# Patient Record
Sex: MALE | Race: WHITE | Employment: FULL TIME | URBAN - METROPOLITAN AREA
[De-identification: names, ages, dates, MRNs, and addresses within clinical notes are randomized per-mention and may not be internally consistent; named-entity substitution may affect disease eponyms.]

---

## 2017-11-17 ENCOUNTER — TELEPHONE (OUTPATIENT)
Dept: SLEEP MEDICINE | Age: 67
End: 2017-11-17

## 2018-02-12 ENCOUNTER — OFFICE VISIT (OUTPATIENT)
Dept: SLEEP MEDICINE | Age: 68
End: 2018-02-12

## 2018-02-12 VITALS
HEART RATE: 80 BPM | OXYGEN SATURATION: 96 % | TEMPERATURE: 96 F | BODY MASS INDEX: 34.58 KG/M2 | HEIGHT: 71 IN | SYSTOLIC BLOOD PRESSURE: 129 MMHG | DIASTOLIC BLOOD PRESSURE: 77 MMHG | WEIGHT: 247 LBS

## 2018-02-12 DIAGNOSIS — I10 ESSENTIAL HYPERTENSION: ICD-10-CM

## 2018-02-12 DIAGNOSIS — G47.33 OBSTRUCTIVE SLEEP APNEA SYNDROME: Primary | ICD-10-CM

## 2018-02-12 DIAGNOSIS — E66.9 OBESITY (BMI 30.0-34.9): ICD-10-CM

## 2018-02-12 PROBLEM — I49.9 ARRHYTHMIA: Status: ACTIVE | Noted: 2018-02-12

## 2018-02-12 RX ORDER — LANOLIN ALCOHOL/MO/W.PET/CERES
3 CREAM (GRAM) TOPICAL
COMMUNITY

## 2018-02-12 RX ORDER — GLUCOSAM/CHONDRO/HERB 149/HYAL 750-100 MG
1 TABLET ORAL DAILY
COMMUNITY

## 2018-02-12 RX ORDER — ESOMEPRAZOLE MAGNESIUM 40 MG/1
40 CAPSULE, DELAYED RELEASE ORAL DAILY
Status: ON HOLD | COMMUNITY
End: 2021-07-03

## 2018-02-12 RX ORDER — ASPIRIN 81 MG/1
81 TABLET ORAL DAILY
COMMUNITY

## 2018-02-12 RX ORDER — ATORVASTATIN CALCIUM 20 MG/1
20 TABLET, FILM COATED ORAL DAILY
COMMUNITY
End: 2021-03-04

## 2018-02-12 RX ORDER — ASCORBIC ACID 500 MG
1 CAPSULE, EXTENDED RELEASE ORAL DAILY
COMMUNITY
End: 2021-03-10

## 2018-02-12 RX ORDER — TAMSULOSIN HYDROCHLORIDE 0.4 MG/1
0.4 CAPSULE ORAL DAILY
COMMUNITY

## 2018-02-12 RX ORDER — GLUCOSAMINE SULFATE 1500 MG
1000 POWDER IN PACKET (EA) ORAL DAILY
COMMUNITY

## 2018-02-12 RX ORDER — GEMFIBROZIL 600 MG/1
600 TABLET, FILM COATED ORAL 2 TIMES DAILY
COMMUNITY

## 2018-02-12 RX ORDER — FLUTICASONE FUROATE AND VILANTEROL 100; 25 UG/1; UG/1
1 POWDER RESPIRATORY (INHALATION) DAILY
Status: ON HOLD | COMMUNITY
End: 2021-07-03

## 2018-02-12 RX ORDER — ALBUTEROL SULFATE 90 UG/1
1 AEROSOL, METERED RESPIRATORY (INHALATION)
COMMUNITY

## 2018-02-12 NOTE — PROGRESS NOTES
[address] [facility] [place]. [place], [address]  Tel.  [phone]  Fax. 100 [facility] 60  [place], [address] [facility]  Tel.  [phone]  Fax. [phone] [address] Tanya Og 33  Tel.  [phone]  Fax. [phone]         Subjective:      Nora Beaulieu is an 69 y.o. male referred for evaluation for a sleep disorder. He complains of rapid heart rate associated with lucid dreaming. Symptoms began several months ago, gradually improving since that time. He usually can fall asleep in 10-15 minutes. Family or house members note snoring. He denies falling asleep while driving. Nora Beaulieu does wake up frequently at night. He is not bothered by waking up too early and left unable to get back to sleep. He actually sleeps about 8 hours at night and wakes up about 3 times during the night. He does not work shifts:  .   Ilah Solid indicates he does not get too little sleep at night. His bedtime is 1130. He awakens at 0730. He does take naps. He takes 3 naps a week lasting 15 to 30, Minute(s). He has the following observed behaviors:  ;  .  Other remarks: vivid dreams  He is an  retired from [employer] but still doing contract work  [place] Sleepiness Score: 8      No Known Allergies      Current Outpatient Prescriptions:     tamsulosin (FLOMAX) 0.4 mg capsule, Take 0.4 mg by mouth daily. , Disp: , Rfl:     esomeprazole (NEXIUM) 40 mg capsule, Take  by mouth daily. , Disp: , Rfl:     aspirin delayed-release 81 mg tablet, Take  by mouth daily. , Disp: , Rfl:     melatonin 3 mg tablet, Take  by mouth., Disp: , Rfl:     albuterol (VENTOLIN HFA) 90 mcg/actuation inhaler, Take  by inhalation. , Disp: , Rfl:     fluticasone-vilanterol (BREO ELLIPTA) 100-25 mcg/dose inhaler, Take 1 Puff by inhalation daily. , Disp: , Rfl:     atorvastatin (LIPITOR) 20 mg tablet, Take  by mouth daily. , Disp: , Rfl:     gemfibrozil (LOPID) 600 mg tablet, Take 600 mg by mouth two (2) times a day., Disp: , Rfl:     cholecalciferol (VITAMIN D3) 1,000 unit cap, Take  by mouth daily. , Disp: , Rfl:     MULTIVIT-MIN/FA/LYCOPEN/LUTEIN (CENTRUM SILVER ULTRA MEN'S PO), Take  by mouth., Disp: , Rfl:     Omega-3-DHA-EPA-Fish Oil 1,000 mg (120 mg-180 mg) cap, Take  by mouth., Disp: , Rfl:     lecithin 1,200 mg cap, Take  by mouth., Disp: , Rfl:      He  has a past medical history of Arrhythmia and Hypertension. He  has no past surgical history on file. He family history includes Diabetes in his father and mother; Hypertension in his father and mother. He  reports that he has quit smoking. He quit after 5.00 years of use. He has never used smokeless tobacco. He reports that he drinks about 0.6 oz of alcohol per week      Review of Systems:  Constitutional: 30 pound weigh tloss  Eyes:  No blurred vision. CVS:  No significant chest pain  Pulm:  No significant shortness of breath  GI:  No significant nausea or vomiting  :  No significant nocturia  Musculoskeletal:  No significant joint pain at night  Skin:  No significant rashes  Neuro:  No significant dizziness   Psych:  No active mood issues    Sleep Review of Systems: notable for no difficulty falling asleep; infrequent awakenings at night;  regular dreaming noted; no nightmares ; no early morning headaches; no memory problems; no concentration issues; no history of any automobile or occupational accidents due to daytime drowsiness.       Objective:     Visit Vitals    /77    Pulse 80    Temp 96 °F (35.6 °C) (Oral)    Ht 5' 11\" (1.803 m)    Wt 247 lb (112 kg)    SpO2 96%    BMI 34.45 kg/m2         General:   Not in acute distress   Eyes:  Anicteric sclerae, no obvious strabismus   Nose:  No obvious nasal septum deviation    Oropharynx:   Class 3 oropharyngeal outlet, thick tongue base, enlarged and boggy uvula, low-lying soft palate, narrow tonsilo-pharyngeal pilars   Tonsils:   tonsils are present and normal   Neck: Neck circ. in \"inches\": 18; midline trachea   Chest/Lungs:  Equal lung expansion, clear on auscultation    CVS:  Normal rate, regular rhythm; no JVD   Skin:  Warm to touch; no obvious rashes   Neuro:  No focal deficits ; no obvious tremor    Psych:  Normal affect,  normal countenance;          Assessment:       ICD-10-CM ICD-9-CM    1. Obstructive sleep apnea syndrome G47.33 327.23 SLEEP STUDY UNATTENDED, 4 CHANNEL   2. Essential hypertension I10 401.9    3. Obesity (BMI 30.0-34. 9) E66.9 278.00          Plan:     * The patient currently has a Moderate Risk for having sleep apnea. STOP-BANG score 5.  * PSG was ordered for initial evaluation. I have reviewed the different types of sleep studies. Attended sleep studies and home sleep apnea tests. Home sleep testing tests only for the presence and severity of sleep apnea. he understands that if the HSAT does not provide reliable result(such as poor data/failed HSAT recording), he may have to repeat the HSAT or come in for an attended polysomnogram.   * He was provided information on sleep apnea including coresponding risk factors and the importance of proper treatment. * Counseling was provided regarding proper sleep hygiene and safe driving. *Treatment options for sleep apnea were reviewed. he is not against a trial of PAP if found to have significant sleep apnea. The treatment plan was reviewed with the patient in detail and reviewed with the patient and the lead technologist. he understands that the lead technologist will be calling him  with the results and assisting with the next step in the treatment plan as outlined today during the consultation with me. All of his questions were addressed. 2.  Hypertension - he continues on his current regimen. I have reviewed the relationship between hypertension as it relates to sleep-disordered breathing. 3.Obesity - I have counseled the patient regarding the benefits of weight loss.         Thank you for allowing us to participate in your patient's medical care. We'll keep you updated on these investigations.   Radha Guerrier MD  Diplomate in Sleep Medicine  [facility]

## 2018-02-12 NOTE — PATIENT INSTRUCTIONS
[address] [place]. [place], [address]  Tel.  [phone]  Fax. 100 [facility] 60  [place], 200 [address] [facility]  Tel.  [phone]  Fax. [phone] [address] Tanya [place]  Tel.  [phone]  Fax. [phone]     Sleep Apnea: After Your Visit  Your Care Instructions  Sleep apnea occurs when you frequently stop breathing for 10 seconds or longer during sleep. It can be mild to severe, based on the number of times per hour that you stop breathing or have slowed breathing. Blocked or narrowed airways in your nose, mouth, or throat can cause sleep apnea. Your airway can become blocked when your throat muscles and tongue relax during sleep. Sleep apnea is common, occurring in 1 out of 20 individuals. Individuals having any of the following characteristics should be evaluated and treated right away due to high risk and detrimental consequences from untreated sleep apnea:  1. Obesity  2. Congestive Heart failure  3. Atrial Fibrillation  4. Uncontrolled Hypertension  5. Type II Diabetes  6. Night-time Arrhythmias  7. Stroke  8. Pulmonary Hypertension  9. High-risk Driving Populations (pilots, truck drivers, etc.)  10. Patients Considering Weight-loss Surgery    How do you know you have sleep apnea? You probably have sleep apnea if you answer 'yes' to 3 or more of the following questions:  S - Have you been told that you Snore? T - Are you often Tired during the day? O - Has anyone Observed you stop breathing while sleeping? P- Do you have (or are being treated for) high blood Pressure? B - Are you obese (Body Mass Index > 35)? A - Is your Age 48years old or older? N - Is your Neck size greater than 16 inches? G - Are you male Gender? A sleep physician can prescribe a breathing device that prevents tissues in the throat from blocking your airway.  Or your doctor may recommend using a dental device (oral breathing device) to help keep your airway open. In some cases, surgery may be needed to remove enlarged tissues in the throat. Follow-up care is a key part of your treatment and safety. Be sure to make and go to all appointments, and call your doctor if you are having problems. It's also a good idea to know your test results and keep a list of the medicines you take. How can you care for yourself at home? · Lose weight, if needed. It may reduce the number of times you stop breathing or have slowed breathing. · Go to bed at the same time every night. · Sleep on your side. It may stop mild apnea. If you tend to roll onto your back, sew a pocket in the back of your pajama top. Put a tennis ball into the pocket, and stitch the pocket shut. This will help keep you from sleeping on your back. · Avoid alcohol and medicines such as sleeping pills and sedatives before bed. · Do not smoke. Smoking can make sleep apnea worse. If you need help quitting, talk to your doctor about stop-smoking programs and medicines. These can increase your chances of quitting for good. · Prop up the head of your bed 4 to 6 inches by putting bricks under the legs of the bed. · Treat breathing problems, such as a stuffy nose, caused by a cold or allergies. · Use a continuous positive airway pressure (CPAP) breathing machine if lifestyle changes do not help your apnea and your doctor recommends it. The machine keeps your airway from closing when you sleep. · If CPAP does not help you, ask your doctor whether you should try other breathing machines. A bilevel positive airway pressure machine has two types of air pressureâone for breathing in and one for breathing out. Another device raises or lowers air pressure as needed while you breathe. · If your nose feels dry or bleeds when using one of these machines, talk with your doctor about increasing moisture in the air. A humidifier may help.   · If your nose is runny or stuffy from using a breathing machine, talk with your doctor about using decongestants or a corticosteroid nasal spray. When should you call for help? Watch closely for changes in your health, and be sure to contact your doctor if:  · You still have sleep apnea even though you have made lifestyle changes. · You are thinking of trying a device such as CPAP. · You are having problems using a CPAP or similar machine. Where can you learn more? Go to [employer]. Enter D151 in the search box to learn more about \"Sleep Apnea: After Your Visit. \"   © 9445-8696 Healthwise, [employer]. Care instructions adapted under license by Bill Lang[employer] (which disclaims liability or warranty for this information). This care instruction is for use with your licensed healthcare professional. If you have questions about a medical condition or this instruction, always ask your healthcare professional. Isabellbernarda Sails any warranty or liability for your use of this information. PROPER SLEEP HYGIENE    What to avoid  · Do not have drinks with caffeine, such as coffee or black tea, for 8 hours before bed. · Do not smoke or use other types of tobacco near bedtime. Nicotine is a stimulant and can keep you awake. · Avoid drinking alcohol late in the evening, because it can cause you to wake in the middle of the night. · Do not eat a big meal close to bedtime. If you are hungry, eat a light snack. · Do not drink a lot of water close to bedtime, because the need to urinate may wake you up during the night. · Do not read or watch TV in bed. Use the bed only for sleeping and sexual activity. What to try  · Go to bed at the same time every night, and wake up at the same time every morning. Do not take naps during the day. · Keep your bedroom quiet, dark, and cool. · Get regular exercise, but not within 3 to 4 hours of your bedtime. .  · Sleep on a comfortable pillow and mattress.   · If watching the clock makes you anxious, turn it facing away from you so you cannot see the time. · If you worry when you lie down, start a worry book. Well before bedtime, write down your worries, and then set the book and your concerns aside. · Try meditation or other relaxation techniques before you go to bed. · If you cannot fall asleep, get up and go to another room until you feel sleepy. Do something relaxing. Repeat your bedtime routine before you go to bed again. · Make your house quiet and calm about an hour before bedtime. Turn down the lights, turn off the TV, log off the computer, and turn down the volume on music. This can help you relax after a busy day. Drowsy Driving  The [address] [place] [address] Road Traffic Safety Administration cites drowsiness as a causing factor in more than 376,975 police reported crashes annually, resulting in 76,000 injuries and 1,500 deaths. Other surveys suggest 55% of people polled have driven while drowsy in the past year, 23% had fallen asleep but not crashed, 3% crashed, and 2% had and accident due to drowsy driving. Who is at risk? Young Drivers: One study of drowsy driving accidents states that 55% of the drivers were under 25 years. Of those, 75% were male. Shift Workers and Travelers: People who work overnight or travel across time zones frequently are at higher risk of experiencing Circadian Rhythm Disorders. They are trying to work and function when their body is programed to sleep. Sleep Deprived: Lack of sleep has a serious impact on your ability to pay attention or focus on a task. Consistently getting less than the average of 8 hours your body needs creates partial or cumulative sleep deprivation. Untreated Sleep Disorders: Sleep Apnea, Narcolepsy, R.L.S., and other sleep disorders (untreated) prevent a person from getting enough restful sleep. This leads to excessive daytime sleepiness and increases the risk for drowsy driving accidents by up to 7 times.   Medications / Alcohol: Even over the counter medications can cause drowsiness. Medications that impair a drivers attention should have a warning label. Alcohol naturally makes you sleepy and on its own can cause accidents. Combined with excessive drowsiness its effects are amplified. Signs of Drowsy Driving:   * You don't remember driving the last few miles   * You may drift out of your viry   * You are unable to focus and your thoughts wander   * You may yawn more often than normal   * You have difficulty keeping your eyes open / nodding off   * Missing traffic signs, speeding, or tailgating  Prevention-   Good sleep hygiene, lifestyle and behavioral choices have the most impact on drowsy driving. There is no substitute for sleep and the average person requires 8 hours nightly. If you find yourself driving drowsy, stop and sleep. Consider the sleep hygiene tips provided during your visit as well. Medication Refill Policy: Refills for all medications require 1 week advance notice. Please have your pharmacy fax a refill request. We are unable to fax, or call in \"controled substance\" medications and you will need to pick these prescriptions up from our office. Ellie Activation    Thank you for requesting access to Ellie. Please follow the instructions below to securely access and download your online medical record. Ellie allows you to send messages to your doctor, view your test results, renew your prescriptions, schedule appointments, and more. How Do I Sign Up? 1. In your internet browser, go to [email or web address]. 2. Click on the First Time User? Click Here link in the Sign In box. You will see the New Member Sign Up page. 3. Enter your Ellie Access Code exactly as it appears below. You will not need to use this code after youve completed the sign-up process. If you do not sign up before the expiration date, you must request a new code.     Ellie Access Code: [email or web address]  Expires: 5/13/2018  2:10 PM (This is the date your [employer]logics access code will )    4. Enter the last four digits of your Social Security Number (xxxx) and Date of Birth (mm/dd/yyyy) as indicated and click Submit. You will be taken to the next sign-up page. 5. Create a [employer] ID. This will be your Edlogics login ID and cannot be changed, so think of one that is secure and easy to remember. 6. Create a Edlogics password. You can change your password at any time. 7. Enter your Password Reset Question and Answer. This can be used at a later time if you forget your password. 8. Enter your e-mail address. You will receive e-mail notification when new information is available in [address] 9. Click Sign Up. You can now view and download portions of your medical record. 10. Click the Download Summary menu link to download a portable copy of your medical information. Additional Information    If you have questions, please call [phone]. Remember, Edlogics is NOT to be used for urgent needs. For medical emergencies, dial 911.

## 2018-02-12 NOTE — Clinical Note
Thank you for the referral.  I will keep you informed of his progress.  [address], [place]

## 2018-02-20 ENCOUNTER — DOCUMENTATION ONLY (OUTPATIENT)
Dept: SLEEP MEDICINE | Age: 68
End: 2018-02-20

## 2018-02-21 ENCOUNTER — TELEPHONE (OUTPATIENT)
Dept: SLEEP MEDICINE | Age: 68
End: 2018-02-21

## 2018-02-21 NOTE — TELEPHONE ENCOUNTER
HSAT Returned    Date of Study: 2/19/2018    The following information was gathered from the patients study log:    · Lights off: 11:30 PM  · Estimated sleep onset: 12 AM    · Awakened a total of 4 times  · The patient felt they slept 7.5 hours  · Patient took nothing before starting the test  · Sleep quality was worse compared to a usual nights sleep. Further information provided: I had problems keeping the canula up my nose. Post nasal drip.

## 2018-03-01 NOTE — TELEPHONE ENCOUNTER
Patient returned technologist's call regarding sleep study results and can be reached at [phone] today and tomorrow.

## 2018-03-09 ENCOUNTER — TELEPHONE (OUTPATIENT)
Dept: SLEEP MEDICINE | Age: 68
End: 2018-03-09

## 2018-03-15 ENCOUNTER — DOCUMENTATION ONLY (OUTPATIENT)
Dept: SLEEP MEDICINE | Age: 68
End: 2018-03-15

## 2018-03-16 ENCOUNTER — TELEPHONE (OUTPATIENT)
Dept: SLEEP MEDICINE | Age: 68
End: 2018-03-16

## 2018-03-16 NOTE — TELEPHONE ENCOUNTER
HSAT Returned    Date of Study: 3/14/2018    The following information was gathered from the patients study log:    · Lights off: 11:15 PM  · Estimated sleep onset: 11:30 PM    · Awakened a total of 3 times  · The patient felt they slept 6.5 hours  · Patient took nothing before starting the test  · Sleep quality was worse compared to a usual nights sleep.     Further information provided: N/A

## 2018-04-03 ENCOUNTER — HOSPITAL ENCOUNTER (OUTPATIENT)
Dept: SLEEP MEDICINE | Age: 68
Discharge: HOME | End: 2018-04-03
Payer: MEDICARE

## 2018-04-03 ENCOUNTER — OFFICE VISIT (OUTPATIENT)
Dept: SLEEP MEDICINE | Age: 68
End: 2018-04-03

## 2018-04-03 DIAGNOSIS — G47.33 OBSTRUCTIVE SLEEP APNEA SYNDROME: Primary | ICD-10-CM

## 2018-04-03 PROCEDURE — 95806 SLEEP STUDY UNATT&RESP EFFT: CPT

## 2018-04-03 NOTE — PROGRESS NOTES
HSAT  - ED [facility]    · Instruction forms and documentation were reviewed and signed.

## 2018-04-04 ENCOUNTER — TELEPHONE (OUTPATIENT)
Dept: SLEEP MEDICINE | Age: 68
End: 2018-04-04

## 2018-04-04 ENCOUNTER — DOCUMENTATION ONLY (OUTPATIENT)
Dept: SLEEP MEDICINE | Age: 68
End: 2018-04-04

## 2018-04-04 DIAGNOSIS — G47.33 OBSTRUCTIVE SLEEP APNEA (ADULT) (PEDIATRIC): Primary | ICD-10-CM

## 2018-04-04 DIAGNOSIS — G47.34 NOCTURNAL HYPOXEMIA: ICD-10-CM

## 2018-04-04 NOTE — TELEPHONE ENCOUNTER
Results of sleep study in [employer] to convey results to patient    HSAT(#3) showed significant sleep apnea with severe oxygen desaturations. With this result, I think it best for him to come in for an attended PAP titration to ensure the pressures on the PAP adequately control his oxygen levels.  He may need a more advanced mode of therapy (bipap)    Order attached  He will be called with results

## 2018-04-04 NOTE — TELEPHONE ENCOUNTER
HSAT Returned    Date of Study: 4/3/2018    The following information was gathered from the patients study log:    · Lights off: 11 PM  · Estimated sleep onset: 12:30 PM    · Awakened a total of 5 times  · The patient felt they slept 6 hours  · Patient took nothing before starting the test  · Sleep quality was worse compared to a usual nights sleep. Further information provided: Bronchitis had a definite impact.

## 2018-04-09 NOTE — TELEPHONE ENCOUNTER
Reviewed sleep study results with patient. He expressed understanding and is willing to proceed with a PAP Titration Study.

## 2019-02-04 RX ORDER — PROPRANOLOL HYDROCHLORIDE 120 MG/1
120 CAPSULE, EXTENDED RELEASE ORAL DAILY
Status: ON HOLD | COMMUNITY
End: 2021-07-03

## 2019-02-05 ENCOUNTER — HOSPITAL ENCOUNTER (OUTPATIENT)
Age: 69
Setting detail: OUTPATIENT SURGERY
Discharge: HOME | End: 2019-02-05
Attending: INTERNAL MEDICINE | Admitting: INTERNAL MEDICINE
Payer: MEDICARE

## 2019-02-05 ENCOUNTER — ANESTHESIA (OUTPATIENT)
Dept: ENDOSCOPY | Age: 69
End: 2019-02-05
Payer: MEDICARE

## 2019-02-05 ENCOUNTER — ANESTHESIA EVENT (OUTPATIENT)
Dept: ENDOSCOPY | Age: 69
End: 2019-02-05
Payer: MEDICARE

## 2019-02-05 VITALS
DIASTOLIC BLOOD PRESSURE: 76 MMHG | HEART RATE: 69 BPM | TEMPERATURE: 97.7 F | BODY MASS INDEX: 33.74 KG/M2 | HEIGHT: 71 IN | SYSTOLIC BLOOD PRESSURE: 155 MMHG | WEIGHT: 241 LBS | OXYGEN SATURATION: 96 % | RESPIRATION RATE: 14 BRPM

## 2019-02-05 PROCEDURE — 76040000019: Performed by: INTERNAL MEDICINE

## 2019-02-05 PROCEDURE — 77030009426 HC FCPS BIOP ENDOSC BSC -B: Performed by: INTERNAL MEDICINE

## 2019-02-05 PROCEDURE — 77030013992 HC SNR POLYP ENDOSC BSC -B: Performed by: INTERNAL MEDICINE

## 2019-02-05 PROCEDURE — 74011250637 HC RX REV CODE- 250/637: Performed by: INTERNAL MEDICINE

## 2019-02-05 PROCEDURE — 76060000031 HC ANESTHESIA FIRST 0.5 HR: Performed by: INTERNAL MEDICINE

## 2019-02-05 PROCEDURE — 74011250636 HC RX REV CODE- 250/636: Performed by: INTERNAL MEDICINE

## 2019-02-05 PROCEDURE — 88305 TISSUE EXAM BY PATHOLOGIST: CPT

## 2019-02-05 PROCEDURE — 74011250636 HC RX REV CODE- 250/636

## 2019-02-05 RX ORDER — SODIUM CHLORIDE 0.9 % (FLUSH) 0.9 %
5-40 SYRINGE (ML) INJECTION EVERY 8 HOURS
Status: DISCONTINUED | OUTPATIENT
Start: 2019-02-05 | End: 2019-02-05 | Stop reason: HOSPADM

## 2019-02-05 RX ORDER — DEXTROMETHORPHAN/PSEUDOEPHED 2.5-7.5/.8
1.2 DROPS ORAL
Status: DISCONTINUED | OUTPATIENT
Start: 2019-02-05 | End: 2019-02-05 | Stop reason: HOSPADM

## 2019-02-05 RX ORDER — SODIUM CHLORIDE 0.9 % (FLUSH) 0.9 %
5-40 SYRINGE (ML) INJECTION AS NEEDED
Status: DISCONTINUED | OUTPATIENT
Start: 2019-02-05 | End: 2019-02-05 | Stop reason: HOSPADM

## 2019-02-05 RX ORDER — FLUMAZENIL 0.1 MG/ML
0.2 INJECTION INTRAVENOUS
Status: DISCONTINUED | OUTPATIENT
Start: 2019-02-05 | End: 2019-02-05 | Stop reason: HOSPADM

## 2019-02-05 RX ORDER — SODIUM CHLORIDE 9 MG/ML
100 INJECTION, SOLUTION INTRAVENOUS CONTINUOUS
Status: DISCONTINUED | OUTPATIENT
Start: 2019-02-05 | End: 2019-02-05 | Stop reason: HOSPADM

## 2019-02-05 RX ORDER — PROPOFOL 10 MG/ML
INJECTION, EMULSION INTRAVENOUS AS NEEDED
Status: DISCONTINUED | OUTPATIENT
Start: 2019-02-05 | End: 2019-02-05 | Stop reason: HOSPADM

## 2019-02-05 RX ORDER — LIDOCAINE HYDROCHLORIDE 20 MG/ML
INJECTION, SOLUTION EPIDURAL; INFILTRATION; INTRACAUDAL; PERINEURAL AS NEEDED
Status: DISCONTINUED | OUTPATIENT
Start: 2019-02-05 | End: 2019-02-05 | Stop reason: HOSPADM

## 2019-02-05 RX ORDER — FENTANYL CITRATE 50 UG/ML
25 INJECTION, SOLUTION INTRAMUSCULAR; INTRAVENOUS
Status: DISCONTINUED | OUTPATIENT
Start: 2019-02-05 | End: 2019-02-05 | Stop reason: HOSPADM

## 2019-02-05 RX ORDER — MIDAZOLAM HYDROCHLORIDE 1 MG/ML
1-2 INJECTION, SOLUTION INTRAMUSCULAR; INTRAVENOUS
Status: DISCONTINUED | OUTPATIENT
Start: 2019-02-05 | End: 2019-02-05 | Stop reason: HOSPADM

## 2019-02-05 RX ORDER — ATROPINE SULFATE 0.1 MG/ML
0.5 INJECTION INTRAVENOUS
Status: DISCONTINUED | OUTPATIENT
Start: 2019-02-05 | End: 2019-02-05 | Stop reason: HOSPADM

## 2019-02-05 RX ADMIN — SIMETHICONE 80 MG: 20 SUSPENSION/ DROPS ORAL at 15:18

## 2019-02-05 RX ADMIN — LIDOCAINE HYDROCHLORIDE 40 MG: 20 INJECTION, SOLUTION EPIDURAL; INFILTRATION; INTRACAUDAL; PERINEURAL at 15:12

## 2019-02-05 RX ADMIN — PROPOFOL 250 MG: 10 INJECTION, EMULSION INTRAVENOUS at 15:32

## 2019-02-05 RX ADMIN — SODIUM CHLORIDE 100 ML/HR: 900 INJECTION, SOLUTION INTRAVENOUS at 14:56

## 2019-02-05 NOTE — DISCHARGE INSTRUCTIONS
Nicole Montenegro MD  [facility], [address]  [place], 200 [facility]  [phone]  www. [email or web address]    Arlene Lucero  [id number]  1950    COLON DISCHARGE INSTRUCTIONS  Discomfort:  Redness at IV site- apply warm compress to area; if redness or soreness persist- contact your physician  There may be a slight amount of blood passed from the rectum  Gaseous discomfort- walking, belching will help relieve any discomfort  You may not operate a vehicle for 12 hours  You may not engage in an occupation involving machinery or appliances for rest of today  You may not drink alcoholic beverages for at least 12 hours  Avoid making any critical decisions for at least 24 hour  DIET:   High fiber diet. - however -  remember your colon is empty and a heavy meal will produce gas. Avoid these foods:  vegetables, fried / greasy foods, carbonated drinks for today      ACTIVITY:  You may resume your normal daily activities it is recommended that you spend the remainder of the day resting -  avoid any strenuous activity. CALL M.D. ANY SIGN OF:   Increasing pain, nausea, vomiting  Abdominal distension (swelling)  New increased bleeding (oral or rectal)  Fever (chills)  Pain in chest area  Bloody discharge from nose or mouth  Shortness of breath     COLONOSCOPY FINDINGS:  Your colonoscopy showed: a large rectal polyp which was removed. Also have some diverticulosis and internal hemorrhoids. Follow-up Instructions:   Call Dr. Nicole Montenegro if any questions or problems. Telephone # [phone]  Dr. Ada Horton office will notify you of the biopsy results within 7 to 10 days. Should have a repeat colonoscopy in 1 year.

## 2019-02-05 NOTE — ROUTINE PROCESS
Laury Kirt  1950  [id number]    Situation:  Verbal report received from: Leticia Salmon RN  Procedure: Procedure(s):  COLONOSCOPY  ENDOSCOPIC POLYPECTOMY  COLON BIOPSY    Background:    Preoperative diagnosis: RECTAL BLEEDING, ANEMIA  Postoperative diagnosis: diverticulosis, rectal polyp, hemorrhoids    :  Dr. Dc Blood  Assistant(s): Endoscopy Technician-1: Jose Eduardo Moore  Endoscopy RN-1: Arnoldo Monge RN    Specimens:   ID Type Source Tests Collected by Time Destination   1 : rectal polyp  Preservative Rectum  Burton Manning MD 2/5/2019 1529 Pathology   2 : rectal bx Preservative Rectum  Burton Manning MD 2/5/2019 1531 Pathology     H. Pylori  no    Assessment:  Intra-procedure medications     Anesthesia gave intra-procedure sedation and medications, see anesthesia flow sheet yes    Intravenous fluids: NS@ KVO     Vital signs stable     Abdominal assessment: round and soft     Recommendation:  Discharge patient per MD order.   Family or Friend Blake Coffee- wife  Permission to share finding with family or friend yes

## 2019-02-05 NOTE — ANESTHESIA PREPROCEDURE EVALUATION
Anesthetic History Review of Systems / Medical History Patient summary reviewed, nursing notes reviewed and pertinent labs reviewed Pulmonary COPD: mild Comments: Former smoker Neuro/Psych Within defined limits Cardiovascular Hypertension Dysrhythmias : SVT Exercise tolerance: >4 METS Comments: Denies recent problems with heart. GI/Hepatic/Renal 
  
GERD Comments: Rectal Bleeding Endo/Other Hypothyroidism Obesity and anemia Pertinent negatives: No morbid obesity Other Findings Physical Exam 
 
Airway Mallampati: III 
TM Distance: 4 - 6 cm Neck ROM: normal range of motion Mouth opening: Normal 
 
 Cardiovascular Regular rate and rhythm,  S1 and S2 normal,  no murmur, click, rub, or gallop Dental 
 
Dentition: Full upper dentures and Full lower dentures Pulmonary Breath sounds clear to auscultation Abdominal 
GI exam deferred Other Findings Anesthetic Plan ASA: 3 Anesthesia type: total IV anesthesia Induction: Intravenous Anesthetic plan and risks discussed with: Patient Took beta blker on schedule last night

## 2019-02-05 NOTE — PROCEDURES
[facility]                  Colonoscopy Operative Report    2/5/2019      Najma Roy  [id number]  1950    Procedure Type:   Colonoscopy --screening     Indications:    Screening colonoscopy     Pre-operative Diagnosis: see indication above    Post-operative Diagnosis:  See findings below    :  Celina Barriga MD    Referring Provider: Manpreet Ignacio MD      Sedation:  MAC anesthesia Propofol    Pre-Procedural Exam:      Airway: clear,  No airway problems anticipated  Heart: RRR, without gallops or rubs  Lungs: clear bilaterally without wheezes, crackles, or rhonchi  Abdomen: soft, nontender, nondistended, bowel sounds present  Mental Status: awake, alert and oriented to person, place and time     Procedure Details:  After informed consent was obtained with all risks and benefits of procedure explained and preoperative exam completed, the patient was taken to the endoscopy suite and placed in the left lateral decubitus position. Upon sequential sedation as per above, a digital rectal exam was performed . The Olympus videocolonoscope  was inserted in the rectum and carefully advanced to the cecum, which was identified by the ileocecal valve and appendiceal orifice. The cecum was identified by the ileocecal valve and appendiceal orifice. The quality of preparation was good. The colonoscope was slowly withdrawn with careful evaluation between folds. Retroflexion in the rectum was completed demonstrating internal hemorrhoids. Findings:   Rectum: Grade 1 internal hemorrhoid(s);  2.5 cm polyp on short stalk in distal rectum removed in one piece with hot snare. Edematous mucosa adjacent to polyp biopsied. Sigmoid:     - Diverticulosis  Descending Colon:     - Diverticulosis  Transverse Colon: normal  Ascending Colon: normal  Cecum: normal  Terminal Ileum: not intubated      Specimen Removed:  rectal polyp    Complications: None.      EBL: None.    Impression:    1. Two and one half cm rectal polyp removed and surrounding mucosa biopsied  2. Left sided diverticulosis  3. Internal hemorrhoids    Recommendations: --Await pathology. , -Repeat colonoscopy in 1 year. High fiber diet. Resume normal medication(s). Discharge Disposition:  Home in the company of a  when able to ambulate. Silvina Banegas MD    2/5/2019     P. Ozzie Carrel, MD  [facility], [address] [place] [address]  [phone]  [email or web address]

## 2019-02-05 NOTE — H&P
Malia Olivares MD  [facility], [address], [address]  [phone]  [email or web address]    Gastroenterology Outpatient History and Physical    Patient: Bryanna Delma    Physician: Mahesh Pollard MD    Vital Signs: Blood pressure (!) 167/98, pulse 74, temperature 98 °F (36.7 °C), resp. rate 17, height 5' 11\" (1.803 m), weight 109.3 kg (241 lb), SpO2 97 %. Allergies: No Known Allergies    Chief Complaint: Screening colonoscopy    History of Present Illness: Here for a screening colonoscopy. Never has had a colonoscopy . Currently has no GI symptoms. No FH of colon cancer or polyps. History:  Past Medical History:   Diagnosis Date    Arrhythmia     SVT    Chronic obstructive pulmonary disease (HCC)     mild    GERD (gastroesophageal reflux disease)     Hypertension     Thyroid disease     benign thyroid growth, checked annually by Dr. Ruddy Cutler      Past Surgical History:   Procedure Laterality Date    HX HEENT Bilateral     cataract extraction      Social History     Socioeconomic History    Marital status:      Spouse name: Not on file    Number of children: Not on file    Years of education: Not on file    Highest education level: Not on file   Tobacco Use    Smoking status: Former Smoker     Years: 5.00    Smokeless tobacco: Never Used   Substance and Sexual Activity    Alcohol use: Yes     Alcohol/week: 4.2 oz     Types: 7 Glasses of wine per week    Drug use: No      Family History   Problem Relation Age of Onset    Hypertension Mother     Heart Disease Mother     Hypertension Father     Diabetes Father       Patient Active Problem List   Diagnosis Code    Hypertension I10    Arrhythmia I49.9    Obesity (BMI 30.0-34. 9) E66.9       Medications:   Prior to Admission medications    Medication Sig Start Date End Date Taking?  Authorizing Provider   propranolol LA (INDERAL LA) 120 mg SR capsule Take 120 mg by mouth daily. Yes Provider, Historical   tamsulosin (FLOMAX) 0.4 mg capsule Take 0.4 mg by mouth daily. Yes Provider, Historical   esomeprazole (NEXIUM) 40 mg capsule Take 40 mg by mouth daily. Yes Provider, Historical   aspirin delayed-release 81 mg tablet Take 81 mg by mouth daily. Yes Provider, Historical   melatonin 3 mg tablet Take 3 mg by mouth nightly. Yes Provider, Historical   albuterol (VENTOLIN HFA) 90 mcg/actuation inhaler Take 1 Puff by inhalation every four (4) hours as needed. Yes Provider, Historical   fluticasone-vilanterol (BREO ELLIPTA) 100-25 mcg/dose inhaler Take 1 Puff by inhalation daily. Yes Provider, Historical   atorvastatin (LIPITOR) 20 mg tablet Take 20 mg by mouth daily. Yes Provider, Historical   gemfibrozil (LOPID) 600 mg tablet Take 600 mg by mouth two (2) times a day. Yes Provider, Historical   cholecalciferol (VITAMIN D3) 1,000 unit cap Take 1,000 Units by mouth daily. Yes Provider, Historical   MULTIVIT-MIN/FA/LYCOPEN/LUTEIN (CENTRUM SILVER ULTRA MEN'S PO) Take 1 Tab by mouth daily. Yes Provider, Historical   Omega-3-DHA-EPA-Fish Oil 1,000 mg (120 mg-180 mg) cap Take 1 Cap by mouth daily. Yes Provider, Historical   lecithin 1,200 mg cap Take 1 Cap by mouth daily.    Yes Provider, Historical       Physical Exam:     General: well developed, well nourished   HEENT: unremarkable   Heart: regular rhythm no mumur    Lungs: clear   Abdominal:  benign   Neurological: unremarkable   Extremities: no edema     Findings/Diagnosis: Screening colonoscopy    Plan of Care/Planned Procedure: Colonoscopy with monitored anesthesia care sedation    Signed:  Mallory Ewing MD 2/5/2019

## 2019-02-05 NOTE — ANESTHESIA POSTPROCEDURE EVALUATION
Procedure(s): 
COLONOSCOPY 
ENDOSCOPIC POLYPECTOMY 
COLON BIOPSY. Anesthesia Post Evaluation Patient location during evaluation: PACU Note status: Adequate. Level of consciousness: responsive to verbal stimuli and sleepy but conscious Pain management: satisfactory to patient Airway patency: patent Anesthetic complications: no 
Cardiovascular status: acceptable Respiratory status: acceptable Hydration status: acceptable Comments: +Post-Anesthesia Evaluation and Assessment Patient: Vicente Marroquin MRN: [id number]  SSN: [id number] YOB: 1950  Age: 77 y.o. Sex: male Cardiovascular Function/Vital Signs /62   Pulse 81   Temp 36.7 °C (98 °F)   Resp 16   Ht 5' 11\" (1.803 m)   Wt 109.3 kg (241 lb)   SpO2 98%   BMI 33.61 kg/m² Patient is status post Procedure(s): 
COLONOSCOPY 
ENDOSCOPIC POLYPECTOMY 
COLON BIOPSY. Nausea/Vomiting: Controlled. Postoperative hydration reviewed and adequate. Pain: 
Pain Scale 1: Numeric (0 - 10) (02/05/19 1540) Pain Intensity 1: 0 (02/05/19 1540) Managed. Neurological Status: At baseline. Mental Status and Level of Consciousness: Arousable. Pulmonary Status:  
O2 Device: Room air (02/05/19 1540) Adequate oxygenation and airway patent. Complications related to anesthesia: None Post-anesthesia assessment completed. No concerns. Signed By: Dav Garner DO  
 2/5/2019 Post anesthesia nausea and vomiting:  controlled Visit Vitals /62 Pulse 81 Temp 36.7 °C (98 °F) Resp 16 Ht 5' 11\" (1.803 m) Wt 109.3 kg (241 lb) SpO2 98% BMI 33.61 kg/m²

## 2020-01-26 ENCOUNTER — APPOINTMENT (OUTPATIENT)
Dept: CT IMAGING | Age: 70
End: 2020-01-26
Attending: EMERGENCY MEDICINE
Payer: MEDICARE

## 2020-01-26 ENCOUNTER — HOSPITAL ENCOUNTER (EMERGENCY)
Age: 70
Discharge: HOME | End: 2020-01-27
Attending: EMERGENCY MEDICINE
Payer: MEDICARE

## 2020-01-26 ENCOUNTER — APPOINTMENT (OUTPATIENT)
Dept: GENERAL RADIOLOGY | Age: 70
End: 2020-01-26
Attending: EMERGENCY MEDICINE
Payer: MEDICARE

## 2020-01-26 DIAGNOSIS — D72.829 LEUKOCYTOSIS, UNSPECIFIED TYPE: ICD-10-CM

## 2020-01-26 DIAGNOSIS — E87.1 ACUTE HYPONATREMIA: ICD-10-CM

## 2020-01-26 DIAGNOSIS — I10 ACCELERATED HYPERTENSION: ICD-10-CM

## 2020-01-26 DIAGNOSIS — R10.9 ACUTE ABDOMINAL PAIN: Primary | ICD-10-CM

## 2020-01-26 DIAGNOSIS — R68.89 FLU-LIKE SYMPTOMS: ICD-10-CM

## 2020-01-26 LAB
ALBUMIN SERPL-MCNC: 3.9 G/DL (ref 3.5–5)
ALBUMIN/GLOB SERPL: 0.9 {RATIO} (ref 1.1–2.2)
ALP SERPL-CCNC: 64 U/L (ref 45–117)
ALT SERPL-CCNC: 27 U/L (ref 12–78)
ANION GAP SERPL CALC-SCNC: 5 MMOL/L (ref 5–15)
APPEARANCE UR: CLEAR
AST SERPL-CCNC: 23 U/L (ref 15–37)
BACTERIA URNS QL MICRO: NEGATIVE /HPF
BASOPHILS # BLD: 0 K/UL (ref 0–0.1)
BASOPHILS NFR BLD: 0 % (ref 0–1)
BILIRUB SERPL-MCNC: 0.4 MG/DL (ref 0.2–1)
BILIRUB UR QL: NEGATIVE
BUN SERPL-MCNC: 18 MG/DL (ref 6–20)
BUN/CREAT SERPL: 17 (ref 12–20)
CALCIUM SERPL-MCNC: 9.4 MG/DL (ref 8.5–10.1)
CHLORIDE SERPL-SCNC: 98 MMOL/L (ref 97–108)
CO2 SERPL-SCNC: 31 MMOL/L (ref 21–32)
COLOR UR: ABNORMAL
CREAT SERPL-MCNC: 1.06 MG/DL (ref 0.7–1.3)
DIFFERENTIAL METHOD BLD: ABNORMAL
EOSINOPHIL # BLD: 0 K/UL (ref 0–0.4)
EOSINOPHIL NFR BLD: 0 % (ref 0–7)
EPITH CASTS URNS QL MICRO: ABNORMAL /LPF
ERYTHROCYTE [DISTWIDTH] IN BLOOD BY AUTOMATED COUNT: 13.2 % (ref 11.5–14.5)
FLUAV AG NPH QL IA: NEGATIVE
FLUBV AG NOSE QL IA: NEGATIVE
GLOBULIN SER CALC-MCNC: 4.2 G/DL (ref 2–4)
GLUCOSE SERPL-MCNC: 121 MG/DL (ref 65–100)
GLUCOSE UR STRIP.AUTO-MCNC: NEGATIVE MG/DL
HCT VFR BLD AUTO: 44.1 % (ref 36.6–50.3)
HGB BLD-MCNC: 14.3 G/DL (ref 12.1–17)
HGB UR QL STRIP: ABNORMAL
HYALINE CASTS URNS QL MICRO: ABNORMAL /LPF (ref 0–5)
IMM GRANULOCYTES # BLD AUTO: 0.2 K/UL (ref 0–0.04)
IMM GRANULOCYTES NFR BLD AUTO: 1 % (ref 0–0.5)
KETONES UR QL STRIP.AUTO: 40 MG/DL
LACTATE SERPL-SCNC: 1.4 MMOL/L (ref 0.4–2)
LEUKOCYTE ESTERASE UR QL STRIP.AUTO: ABNORMAL
LYMPHOCYTES # BLD: 0.8 K/UL (ref 0.8–3.5)
LYMPHOCYTES NFR BLD: 5 % (ref 12–49)
MCH RBC QN AUTO: 28.7 PG (ref 26–34)
MCHC RBC AUTO-ENTMCNC: 32.4 G/DL (ref 30–36.5)
MCV RBC AUTO: 88.6 FL (ref 80–99)
MONOCYTES # BLD: 0.9 K/UL (ref 0–1)
MONOCYTES NFR BLD: 6 % (ref 5–13)
NEUTS SEG # BLD: 13.1 K/UL (ref 1.8–8)
NEUTS SEG NFR BLD: 88 % (ref 32–75)
NITRITE UR QL STRIP.AUTO: NEGATIVE
NRBC # BLD: 0 K/UL (ref 0–0.01)
NRBC BLD-RTO: 0 PER 100 WBC
PH UR STRIP: 6.5 [PH] (ref 5–8)
PLATELET # BLD AUTO: 155 K/UL (ref 150–400)
PMV BLD AUTO: 11.1 FL (ref 8.9–12.9)
POTASSIUM SERPL-SCNC: 4.1 MMOL/L (ref 3.5–5.1)
PROT SERPL-MCNC: 8.1 G/DL (ref 6.4–8.2)
PROT UR STRIP-MCNC: 30 MG/DL
RBC # BLD AUTO: 4.98 M/UL (ref 4.1–5.7)
RBC #/AREA URNS HPF: ABNORMAL /HPF (ref 0–5)
RBC MORPH BLD: ABNORMAL
SODIUM SERPL-SCNC: 134 MMOL/L (ref 136–145)
SP GR UR REFRACTOMETRY: 1.02 (ref 1–1.03)
TROPONIN I SERPL-MCNC: <0.05 NG/ML
UA: UC IF INDICATED,UAUC: ABNORMAL
UROBILINOGEN UR QL STRIP.AUTO: 0.2 EU/DL (ref 0.2–1)
WBC # BLD AUTO: 15 K/UL (ref 4.1–11.1)
WBC URNS QL MICRO: ABNORMAL /HPF (ref 0–4)

## 2020-01-26 PROCEDURE — 87804 INFLUENZA ASSAY W/OPTIC: CPT

## 2020-01-26 PROCEDURE — 96365 THER/PROPH/DIAG IV INF INIT: CPT

## 2020-01-26 PROCEDURE — 81001 URINALYSIS AUTO W/SCOPE: CPT

## 2020-01-26 PROCEDURE — 80053 COMPREHEN METABOLIC PANEL: CPT

## 2020-01-26 PROCEDURE — 74011250636 HC RX REV CODE- 250/636: Performed by: EMERGENCY MEDICINE

## 2020-01-26 PROCEDURE — 71046 X-RAY EXAM CHEST 2 VIEWS: CPT

## 2020-01-26 PROCEDURE — 74177 CT ABD & PELVIS W/CONTRAST: CPT

## 2020-01-26 PROCEDURE — 74011250637 HC RX REV CODE- 250/637: Performed by: EMERGENCY MEDICINE

## 2020-01-26 PROCEDURE — 84484 ASSAY OF TROPONIN QUANT: CPT

## 2020-01-26 PROCEDURE — 96375 TX/PRO/DX INJ NEW DRUG ADDON: CPT

## 2020-01-26 PROCEDURE — 74011636320 HC RX REV CODE- 636/320: Performed by: EMERGENCY MEDICINE

## 2020-01-26 PROCEDURE — 93005 ELECTROCARDIOGRAM TRACING: CPT

## 2020-01-26 PROCEDURE — 83605 ASSAY OF LACTIC ACID: CPT

## 2020-01-26 PROCEDURE — 87040 BLOOD CULTURE FOR BACTERIA: CPT

## 2020-01-26 PROCEDURE — 99285 EMERGENCY DEPT VISIT HI MDM: CPT

## 2020-01-26 PROCEDURE — 85025 COMPLETE CBC W/AUTO DIFF WBC: CPT

## 2020-01-26 PROCEDURE — 36415 COLL VENOUS BLD VENIPUNCTURE: CPT

## 2020-01-26 PROCEDURE — 96366 THER/PROPH/DIAG IV INF ADDON: CPT

## 2020-01-26 RX ORDER — ACETAMINOPHEN 500 MG
1000 TABLET ORAL
Status: COMPLETED | OUTPATIENT
Start: 2020-01-26 | End: 2020-01-26

## 2020-01-26 RX ORDER — ONDANSETRON 4 MG/1
4 TABLET, ORALLY DISINTEGRATING ORAL
Qty: 20 TAB | Refills: 0 | Status: SHIPPED | OUTPATIENT
Start: 2020-01-26 | End: 2021-03-04

## 2020-01-26 RX ORDER — KETOROLAC TROMETHAMINE 30 MG/ML
30 INJECTION, SOLUTION INTRAMUSCULAR; INTRAVENOUS
Status: COMPLETED | OUTPATIENT
Start: 2020-01-26 | End: 2020-01-26

## 2020-01-26 RX ORDER — LEVOFLOXACIN 500 MG/1
500 TABLET, FILM COATED ORAL DAILY
Qty: 7 TAB | Refills: 0 | Status: SHIPPED | OUTPATIENT
Start: 2020-01-26 | End: 2020-02-02

## 2020-01-26 RX ORDER — KETOROLAC TROMETHAMINE 10 MG/1
10 TABLET, FILM COATED ORAL
Qty: 20 TAB | Refills: 0 | Status: SHIPPED | OUTPATIENT
Start: 2020-01-26 | End: 2021-03-04

## 2020-01-26 RX ORDER — LEVOFLOXACIN 5 MG/ML
750 INJECTION, SOLUTION INTRAVENOUS ONCE
Status: COMPLETED | OUTPATIENT
Start: 2020-01-26 | End: 2020-01-26

## 2020-01-26 RX ORDER — ONDANSETRON 2 MG/ML
4 INJECTION INTRAMUSCULAR; INTRAVENOUS
Status: COMPLETED | OUTPATIENT
Start: 2020-01-26 | End: 2020-01-26

## 2020-01-26 RX ORDER — SODIUM CHLORIDE 0.9 % (FLUSH) 0.9 %
10 SYRINGE (ML) INJECTION
Status: COMPLETED | OUTPATIENT
Start: 2020-01-26 | End: 2020-01-26

## 2020-01-26 RX ORDER — SODIUM CHLORIDE 0.9 % (FLUSH) 0.9 %
5-10 SYRINGE (ML) INJECTION AS NEEDED
Status: DISCONTINUED | OUTPATIENT
Start: 2020-01-26 | End: 2020-01-27 | Stop reason: HOSPADM

## 2020-01-26 RX ADMIN — ONDANSETRON 4 MG: 2 INJECTION INTRAMUSCULAR; INTRAVENOUS at 21:12

## 2020-01-26 RX ADMIN — KETOROLAC TROMETHAMINE 30 MG: 30 INJECTION, SOLUTION INTRAMUSCULAR at 23:48

## 2020-01-26 RX ADMIN — Medication 10 ML: at 22:10

## 2020-01-26 RX ADMIN — SODIUM CHLORIDE 1000 ML: 900 INJECTION, SOLUTION INTRAVENOUS at 22:56

## 2020-01-26 RX ADMIN — IOPAMIDOL 100 ML: 755 INJECTION, SOLUTION INTRAVENOUS at 22:10

## 2020-01-26 RX ADMIN — ACETAMINOPHEN 1000 MG: 500 TABLET ORAL at 21:11

## 2020-01-26 RX ADMIN — LEVOFLOXACIN 750 MG: 5 INJECTION, SOLUTION INTRAVENOUS at 21:14

## 2020-01-27 VITALS
OXYGEN SATURATION: 95 % | WEIGHT: 257.94 LBS | BODY MASS INDEX: 36.93 KG/M2 | TEMPERATURE: 100.1 F | SYSTOLIC BLOOD PRESSURE: 138 MMHG | DIASTOLIC BLOOD PRESSURE: 78 MMHG | HEIGHT: 70 IN | HEART RATE: 112 BPM | RESPIRATION RATE: 22 BRPM

## 2020-01-27 LAB
ATRIAL RATE: 106 BPM
CALCULATED P AXIS, ECG09: 69 DEGREES
CALCULATED R AXIS, ECG10: 60 DEGREES
CALCULATED T AXIS, ECG11: 73 DEGREES
DIAGNOSIS, 93000: NORMAL
P-R INTERVAL, ECG05: 200 MS
Q-T INTERVAL, ECG07: 328 MS
QRS DURATION, ECG06: 94 MS
QTC CALCULATION (BEZET), ECG08: 435 MS
VENTRICULAR RATE, ECG03: 106 BPM

## 2020-01-27 NOTE — ED PROVIDER NOTES
EMERGENCY DEPARTMENT HISTORY AND PHYSICAL EXAM      Please note that this dictation was completed with [employer], the computer voice recognition software. Quite often unanticipated grammatical, syntax, homophones, and other interpretive errors are inadvertently transcribed by the computer software. Please disregard these errors and any errors that have escaped final proofreading. Thank you. Date: 1/26/2020  Patient Name: Gary Cedeno  Patient Age and Sex: 68 y.o. male    History of Presenting Illness     Chief Complaint   Patient presents with    Fever     since last night, fever, chills. History Provided By: Patient    HPI: Gary Cedeno, 68 y.o. male with past medical history as documented below presents to the ED with c/o of acute febrile illness with associated chills, urinary frequency and dysuria. Pt noted to have a fever of 102F at home yesterday, today reporting persistent chills and urinary sx's. He states his HR is always elevated and currently takes propanolol. He reports mild dull lower abdominal discomfort as well as \"prostate pain. \" Pt denies any other alleviating or exacerbating factors. Additionally, pt specifically denies any recent headache, CP, SOB, lightheadedness, dizziness, numbness, weakness, BLE swelling, heart palpitations, diarrhea, constipation, melena, hematochezia, cough, or congestion. There are no other complaints, changes or physical findings at this time.      PCP: Abigail Menjivar MD    Past History   Past Medical History:  Past Medical History:   Diagnosis Date    Arrhythmia     SVT    Chronic obstructive pulmonary disease (HCC)     mild    GERD (gastroesophageal reflux disease)     Hypertension     Thyroid disease     benign thyroid growth, checked annually by Dr. Kaiden Nassar       Past Surgical History:  Past Surgical History:   Procedure Laterality Date    COLONOSCOPY N/A 2/5/2019    COLONOSCOPY performed by Chari Tineo MD at [facility]    COLONOSCOPY,[facility],SNARE  2/5/2019         HX HEENT Bilateral     cataract extraction    ID COLON CA SCRN NOT  [address] IND  2/5/2019            Family History:  Family History   Problem Relation Age of Onset    Hypertension Mother     Heart Disease Mother     Hypertension Father     Diabetes Father        Social History:  Social History     Tobacco Use    Smoking status: Former Smoker     Years: 5.00    Smokeless tobacco: Never Used   Substance Use Topics    Alcohol use: Yes     Alcohol/week: 7.0 standard drinks     Types: 7 Glasses of wine per week    Drug use: No       Allergies:  No Known Allergies    Current Medications:  No current facility-administered medications on file prior to encounter. Current Outpatient Medications on File Prior to Encounter   Medication Sig Dispense Refill    propranolol LA (INDERAL LA) 120 mg SR capsule Take 120 mg by mouth daily.  tamsulosin (FLOMAX) 0.4 mg capsule Take 0.4 mg by mouth daily.  esomeprazole (NEXIUM) 40 mg capsule Take 40 mg by mouth daily.  aspirin delayed-release 81 mg tablet Take 81 mg by mouth daily.  melatonin 3 mg tablet Take 3 mg by mouth nightly.  albuterol (VENTOLIN HFA) 90 mcg/actuation inhaler Take 1 Puff by inhalation every four (4) hours as needed.  fluticasone-vilanterol (BREO ELLIPTA) 100-25 mcg/dose inhaler Take 1 Puff by inhalation daily.  atorvastatin (LIPITOR) 20 mg tablet Take 20 mg by mouth daily.  gemfibrozil (LOPID) 600 mg tablet Take 600 mg by mouth two (2) times a day.  cholecalciferol (VITAMIN D3) 1,000 unit cap Take 1,000 Units by mouth daily.  MULTIVIT-MIN/FA/LYCOPEN/LUTEIN (CENTRUM SILVER ULTRA MEN'S PO) Take 1 Tab by mouth daily.  Omega-3-DHA-EPA-Fish Oil 1,000 mg (120 mg-180 mg) cap Take 1 Cap by mouth daily.  lecithin 1,200 mg cap Take 1 Cap by mouth daily.          Review of Systems   Review of Systems   Constitutional: Positive for chills and fever.   HENT: Negative. Negative for congestion, facial swelling, rhinorrhea, sore throat, trouble swallowing and voice change. Eyes: Negative. Respiratory: Negative. Negative for apnea, cough, chest tightness, shortness of breath and wheezing. Cardiovascular: Negative. Negative for chest pain, palpitations and leg swelling. Gastrointestinal: Positive for abdominal pain. Negative for abdominal distention, blood in stool, constipation, diarrhea, nausea and vomiting. Endocrine: Negative. Negative for cold intolerance, heat intolerance and polyuria. Genitourinary: Positive for dysuria and urgency. Negative for difficulty urinating, flank pain, frequency and hematuria. Musculoskeletal: Negative. Negative for arthralgias, back pain, myalgias, neck pain and neck stiffness. Skin: Negative. Negative for color change and rash. Neurological: Negative. Negative for dizziness, syncope, facial asymmetry, speech difficulty, weakness, light-headedness, numbness and headaches. Hematological: Negative. Does not bruise/bleed easily. Psychiatric/Behavioral: Negative. Negative for confusion and self-injury. The patient is not nervous/anxious. Physical Exam   Physical Exam  Vitals signs and nursing note reviewed. Constitutional:       Appearance: He is well-developed. He is not toxic-appearing. HENT:      Head: Normocephalic and atraumatic. Mouth/Throat:      Pharynx: No posterior oropharyngeal erythema. Eyes:      Conjunctiva/sclera: Conjunctivae normal.      Pupils: Pupils are equal, round, and reactive to light. Neck:      Musculoskeletal: Normal range of motion. Cardiovascular:      Rate and Rhythm: Normal rate and regular rhythm. Heart sounds: Normal heart sounds. No murmur. No friction rub. No gallop. Pulmonary:      Effort: Pulmonary effort is normal. No respiratory distress. Breath sounds: Normal breath sounds. No wheezing or rales.    Chest:      Chest wall: No tenderness. Abdominal:      General: Bowel sounds are normal. There is no distension. Palpations: Abdomen is soft. There is no mass. Tenderness: There is no abdominal tenderness. There is no guarding or rebound. Musculoskeletal: Normal range of motion. General: No tenderness or deformity. Skin:     General: Skin is warm. Findings: No rash. Neurological:      Mental Status: He is alert and oriented to person, place, and time. Cranial Nerves: No cranial nerve deficit. Motor: No abnormal muscle tone. Coordination: Coordination normal.      Deep Tendon Reflexes: Reflexes normal.   Psychiatric:         Behavior: Behavior is cooperative. Diagnostic Study Results     Labs -  Recent Results (from the past 24 hour(s))   EKG, 12 LEAD, INITIAL    Collection Time: 01/26/20  8:08 PM   Result Value Ref Range    Ventricular Rate 106 BPM    Atrial Rate 106 BPM    P-R Interval 200 ms    QRS Duration 94 ms    Q-T Interval 328 ms    QTC Calculation (Bezet) 435 ms    Calculated P Axis 69 degrees    Calculated R Axis 60 degrees    Calculated T Axis 73 degrees    Diagnosis Sinus tachycardia  No previous ECGs available      CBC WITH AUTOMATED DIFF    Collection Time: 01/26/20  8:38 PM   Result Value Ref Range    WBC 15.0 (H) 4.1 - 11.1 K/uL    RBC 4.98 4.10 - 5.70 M/uL    HGB 14.3 12.1 - 17.0 g/dL    HCT 44.1 36.6 - 50.3 %    MCV 88.6 80.0 - 99.0 FL    MCH 28.7 26.0 - 34.0 PG    MCHC 32.4 30.0 - 36.5 g/dL    RDW 13.2 11.5 - 14.5 %    PLATELET 383 261 - 596 K/uL    MPV 11.1 8.9 - 12.9 FL    NRBC 0.0 0  WBC    ABSOLUTE NRBC 0.00 0.00 - 0.01 K/uL    NEUTROPHILS 88 (H) 32 - 75 %    LYMPHOCYTES 5 (L) 12 - 49 %    MONOCYTES 6 5 - 13 %    EOSINOPHILS 0 0 - 7 %    BASOPHILS 0 0 - 1 %    IMMATURE GRANULOCYTES 1 (H) 0.0 - 0.5 %    ABS. NEUTROPHILS 13.1 (H) 1.8 - 8.0 K/UL    ABS. LYMPHOCYTES 0.8 0.8 - 3.5 K/UL    ABS. MONOCYTES 0.9 0.0 - 1.0 K/UL    ABS.  EOSINOPHILS 0.0 0.0 - 0.4 K/UL ABS. BASOPHILS 0.0 0.0 - 0.1 K/UL    ABS. IMM. GRANS. 0.2 (H) 0.00 - 0.04 K/UL    DF AUTOMATED      RBC COMMENTS NORMOCYTIC, NORMOCHROMIC     METABOLIC PANEL, COMPREHENSIVE    Collection Time: 01/26/20  8:38 PM   Result Value Ref Range    Sodium 134 (L) 136 - 145 mmol/L    Potassium 4.1 3.5 - 5.1 mmol/L    Chloride 98 97 - 108 mmol/L    CO2 31 21 - 32 mmol/L    Anion gap 5 5 - 15 mmol/L    Glucose 121 (H) 65 - 100 mg/dL    BUN 18 6 - 20 MG/DL    Creatinine 1.06 0.70 - 1.30 MG/DL    BUN/Creatinine ratio 17 12 - 20      GFR est AA >60 >60 ml/min/1.73m2    GFR est non-AA >60 >60 ml/min/1.73m2    Calcium 9.4 8.5 - 10.1 MG/DL    Bilirubin, total 0.4 0.2 - 1.0 MG/DL    ALT (SGPT) 27 12 - 78 U/L    AST (SGOT) 23 15 - 37 U/L    Alk.  phosphatase 64 45 - 117 U/L    Protein, total 8.1 6.4 - 8.2 g/dL    Albumin 3.9 3.5 - 5.0 g/dL    Globulin 4.2 (H) 2.0 - 4.0 g/dL    A-G Ratio 0.9 (L) 1.1 - 2.2     TROPONIN I    Collection Time: 01/26/20  8:38 PM   Result Value Ref Range    Troponin-I, Qt. <0.05 <0.05 ng/mL   LACTIC ACID    Collection Time: 01/26/20  8:38 PM   Result Value Ref Range    Lactic acid 1.4 0.4 - 2.0 MMOL/L   URINALYSIS W/ REFLEX CULTURE    Collection Time: 01/26/20  8:38 PM   Result Value Ref Range    Color YELLOW/STRAW      Appearance CLEAR CLEAR      Specific gravity 1.018 1.003 - 1.030      pH (UA) 6.5 5.0 - 8.0      Protein 30 (A) NEG mg/dL    Glucose NEGATIVE  NEG mg/dL    Ketone 40 (A) NEG mg/dL    Bilirubin NEGATIVE  NEG      Blood LARGE (A) NEG      Urobilinogen 0.2 0.2 - 1.0 EU/dL    Nitrites NEGATIVE  NEG      Leukocyte Esterase TRACE (A) NEG      WBC 0-4 0 - 4 /hpf    RBC  0 - 5 /hpf    Epithelial cells FEW FEW /lpf    Bacteria NEGATIVE  NEG /hpf    UA:UC IF INDICATED CULTURE NOT INDICATED BY UA RESULT CNI      Hyaline cast 0-2 0 - 5 /lpf   INFLUENZA A & B AG (RAPID TEST)    Collection Time: 01/26/20 11:02 PM   Result Value Ref Range    Influenza A Antigen NEGATIVE  NEG      Influenza B Antigen NEGATIVE  NEG         Radiologic Studies -   CT ABD PELV W CONT   Final Result   IMPRESSION:      1. Mild heterogeneous enlargement of the prostate gland, nonspecific. There are   however mildly enlarged left pelvic lymph nodes, correlation recommended. 2. No hydronephrosis. Probable diverticula along the bladder dome. 3. Several subcentimeter hypodensities in the liver, too small to characterize. 4. Appendicolith. No acute appendicitis. XR CHEST PA LAT   Final Result   IMPRESSION:   No acute process. CT Results  (Last 48 hours)               01/26/20 2210  CT ABD PELV W CONT Final result    Impression:  IMPRESSION:       1. Mild heterogeneous enlargement of the prostate gland, nonspecific. There are   however mildly enlarged left pelvic lymph nodes, correlation recommended. 2. No hydronephrosis. Probable diverticula along the bladder dome. 3. Several subcentimeter hypodensities in the liver, too small to characterize. 4. Appendicolith. No acute appendicitis. Narrative:  INDICATION: acute low abd pain, concern for prostatitis       COMPARISON: None       TECHNIQUE:   Following the uneventful intravenous administration of IV contrast, thin axial   images were obtained through the abdomen and pelvis. Coronal and sagittal   reconstructions were generated. Oral contrast was not administered. CT dose   reduction was achieved through use of a standardized protocol tailored for this   examination and automatic exposure control for dose modulation. FINDINGS:   LUNG BASES: No abnormality. LIVER: Several subcentimeter hypodensities, too small to characterize. GALLBLADDER: Unremarkable. SPLEEN: No enlargement or lesion. PANCREAS: No mass or ductal dilatation. ADRENALS: No mass. KIDNEYS: No hydronephrosis. Subcentimeter hypodensity mid left kidney, too small   to characterize. GI TRACT: Sigmoid diverticulosis. No diverticulitis. No bowel wall thickening or   obstruction. PERITONEUM: No free air or free fluid. APPENDIX: Contains calcification. RETROPERITONEUM: No aortic aneurysm. LYMPH NODES: None enlarged. ADDITIONAL COMMENTS: N/A.       URINARY BLADDER: Focal outpouching along the dome, could represent diverticula. REPRODUCTIVE ORGANS: Mild heterogeneous enlargement of the prostate. LYMPH NODES: Mildly enlarged left external iliac and inguinal lymph nodes,   measuring between 11 and 19 mm. FREE FLUID: None. BONES: No destructive bone lesion. ADDITIONAL COMMENTS: N/A. CXR Results  (Last 48 hours)               01/26/20 2024  XR CHEST PA LAT Final result    Impression:  IMPRESSION:   No acute process. Narrative:  INDICATION:   chest pain       COMPARISON: None       FINDINGS:       Frontal and lateral views of the chest demonstrate a normal cardiomediastinal   silhouette. The lungs are adequately expanded. There is no edema, effusion,   consolidation, or pneumothorax. The osseous structures are unremarkable. Medical Decision Making   I am the first provider for this patient. I reviewed the vital signs, available nursing notes, past medical history, past surgical history, family history and social history. Vital Signs-Reviewed the patient's vital signs.   Patient Vitals for the past 24 hrs:   Temp Pulse Resp BP SpO2   01/27/20 0005     95 %   01/27/20 0001  (!) 112 22 138/78 90 %   01/26/20 2357 100.1 °F (37.8 °C)       01/26/20 2345  (!) 110 16 141/64 92 %   01/26/20 2330  (!) 113 20 134/67 93 %   01/26/20 2315  (!) 127 22 149/71 (!) 85 %   01/26/20 2311     92 %   01/26/20 2300  (!) 112 16 159/80 93 %   01/26/20 2259 (!) 100.6 °F (38.1 °C) (!) 113 24 159/80 92 %   01/26/20 2245  (!) 126 24 160/81 (!) 84 %   01/26/20 2215  (!) 120 15 154/84 92 %   01/26/20 2200  (!) 113 26 169/81 94 %   01/26/20 2145  (!) 111 30 (!) 159/93 94 %   01/26/20 2130  (!) 119 25 177/88 (!) 86 %   01/26/20 2115  (!) 116 18 160/89 92 %   01/26/20 2028     92 %   01/26/20 2001 (!) 100.8 °F (38.2 °C) (!) 112 18 (!) 195/94 93 %     Pulse Oximetry Analysis - 93% on RA    Cardiac Monitor:   Rate: 112 bpm  Rhythm: Sinus Tachycardia      ED EKG interpretation:  Rhythm: sinus tachycardia; and regular . Rate (approx.): 106; Axis: normal; P wave: normal; QRS interval: normal ; ST/T wave: normal; Other findings: normal. This EKG was interpreted by Dennys Diaz M.D. Records Reviewed: Nursing Notes, Old Medical Records, Previous electrocardiograms, Previous Radiology Studies and Previous Laboratory Studies    Provider Notes (Medical Decision Making):   Patient presents with fever, tachycardia and concerns for infection. Most likely UTI vs prostatitis. DDx: sepsis 2/2 UTI, PNA, intraabdominal infection (colitis, appendicitis, cholecystitis),  infectious diarrhea, meningitis, soft tissue infection, septic arthritis, flu/viral prodrome. Will follow sepsis protocol and order set by providing IVF resuscitation, obtaining blood and urine cultures, antibiotics, labs, lactate, EKG and frequently reassessing hemodynamic status on the patient. Will hold off on aggressive fluid hydration unless patient shows signs of severe sepsis or shock. ED Course:   Initial assessment performed. The patients presenting problems have been discussed, and they are in agreement with the care plan formulated and outlined with them. I have encouraged them to ask questions as they arise throughout their visit. ALCOHOL/SUBSTANCE ABUSE COUNSELING:  Upon evaluation, pt endorsed recent alcohol/illicit drug use. For approximately 15 minutes, pt has been counseled on the dangers of alcohol and illicit drug use on their health, and they were encouraged to quit as soon as possible in order to decrease further risks to their health. Pt has conveyed their understanding of the risks involved should they continue to use these products.     HYPERTENSION COUNSELING Education was provided to the patient today regarding their hypertension. Patient is made aware of their elevated blood pressure and is instructed to follow up this week with their Primary Care for a recheck. Patient is counseled regarding consequences of chronic, uncontrolled hypertension including kidney disease, heart disease, stroke or even death. Patient states their understanding and agrees to follow up this week. Additionally, during their visit, I discussed sodium restriction, maintaining ideal body weight and regular exercise program as physiologic means to achieve blood pressure control. The patient will strive towards this. I reviewed our electronic medical record system for any past medical records that were available that may contribute to the patient's current condition, the nursing notes and vital signs from today's visit.   Ivy Castro MD    ED Orders Placed :  Orders Placed This Encounter    SEVERE SEPSIS AND SEPTIC SHOCK BUNDLE INITIATED    SEVERE SEPSIS AND SEPTIC SHOCK BUNDLE INITIATED    CULTURE, BLOOD    CULTURE, BLOOD    INFLUENZA A & B AG (RAPID TEST)    XR CHEST PA LAT    CT ABD PELV W CONT    CBC WITH AUTOMATED DIFF    METABOLIC PANEL, COMPREHENSIVE    TROPONIN I    LACTIC ACID, PLASMA    URINALYSIS W/ REFLEX CULTURE    VITAL SIGNS - PER UNIT ROUTINE    STRICT I & O    NEUROLOGIC STATUS ASSESSMENT - PER UNIT ROUTINE    NOTIFY PROVIDER: SPECIFY Notify provider within one hour to start vasopressors if patient is unable to maintain a MAP of greater than or equal to 65 mmHg despite fluid resuscitation CONTINUOUS STAT    NOTIFY PROVIDER: SPECIFY Notify provider within one hour to start vasopressors if patient is unable to maintain a MAP of greater than or equal to 65 mmHg despite fluid resuscitation CONTINUOUS STAT    VITAL SIGNS - PER UNIT ROUTINE    PO CHALLENGE    EKG 12 LEAD INITIAL    SALINE LOCK IV ONE TIME STAT    sodium chloride (NS) flush 5-10 mL    sodium chloride 0.9 % bolus infusion 1,000 mL    DISCONTD: sodium chloride 0.9 % bolus infusion 1,000 mL    DISCONTD: sodium chloride 0.9 % bolus infusion 1,000 mL    DISCONTD: sodium chloride 0.9 % bolus infusion 510 mL    acetaminophen (TYLENOL) tablet 1,000 mg    ondansetron (ZOFRAN) injection 4 mg    levoFLOXacin (LEVAQUIN) 750 mg in D5W IVPB    iopamidoL (ISOVUE-370) 76 % injection 100 mL    sodium chloride (NS) flush 10 mL    ketorolac (TORADOL) injection 30 mg    levoFLOXacin (LEVAQUIN) 500 mg tablet    ondansetron (ZOFRAN ODT) 4 mg disintegrating tablet    ketorolac (TORADOL) 10 mg tablet     ED Medications Administered:  Medications   sodium chloride (NS) flush 5-10 mL (has no administration in time range)   sodium chloride 0.9 % bolus infusion 1,000 mL (0 mL IntraVENous IV Completed 1/26/20 2357)   acetaminophen (TYLENOL) tablet 1,000 mg (1,000 mg Oral Given 1/26/20 2111)   ondansetron (ZOFRAN) injection 4 mg (4 mg IntraVENous Given 1/26/20 2112)   levoFLOXacin (LEVAQUIN) 750 mg in D5W IVPB (0 mg IntraVENous IV Completed 1/26/20 2356)   iopamidoL (ISOVUE-370) 76 % injection 100 mL (100 mL IntraVENous Given 1/26/20 2210)   sodium chloride (NS) flush 10 mL (10 mL IntraVENous Given 1/26/20 2210)   ketorolac (TORADOL) injection 30 mg (30 mg IntraVENous Given 1/26/20 2348)         Procedure Note - Rectal Exam:   Performed by: Vishal Abraham MD  Chaperoned by: primary RN  Rectal exam performed. Minimally TTP prostate, no abscess palpated, no bleeding. The procedure took 1-15 minutes, and pt tolerated well. Progress Note:  I have re-examined the patient. he feels much better and symptoms improved. Tolerating oral intake. Abdomen is soft and without guarding, rebound or other peritoneal signs. I have discussed with patient the importance of close f/u and to return to the ED if symptoms don't improve or worsen.      Progress Note:  Patient has been reassessed and reports feeling better and symptoms have improved significantly after ED treatment. Patient feels comfortable going home with close follow-up. Veronique Birch final labs and imaging have been reviewed with him and available family and/or caregiver. They have been counseled regarding his diagnosis. He verbally conveys understanding and agreement of the signs, symptoms, diagnosis, treatment and prognosis and additionally agrees to follow up as recommended with Dr. Sugar Zuleta MD and/or specialist in 24 - 48 hours. He also agrees with the care-plan we created together and conveys that all of his questions have been answered. I have also put together some discharge instructions for him that include: 1) educational information regarding their diagnosis, 2) how to care for their diagnosis at home, as well a 3) list of reasons why they would want to return to the ED prior to their follow-up appointment should the patient's condition change or symptoms worsen. I have answered all questions to the patient's satisfaction. Strict return precautions given. He both understood and agreed with plan as discussed. Vital signs stable for discharge. Pt very appreciative of care today. Disposition: Discharge  The pt is ready for discharge. The pt's signs, symptoms, diagnosis, and discharge instructions have been discussed and pt has conveyed their understanding. The pt is to follow up as recommended or return to ER should their symptoms worsen. Plan has been discussed and pt is in full agreement. Plan:  1. Return precautions as discussed. 2.   Discharge Medication List as of 1/26/2020 11:22 PM      START taking these medications    Details   levoFLOXacin (LEVAQUIN) 500 mg tablet Take 1 Tab by mouth daily for 7 days. , Print, Disp-7 Tab, R-0      ondansetron (ZOFRAN ODT) 4 mg disintegrating tablet Take 1 Tab by mouth every eight (8) hours as needed for Nausea or Vomiting., Print, Disp-20 Tab, R-0      ketorolac (TORADOL) 10 mg tablet Take 1 Tab by mouth every six (6) hours as needed for Pain., Print, Disp-20 Tab, R-0         CONTINUE these medications which have NOT CHANGED    Details   propranolol LA (INDERAL LA) 120 mg SR capsule Take 120 mg by mouth daily. , Historical Med      tamsulosin (FLOMAX) 0.4 mg capsule Take 0.4 mg by mouth daily. , Historical Med      esomeprazole (NEXIUM) 40 mg capsule Take 40 mg by mouth daily. , Historical Med      aspirin delayed-release 81 mg tablet Take 81 mg by mouth daily. , Historical Med      melatonin 3 mg tablet Take 3 mg by mouth nightly., Historical Med      albuterol (VENTOLIN HFA) 90 mcg/actuation inhaler Take 1 Puff by inhalation every four (4) hours as needed., Historical Med      fluticasone-vilanterol (BREO ELLIPTA) 100-25 mcg/dose inhaler Take 1 Puff by inhalation daily. , Historical Med      atorvastatin (LIPITOR) 20 mg tablet Take 20 mg by mouth daily. , Historical Med      gemfibrozil (LOPID) 600 mg tablet Take 600 mg by mouth two (2) times a day., Historical Med      cholecalciferol (VITAMIN D3) 1,000 unit cap Take 1,000 Units by mouth daily. , Historical Med      MULTIVIT-MIN/FA/LYCOPEN/LUTEIN (CENTRUM SILVER ULTRA MEN'S PO) Take 1 Tab by mouth daily. , Historical Med      Omega-3-DHA-EPA-Fish Oil 1,000 mg (120 mg-180 mg) cap Take 1 Cap by mouth daily. , Historical Med      lecithin 1,200 mg cap Take 1 Cap by mouth daily. , Historical Med           3. Follow-up Information     Follow up With Specialties Details Why Contact Info    Jillian Graham, [address] [facility]  [phone]      [facility] EMERGENCY DEPT Emergency Medicine  As needed, If symptoms worsen [address] [place] [id number]  [phone]    [facility]    [address] 219 52 Ashwini Sol to return to ED if worse  Diagnosis     Clinical Impression:   1. Acute abdominal pain    2. Leukocytosis, unspecified type    3.  Flu-like symptoms    4. Accelerated hypertension    5. Acute hyponatremia      Attestation:  I personally performed the services described in this documentation on this date, 1/26/2020 for patient Media Hof. I have reviewed and verified that the information is accurate and complete. Jn Condon MD      This note will not be viewable in [address]

## 2020-01-27 NOTE — ED NOTES
Patient c/o fever and chills that started last night, highest temp being 102. Patient is having urinary issues such as frequency, urgency but denies burning with urination. Patient is A&Ox4.

## 2020-01-27 NOTE — DISCHARGE INSTRUCTIONS
[facility]  Hotline    The [facility] Urology Hotline is a resource to assist you when experiencing the symptoms of a kidney stone. Call the [facility] hotline at [phone]. It is available 24/7. When you call this number, a staff member will coordinate appropriate care by either scheduling you a timely appointment at our office or directing you to immediate care, as needed. Thank you for allowing us to take care of you today! We hope we addressed all of your concerns and needs. We strive to provide excellent quality care in the Emergency Department. You will receive a survey after your visit to evaluate the care you were provided. Should you receive a survey from us, we invite you to share your experience and tell us what made it excellent. It was a pleasure serving you, we invite you to share your experience with us, in our pursuit for excellence, should you be selected to receive a survey. The exam and treatment you received in the Emergency Department were for an urgent problem and are not intended as complete care. It is important that you follow up with a doctor, nurse practitioner, or physician assistant for ongoing care. If your symptoms become worse or you do not improve as expected and you are unable to reach your usual health care provider, you should return to the Emergency Department. We are available 24 hours a day. Please take your discharge instructions with you when you go to your follow-up appointment. If you have any problem arranging a follow-up appointment, contact the Emergency Department immediately. If a prescription has been provided, please have it filled as soon as possible to prevent a delay in treatment. Read the entire medication instruction sheet provided to you by the pharmacy.  If you have any questions or reservations about taking the medication due to side effects or interactions with other medications, please call your primary care physician or contact the ER to speak with the charge nurse. Make an appointment with your family doctor or the physician you were referred to for follow-up of this visit as instructed on your discharge paperwork, as this is mandatory follow-up. Return to the ER if you are unable to be seen or if you are unable to be seen in a timely manner. If you have any problem arranging the follow-up visit, contact the Emergency Department immediately. I hope you feel better and thank you again for allow us to provide you with excellent care today at [facility]!       Warmest regards,    Juliane Hooks MD  Emergency Medicine Physician  [facility]      _____________________________________________________________________________________________________________    Vitals:    01/26/20 2001 01/26/20 2028 01/26/20 2259 01/26/20 2311   BP: (!) 195/94  159/80    BP 1 Location:   Right arm    BP Patient Position:   At rest    Pulse: (!) 112  (!) 113    Resp: 18  24    Temp: (!) 100.8 °F (38.2 °C)  (!) 100.6 °F (38.1 °C)    SpO2: 93% 92% 92% 92%   Weight: 117 kg (257 lb 15 oz)      Height: 5' 10\" (1.778 m)          Recent Results (from the past 12 hour(s))   EKG, 12 LEAD, INITIAL    Collection Time: 01/26/20  8:08 PM   Result Value Ref Range    Ventricular Rate 106 BPM    Atrial Rate 106 BPM    P-R Interval 200 ms    QRS Duration 94 ms    Q-T Interval 328 ms    QTC Calculation (Bezet) 435 ms    Calculated P Axis 69 degrees    Calculated R Axis 60 degrees    Calculated T Axis 73 degrees    Diagnosis Sinus tachycardia  No previous ECGs available      CBC WITH AUTOMATED DIFF    Collection Time: 01/26/20  8:38 PM   Result Value Ref Range    WBC 15.0 (H) 4.1 - 11.1 K/uL    RBC 4.98 4.10 - 5.70 M/uL    HGB 14.3 12.1 - 17.0 g/dL    HCT 44.1 36.6 - 50.3 %    MCV 88.6 80.0 - 99.0 FL    MCH 28.7 26.0 - 34.0 PG    MCHC 32.4 30.0 - 36.5 g/dL    RDW 13.2 11.5 - 14.5 % PLATELET 925 697 - 560 K/uL    MPV 11.1 8.9 - 12.9 FL    NRBC 0.0 0  WBC    ABSOLUTE NRBC 0.00 0.00 - 0.01 K/uL    NEUTROPHILS 88 (H) 32 - 75 %    LYMPHOCYTES 5 (L) 12 - 49 %    MONOCYTES 6 5 - 13 %    EOSINOPHILS 0 0 - 7 %    BASOPHILS 0 0 - 1 %    IMMATURE GRANULOCYTES 1 (H) 0.0 - 0.5 %    ABS. NEUTROPHILS 13.1 (H) 1.8 - 8.0 K/UL    ABS. LYMPHOCYTES 0.8 0.8 - 3.5 K/UL    ABS. MONOCYTES 0.9 0.0 - 1.0 K/UL    ABS. EOSINOPHILS 0.0 0.0 - 0.4 K/UL    ABS. BASOPHILS 0.0 0.0 - 0.1 K/UL    ABS. IMM. GRANS. 0.2 (H) 0.00 - 0.04 K/UL    DF AUTOMATED      RBC COMMENTS NORMOCYTIC, NORMOCHROMIC     METABOLIC PANEL, COMPREHENSIVE    Collection Time: 01/26/20  8:38 PM   Result Value Ref Range    Sodium 134 (L) 136 - 145 mmol/L    Potassium 4.1 3.5 - 5.1 mmol/L    Chloride 98 97 - 108 mmol/L    CO2 31 21 - 32 mmol/L    Anion gap 5 5 - 15 mmol/L    Glucose 121 (H) 65 - 100 mg/dL    BUN 18 6 - 20 MG/DL    Creatinine 1.06 0.70 - 1.30 MG/DL    BUN/Creatinine ratio 17 12 - 20      GFR est AA >60 >60 ml/min/1.73m2    GFR est non-AA >60 >60 ml/min/1.73m2    Calcium 9.4 8.5 - 10.1 MG/DL    Bilirubin, total 0.4 0.2 - 1.0 MG/DL    ALT (SGPT) 27 12 - 78 U/L    AST (SGOT) 23 15 - 37 U/L    Alk.  phosphatase 64 45 - 117 U/L    Protein, total 8.1 6.4 - 8.2 g/dL    Albumin 3.9 3.5 - 5.0 g/dL    Globulin 4.2 (H) 2.0 - 4.0 g/dL    A-G Ratio 0.9 (L) 1.1 - 2.2     TROPONIN I    Collection Time: 01/26/20  8:38 PM   Result Value Ref Range    Troponin-I, Qt. <0.05 <0.05 ng/mL   LACTIC ACID    Collection Time: 01/26/20  8:38 PM   Result Value Ref Range    Lactic acid 1.4 0.4 - 2.0 MMOL/L   URINALYSIS W/ REFLEX CULTURE    Collection Time: 01/26/20  8:38 PM   Result Value Ref Range    Color YELLOW/STRAW      Appearance CLEAR CLEAR      Specific gravity 1.018 1.003 - 1.030      pH (UA) 6.5 5.0 - 8.0      Protein 30 (A) NEG mg/dL    Glucose NEGATIVE  NEG mg/dL    Ketone 40 (A) NEG mg/dL    Bilirubin NEGATIVE  NEG      Blood LARGE (A) NEG Urobilinogen 0.2 0.2 - 1.0 EU/dL    Nitrites NEGATIVE  NEG      Leukocyte Esterase TRACE (A) NEG      WBC 0-4 0 - 4 /hpf    RBC  0 - 5 /hpf    Epithelial cells FEW FEW /lpf    Bacteria NEGATIVE  NEG /hpf    UA:UC IF INDICATED CULTURE NOT INDICATED BY UA RESULT CNI      Hyaline cast 0-2 0 - 5 /lpf       CT ABD PELV W CONT   Final Result   IMPRESSION:      1. Mild heterogeneous enlargement of the prostate gland, nonspecific. There are   however mildly enlarged left pelvic lymph nodes, correlation recommended. 2. No hydronephrosis. Probable diverticula along the bladder dome. 3. Several subcentimeter hypodensities in the liver, too small to characterize. 4. Appendicolith. No acute appendicitis. XR CHEST PA LAT   Final Result   IMPRESSION:   No acute process. CT Results  (Last 48 hours)               01/26/20 2219  CT ABD PELV W CONT Final result    Impression:  IMPRESSION:       1. Mild heterogeneous enlargement of the prostate gland, nonspecific. There are   however mildly enlarged left pelvic lymph nodes, correlation recommended. 2. No hydronephrosis. Probable diverticula along the bladder dome. 3. Several subcentimeter hypodensities in the liver, too small to characterize. 4. Appendicolith. No acute appendicitis. Narrative:  INDICATION: acute low abd pain, concern for prostatitis       COMPARISON: None       TECHNIQUE:   Following the uneventful intravenous administration of IV contrast, thin axial   images were obtained through the abdomen and pelvis. Coronal and sagittal   reconstructions were generated. Oral contrast was not administered. CT dose   reduction was achieved through use of a standardized protocol tailored for this   examination and automatic exposure control for dose modulation. FINDINGS:   LUNG BASES: No abnormality. LIVER: Several subcentimeter hypodensities, too small to characterize. GALLBLADDER: Unremarkable. SPLEEN: No enlargement or lesion. PANCREAS: No mass or ductal dilatation. ADRENALS: No mass. KIDNEYS: No hydronephrosis. Subcentimeter hypodensity mid left kidney, too small   to characterize. GI TRACT: Sigmoid diverticulosis. No diverticulitis. No bowel wall thickening or   obstruction. PERITONEUM: No free air or free fluid. APPENDIX: Contains calcification. RETROPERITONEUM: No aortic aneurysm. LYMPH NODES: None enlarged. ADDITIONAL COMMENTS: N/A.       URINARY BLADDER: Focal outpouching along the dome, could represent diverticula. REPRODUCTIVE ORGANS: Mild heterogeneous enlargement of the prostate. LYMPH NODES: Mildly enlarged left external iliac and inguinal lymph nodes,   measuring between 11 and 19 mm. FREE FLUID: None. BONES: No destructive bone lesion. ADDITIONAL COMMENTS: N/A.                  Local Primary Care Physicians   [facility] [phone]  MD Carlos A Lazar MD Clotilde Eck, MD [facility] [phone]  Sanjeev Watters, [facility]  MD Eber Dennison MD Barry Kell, MD [address] [place] [address] [phone]  Gabriela Cera[place] [address] [facility] [phone]  MD Fadi Sherman MD Farrel Memory, MD Louise Aguilar MD   [facility] [phone]  [facility] TLWBMB MD Jackson TAI MD Gracie Grey, NP [address] [phone]  Conley Furnish, MD Kristie Miss, MD Leslye Spearman, MD Caralee Osler, MD Milton Flack, MD Learta Dalton, MD Cal Peon, MD   [facility] POINT [phone]  Jose Luis Hand MD [address] [phone]  MD Clair Young, MD Maria Randhawa MD Medora Bon, MD Fleeta Mayor, MD Colvin Britain, MD   [address] [facility] [phone]  MD Marge Pacheco, P  Lorena Ni, NP  MD Jaky Herrera MD Carl Cos, MD Kaaren Barges Leahhue Reich, [address] [facility] [phone]  MD Ana Garcia MD Sarrah Ponder, MD Durrell Pulley, MD Merilee Knoll, MD   [facility] [phone]  MD Ghislaine Putnam MD [place] [phone]  Josie Watson, MD Yared Bob, MD Tasha Victoria MD   [facility] [phone]  Rita Kim, MD Elpidio Schroeder, MD Winifred Shahid, MD Paco Saldaña, MD Carmita Dorado, NP  Mahad Oro MD [facility]   [phone]  Frida Mckenzie, MD Lali Johns, MD Muna Siddiqui MD     [address] [facility] [phone]  Pepe Genao MD  [facility], FNP  Randolph Hall, FREEDOM Hall, FNP  FREEDOM Akhtar MD Sharma Perl, OSMAN Sakrar,    Miscellaneous:  Neeta Nuñez MD [facility] Departments   For adult and child immunizations, family planning, TB screening, STD testing and women's health services. [facility]: [place] [address] [phone]     [address]: [address] [phone]     [facility]        [address] [address]  For primary care services, woman and child wellness, and some clinics providing specialty care. [facility] -- [facility] [facility]. [facility]5 [facility] [phone]   [address] [facility] [address] [facility] [address] [facility] [phone]   [address] [facility] [address]  [phone]   [address]  [employer] [address] [place] [address] [address] [place] [address] Dr [phone] [address] [facility] [address]  [address] [address] [place] [phone]   [address] [phone]   Judah Coronado [facility] [address] [place] [address], [address] [place] [address]   [facility]: [facility] [address]     [place] [address] Outreach [address]  Community  [phone]   [employer]  [address] ([email or web address])         Sexual Health/Woman Wellness Clinics   For STD/HIV testing and treatment, pregnancy testing and services, men's health, birth control services, LGBT services, and hepatitis/HPV vaccine services. [place] [employer] [place] [facility] [employer] [address] [facility] 75 [facility] 157 [address] [phone]   [facility] [address] 5th floor [phone]   Pregnancy [address] Women [address]  [address]uan Antonio Bradford [phone]        [place] [address]   [phone]   Premium   [phone]   Women, Infant and Children's Services: [address] [phone]       [facility] 121 Intervention   [phone]   [address] [facility] 16.   1212 [facility]

## 2020-01-30 ENCOUNTER — HOSPITAL ENCOUNTER (EMERGENCY)
Age: 70
Discharge: HOME | End: 2020-01-30
Attending: EMERGENCY MEDICINE
Payer: MEDICARE

## 2020-01-30 ENCOUNTER — APPOINTMENT (OUTPATIENT)
Dept: ULTRASOUND IMAGING | Age: 70
End: 2020-01-30
Attending: EMERGENCY MEDICINE
Payer: MEDICARE

## 2020-01-30 VITALS
OXYGEN SATURATION: 97 % | DIASTOLIC BLOOD PRESSURE: 88 MMHG | WEIGHT: 256.84 LBS | BODY MASS INDEX: 36.77 KG/M2 | SYSTOLIC BLOOD PRESSURE: 186 MMHG | HEART RATE: 83 BPM | RESPIRATION RATE: 18 BRPM | HEIGHT: 70 IN | TEMPERATURE: 98 F

## 2020-01-30 DIAGNOSIS — I10 HYPERTENSION, UNSPECIFIED TYPE: ICD-10-CM

## 2020-01-30 DIAGNOSIS — L03.90 CELLULITIS, UNSPECIFIED CELLULITIS SITE: Primary | ICD-10-CM

## 2020-01-30 PROCEDURE — 99282 EMERGENCY DEPT VISIT SF MDM: CPT

## 2020-01-30 PROCEDURE — 93971 EXTREMITY STUDY: CPT

## 2020-01-30 RX ORDER — SULFAMETHOXAZOLE AND TRIMETHOPRIM 800; 160 MG/1; MG/1
1 TABLET ORAL 2 TIMES DAILY
Qty: 14 TAB | Refills: 0 | Status: SHIPPED | OUTPATIENT
Start: 2020-01-30 | End: 2020-02-06

## 2020-01-30 NOTE — ED PROVIDER NOTES
EMERGENCY DEPARTMENT HISTORY AND PHYSICAL EXAM      Date: 1/30/2020  Patient Name: Earlene Pineda    History of Presenting Illness     Chief Complaint   Patient presents with    Leg Swelling       History Provided By: Patient    HPI: Earlene Pineda, 68 y.o. male with PMHx as noted below presents emergency department chief complaint of left leg pain and swelling. Patient notes he was seen here several days ago and diagnosed with urinary tract infection. He was started on Levaquin at that time. Patient notes that Monday he developed redness in the left leg yesterday symptoms worsened and was associated with significant pain swelling in the left lower leg. Patient noted that there was drastic improvement in the pain today however was sent here by his primary care to rule out DVT. He otherwise is denying any chest pain or shortness of breath and is having no fevers, chills or systemic symptoms. PCP: Vanessa Oleary MD    Current Outpatient Medications   Medication Sig Dispense Refill    trimethoprim-sulfamethoxazole (BACTRIM DS) 160-800 mg per tablet Take 1 Tab by mouth two (2) times a day for 7 days. 14 Tab 0    levoFLOXacin (LEVAQUIN) 500 mg tablet Take 1 Tab by mouth daily for 7 days. 7 Tab 0    ondansetron (ZOFRAN ODT) 4 mg disintegrating tablet Take 1 Tab by mouth every eight (8) hours as needed for Nausea or Vomiting. 20 Tab 0    ketorolac (TORADOL) 10 mg tablet Take 1 Tab by mouth every six (6) hours as needed for Pain. 20 Tab 0    propranolol LA (INDERAL LA) 120 mg SR capsule Take 120 mg by mouth daily.  tamsulosin (FLOMAX) 0.4 mg capsule Take 0.4 mg by mouth daily.  esomeprazole (NEXIUM) 40 mg capsule Take 40 mg by mouth daily.  aspirin delayed-release 81 mg tablet Take 81 mg by mouth daily.  melatonin 3 mg tablet Take 3 mg by mouth nightly.  albuterol (VENTOLIN HFA) 90 mcg/actuation inhaler Take 1 Puff by inhalation every four (4) hours as needed.       fluticasone-vilanterol (BREO ELLIPTA) 100-25 mcg/dose inhaler Take 1 Puff by inhalation daily.  atorvastatin (LIPITOR) 20 mg tablet Take 20 mg by mouth daily.  gemfibrozil (LOPID) 600 mg tablet Take 600 mg by mouth two (2) times a day.  cholecalciferol (VITAMIN D3) 1,000 unit cap Take 1,000 Units by mouth daily.  MULTIVIT-MIN/FA/LYCOPEN/LUTEIN (CENTRUM SILVER ULTRA MEN'S PO) Take 1 Tab by mouth daily.  Omega-3-DHA-EPA-Fish Oil 1,000 mg (120 mg-180 mg) cap Take 1 Cap by mouth daily.  lecithin 1,200 mg cap Take 1 Cap by mouth daily. Past History     Past Medical History:  Past Medical History:   Diagnosis Date    Arrhythmia     SVT    Chronic obstructive pulmonary disease (HCC)     mild    GERD (gastroesophageal reflux disease)     Hypertension     Thyroid disease     benign thyroid growth, checked annually by Dr. Hank Zamorano       Past Surgical History:  Past Surgical History:   Procedure Laterality Date    COLONOSCOPY N/A 2/5/2019    COLONOSCOPY [facility] by Daquan Chaudhary MD at [facility] ENDOSCOPY    COLONOSCOPY,REMKOBE RODRIGUES,SNARE  2/5/2019         HX HEENT Bilateral     cataract extraction    ND COLON CA SCRN NOT  [address] IND  2/5/2019            Family History:  Family History   Problem Relation Age of Onset    Hypertension Mother     Heart Disease Mother     Hypertension Father     Diabetes Father        Social History:  Social History     Tobacco Use    Smoking status: Former Smoker     Years: 5.00    Smokeless tobacco: Never Used   Substance Use Topics    Alcohol use: Yes     Alcohol/week: 7.0 standard drinks     Types: 7 Glasses of wine per week    Drug use: No       Allergies:  No Known Allergies      Review of Systems   Review of Systems  Constitutional: Negative for fever, chills, and fatigue. HENT: Negative for congestion, sore throat, rhinorrhea, sneezing and neck stiffness   Eyes: Negative for discharge and redness.    Respiratory: Negative for  shortness of breath, wheezing   Cardiovascular: Negative for chest pain, palpitations   Gastrointestinal: Negative for nausea, vomiting, abdominal pain, constipation, diarrhea and blood in stool. Genitourinary: Negative for dysuria, hematuria, flank pain, decreased urine volume, discharge,   Musculoskeletal: Negative for myalgias or joint pain . Skin: Negative for rash or lesions . Neurological: Negative weakness, light-headedness, numbness and headaches. Physical Exam   Physical Exam    GENERAL: alert and oriented, no acute distress  EYES: PEERL, No injection, discharge or icterus. ENT: Mucous membranes pink and moist.  NECK: Supple  LUNGS: Airway patent. Non-labored respirations. Breath sounds clear with good air entry bilaterally. HEART: Regular rate and rhythm. No peripheral edema  ABDOMEN: Non-distended and non-tender, without guarding or rebound. SKIN:  warm, dry  MSK/EXTREMITIES: The left lower leg is swollen, warm and erythematous, minimal tenderness, no crepitus  NEUROLOGICAL: Alert, oriented      Diagnostic Study Results     Labs -   No results found for this or any previous visit (from the past 12 hour(s)). Radiologic Studies -   No orders to display     CT Results  (Last 48 hours)    None        CXR Results  (Last 48 hours)    None            Medical Decision Making   I am the first provider for this patient. I reviewed the vital signs, available nursing notes, past medical history, past surgical history, family history and social history. Vital Signs-Reviewed the patient's vital signs. Patient Vitals for the past 12 hrs:   Temp Pulse Resp BP SpO2   01/30/20 1236     97 %   01/30/20 1204 98 °F (36.7 °C) 83 18 186/88 96 %         Records Reviewed: Nursing Notes and Old Medical Records    Provider Notes (Medical Decision Making): On presentation, the patient is well appearing, in no acute distress with normal vital signs.   Based on my history and exam the differential diagnosis for this patient includes DVT, cellulitis, erysipelas, Baker's cyst.  The leg is only minimally tender, no crepitus or systemic symptoms concerning for necrotizing soft tissue infection. DVT ultrasound was negative. At this time, symptoms are most consistent with likely cellulitis. Patient has completed 2 days of Levaquin now so will add additional coverage with Bactrim to cover for MRSA. Otherwise patient having no systemic symptoms and feel he is stable for outpatient management. He should return if he develops high fevers or any new concerning symptoms. He should follow-up with his primary care in the next 48 hours for reevaluation. ED Course:   Initial assessment performed. The patients presenting problems have been discussed, and they are in agreement with the care plan formulated and outlined with them. I have encouraged them to ask questions as they arise throughout their visit. PROGRESS NOTE:  The patient has been re-evaluated and is ready for discharge. Reviewed available results with patient and have counseled them on diagnosis and care plan. They have expressed understanding, and all their questions have been answered. They agree with plan and agree to have pt F/U as recommended, or return to the ED if their sxs worsen. Discharge instructions have been provided and explained to them, along with reasons to have pt return to the ED. The patient is amenable to discharge so will discharge pt at this time      Disposition:  home    PLAN:  1. Current Discharge Medication List      START taking these medications    Details   trimethoprim-sulfamethoxazole (BACTRIM DS) 160-800 mg per tablet Take 1 Tab by mouth two (2) times a day for 7 days. Qty: 14 Tab, Refills: 0           2.    Follow-up Information     Follow up With Specialties Details Why Contact Info    Gabi Kerr MD [facility] Schedule an appointment as soon as possible for a visit in 2 days  [address]  [phone]      [facility] EMERGENCY DEPT Emergency Medicine  If symptoms worsen [address]  [address] [facility]  [phone]        Return to ED if worse     Diagnosis     Clinical Impression:   1. Cellulitis, unspecified cellulitis site    2. Hypertension, unspecified type        Please note that this dictation was completed with Dragon, computer voice recognition software. Quite often unanticipated grammatical, syntax, homophones, and other interpretive errors are inadvertently transcribed by the computer software. Please disregard these errors. Additionally, please excuse any errors that have escaped final proofreading.

## 2020-01-30 NOTE — ED NOTES
D/C papers given to and signed by pt, verbalizes understanding.   Pt D/C home with steady gait accompanied by wife

## 2020-01-30 NOTE — ED NOTES
Pt was evaluated in this ED of Sunday for UTI. Pt reports difficulty removing EKG electrode from LLE and pain. Pt c/o increased pain in LLE on Monday followed by redness and swelling.   Pt sent to ED from PCP to r/o LLE DVT

## 2020-01-30 NOTE — ED TRIAGE NOTES
The patient reprots that he was in here Sunday night for a UTI and reports waking up Monday with left leg swelling, reports going to PCP today and was sent here for a DVT rule out

## 2020-01-30 NOTE — DISCHARGE INSTRUCTIONS

## 2020-02-01 LAB
BACTERIA SPEC CULT: NORMAL
BACTERIA SPEC CULT: NORMAL
SERVICE CMNT-IMP: NORMAL
SERVICE CMNT-IMP: NORMAL

## 2020-05-19 ENCOUNTER — APPOINTMENT (OUTPATIENT)
Dept: ULTRASOUND IMAGING | Age: 70
End: 2020-05-19
Attending: EMERGENCY MEDICINE
Payer: MEDICARE

## 2020-05-19 ENCOUNTER — HOSPITAL ENCOUNTER (EMERGENCY)
Age: 70
Discharge: HOME | End: 2020-05-19
Attending: EMERGENCY MEDICINE
Payer: MEDICARE

## 2020-05-19 VITALS
TEMPERATURE: 97.6 F | HEART RATE: 85 BPM | SYSTOLIC BLOOD PRESSURE: 143 MMHG | OXYGEN SATURATION: 94 % | RESPIRATION RATE: 18 BRPM | DIASTOLIC BLOOD PRESSURE: 79 MMHG

## 2020-05-19 DIAGNOSIS — R60.0 EDEMA OF LEFT LOWER EXTREMITY: Primary | ICD-10-CM

## 2020-05-19 PROCEDURE — 93971 EXTREMITY STUDY: CPT

## 2020-05-19 PROCEDURE — 99282 EMERGENCY DEPT VISIT SF MDM: CPT

## 2020-05-19 RX ORDER — FINASTERIDE 5 MG/1
10 TABLET, FILM COATED ORAL DAILY
Status: ON HOLD | COMMUNITY
End: 2021-07-03

## 2020-05-19 NOTE — ED TRIAGE NOTES
TRIAGE NOTE: Pt comes in for LEFT leg swelling since Thursday. Worked hard in the yard that day. Has been using A&D ointment, no pain. History of leg swelling, no hx of DVT.

## 2020-05-19 NOTE — ED PROVIDER NOTES
The history is provided by the patient. Leg Swelling    This is a new problem. Episode onset: 5 days ago associated with 2 small \"welps\" that he thought might be bug bites from gardening. The problem occurs constantly. The problem has been gradually improving. The pain is present in the left lower leg. The quality of the pain is described as aching. The pain is mild. Pertinent negatives include no numbness, full range of motion and no stiffness. He has tried nothing for the symptoms. Past Medical History:   Diagnosis Date    Arrhythmia     SVT    Chronic obstructive pulmonary disease (HCC)     mild    GERD (gastroesophageal reflux disease)     Hypertension     Thyroid disease     benign thyroid growth, checked annually by Dr. Jocelyn Perez       Past Surgical History:   Procedure Laterality Date    COLONOSCOPY N/A 2/5/2019    COLONOSCOPY performed by Gala Baez MD at [facility] ENDOSCOPY    COLONOSCOPY,MAYELA RODRIGUES,SNARE  2/5/2019         HX HEENT Bilateral     cataract extraction    [place] COLON CA SCRN NOT HI [facility] IND  2/5/2019              Family History:   Problem Relation Age of Onset    Hypertension Mother     Heart Disease Mother     Hypertension Father     Diabetes Father        Social History     Socioeconomic History    Marital status:      Spouse name: Not on file    Number of children: Not on file    Years of education: Not on file    Highest education level: Not on file   Occupational History    Not on file   Social Needs    Financial resource strain: Not on file    Food insecurity     Worry: Not on file     Inability: Not on file    Transportation needs     Medical: Not on file     Non-medical: Not on file   Tobacco Use    Smoking status: Former Smoker     Years: 5.00    Smokeless tobacco: Never Used   Substance and Sexual Activity    Alcohol use:  Yes     Alcohol/week: 7.0 standard drinks     Types: 7 Glasses of wine per week    Drug use: No    Sexual activity: Not on file   Lifestyle    Physical activity     Days per week: Not on file     Minutes per session: Not on file    Stress: Not on file   Relationships    Social connections     Talks on phone: Not on file     Gets together: Not on file     Attends Confucianist service: Not on file     Active member of club or organization: Not on file     Attends meetings of clubs or organizations: Not on file     Relationship status: Not on file    Intimate partner violence     Fear of current or ex partner: Not on file     Emotionally abused: Not on file     Physically abused: Not on file     Forced sexual activity: Not on file   Other Topics Concern    Not on file   Social History Narrative    Not on file         ALLERGIES: Patient has no known allergies. Review of Systems   Musculoskeletal: Negative for stiffness. Neurological: Negative for numbness. All other systems reviewed and are negative. Vitals:    05/19/20 0905   BP: 143/79   Pulse: 85   Resp: 18   Temp: 97.6 °F (36.4 °C)   SpO2: 94%            Physical Exam  Vitals signs and nursing note reviewed. Constitutional:       General: He is not in acute distress. Appearance: He is well-developed. HENT:      Head: Normocephalic and atraumatic. Eyes:      Conjunctiva/sclera: Conjunctivae normal.   Neck:      Musculoskeletal: Neck supple. Trachea: No tracheal deviation. Cardiovascular:      Rate and Rhythm: Normal rate and regular rhythm. Pulmonary:      Effort: Pulmonary effort is normal. No respiratory distress. Abdominal:      General: There is no distension. Musculoskeletal: Normal range of motion. General: No deformity. Left lower leg: He exhibits no tenderness. Edema (2+ pitting from level of ankle to tibial tuberosity) present. Skin:     General: Skin is warm and dry. Neurological:      Mental Status: He is alert. Cranial Nerves: No cranial nerve deficit.    Psychiatric:         Behavior: Behavior normal.          MDM     68 y.o. male presents with LLE swelling after noticing what he thinks were 2 insect bites on his left anterior lower leg. DDx includes DVT or reactive edema after a mild cellulitis as he describes initial red streaking and tenderness which has since resolved. No DVT on venous doppler. Discussed compression and elevation as definitive management of peripheral edema. Plan to follow up with PCP as needed and return precautions discussed for worsening or new concerning symptoms.      Procedures

## 2020-05-19 NOTE — ED NOTES
Patient discharged with vital signs stable, AVS, in no acute distress. Leg wrapped with ace bandage.

## 2021-03-04 ENCOUNTER — HOSPITAL ENCOUNTER (INPATIENT)
Age: 71
LOS: 6 days | Discharge: HOME HEALTH | DRG: 189 | End: 2021-03-10
Attending: EMERGENCY MEDICINE | Admitting: HOSPITALIST
Payer: MEDICARE

## 2021-03-04 ENCOUNTER — APPOINTMENT (OUTPATIENT)
Dept: GENERAL RADIOLOGY | Age: 71
DRG: 189 | End: 2021-03-04
Attending: EMERGENCY MEDICINE
Payer: MEDICARE

## 2021-03-04 ENCOUNTER — APPOINTMENT (OUTPATIENT)
Dept: CT IMAGING | Age: 71
DRG: 189 | End: 2021-03-04
Attending: EMERGENCY MEDICINE
Payer: MEDICARE

## 2021-03-04 DIAGNOSIS — J90 CHRONIC BILATERAL PLEURAL EFFUSIONS: ICD-10-CM

## 2021-03-04 DIAGNOSIS — J44.1 ACUTE EXACERBATION OF CHRONIC OBSTRUCTIVE PULMONARY DISEASE (COPD) (HCC): ICD-10-CM

## 2021-03-04 DIAGNOSIS — J96.01 ACUTE RESPIRATORY FAILURE WITH HYPOXIA (HCC): Primary | ICD-10-CM

## 2021-03-04 LAB
ALBUMIN SERPL-MCNC: 3.5 G/DL (ref 3.5–5)
ALBUMIN/GLOB SERPL: 0.9 {RATIO} (ref 1.1–2.2)
ALP SERPL-CCNC: 80 U/L (ref 45–117)
ALT SERPL-CCNC: 43 U/L (ref 12–78)
ANION GAP SERPL CALC-SCNC: 5 MMOL/L (ref 5–15)
APPEARANCE UR: CLEAR
ARTERIAL PATENCY WRIST A: YES
AST SERPL-CCNC: 23 U/L (ref 15–37)
ATRIAL RATE: 88 BPM
BACTERIA URNS QL MICRO: NEGATIVE /HPF
BASE EXCESS BLD CALC-SCNC: 11 MMOL/L
BASOPHILS # BLD: 0 K/UL (ref 0–0.1)
BASOPHILS NFR BLD: 0 % (ref 0–1)
BDY SITE: ABNORMAL
BILIRUB SERPL-MCNC: 0.4 MG/DL (ref 0.2–1)
BILIRUB UR QL: NEGATIVE
BNP SERPL-MCNC: 1192 PG/ML
BUN SERPL-MCNC: 24 MG/DL (ref 6–20)
BUN/CREAT SERPL: 21 (ref 12–20)
CA-I BLD-SCNC: 1.38 MMOL/L (ref 1.12–1.32)
CALCIUM SERPL-MCNC: 8.8 MG/DL (ref 8.5–10.1)
CALCULATED P AXIS, ECG09: 62 DEGREES
CALCULATED R AXIS, ECG10: 42 DEGREES
CALCULATED T AXIS, ECG11: 34 DEGREES
CHLORIDE SERPL-SCNC: 98 MMOL/L (ref 97–108)
CK SERPL-CCNC: 164 U/L (ref 39–308)
CO2 SERPL-SCNC: 33 MMOL/L (ref 21–32)
COLOR UR: ABNORMAL
COVID-19 RAPID TEST, COVR: ABNORMAL
CREAT SERPL-MCNC: 1.15 MG/DL (ref 0.7–1.3)
D DIMER PPP FEU-MCNC: 1.26 MG/L FEU (ref 0–0.65)
DIAGNOSIS, 93000: NORMAL
DIFFERENTIAL METHOD BLD: ABNORMAL
EOSINOPHIL # BLD: 0.1 K/UL (ref 0–0.4)
EOSINOPHIL NFR BLD: 1 % (ref 0–7)
EPITH CASTS URNS QL MICRO: ABNORMAL /LPF
ERYTHROCYTE [DISTWIDTH] IN BLOOD BY AUTOMATED COUNT: 16.1 % (ref 11.5–14.5)
GAS FLOW.O2 O2 DELIVERY SYS: ABNORMAL L/MIN
GAS FLOW.O2 SETTING OXYMISER: 6 L/M
GLOBULIN SER CALC-MCNC: 4.1 G/DL (ref 2–4)
GLUCOSE SERPL-MCNC: 156 MG/DL (ref 65–100)
GLUCOSE UR STRIP.AUTO-MCNC: NEGATIVE MG/DL
HCO3 BLD-SCNC: 38.6 MMOL/L (ref 22–26)
HCT VFR BLD AUTO: 45.8 % (ref 36.6–50.3)
HGB BLD-MCNC: 14 G/DL (ref 12.1–17)
HGB UR QL STRIP: NEGATIVE
HYALINE CASTS URNS QL MICRO: ABNORMAL /LPF (ref 0–5)
IMM GRANULOCYTES # BLD AUTO: 0.1 K/UL (ref 0–0.04)
IMM GRANULOCYTES NFR BLD AUTO: 1 % (ref 0–0.5)
KETONES UR QL STRIP.AUTO: NEGATIVE MG/DL
LACTATE BLD-SCNC: 0.62 MMOL/L (ref 0.4–2)
LEUKOCYTE ESTERASE UR QL STRIP.AUTO: NEGATIVE
LYMPHOCYTES # BLD: 1.8 K/UL (ref 0.8–3.5)
LYMPHOCYTES NFR BLD: 18 % (ref 12–49)
MAGNESIUM SERPL-MCNC: 2 MG/DL (ref 1.6–2.4)
MCH RBC QN AUTO: 29.2 PG (ref 26–34)
MCHC RBC AUTO-ENTMCNC: 30.6 G/DL (ref 30–36.5)
MCV RBC AUTO: 95.4 FL (ref 80–99)
MONOCYTES # BLD: 0.7 K/UL (ref 0–1)
MONOCYTES NFR BLD: 7 % (ref 5–13)
NEUTS SEG # BLD: 7.2 K/UL (ref 1.8–8)
NEUTS SEG NFR BLD: 73 % (ref 32–75)
NITRITE UR QL STRIP.AUTO: NEGATIVE
NRBC # BLD: 0.05 K/UL (ref 0–0.01)
NRBC BLD-RTO: 0.5 PER 100 WBC
P-R INTERVAL, ECG05: 160 MS
PCO2 BLD: 89.5 MMHG (ref 35–45)
PH BLD: 7.24 [PH] (ref 7.35–7.45)
PH UR STRIP: 6 [PH] (ref 5–8)
PLATELET # BLD AUTO: 208 K/UL (ref 150–400)
PMV BLD AUTO: 10.3 FL (ref 8.9–12.9)
PO2 BLD: 92 MMHG (ref 80–100)
POTASSIUM SERPL-SCNC: 4.7 MMOL/L (ref 3.5–5.1)
PROCALCITONIN SERPL-MCNC: <0.05 NG/ML
PROT SERPL-MCNC: 7.6 G/DL (ref 6.4–8.2)
PROT UR STRIP-MCNC: 100 MG/DL
Q-T INTERVAL, ECG07: 372 MS
QRS DURATION, ECG06: 90 MS
QTC CALCULATION (BEZET), ECG08: 450 MS
RBC # BLD AUTO: 4.8 M/UL (ref 4.1–5.7)
RBC #/AREA URNS HPF: ABNORMAL /HPF (ref 0–5)
SAO2 % BLD: 95 % (ref 92–97)
SARS-COV-2, COV2: NORMAL
SARS-COV-2, COV2: NORMAL
SODIUM SERPL-SCNC: 136 MMOL/L (ref 136–145)
SOURCE, COVRS: ABNORMAL
SP GR UR REFRACTOMETRY: 1.02 (ref 1–1.03)
SPECIMEN TYPE: ABNORMAL
TOTAL RESP. RATE, ITRR: 18
TROPONIN I SERPL-MCNC: <0.05 NG/ML
UA: UC IF INDICATED,UAUC: ABNORMAL
UROBILINOGEN UR QL STRIP.AUTO: 0.2 EU/DL (ref 0.2–1)
VENTRICULAR RATE, ECG03: 88 BPM
WBC # BLD AUTO: 10 K/UL (ref 4.1–11.1)
WBC URNS QL MICRO: ABNORMAL /HPF (ref 0–4)

## 2021-03-04 PROCEDURE — 80053 COMPREHEN METABOLIC PANEL: CPT

## 2021-03-04 PROCEDURE — 93005 ELECTROCARDIOGRAM TRACING: CPT

## 2021-03-04 PROCEDURE — 94761 N-INVAS EAR/PLS OXIMETRY MLT: CPT

## 2021-03-04 PROCEDURE — 87635 SARS-COV-2 COVID-19 AMP PRB: CPT

## 2021-03-04 PROCEDURE — U0005 INFEC AGEN DETEC AMPLI PROBE: HCPCS

## 2021-03-04 PROCEDURE — 83735 ASSAY OF MAGNESIUM: CPT

## 2021-03-04 PROCEDURE — 82550 ASSAY OF CK (CPK): CPT

## 2021-03-04 PROCEDURE — 65660000000 HC RM CCU STEPDOWN

## 2021-03-04 PROCEDURE — 81001 URINALYSIS AUTO W/SCOPE: CPT

## 2021-03-04 PROCEDURE — 85025 COMPLETE CBC W/AUTO DIFF WBC: CPT

## 2021-03-04 PROCEDURE — 74011250637 HC RX REV CODE- 250/637: Performed by: EMERGENCY MEDICINE

## 2021-03-04 PROCEDURE — 82803 BLOOD GASES ANY COMBINATION: CPT

## 2021-03-04 PROCEDURE — 84484 ASSAY OF TROPONIN QUANT: CPT

## 2021-03-04 PROCEDURE — 36600 WITHDRAWAL OF ARTERIAL BLOOD: CPT

## 2021-03-04 PROCEDURE — 94640 AIRWAY INHALATION TREATMENT: CPT

## 2021-03-04 PROCEDURE — 85379 FIBRIN DEGRADATION QUANT: CPT

## 2021-03-04 PROCEDURE — 71275 CT ANGIOGRAPHY CHEST: CPT

## 2021-03-04 PROCEDURE — 74011250636 HC RX REV CODE- 250/636: Performed by: EMERGENCY MEDICINE

## 2021-03-04 PROCEDURE — 87040 BLOOD CULTURE FOR BACTERIA: CPT

## 2021-03-04 PROCEDURE — 74011250636 HC RX REV CODE- 250/636: Performed by: HOSPITALIST

## 2021-03-04 PROCEDURE — 74011250637 HC RX REV CODE- 250/637: Performed by: HOSPITALIST

## 2021-03-04 PROCEDURE — 83880 ASSAY OF NATRIURETIC PEPTIDE: CPT

## 2021-03-04 PROCEDURE — 36415 COLL VENOUS BLD VENIPUNCTURE: CPT

## 2021-03-04 PROCEDURE — 83605 ASSAY OF LACTIC ACID: CPT

## 2021-03-04 PROCEDURE — 96374 THER/PROPH/DIAG INJ IV PUSH: CPT

## 2021-03-04 PROCEDURE — 74011000636 HC RX REV CODE- 636: Performed by: EMERGENCY MEDICINE

## 2021-03-04 PROCEDURE — 99285 EMERGENCY DEPT VISIT HI MDM: CPT

## 2021-03-04 PROCEDURE — 71046 X-RAY EXAM CHEST 2 VIEWS: CPT

## 2021-03-04 PROCEDURE — 84145 PROCALCITONIN (PCT): CPT

## 2021-03-04 RX ORDER — PROMETHAZINE HYDROCHLORIDE 25 MG/1
12.5 TABLET ORAL
Status: DISCONTINUED | OUTPATIENT
Start: 2021-03-04 | End: 2021-03-10 | Stop reason: HOSPADM

## 2021-03-04 RX ORDER — ACETAMINOPHEN 325 MG/1
650 TABLET ORAL
Status: DISCONTINUED | OUTPATIENT
Start: 2021-03-04 | End: 2021-03-04

## 2021-03-04 RX ORDER — ENOXAPARIN SODIUM 100 MG/ML
40 INJECTION SUBCUTANEOUS DAILY
Status: DISCONTINUED | OUTPATIENT
Start: 2021-03-05 | End: 2021-03-05 | Stop reason: ALTCHOICE

## 2021-03-04 RX ORDER — SODIUM CHLORIDE 0.9 % (FLUSH) 0.9 %
5-40 SYRINGE (ML) INJECTION EVERY 8 HOURS
Status: DISCONTINUED | OUTPATIENT
Start: 2021-03-04 | End: 2021-03-10 | Stop reason: HOSPADM

## 2021-03-04 RX ORDER — MELATONIN
1000 DAILY
Status: DISCONTINUED | OUTPATIENT
Start: 2021-03-05 | End: 2021-03-10 | Stop reason: HOSPADM

## 2021-03-04 RX ORDER — FUROSEMIDE 10 MG/ML
20 INJECTION INTRAMUSCULAR; INTRAVENOUS
Status: DISCONTINUED | OUTPATIENT
Start: 2021-03-04 | End: 2021-03-04

## 2021-03-04 RX ORDER — ACETAMINOPHEN 325 MG/1
650 TABLET ORAL
Status: DISCONTINUED | OUTPATIENT
Start: 2021-03-04 | End: 2021-03-10 | Stop reason: HOSPADM

## 2021-03-04 RX ORDER — ROSUVASTATIN CALCIUM 40 MG/1
40 TABLET, COATED ORAL
COMMUNITY

## 2021-03-04 RX ORDER — TAMSULOSIN HYDROCHLORIDE 0.4 MG/1
0.4 CAPSULE ORAL EVERY EVENING
Status: DISCONTINUED | OUTPATIENT
Start: 2021-03-04 | End: 2021-03-10 | Stop reason: HOSPADM

## 2021-03-04 RX ORDER — PROPRANOLOL HYDROCHLORIDE 60 MG/1
120 CAPSULE, EXTENDED RELEASE ORAL EVERY EVENING
Status: DISCONTINUED | OUTPATIENT
Start: 2021-03-04 | End: 2021-03-10 | Stop reason: HOSPADM

## 2021-03-04 RX ORDER — FUROSEMIDE 10 MG/ML
40 INJECTION INTRAMUSCULAR; INTRAVENOUS DAILY
Status: DISCONTINUED | OUTPATIENT
Start: 2021-03-04 | End: 2021-03-05

## 2021-03-04 RX ORDER — ASPIRIN 81 MG/1
81 TABLET ORAL EVERY EVENING
Status: DISCONTINUED | OUTPATIENT
Start: 2021-03-04 | End: 2021-03-10 | Stop reason: HOSPADM

## 2021-03-04 RX ORDER — DEXAMETHASONE SODIUM PHOSPHATE 4 MG/ML
10 INJECTION, SOLUTION INTRA-ARTICULAR; INTRALESIONAL; INTRAMUSCULAR; INTRAVENOUS; SOFT TISSUE
Status: COMPLETED | OUTPATIENT
Start: 2021-03-04 | End: 2021-03-04

## 2021-03-04 RX ORDER — ONDANSETRON 2 MG/ML
4 INJECTION INTRAMUSCULAR; INTRAVENOUS
Status: DISCONTINUED | OUTPATIENT
Start: 2021-03-04 | End: 2021-03-10 | Stop reason: HOSPADM

## 2021-03-04 RX ORDER — LANOLIN ALCOHOL/MO/W.PET/CERES
3 CREAM (GRAM) TOPICAL
Status: DISCONTINUED | OUTPATIENT
Start: 2021-03-04 | End: 2021-03-10 | Stop reason: HOSPADM

## 2021-03-04 RX ORDER — ACETAMINOPHEN 650 MG/1
650 SUPPOSITORY RECTAL
Status: DISCONTINUED | OUTPATIENT
Start: 2021-03-04 | End: 2021-03-10 | Stop reason: HOSPADM

## 2021-03-04 RX ORDER — ONDANSETRON 2 MG/ML
4 INJECTION INTRAMUSCULAR; INTRAVENOUS
Status: DISCONTINUED | OUTPATIENT
Start: 2021-03-04 | End: 2021-03-04

## 2021-03-04 RX ORDER — ROSUVASTATIN CALCIUM 40 MG/1
40 TABLET, COATED ORAL
Status: DISCONTINUED | OUTPATIENT
Start: 2021-03-04 | End: 2021-03-10 | Stop reason: HOSPADM

## 2021-03-04 RX ORDER — SODIUM CHLORIDE 0.9 % (FLUSH) 0.9 %
5-10 SYRINGE (ML) INJECTION AS NEEDED
Status: DISCONTINUED | OUTPATIENT
Start: 2021-03-04 | End: 2021-03-10 | Stop reason: HOSPADM

## 2021-03-04 RX ORDER — PANTOPRAZOLE SODIUM 40 MG/1
40 TABLET, DELAYED RELEASE ORAL
Status: DISCONTINUED | OUTPATIENT
Start: 2021-03-05 | End: 2021-03-10 | Stop reason: HOSPADM

## 2021-03-04 RX ORDER — SODIUM CHLORIDE 0.9 % (FLUSH) 0.9 %
5-40 SYRINGE (ML) INJECTION AS NEEDED
Status: DISCONTINUED | OUTPATIENT
Start: 2021-03-04 | End: 2021-03-10 | Stop reason: HOSPADM

## 2021-03-04 RX ORDER — GEMFIBROZIL 600 MG/1
600 TABLET, FILM COATED ORAL 2 TIMES DAILY
Status: DISCONTINUED | OUTPATIENT
Start: 2021-03-04 | End: 2021-03-10 | Stop reason: HOSPADM

## 2021-03-04 RX ORDER — POLYETHYLENE GLYCOL 3350 17 G/17G
17 POWDER, FOR SOLUTION ORAL DAILY PRN
Status: DISCONTINUED | OUTPATIENT
Start: 2021-03-04 | End: 2021-03-10 | Stop reason: HOSPADM

## 2021-03-04 RX ORDER — FINASTERIDE 5 MG/1
5 TABLET, FILM COATED ORAL EVERY EVENING
Status: DISCONTINUED | OUTPATIENT
Start: 2021-03-04 | End: 2021-03-10 | Stop reason: HOSPADM

## 2021-03-04 RX ORDER — GLUCOSAM/CHONDRO/HERB 149/HYAL 750-100 MG
1 TABLET ORAL DAILY
Status: DISCONTINUED | OUTPATIENT
Start: 2021-03-05 | End: 2021-03-10 | Stop reason: HOSPADM

## 2021-03-04 RX ADMIN — Medication 10 ML: at 23:57

## 2021-03-04 RX ADMIN — TAMSULOSIN HYDROCHLORIDE 0.4 MG: 0.4 CAPSULE ORAL at 17:44

## 2021-03-04 RX ADMIN — IPRATROPIUM BROMIDE AND ALBUTEROL 4 PUFF: 20; 100 SPRAY, METERED RESPIRATORY (INHALATION) at 13:14

## 2021-03-04 RX ADMIN — IOPAMIDOL 100 ML: 755 INJECTION, SOLUTION INTRAVENOUS at 11:50

## 2021-03-04 RX ADMIN — FUROSEMIDE 40 MG: 10 INJECTION, SOLUTION INTRAMUSCULAR; INTRAVENOUS at 14:35

## 2021-03-04 RX ADMIN — Medication 3 MG: at 22:15

## 2021-03-04 RX ADMIN — IPRATROPIUM BROMIDE AND ALBUTEROL 1 PUFF: 20; 100 SPRAY, METERED RESPIRATORY (INHALATION) at 16:38

## 2021-03-04 RX ADMIN — IPRATROPIUM BROMIDE AND ALBUTEROL 1 PUFF: 20; 100 SPRAY, METERED RESPIRATORY (INHALATION) at 20:00

## 2021-03-04 RX ADMIN — Medication 10 ML: at 09:32

## 2021-03-04 RX ADMIN — IPRATROPIUM BROMIDE AND ALBUTEROL 4 PUFF: 20; 100 SPRAY, METERED RESPIRATORY (INHALATION) at 13:30

## 2021-03-04 RX ADMIN — DEXAMETHASONE SODIUM PHOSPHATE 10 MG: 4 INJECTION, SOLUTION INTRAMUSCULAR; INTRAVENOUS at 09:30

## 2021-03-04 RX ADMIN — Medication 10 ML: at 14:36

## 2021-03-04 RX ADMIN — ASPIRIN 81 MG: 81 TABLET, COATED ORAL at 17:44

## 2021-03-04 RX ADMIN — FINASTERIDE 5 MG: 5 TABLET, FILM COATED ORAL at 17:44

## 2021-03-04 RX ADMIN — ROSUVASTATIN CALCIUM 40 MG: 40 TABLET, FILM COATED ORAL at 23:58

## 2021-03-04 RX ADMIN — IPRATROPIUM BROMIDE AND ALBUTEROL 4 PUFF: 20; 100 SPRAY, METERED RESPIRATORY (INHALATION) at 13:45

## 2021-03-04 NOTE — ED NOTES
Pt arrives from triage via wheelchair, hypoxic in triage. Pt sats are 68% in the room. Pt placed on NRB at 15 L/min. Pt reports that he has been progressively worse since this past weekend. Pt additionally notes that he tripped and fell 2 weeks ago. Diffuse bruising noted on the left side of his body. He notes residual L should discomfort which has been exacerbated by his breathing difficulties. Pt reports mild productive cough, orthopnea which has disrupted his sleep and dependent edema in his bilateral lower extremities. Pt reports hx COPD, emphysema and a hx of PSVT. Pt denies CP, fever or known exposure to COVID.     0830: Dr Josse Gan at bedside evaluating pt. Per MD, this RN will begin to slowly titrate O2 down w /NRB. Also, per MD ice chips ok. 1027: Pt jamila w/ xray     0900: Respiratory called to administer inhaler. 0930: Oxygen titrated to 10l/min, pt remains on NRB. No distress noted, pt resting comfortably. 0935: Oxygen titrated to 8l/min, pt remains on NRB. No distress noted, pt resting comfortably. Oxygen saturation 97%     1100: CT at bedside but unable to transport pt alone at this time due to oxygen requirement. This RN will call CT later to transport pt once oxygen requirement is lower. Per MD, oxygen titrated to 6L/min on the NC. Pt tolerating change well, no distress noted. 1310: MD and respiratory at bedside. 1424: Respiratory leaving bedside. 1430: Pt resting comfortably in bed. Confirmed discontinuation of antibiotics with hospitalist and meds given as ordered. 1440: Respiratory at bedside titrating oxygen to 3 L/min     1445: Per MD, titrate oxygen saturations to maintain > 90%    1450: Pt sats in mid 80's. Titrated oxygen to 4 l/min. Pt improved to 90%    1600: Pt resting comfortably, saturations 92%. 1742: Pt resting comfortably. Trial titration to 3l/min. Pt did not toelrate, saturations dropped below 90%. Pt back on 4l/min.      1900: Sheets and gown changed, meal tray at bedside and patient repositioned into high fowlers

## 2021-03-04 NOTE — ED PROVIDER NOTES
EMERGENCY DEPARTMENT HISTORY AND PHYSICAL EXAM      Date: 3/4/2021  Patient Name: Vinay Ontiveros    History of Presenting Illness     Chief Complaint   Patient presents with    Shortness of Breath     SOB worse for few days, unable to sleep at night.  +fatigue, +hx of COPD. Denies cough or fever. Initial sats 70%; increased to 80% while sitting in triage    Rapid Heart Rate     Reports episode of SVT last night with +hx. History Provided By: Patient    HPI: Vinay Ontiveros, 65 y.o. male with a past medical history significant for COPD, high cholesterol, medical problems as stated below presents to the ED with cc of moderate severe shortness of breath with exertion, palpitations, fatigue over the last 4 days. Patient reports his shortness of breath is severe with any type of exertion at all. He has had no dry nonproductive cough. He denies any fevers or chills. No lower extremity swelling. He denies any change in taste or smell or sick contacts. No other associated symptoms. No other exacerbating ameliorating factors. He has not got this Covid vaccine as of yet. He has never had Covid before. No history of DVT or blood clots. There are no other complaints, changes, or physical findings at this time. PCP: Kate Waters MD    No current facility-administered medications on file prior to encounter. Current Outpatient Medications on File Prior to Encounter   Medication Sig Dispense Refill    rosuvastatin (Crestor) 40 mg tablet Take 40 mg by mouth nightly.  finasteride (PROSCAR) 5 mg tablet Take 5 mg by mouth daily. Pt unsure of strength      [DISCONTINUED] ondansetron (ZOFRAN ODT) 4 mg disintegrating tablet Take 1 Tab by mouth every eight (8) hours as needed for Nausea or Vomiting. 20 Tab 0    [DISCONTINUED] ketorolac (TORADOL) 10 mg tablet Take 1 Tab by mouth every six (6) hours as needed for Pain.  20 Tab 0    propranolol LA (INDERAL LA) 120 mg SR capsule Take 120 mg by mouth daily.  tamsulosin (FLOMAX) 0.4 mg capsule Take 0.4 mg by mouth daily.  esomeprazole (NEXIUM) 40 mg capsule Take 40 mg by mouth daily.  aspirin delayed-release 81 mg tablet Take 81 mg by mouth daily.  melatonin 3 mg tablet Take 3 mg by mouth nightly.  albuterol (VENTOLIN HFA) 90 mcg/actuation inhaler Take 1 Puff by inhalation every four (4) hours as needed.  fluticasone-vilanterol (BREO ELLIPTA) 100-25 mcg/dose inhaler Take 1 Puff by inhalation daily.  gemfibrozil (LOPID) 600 mg tablet Take 600 mg by mouth two (2) times a day.  cholecalciferol (VITAMIN D3) 1,000 unit cap Take 1,000 Units by mouth daily.  MULTIVIT-MIN/FA/LYCOPEN/LUTEIN (CENTRUM SILVER ULTRA MEN'S PO) Take 1 Tab by mouth daily.  Omega-3-DHA-EPA-Fish Oil 1,000 mg (120 mg-180 mg) cap Take 1 Cap by mouth daily.  lecithin 1,200 mg cap Take 1 Cap by mouth daily.  [DISCONTINUED] atorvastatin (LIPITOR) 20 mg tablet Take 20 mg by mouth daily.          Past History     Past Medical History:  Past Medical History:   Diagnosis Date    Arrhythmia     SVT    Chronic obstructive pulmonary disease (HCC)     mild    GERD (gastroesophageal reflux disease)     Hypertension     Thyroid disease     benign thyroid growth, checked annually by Dr. Arlys Duane       Past Surgical History:  Past Surgical History:   Procedure Laterality Date    COLONOSCOPY N/A 2/5/2019    COLONOSCOPY performed [facility] Mandy Sol [facility]COPY    COLONOSCOPY,MAYELA RODRIGUES,SNARE  2/5/2019         HX HEENT Bilateral     cataract extraction    [place] COLON CA SCRN NOT  [address] IND  2/5/2019            Family History:  Family History   Problem Relation Age of Onset    Hypertension Mother     Heart Disease Mother     Hypertension Father     Diabetes Father        Social History:  Social History     Tobacco Use    Smoking status: Former Smoker     Years: 5.00    Smokeless tobacco: Never Used   Substance Use Topics    Alcohol use: Yes     Alcohol/week: 7.0 standard drinks     Types: 7 Glasses of wine per week    Drug use: No       Allergies:  No Known Allergies      Review of Systems   Review of Systems   Constitutional: Negative for chills, diaphoresis, fatigue and fever. HENT: Negative for ear pain and sore throat. Eyes: Negative for pain and redness. Respiratory: Positive for cough, chest tightness and shortness of breath. Cardiovascular: Negative for chest pain and leg swelling. Gastrointestinal: Negative for abdominal pain, diarrhea, nausea and vomiting. Endocrine: Negative for cold intolerance and heat intolerance. Genitourinary: Negative for flank pain and hematuria. Musculoskeletal: Negative for back pain and neck stiffness. Skin: Negative for rash and wound. Neurological: Negative for dizziness, syncope and headaches. All other systems reviewed and are negative. Physical Exam   Physical Exam  Vitals signs and nursing note reviewed. Constitutional:       General: He is in acute distress. Appearance: He is well-developed. He is not ill-appearing. HENT:      Head: Normocephalic and atraumatic. Mouth/Throat:      Pharynx: No oropharyngeal exudate. Eyes:      Conjunctiva/sclera: Conjunctivae normal.      Pupils: Pupils are equal, round, and reactive to light. Neck:      Musculoskeletal: Normal range of motion. Cardiovascular:      Rate and Rhythm: Normal rate and regular rhythm. Heart sounds: No murmur. Pulmonary:      Effort: Pulmonary effort is normal. Tachypnea and prolonged expiration present. No accessory muscle usage or respiratory distress. Breath sounds: Examination of the right-upper field reveals decreased breath sounds. Examination of the left-upper field reveals decreased breath sounds. Examination of the right-middle field reveals decreased breath sounds. Examination of the left-middle field reveals decreased breath sounds.  Examination of the right-lower field reveals decreased breath sounds. Examination of the left-lower field reveals decreased breath sounds. Decreased breath sounds present. No wheezing. Abdominal:      General: Bowel sounds are normal. There is no distension. Palpations: Abdomen is soft. Tenderness: There is no abdominal tenderness. Musculoskeletal: Normal range of motion. General: No deformity. Skin:     General: Skin is warm and dry. Findings: No rash. Neurological:      Mental Status: He is alert and oriented to person, place, and time. Coordination: Coordination normal.   Psychiatric:         Behavior: Behavior normal.         Diagnostic Study Results     Labs -     Recent Results (from the past 24 hour(s))   EKG, 12 LEAD, INITIAL    Collection Time: 03/04/21  8:13 AM   Result Value Ref Range    Ventricular Rate 88 BPM    Atrial Rate 88 BPM    P-R Interval 160 ms    QRS Duration 90 ms    Q-T Interval 372 ms    QTC Calculation (Bezet) 450 ms    Calculated P Axis 62 degrees    Calculated R Axis 42 degrees    Calculated T Axis 34 degrees    Diagnosis       Normal sinus rhythm  Low voltage QRS  When compared with ECG of 26-JAN-2020 20:08,  Nonspecific T wave abnormality now evident in Inferior leads  Confirmed by Stefan Obregon P.VSalvador ([id number]) on 3/4/2021 11:47:45 AM     CBC WITH AUTOMATED DIFF    Collection Time: 03/04/21  8:37 AM   Result Value Ref Range    WBC 10.0 4.1 - 11.1 K/uL    RBC 4.80 4. 10 - 5.70 M/uL    HGB 14.0 12.1 - 17.0 g/dL    HCT 45.8 36.6 - 50.3 %    MCV 95.4 80.0 - 99.0 FL    MCH 29.2 26.0 - 34.0 PG    MCHC 30.6 30.0 - 36.5 g/dL    RDW 16.1 (H) 11.5 - 14.5 %    PLATELET 975 173 - 840 K/uL    MPV 10.3 8.9 - 12.9 FL    NRBC 0.5 (H) 0  WBC    ABSOLUTE NRBC 0.05 (H) 0.00 - 0.01 K/uL    NEUTROPHILS 73 32 - 75 %    LYMPHOCYTES 18 12 - 49 %    MONOCYTES 7 5 - 13 %    EOSINOPHILS 1 0 - 7 %    BASOPHILS 0 0 - 1 %    IMMATURE GRANULOCYTES 1 (H) 0.0 - 0.5 %    ABS.  NEUTROPHILS 7.2 1.8 - 8.0 K/UL    ABS. LYMPHOCYTES 1.8 0.8 - 3.5 K/UL    ABS. MONOCYTES 0.7 0.0 - 1.0 K/UL    ABS. EOSINOPHILS 0.1 0.0 - 0.4 K/UL    ABS. BASOPHILS 0.0 0.0 - 0.1 K/UL    ABS. IMM. GRANS. 0.1 (H) 0.00 - 0.04 K/UL    DF AUTOMATED     METABOLIC PANEL, COMPREHENSIVE    Collection Time: 03/04/21  8:37 AM   Result Value Ref Range    Sodium 136 136 - 145 mmol/L    Potassium 4.7 3.5 - 5.1 mmol/L    Chloride 98 97 - 108 mmol/L    CO2 33 (H) 21 - 32 mmol/L    Anion gap 5 5 - 15 mmol/L    Glucose 156 (H) 65 - 100 mg/dL    BUN 24 (H) 6 - 20 MG/DL    Creatinine 1.15 0.70 - 1.30 MG/DL    BUN/Creatinine ratio 21 (H) 12 - 20      GFR est AA >60 >60 ml/min/1.73m2    GFR est non-AA >60 >60 ml/min/1.73m2    Calcium 8.8 8.5 - 10.1 MG/DL    Bilirubin, total 0.4 0.2 - 1.0 MG/DL    ALT (SGPT) 43 12 - 78 U/L    AST (SGOT) 23 15 - 37 U/L    Alk.  phosphatase 80 45 - 117 U/L    Protein, total 7.6 6.4 - 8.2 g/dL    Albumin 3.5 3.5 - 5.0 g/dL    Globulin 4.1 (H) 2.0 - 4.0 g/dL    A-G Ratio 0.9 (L) 1.1 - 2.2     CK W/ REFLX CKMB    Collection Time: 03/04/21  8:37 AM   Result Value Ref Range     39 - 308 U/L   MAGNESIUM    Collection Time: 03/04/21  8:46 AM   Result Value Ref Range    Magnesium 2.0 1.6 - 2.4 mg/dL   PROCALCITONIN    Collection Time: 03/04/21  8:46 AM   Result Value Ref Range    Procalcitonin <0.05 ng/mL   D DIMER    Collection Time: 03/04/21  8:46 AM   Result Value Ref Range    D-dimer 1.26 (H) 0.00 - 0.65 mg/L FEU   SARS-COV-2    Collection Time: 03/04/21  8:46 AM   Result Value Ref Range    SARS-CoV-2 Please find results under separate order     TROPONIN I    Collection Time: 03/04/21  8:46 AM   Result Value Ref Range    Troponin-I, Qt. <0.05 <0.05 ng/mL   COVID-19 RAPID TEST    Collection Time: 03/04/21  8:46 AM   Result Value Ref Range    Specimen source Nasopharyngeal      COVID-19 rapid test Indeterminate (A) NOTD     NT-PRO BNP    Collection Time: 03/04/21  8:46 AM   Result Value Ref Range    NT pro-BNP 1,192 (H) <125 PG/ML   POC LACTIC ACID    Collection Time: 03/04/21  9:15 AM   Result Value Ref Range    Lactic Acid (POC) 0.62 0.40 - 2.00 mmol/L   URINALYSIS W/ REFLEX CULTURE    Collection Time: 03/04/21 10:37 AM    Specimen: Urine   Result Value Ref Range    Color YELLOW/STRAW      Appearance CLEAR CLEAR      Specific gravity 1.025 1.003 - 1.030      pH (UA) 6.0 5.0 - 8.0      Protein 100 (A) NEG mg/dL    Glucose Negative NEG mg/dL    Ketone Negative NEG mg/dL    Bilirubin Negative NEG      Blood Negative NEG      Urobilinogen 0.2 0.2 - 1.0 EU/dL    Nitrites Negative NEG      Leukocyte Esterase Negative NEG      WBC 5-10 0 - 4 /hpf    RBC 5-10 0 - 5 /hpf    Epithelial cells FEW FEW /lpf    Bacteria Negative NEG /hpf    UA:UC IF INDICATED CULTURE NOT INDICATED BY UA RESULT CNI      Hyaline cast 0-2 0 - 5 /lpf       Radiologic Studies -   CTA CHEST W OR W WO CONT   Final Result   Mild edema. Trace bilateral pleural effusions with bilateral lower   lobe atelectasis. No evidence of pulmonary embolism. XR CHEST PA LAT   Final Result   Mild pulmonary edema. XR CHEST PORT    (Results Pending)     CT Results  (Last 48 hours)               03/04/21 1150  CTA CHEST W OR W WO CONT Final result    Impression:  Mild edema. Trace bilateral pleural effusions with bilateral lower   lobe atelectasis. No evidence of pulmonary embolism. Narrative:  EXAM:  CTA CHEST W OR W WO CONT       INDICATION:   Shortness of breath for a few days, decreased oxygen saturation       COMPARISON: None. TECHNIQUE:    Precontrast  images were obtained to localize the volume for acquisition. Multislice helical CT arteriography was performed from the diaphragm to the   thoracic inlet during uneventful rapid bolus of 100 cc Isovue-370. Lung and soft   tissue windows were generated. Coronal and sagittal images were generated and   3D post processing consisting of coronal maximum intensity images was performed.      CT dose reduction was achieved through use of a standardized protocol tailored   for this examination and automatic exposure control for dose modulation. FINDINGS:   CHEST:   THYROID: Partially calcified 19 mm nodule is seen in the right gland. MEDIASTINUM: No mass or lymphadenopathy. FELIPA: No mass or lymphadenopathy. THORACIC AORTA: No dissection or aneurysm. MAIN PULMONARY ARTERY: There is no evidence of pulmonary embolism. TRACHEA/BRONCHI: Patent. ESOPHAGUS: No wall thickening or dilatation. HEART: Normal in size. PLEURA: Trace bilateral pleural effusions are noted. LUNGS: Minimal edema. Bilateral lower lobe atelectasis. INCIDENTALLY IMAGED UPPER ABDOMEN: No focal abnormality. BONES: Degenerative changes are seen in the thoracic spine. There is a 4.6 cm   left pectoralis lipoma. CXR Results  (Last 48 hours)               03/04/21 0854  XR CHEST PA LAT Final result    Impression:  Mild pulmonary edema. Narrative:  Exam:  2 view chest       Indication: Shortness of breath       Comparison to 1/26/2020. PA and lateral views demonstrate normal heart size. Mild pulmonary edema is   noted. The osseous structures are unremarkable. Medical Decision Making   I am the first provider for this patient. I reviewed the vital signs, available nursing notes, past medical history, past surgical history, family history and social history. Vital Signs-Reviewed the patient's vital signs.   Patient Vitals for the past 12 hrs:   Temp Pulse Resp BP SpO2   03/04/21 1115  81 21 (!) 143/72 92 %   03/04/21 1105  77 19 (!) 142/71 94 %   03/04/21 1103     93 %   03/04/21 1100     95 %   03/04/21 1045  75 21 (!) 163/75 96 %   03/04/21 1015  77 13 (!) 149/82 95 %   03/04/21 0935     97 %   03/04/21 0845  85 27 (!) 163/77 99 %   03/04/21 0818     96 %   03/04/21 0818     (!) 68 %   03/04/21 0817  91 23 (!) 146/70 (!) 67 %   03/04/21 0807 97.4 °F (36.3 °C) 91 20 (!) 156/83 (!) 70 %       Records Reviewed: Nursing records and medical records reviewed    MDM:  Patient presents with acute dyspnea. DDx: asthma, copd, pna, pulmonary edema, acute bronchitis, ACS, ptx, pna. Will obtain EKG, labs, CXR, provide O2 as needed for hypoxia, treat symptomatically and reassess. Will continue to monitor closely in ED. Provider Notes (Medical Decision Making):   Patient is a 59-year-old male presenting with symptoms shortness of breath, severe hypoxia, and PND. Chest x-ray and CT scan of the chest are suggestive of CHF exacerbation versus acute COPD exacerbation. No clear infiltrate. Indeterminate Covid test so will order PCR test.  Given IV Decadron for respiratory failure and possible Covid. Also given broad-spectrum antibiotics for CAP coverage. Patient is speaking in full sentences without any significant tachypnea. No indication for BiPAP or ice admission at this time. On 6 to 8 L supplemental oxygen to keep sats above 92%. ED Course:   Initial assessment performed. The patients presenting problems have been discussed, and they are in agreement with the care plan formulated and outlined with them. I have encouraged them to ask questions as they arise throughout their visit. Critical Care:  I have spent 35 minutes of critical care time in evaluating and treating this patient. This includes time spent at bedside, time with family and decision makers, documentation, review of labs and imaging, and/or consultation with specialists. It does not include time spent on separately billed procedures. This patient presents with a critical illness or injury that acutely impairs one or more vital organ systems such that there is a high probability of imminent or life threatening deterioration in the patient's condition.   This case involved decision making of high complexity to assess, manipulate, and support vital organ system failure and/or to prevent further life threatening deterioration of the patient's condition. Failure to initiate these interventions on an urgent basis would likely result in sudden, clinically significant or life threatening deterioration in the patient's condition. Abnormal findings supporting critical care: Acute respiratory failure with hypoxia  Interventions to support critical care: Supplemental oxygen  Failure to intervene may result in: Respiratory failure and/or death        Disposition:  Admit Note:  1:10 PM  Pt is being admitted by Dr. Harshil Lizarraga. The results of their tests and reason(s) for their admission have been discussed with pt and/or available family. They convey agreement and understanding for the need to be admitted and for admission diagnosis. Diagnosis     Clinical Impression:   1. Acute respiratory failure with hypoxia ([address]    2. Acute exacerbation of chronic obstructive pulmonary disease (COPD) ([facility] [address]    3. Chronic bilateral pleural effusions        Attestations:    Annmarie Andrews MD    Please note that this dictation was completed with [employer]ared[employer], the computer voice recognition software. Quite often unanticipated grammatical, syntax, homophones, and other interpretive errors are inadvertently transcribed by the computer software. Please disregard these errors. Please excuse any errors that have escaped final proofreading. Thank you.

## 2021-03-04 NOTE — PROGRESS NOTES
Transition of Care Plan:    Disposition: Home with spouse   Follow up appointments: PCP  DME needed: No DME at baseline. If patient has oxygen needs at discharge, oxygen challenge will need to be completed in order for CM to order home O2. Transportation at Discharge: Pt's wife  Keys or means to access home: Pt's wife will have keys        IM Medicare letter: Will need 2nd IMM letter prior to d/c  Caregiver Contact: Pt's Aniket verma [phone]  Discharge Caregiver contacted prior to discharge? This CM talked with pt's wife on today, unit care manager to continue communicate prior to d/c.    Reason for Admission:  Acute respiratory failure with hypoxia, acute exacerbation of chronic obstructive pulmonary disease, chronic bilateral pleural effusions. RUR Score: 12% low risk for readmission                     Plan for utilizing home health:  No home health hx, no home health needs identified at present. PCP: First and Last name: Brisa Suazo MD     Name of Practice:    Are you a current patient: Yes/No: Yes   Approximate date of last visit: Month ago    Can you participate in a virtual visit with your PCP: Yes                    Current Advanced Directive/Advance Care Plan: Advance Care Planning     General Advance Care Planning (ACP) Conversation      Date of Conversation: 3/4/2021  Conducted with: Pt's wife, Swetha Mosquera Decision Maker:       Content/Action Overview:   Pt's wife states that pt does not have ACP documents that she is aware of. Pt's wife is legal next of kin. Reviewed DNR/DNI and patient elects Full Code (Attempt Resuscitation)      Length of Voluntary ACP Conversation in minutes:  <16 minutes (Non-Billable)    [address] [place] [address]: No ACP documents on file, Pt's wife is legal next of kin. Transition of Care Plan:  Home with spouse, outpatient follow up. CM reviewed chart.  CM attempted to talk with pt, no answer to room phone. CM completed assessment with pt's wife via home phone. CM introduced self/role, verified demographics, and discussed discharge planning. Pt resides with his wife in a multi-level home with 3 ARSENIO. Pt's wife reports that their main living area (bedroom, bathroom) are on first floor. Pt's wife reports that pt was previously independent, but that his shortness of breath was making it more difficult for him to do his daily activities, leading him to present to [facility] ED. Pt uses no DME at home, has no home oxygen. Pt's wife will transport at d/c. Pt does drive at baseline. Denies SNF/IPR/HH hx. Pt's pharmacy preference is [facility] in [place]. Pt is active with PCP, sees NP at Dr. Loretta Stout office. No concerns voiced at this time with transition of care plan. Unit care management will continue to follow for transition of care planning needs. Care Management Interventions  PCP Verified by CM: Yes  Palliative Care Criteria Met (RRAT>21 & CHF Dx)?: No  Mode of Transport at Discharge:  Other (see comment)(Pt's wife)  Transition of Care Consult (CM Consult): Discharge Planning  Physical Therapy Consult: No  Occupational Therapy Consult: No  Speech Therapy Consult: No  Current Support Network: Lives with Spouse  Confirm Follow Up Transport: Self  Discharge Location  Discharge Placement: Home with outpatient services    [facility], [address]

## 2021-03-04 NOTE — H&P
Hospitalist Admission Note    NAME: Elsy Villeda   :  1950   MRN:  [id number]     Date/Time:  3/4/2021 1:36 PM    Patient PCP: Maritza Soulier, MD  Cardiology:  Dr. Dinh Winkler    Please note that this dictation was completed with [email or web address], the computer voice recognition software. Quite often unanticipated grammatical, syntax, homophones, and other interpretive errors are inadvertently transcribed by the computer software. Please disregard these errors. Please excuse any errors that have escaped final proofreading  ______________________________________________________________________   Assessment & Plan:  Acute hypoxic respiratory failure, POA due to  New onset chf with  COPD exacerbation due to chf exacerbation  COVID-19 rule out  --on 6L, CTA negative PE, + mild pulmonary edema and b/l atx and trace b/l pleural effusions, probnp elevated 1192.  --rapid covid indeterminate, pcr ordered  --diurese with IV lasix 40mg daily, echo, cardiology consult  --start combivent inhaler q4h. Continue Breo  --I/o, daily weight  --check abg to check for hypercapnia since serum bicarb elevated  --hold off steroid for now since will make chf worse  --cancel rocephin/azithromycin ordered by ER as no suspicion for bacterial bronchitis. Procalcitonin <0.05 and no change in chronic cough. KADIE, untreated  --need to f/u with his sleep doctor to get cpap. This will help keep chf under control    P. SVT  --currently sinus, continue propranolol    BPH  --continue proscar, flomax    GERD  --continue PPI    Obesity  --will be placed on reduced calorie diet in hospital  Body mass index is 38.45 kg/m².     Code: full  DVT prophylaxis: lovenox  Surrogate decision maker:  Wife Patience Patten          Subjective:   CHIEF COMPLAINT:  SOB, BOWDEN    HISTORY OF PRESENT ILLNESS:     Elsy Villeda is a 65 y.o. male with copd (not on home O2), KADIE by sleep study about 1 year ago but did not follow up to get cpap, HTN, hx SVT presents with 5 days of progressively worsening SOB. This occurred after he fell down 6-7 steps from the stairs and landed on left side 2 weeks ago with bruising of entire left trunk. He reports baseline O2 sat on his home pulse oximeter is 89-90% but over past 5-6 days, O2 sats have been low, down to 70% RA (he though the pulse oximeter was going bad). Last night unable to lay down to sleep. SOB worse with exertion, now has to rest after walking 10 feet.  +wheezing. No change in chronic cough, no sputum production, no fever, chills, chest pain. Last night also felt his heart beat much stronger than usual but HR was 85. He was diagnosed with KADIE last year but did not follow up for cpap. He has not got this Covid vaccine as of yet. He has never had Covid before. No history of DVT or blood clots. Denies any personal hx chf but it is prevalent in his family. We were asked to admit for work up and evaluation of the above problems. Past Medical History:   Diagnosis Date    Arrhythmia     SVT    Chronic obstructive pulmonary disease (HCC)     mild    GERD (gastroesophageal reflux disease)     Hypertension     Thyroid disease     benign thyroid growth, checked annually by Dr. Paolo London      Past Surgical History:   Procedure Laterality Date    COLONOSCOPY N/A 2/5/2019    COLONOSCOPY performed by Marky Velasquez MD at [facility]  2/5/2019         HX HEENT Bilateral     cataract extraction    AL COLON CA SCRN NOT  [address] [place] [address]  2/5/2019          Social History     Tobacco Use    Smoking status: Former Smoker     Years: 5.00    Smokeless tobacco: Never Used   Substance Use Topics    Alcohol use:  Yes     Alcohol/week: 7.0 standard drinks     Types: 7 Glasses of wine per week      Consultant for LEXIE Sewell 23 History   Problem Relation Age of Onset    Hypertension Mother     Heart Disease Mother     Hypertension Father     Diabetes Father No Known Allergies     Prior to Admission medications    Medication Sig Start Date End Date Taking? Authorizing Provider   rosuvastatin (Crestor) 40 mg tablet Take 40 mg by mouth nightly. Yes Other, MD Elaina   finasteride (PROSCAR) 5 mg tablet Take 5 mg by mouth daily. Pt unsure of strength    Other, MD Elaina   propranolol LA (INDERAL LA) 120 mg SR capsule Take 120 mg by mouth daily. Provider, Historical   tamsulosin (FLOMAX) 0.4 mg capsule Take 0.4 mg by mouth daily. Provider, Historical   esomeprazole (NEXIUM) 40 mg capsule Take 40 mg by mouth daily. Provider, Historical   aspirin delayed-release 81 mg tablet Take 81 mg by mouth daily. Provider, Historical   melatonin 3 mg tablet Take 3 mg by mouth nightly. Provider, Historical   albuterol (VENTOLIN HFA) 90 mcg/actuation inhaler Take 1 Puff by inhalation every four (4) hours as needed. Provider, Historical   fluticasone-vilanterol (BREO ELLIPTA) 100-25 mcg/dose inhaler Take 1 Puff by inhalation daily. Provider, Historical   gemfibrozil (LOPID) 600 mg tablet Take 600 mg by mouth two (2) times a day. Provider, Historical   cholecalciferol (VITAMIN D3) 1,000 unit cap Take 1,000 Units by mouth daily. Provider, Historical   MULTIVIT-MIN/FA/LYCOPEN/LUTEIN (CENTRUM SILVER ULTRA MEN'S PO) Take 1 Tab by mouth daily. Provider, Historical   Omega-3-DHA-EPA-Fish Oil 1,000 mg (120 mg-180 mg) cap Take 1 Cap by mouth daily. Provider, Historical   lecithin 1,200 mg cap Take 1 Cap by mouth daily. Provider, Historical     REVIEW OF SYSTEMS:  POSITIVE= Bold.   Negative = normal text  General:  fever, chills, sweats, generalized weakness, weight loss/gain, loss of appetite  Eyes:  blurred vision, eye pain, loss of vision, diplopia  Ear Nose and Throat:  rhinorrhea, pharyngitis  Respiratory:   cough, sputum production, SOB, wheezing, BOWDEN, pleuritic pain  Cardiology:  chest pain, palpitations, orthopnea, PND, edema, syncope   Gastrointestinal: abdominal pain, N/V, dysphagia, diarrhea, constipation, bleeding  Genitourinary:  frequency, urgency, dysuria, hematuria, incontinence  Muskuloskeletal :  arthralgia, myalgia  Hematology:  easy bruising, bleeding, lymphadenopathy  Dermatological:  rash, ulceration, pruritis  Endocrine:  hot flashes or polydipsia  Neurological:  headache, dizziness, confusion, focal weakness, paresthesia, memory loss, gait disturbance  Psychological: anxiety, depression, agitation      Objective:   VITALS:    Visit Vitals  BP (!) 143/72   Pulse 81   Temp 97.4 °F (36.3 °C)   Resp 21   Ht 6' (1.829 m)   Wt 128.6 kg (283 lb 8.2 oz)   SpO2 92%   BMI 38.45 kg/m²     Temp (24hrs), Av.4 °F (36.3 °C), Min:97.4 °F (36.3 °C), Max:97.4 °F (36.3 °C)    Body mass index is 38.45 kg/m². PHYSICAL EXAM:    General:    Alert, obese, cooperative, no distress, appears stated age. HEENT: Atraumatic, anicteric sclerae, pink conjunctivae     No oral ulcers, mucosa moist, throat clear. Hearing intact. Neck:  Supple, symmetrical,  thyroid: non tender  Lungs:   Decreased BS with mild wheezing. No ronchi. Bibasilar rales. Chest wall:  No tenderness  No Accessory muscle use. Heart:   Regular  rhythm,  No  murmur   No gallop. 2+ pitting edema b/l lower legs  Abdomen:   Obese, Soft, non-tender. Not distended. Bowel sounds normal. No masses  Extremities: No cyanosis. No clubbing  Skin:     Not pale Not Jaundiced  No rashes. +umbilical wall bruising, tiny bit of bruising near left shoulder  Psych:  Good insight. Not depressed. Not anxious or agitated. Neurologic: EOMs intact. No facial asymmetry. No aphasia or slurred speech. Symmetrical strength, Alert and oriented X 3.   No asterixis  Peripheral pulse: Bilateral, DP, 2+    IMAGING RESULTS:   []       I have personally reviewed the actual   []     CXR  []     CT scan  CXR:  CT :  EKG: SR 88, low voltage, normal intervals   ________________________________________________________________________  Care Plan discussed with:    Comments   Patient y    Family      RN     Care Manager                    Consultant:  y Dr. Winnfield Benavides   ________________________________________________________________________  Prophylaxis:  GI PPI   DVT lovenox   ________________________________________________________________________  Recommended Disposition:   Home with Family y   HH/PT/OT/RN    SNF/LTC    ANOOP    ________________________________________________________________________  Code Status:  Full Code y   DNR/DNI    ________________________________________________________________________  TOTAL TIME:  50 minutes      Comments     Reviewed previous records   >50% of visit spent in counseling and coordination of care  Discussion with patient and/or family and questions answered         ______________________________________________________________________  Bethanie Raymundo MD      Procedures: see electronic medical records for all procedures/Xrays and details which were not copied into this note but were reviewed prior to creation of Plan. LAB DATA REVIEWED:    Recent Results (from the past 24 hour(s))   EKG, 12 LEAD, INITIAL    Collection Time: 03/04/21  8:13 AM   Result Value Ref Range    Ventricular Rate 88 BPM    Atrial Rate 88 BPM    P-R Interval 160 ms    QRS Duration 90 ms    Q-T Interval 372 ms    QTC Calculation (Bezet) 450 ms    Calculated P Axis 62 degrees    Calculated R Axis 42 degrees    Calculated T Axis 34 degrees    Diagnosis       Normal sinus rhythm  Low voltage QRS  When compared with ECG of 26-JAN-2020 20:08,  Nonspecific T wave abnormality now evident in Inferior leads  Confirmed by Kade Delaney, P.VSalvador ([id number]) on 3/4/2021 11:47:45 AM     CBC WITH AUTOMATED DIFF    Collection Time: 03/04/21  8:37 AM   Result Value Ref Range    WBC 10.0 4.1 - 11.1 K/uL    RBC 4.80 4. 10 - 5.70 M/uL    HGB 14.0 12.1 - 17.0 g/dL    HCT 45.8 36.6 - 50.3 %    MCV 95.4 80.0 - 99.0 FL    MCH 29.2 26.0 - 34.0 PG    MCHC 30.6 30.0 - 36.5 g/dL    RDW 16.1 (H) 11.5 - 14.5 %    PLATELET 923 840 - 630 K/uL    MPV 10.3 8.9 - 12.9 FL    NRBC 0.5 (H) 0  WBC    ABSOLUTE NRBC 0.05 (H) 0.00 - 0.01 K/uL    NEUTROPHILS 73 32 - 75 %    LYMPHOCYTES 18 12 - 49 %    MONOCYTES 7 5 - 13 %    EOSINOPHILS 1 0 - 7 %    BASOPHILS 0 0 - 1 %    IMMATURE GRANULOCYTES 1 (H) 0.0 - 0.5 %    ABS. NEUTROPHILS 7.2 1.8 - 8.0 K/UL    ABS. LYMPHOCYTES 1.8 0.8 - 3.5 K/UL    ABS. MONOCYTES 0.7 0.0 - 1.0 K/UL    ABS. EOSINOPHILS 0.1 0.0 - 0.4 K/UL    ABS. BASOPHILS 0.0 0.0 - 0.1 K/UL    ABS. IMM. GRANS. 0.1 (H) 0.00 - 0.04 K/UL    DF AUTOMATED     METABOLIC PANEL, COMPREHENSIVE    Collection Time: 03/04/21  8:37 AM   Result Value Ref Range    Sodium 136 136 - 145 mmol/L    Potassium 4.7 3.5 - 5.1 mmol/L    Chloride 98 97 - 108 mmol/L    CO2 33 (H) 21 - 32 mmol/L    Anion gap 5 5 - 15 mmol/L    Glucose 156 (H) 65 - 100 mg/dL    BUN 24 (H) 6 - 20 MG/DL    Creatinine 1.15 0.70 - 1.30 MG/DL    BUN/Creatinine ratio 21 (H) 12 - 20      GFR est AA >60 >60 ml/min/1.73m2    GFR est non-AA >60 >60 ml/min/1.73m2    Calcium 8.8 8.5 - 10.1 MG/DL    Bilirubin, total 0.4 0.2 - 1.0 MG/DL    ALT (SGPT) 43 12 - 78 U/L    AST (SGOT) 23 15 - 37 U/L    Alk.  phosphatase 80 45 - 117 U/L    Protein, total 7.6 6.4 - 8.2 g/dL    Albumin 3.5 3.5 - 5.0 g/dL    Globulin 4.1 (H) 2.0 - 4.0 g/dL    A-G Ratio 0.9 (L) 1.1 - 2.2     CK W/ REFLX CKMB    Collection Time: 03/04/21  8:37 AM   Result Value Ref Range     39 - 308 U/L   MAGNESIUM    Collection Time: 03/04/21  8:46 AM   Result Value Ref Range    Magnesium 2.0 1.6 - 2.4 mg/dL   PROCALCITONIN    Collection Time: 03/04/21  8:46 AM   Result Value Ref Range    Procalcitonin <0.05 ng/mL   D DIMER    Collection Time: 03/04/21  8:46 AM   Result Value Ref Range    D-dimer 1.26 (H) 0.00 - 0.65 mg/L FEU   SARS-COV-2    Collection Time: 03/04/21  8:46 AM   Result Value Ref Range    SARS-CoV-2 Please find results under separate order     TROPONIN I    Collection Time: 03/04/21  8:46 AM   Result Value Ref Range    Troponin-I, Qt. <0.05 <0.05 ng/mL   COVID-19 RAPID TEST    Collection Time: 03/04/21  8:46 AM   Result Value Ref Range    Specimen source Nasopharyngeal      COVID-19 rapid test Indeterminate (A) NOTD     NT-PRO BNP    Collection Time: 03/04/21  8:46 AM   Result Value Ref Range    NT pro-BNP 1,192 (H) <125 PG/ML   POC LACTIC ACID    Collection Time: 03/04/21  9:15 AM   Result Value Ref Range    Lactic Acid (POC) 0.62 0.40 - 2.00 mmol/L   URINALYSIS W/ REFLEX CULTURE    Collection Time: 03/04/21 10:37 AM    Specimen: Urine   Result Value Ref Range    Color YELLOW/STRAW      Appearance CLEAR CLEAR      Specific gravity 1.025 1.003 - 1.030      pH (UA) 6.0 5.0 - 8.0      Protein 100 (A) NEG mg/dL    Glucose Negative NEG mg/dL    Ketone Negative NEG mg/dL    Bilirubin Negative NEG      Blood Negative NEG      Urobilinogen 0.2 0.2 - 1.0 EU/dL    Nitrites Negative NEG      Leukocyte Esterase Negative NEG      WBC 5-10 0 - 4 /hpf    RBC 5-10 0 - 5 /hpf    Epithelial cells FEW FEW /lpf    Bacteria Negative NEG /hpf    UA:UC IF INDICATED CULTURE NOT INDICATED BY UA RESULT CNI      Hyaline cast 0-2 0 - 5 /lpf

## 2021-03-05 ENCOUNTER — APPOINTMENT (OUTPATIENT)
Dept: NON INVASIVE DIAGNOSTICS | Age: 71
DRG: 189 | End: 2021-03-05
Attending: HOSPITALIST
Payer: MEDICARE

## 2021-03-05 LAB
ANION GAP SERPL CALC-SCNC: 2 MMOL/L (ref 5–15)
BUN SERPL-MCNC: 20 MG/DL (ref 6–20)
BUN/CREAT SERPL: 19 (ref 12–20)
CALCIUM SERPL-MCNC: 9 MG/DL (ref 8.5–10.1)
CALCULATED R AXIS, ECG10: 12 DEGREES
CALCULATED T AXIS, ECG11: 47 DEGREES
CHLORIDE SERPL-SCNC: 97 MMOL/L (ref 97–108)
CO2 SERPL-SCNC: 38 MMOL/L (ref 21–32)
CREAT SERPL-MCNC: 1.05 MG/DL (ref 0.7–1.3)
DIAGNOSIS, 93000: NORMAL
ECHO AO ROOT DIAM: 4.13 CM
ECHO AV AREA PEAK VELOCITY: 3.46 CM2
ECHO AV AREA VTI: 3.37 CM2
ECHO AV AREA/BSA PEAK VELOCITY: 1.4 CM2/M2
ECHO AV AREA/BSA VTI: 1.4 CM2/M2
ECHO AV MEAN GRADIENT: 3.12 MMHG
ECHO AV PEAK GRADIENT: 6.4 MMHG
ECHO AV PEAK VELOCITY: 126.47 CM/S
ECHO AV VTI: 24.15 CM
ECHO LA MAJOR AXIS: 3.88 CM
ECHO LA MINOR AXIS: 1.57 CM
ECHO LV E' LATERAL VELOCITY: 11.25 CM/S
ECHO LV E' SEPTAL VELOCITY: 6.13 CM/S
ECHO LV INTERNAL DIMENSION DIASTOLIC: 4.38 CM (ref 4.2–5.9)
ECHO LV INTERNAL DIMENSION SYSTOLIC: 4.02 CM
ECHO LV IVSD: 1.59 CM (ref 0.6–1)
ECHO LV MASS 2D: 269.2 G (ref 88–224)
ECHO LV MASS INDEX 2D: 109.2 G/M2 (ref 49–115)
ECHO LV POSTERIOR WALL DIASTOLIC: 1.44 CM (ref 0.6–1)
ECHO LVOT CARDIAC OUTPUT: 6.05 LITER/MINUTE
ECHO LVOT DIAM: 2.24 CM
ECHO LVOT PEAK GRADIENT: 4.99 MMHG
ECHO LVOT PEAK VELOCITY: 111.74 CM/S
ECHO LVOT SV: 81.4 ML
ECHO LVOT VTI: 20.75 CM
ECHO MV A VELOCITY: 77.52 CM/S
ECHO MV AREA PHT: 3.27 CM2
ECHO MV AREA VTI: 3.26 CM2
ECHO MV E DECELERATION TIME (DT): 279.54 MS
ECHO MV E VELOCITY: 77.52 CM/S
ECHO MV E/A RATIO: 1
ECHO MV E/E' LATERAL: 6.89
ECHO MV E/E' RATIO (AVERAGED): 9.77
ECHO MV E/E' SEPTAL: 12.65
ECHO MV MAX VELOCITY: 101.44 CM/S
ECHO MV MEAN GRADIENT: 1.65 MMHG
ECHO MV PEAK GRADIENT: 4.12 MMHG
ECHO MV PRESSURE HALF TIME (PHT): 67.31 MS
ECHO MV VTI: 24.96 CM
ECHO PV MAX VELOCITY: 57.91 CM/S
ECHO PV PEAK INSTANTANEOUS GRADIENT SYSTOLIC: 1.34 MMHG
ECHO RA AREA 4C: 27.62 CM2
GLUCOSE SERPL-MCNC: 112 MG/DL (ref 65–100)
LVOT MG: 2.5 MMHG
POTASSIUM SERPL-SCNC: 4.6 MMOL/L (ref 3.5–5.1)
Q-T INTERVAL, ECG07: 330 MS
QRS DURATION, ECG06: 90 MS
QTC CALCULATION (BEZET), ECG08: 474 MS
SARS-COV-2, XPLCVT: NOT DETECTED
SODIUM SERPL-SCNC: 137 MMOL/L (ref 136–145)
SOURCE, COVRS: NORMAL
TROPONIN I SERPL-MCNC: <0.05 NG/ML
VENTRICULAR RATE, ECG03: 124 BPM

## 2021-03-05 PROCEDURE — 74011000250 HC RX REV CODE- 250: Performed by: NURSE PRACTITIONER

## 2021-03-05 PROCEDURE — 94640 AIRWAY INHALATION TREATMENT: CPT

## 2021-03-05 PROCEDURE — 2709999900 HC NON-CHARGEABLE SUPPLY

## 2021-03-05 PROCEDURE — 74011636637 HC RX REV CODE- 636/637: Performed by: INTERNAL MEDICINE

## 2021-03-05 PROCEDURE — 74011250637 HC RX REV CODE- 250/637: Performed by: NURSE PRACTITIONER

## 2021-03-05 PROCEDURE — 84484 ASSAY OF TROPONIN QUANT: CPT

## 2021-03-05 PROCEDURE — 74011250636 HC RX REV CODE- 250/636: Performed by: HOSPITALIST

## 2021-03-05 PROCEDURE — 77030027138 HC INCENT SPIROMETER -A

## 2021-03-05 PROCEDURE — 65660000001 HC RM ICU INTERMED STEPDOWN

## 2021-03-05 PROCEDURE — 74011250637 HC RX REV CODE- 250/637: Performed by: HOSPITALIST

## 2021-03-05 PROCEDURE — 93005 ELECTROCARDIOGRAM TRACING: CPT

## 2021-03-05 PROCEDURE — 93306 TTE W/DOPPLER COMPLETE: CPT

## 2021-03-05 PROCEDURE — 80048 BASIC METABOLIC PNL TOTAL CA: CPT

## 2021-03-05 PROCEDURE — 77010033678 HC OXYGEN DAILY

## 2021-03-05 PROCEDURE — 36415 COLL VENOUS BLD VENIPUNCTURE: CPT

## 2021-03-05 RX ORDER — METOPROLOL TARTRATE 5 MG/5ML
2.5 INJECTION INTRAVENOUS ONCE
Status: COMPLETED | OUTPATIENT
Start: 2021-03-05 | End: 2021-03-05

## 2021-03-05 RX ORDER — PREDNISONE 20 MG/1
40 TABLET ORAL
Status: DISCONTINUED | OUTPATIENT
Start: 2021-03-05 | End: 2021-03-10 | Stop reason: HOSPADM

## 2021-03-05 RX ORDER — FUROSEMIDE 40 MG/1
40 TABLET ORAL DAILY
Status: DISCONTINUED | OUTPATIENT
Start: 2021-03-05 | End: 2021-03-07

## 2021-03-05 RX ORDER — METOPROLOL TARTRATE 5 MG/5ML
2.5 INJECTION INTRAVENOUS ONCE
Status: COMPLETED | OUTPATIENT
Start: 2021-03-06 | End: 2021-03-06

## 2021-03-05 RX ADMIN — GEMFIBROZIL 600 MG: 600 TABLET ORAL at 08:57

## 2021-03-05 RX ADMIN — TAMSULOSIN HYDROCHLORIDE 0.4 MG: 0.4 CAPSULE ORAL at 17:08

## 2021-03-05 RX ADMIN — Medication 10 ML: at 06:27

## 2021-03-05 RX ADMIN — Medication 10 ML: at 22:00

## 2021-03-05 RX ADMIN — FINASTERIDE 5 MG: 5 TABLET, FILM COATED ORAL at 17:08

## 2021-03-05 RX ADMIN — PROPRANOLOL HYDROCHLORIDE 120 MG: 60 CAPSULE, EXTENDED RELEASE ORAL at 00:06

## 2021-03-05 RX ADMIN — Medication 3 MG: at 21:39

## 2021-03-05 RX ADMIN — PANTOPRAZOLE SODIUM 40 MG: 40 TABLET, DELAYED RELEASE ORAL at 08:57

## 2021-03-05 RX ADMIN — ROSUVASTATIN CALCIUM 40 MG: 40 TABLET, FILM COATED ORAL at 21:39

## 2021-03-05 RX ADMIN — IPRATROPIUM BROMIDE AND ALBUTEROL 1 PUFF: 20; 100 SPRAY, METERED RESPIRATORY (INHALATION) at 08:00

## 2021-03-05 RX ADMIN — PREDNISONE 40 MG: 20 TABLET ORAL at 10:54

## 2021-03-05 RX ADMIN — IPRATROPIUM BROMIDE AND ALBUTEROL 1 PUFF: 20; 100 SPRAY, METERED RESPIRATORY (INHALATION) at 00:00

## 2021-03-05 RX ADMIN — APIXABAN 5 MG: 5 TABLET, FILM COATED ORAL at 23:14

## 2021-03-05 RX ADMIN — IPRATROPIUM BROMIDE AND ALBUTEROL 1 PUFF: 20; 100 SPRAY, METERED RESPIRATORY (INHALATION) at 11:33

## 2021-03-05 RX ADMIN — METOPROLOL TARTRATE 2.5 MG: 1 INJECTION, SOLUTION INTRAVENOUS at 23:14

## 2021-03-05 RX ADMIN — OMEGA-3 FATTY ACIDS CAP 1000 MG 1 CAPSULE: 1000 CAP at 08:57

## 2021-03-05 RX ADMIN — ENOXAPARIN SODIUM 40 MG: 40 INJECTION SUBCUTANEOUS at 08:57

## 2021-03-05 RX ADMIN — CHOLECALCIFEROL TAB 25 MCG (1000 UNIT) 1000 UNITS: 25 TAB at 08:57

## 2021-03-05 RX ADMIN — IPRATROPIUM BROMIDE AND ALBUTEROL 1 PUFF: 20; 100 SPRAY, METERED RESPIRATORY (INHALATION) at 16:03

## 2021-03-05 RX ADMIN — Medication 10 ML: at 14:00

## 2021-03-05 RX ADMIN — ASPIRIN 81 MG: 81 TABLET, COATED ORAL at 17:07

## 2021-03-05 RX ADMIN — GEMFIBROZIL 600 MG: 600 TABLET ORAL at 17:07

## 2021-03-05 RX ADMIN — MULTIPLE VITAMINS W/ MINERALS TAB 1 TABLET: TAB at 08:57

## 2021-03-05 RX ADMIN — PROPRANOLOL HYDROCHLORIDE 120 MG: 60 CAPSULE, EXTENDED RELEASE ORAL at 17:07

## 2021-03-05 RX ADMIN — FUROSEMIDE 40 MG: 10 INJECTION, SOLUTION INTRAMUSCULAR; INTRAVENOUS at 08:57

## 2021-03-05 NOTE — ED NOTES
Bedside and Verbal shift change report given to [facility] and Jade Huff (student nurse) (oncoming nurse) by Dejan Sotelo (offgoing nurse). Report included the following information SBAR, ED Summary, MAR and Recent Results.

## 2021-03-05 NOTE — PROGRESS NOTES
Problem: Falls - Risk of  Goal: *Absence of Falls  Description: Document Nell Solis Fall Risk and appropriate interventions in the flowsheet.   Outcome: Progressing Towards Goal  Note: Fall Risk Interventions:            Medication Interventions: Bed/chair exit alarm, Evaluate medications/consider consulting pharmacy, Patient to call before getting OOB         History of Falls Interventions: Bed/chair exit alarm, Room close to nurse's station         Problem: Patient Education: Go to Patient Education Activity  Goal: Patient/Family Education  Outcome: Progressing Towards Goal

## 2021-03-05 NOTE — PROGRESS NOTES
0700 Bedside shift change report given to Lucia Campo RN (oncoming nurse) by Alex Silverman RN (offgoing nurse). Report included the following information SBAR, Kardex, ED Summary, Intake/Output, MAR, Recent Results and Cardiac Rhythm NSR.     0750 Dr. Yris Ramon at the bedside. 100 [facility] with Dr. Tereza Zheng. No new order. Awaiting PCR results for Freddy Vásquez Dr. Tereza Zheng about about patient's PCR is negative for COVID. Verbal given to discontinue droplet isolation at this time. Order placed. [address] Tereza Zheng about transferring the patient off the COVID unit. Given verbal order to transfer patient to clean step down. Order placed. 1630 Tried to call report. Per Jessica Mendoza RN will call back to unit at this time. 1845 TRANSFER - OUT REPORT:    Verbal report given to Conrado Esteban RN name) on Obinna Duncan  being transferred to [facility](unit) for routine progression of care       Report consisted of patients Situation, Background, Assessment and   Recommendations(SBAR). Information from the following report(s) SBAR, Kardex, ED Summary, Intake/Output, MAR, Recent Results, Cardiac Rhythm NSR and Alarm Parameters  was reviewed with the receiving nurse. Lines:   Peripheral IV 03/04/21 Right Antecubital (Active)   Site Assessment Clean, dry, & intact 03/05/21 1526   Phlebitis Assessment 0 03/05/21 1526   Infiltration Assessment 0 03/05/21 1526   Dressing Status Clean, dry, & intact 03/05/21 1526   Dressing Type Tape;Transparent 03/05/21 1526   Hub Color/Line Status Flushed;Capped 03/05/21 1526   Action Taken Blood drawn 03/04/21 0838   Alcohol Cap Used Yes 03/05/21 1526        Opportunity for questions and clarification was provided.       Patient transported with:   O2 @ 6 liters

## 2021-03-05 NOTE — PROGRESS NOTES
TRANSFER - IN REPORT:    Verbal report received from Susana(name) on Shashi Milian  being received from ED(unit) for routine progression of care      Report consisted of patient’s Situation, Background, Assessment and   Recommendations(SBAR).     Information from the following report(s) SBAR and ED Summary was reviewed with the receiving nurse.    Opportunity for questions and clarification was provided.      Assessment completed upon patient’s arrival to unit and care assumed.     Primary Nurse Kenia Staley and BRO Núñez performed a dual skin assessment on this patient No impairment noted  Benedict score is 20    Patient arrived from ED to PCU via stretcher. Patient able to ambulate from stretcher to bed with minimal distress. Patient on 4L NC on arrival and tachypneic. Patient denies shortness of breath, palpitations or any pain at this time. Patient describes all his symptoms as going away since having a nebulizer treatment downstairs in the ED. Admission assessment and physical assessment completed, patient oriented to room and updated on plan of care. Patient denying all needs at this time, resting quietly with eyes closed- respirations even and unlabored.     0700- Dr. Garrett with cardiology at bedside speaking with patient.     End of Shift Note    Bedside shift change report given to [place] (oncoming nurse) by Kenia Staley (offgoing nurse).  Report included the following information SBAR, Kardex, ED Summary and Cardiac Rhythm NSR    Shift worked:  Night     Shift summary and any significant changes:     Patient requiring 5L NC at this time, desats with exertion. Patient denies SOB, chest pain & palpitations at this time.     Concerns for physician to address:  Propanolol given late- will need time adjustment for next dose.      Zone phone for oncoming shift:   xxxx       Activity:  Activity Level: Up with Assistance  Number times ambulated in hallways past shift: 0  Number of times OOB to chair  past shift: 0    Cardiac:   Cardiac Monitoring: Yes      Cardiac Rhythm: Normal sinus rhythm    Access:   Current line(s): PIV     Genitourinary:   Urinary status: voiding    Respiratory:   O2 Device: Nasal cannula  Chronic home O2 use?: NO  Incentive spirometer at bedside: N/A     GI:  Last Bowel Movement Date: 03/04/21  Current diet:  DIET CARDIAC Regular; 2 GM NA (House Low NA)  Passing flatus: YES  Tolerating current diet: YES       Pain Management:   Patient states pain is manageable on current regimen: YES    Skin:  Benedict Score: 20  Interventions: float heels and increase time out of bed    Patient Safety:  Fall Score:  Total Score: 2  Interventions: gripper socks, pt to call before getting OOB and stay with me (per policy)       Length of Stay:  Expected LOS: - - -  Actual LOS: [place]

## 2021-03-05 NOTE — PROGRESS NOTES
Hospitalist Progress Note    NAME: Shira Cummings   :  1950   MRN:  [id number]       Assessment / Plan:  Acute hypoxic and hypercapnic respiratory failure , POA due to  New onset chf and COPD exacerbation   COPD exacerbation due to chf exacerbation  COVID-19 rule out  --on 6L, CTA negative PE, + mild pulmonary edema and b/l atx and trace b/l pleural effusions, probnp elevated 1192.  --rapid covid indeterminate, pcr pending  --Was started on diuretics, -2 L fluid balance, currently looks euvolemic  -Change Lasix to oral 40 mg daily  -ABG from admission shows pH of 7.2 with PCO2 elevated  -On admission today patient looks euvolemic and still has wheezing  -We will add prednisone 40 mg daily  --Continue combivent inhaler q4h. Continue Breo  --I/o, daily weight  --No antibiotics as procalcitonin level is less than 0.05  -Appreciated cardiology consultation     KADIE, untreated  --need to f/u with his sleep doctor to get cpap. This will help keep chf under control     P. SVT  --currently sinus, continue propranolol     BPH  --continue proscar, flomax     GERD  --continue PPI     Obesity  --will be placed on reduced calorie diet in hospital  Body mass index is 38.45 kg/m².     Code: full  DVT prophylaxis: lovenox  Surrogate decision maker:  Wife Leticia Trujillo         Subjective:     Chief Complaint / Reason for Physician Visit  \" Continues to report shortness of breath and wheezing, denies any cough or sputum production\". Discussed with RN events overnight. Review of Systems:  Symptom Y/N Comments  Symptom Y/N Comments   Fever/Chills n   Chest Pain n    Poor Appetite n   Edema n    Cough n   Abdominal Pain n    Sputum n   Joint Pain     SOB/BOWDEN y   Pruritis/Rash     Nausea/vomit n   Tolerating PT/OT     Diarrhea n   Tolerating Diet     Constipation n   Other       Could NOT obtain due to:      Objective:     VITALS:   Last 24hrs VS reviewed since prior progress note.  Most recent are:  Patient Vitals for the past 24 hrs:   Temp Pulse Resp BP SpO2   03/05/21 1200  83      03/05/21 1055 98 °F (36.7 °C) 78 21 (!) 141/72 97 %   03/05/21 0857  83  (!) 128/56    03/05/21 0753 98.4 °F (36.9 °C) 79 24 135/69    03/05/21 0409  78 17 122/71 95 %   03/05/21 0055     94 %   03/04/21 2357 98.1 °F (36.7 °C) 83 18 (!) 166/93 95 %   03/04/21 2200 98.2 °F (36.8 °C) 89 17 (!) 142/67 92 %   03/04/21 2021     94 %   03/04/21 1845 98.6 °F (37 °C) 82 20 113/80 94 %   03/04/21 1830  81 20 134/69 93 %   03/04/21 1745  81 23 132/65 92 %   03/04/21 1730  86 25 (!) 165/92 91 %   03/04/21 1638     90 %   03/04/21 1600 98.3 °F (36.8 °C) 89 22 (!) 129/110 92 %   03/04/21 1545  82 18 139/76 91 %   03/04/21 1500  86 22 (!) 163/75 (!) 89 %   03/04/21 1452     90 %   03/04/21 1450     (!) 85 %   03/04/21 1442     90 %   03/04/21 1430 98.3 °F (36.8 °C) 86 20 (!) 157/91 95 %   03/04/21 1415  81 22 (!) 164/77 95 %   03/04/21 1400  82 19 (!) 154/133 95 %       Intake/Output Summary (Last 24 hours) at 3/5/2021 1346  Last data filed at 3/5/2021 1055  Gross per 24 hour   Intake 720 ml   Output 3225 ml   Net -2505 ml   To face encounter was provided on March 5, 2021 with the following findings    PHYSICAL EXAM:  General: WD, WN. Alert, cooperative, no acute distress    EENT:  EOMI. Anicteric sclerae. MMM  Resp:  Wheezing noticed in all the lung fields with poor air entry bilaterally. No accessory muscle use  CV:  Regular  rhythm,  No edema  GI:  Soft, Non distended, Non tender.  +Bowel sounds  Neurologic:  Alert and oriented X 3, normal speech,   Psych:   Good insight. Not anxious nor agitated  Skin:  No rashes.   No jaundice    Reviewed most current lab test results and cultures  YES  Reviewed most current radiology test results   YES  Review and summation of old records today    NO  Reviewed patient's current orders and MAR    YES  PMH/SH reviewed - no change compared to H&P  ________________________________________________________________________  Care Plan discussed with:    Comments   Patient x    Family      RN x    Care Manager     Consultant                        Multidiciplinary team rounds were held today with , nursing, pharmacist and clinical coordinator. Patient's plan of care was discussed; medications were reviewed and discharge planning was addressed. ________________________________________________________________________  Total NON critical care TIME:  30  Minutes    Total CRITICAL CARE TIME Spent:   Minutes non procedure based      Comments   >50% of visit spent in counseling and coordination of care     ________________________________________________________________________  Racquel Ascencio MD     Procedures: see electronic medical records for all procedures/Xrays and details which were not copied into this note but were reviewed prior to creation of Plan. LABS:  I reviewed today's most current labs and imaging studies.   Pertinent labs include:  Recent Labs     03/04/21  0837   WBC 10.0   HGB 14.0   HCT 45.8        Recent Labs     03/05/21  0442 03/04/21  0846 03/04/21  0837     --  136   K 4.6  --  4.7   CL 97  --  98   CO2 38*  --  33*   *  --  156*   BUN 20  --  24*   CREA 1.05  --  1.15   CA 9.0  --  8.8   MG  --  2.0  --    ALB  --   --  3.5   TBILI  --   --  0.4   ALT  --   --  43       Signed: Racquel Ascencio MD

## 2021-03-05 NOTE — PROGRESS NOTES
TRANSFER - IN REPORT:    Verbal report received from Rashida on Don Fair  being received from PCU for routine progression of care      Report consisted of patients Situation, Background, Assessment and   Recommendations(SBAR). Information from the following report(s) SBAR, ED Summary and Recent Results was reviewed with the receiving nurse. Opportunity for questions and clarification was provided. Assessment completed upon patients arrival to unit and care assumed.

## 2021-03-05 NOTE — ED NOTES
TRANSFER - OUT REPORT:    Verbal report given to Kenia(name) on Elsy Seats  being transferred to PCU(unit) for routine progression of care       Report consisted of patients Situation, Background, Assessment and   Recommendations(SBAR). Information from the following report(s) SBAR, ED Summary, [facility] and Recent Results was reviewed with the receiving nurse. Lines:   Peripheral IV 03/04/21 Right Antecubital (Active)   Site Assessment Clean, dry, & intact 03/04/21 0838   Phlebitis Assessment 0 03/04/21 0838   Infiltration Assessment 0 03/04/21 0838   Dressing Status Clean, dry, & intact 03/04/21 0838   Dressing Type Transparent;Tape 03/04/21 0838   Hub Color/Line Status Pink;Flushed;Capped 03/04/21 0838   Action Taken Blood drawn 03/04/21 0838   Alcohol Cap Used No 03/04/21 8274        Opportunity for questions and clarification was provided.       Patient transported with:   Monitor  O2 @ 4 liters  Registered Nurse

## 2021-03-05 NOTE — PROGRESS NOTES
Noted ABG result on 6L    7.24/89/92/38 95%. Reduce O2 to 3L. Titrate O2 to keep sats 90% and no higher than 92%. Does not need bipap at this time as he is awake, alert, no asterixis but would need it if he decompensates. Start diuresis. Order prn bipap for RR >28, or if cannot maintain sats >90% on NC, or if become confused or lethargic.     Alec Quezada MD

## 2021-03-06 LAB
ANION GAP SERPL CALC-SCNC: 1 MMOL/L (ref 5–15)
BUN SERPL-MCNC: 34 MG/DL (ref 6–20)
BUN/CREAT SERPL: 32 (ref 12–20)
CALCIUM SERPL-MCNC: 8.8 MG/DL (ref 8.5–10.1)
CALCULATED R AXIS, ECG10: 22 DEGREES
CALCULATED T AXIS, ECG11: 63 DEGREES
CHLORIDE SERPL-SCNC: 94 MMOL/L (ref 97–108)
CO2 SERPL-SCNC: 39 MMOL/L (ref 21–32)
CREAT SERPL-MCNC: 1.06 MG/DL (ref 0.7–1.3)
DIAGNOSIS, 93000: NORMAL
ERYTHROCYTE [DISTWIDTH] IN BLOOD BY AUTOMATED COUNT: 15.9 % (ref 11.5–14.5)
GLUCOSE SERPL-MCNC: 109 MG/DL (ref 65–100)
HCT VFR BLD AUTO: 43.6 % (ref 36.6–50.3)
HGB BLD-MCNC: 13 G/DL (ref 12.1–17)
MAGNESIUM SERPL-MCNC: 2.1 MG/DL (ref 1.6–2.4)
MCH RBC QN AUTO: 29.5 PG (ref 26–34)
MCHC RBC AUTO-ENTMCNC: 29.8 G/DL (ref 30–36.5)
MCV RBC AUTO: 99.1 FL (ref 80–99)
NRBC # BLD: 0 K/UL (ref 0–0.01)
NRBC BLD-RTO: 0 PER 100 WBC
PLATELET # BLD AUTO: 196 K/UL (ref 150–400)
PMV BLD AUTO: 10.5 FL (ref 8.9–12.9)
POTASSIUM SERPL-SCNC: 4.8 MMOL/L (ref 3.5–5.1)
Q-T INTERVAL, ECG07: 328 MS
QRS DURATION, ECG06: 88 MS
QTC CALCULATION (BEZET), ECG08: 449 MS
RBC # BLD AUTO: 4.4 M/UL (ref 4.1–5.7)
SODIUM SERPL-SCNC: 134 MMOL/L (ref 136–145)
VENTRICULAR RATE, ECG03: 113 BPM
WBC # BLD AUTO: 9.9 K/UL (ref 4.1–11.1)

## 2021-03-06 PROCEDURE — 94640 AIRWAY INHALATION TREATMENT: CPT

## 2021-03-06 PROCEDURE — 83735 ASSAY OF MAGNESIUM: CPT

## 2021-03-06 PROCEDURE — 74011636637 HC RX REV CODE- 636/637: Performed by: INTERNAL MEDICINE

## 2021-03-06 PROCEDURE — 74011000250 HC RX REV CODE- 250: Performed by: INTERNAL MEDICINE

## 2021-03-06 PROCEDURE — 36415 COLL VENOUS BLD VENIPUNCTURE: CPT

## 2021-03-06 PROCEDURE — 74011250637 HC RX REV CODE- 250/637: Performed by: INTERNAL MEDICINE

## 2021-03-06 PROCEDURE — 74011250637 HC RX REV CODE- 250/637: Performed by: NURSE PRACTITIONER

## 2021-03-06 PROCEDURE — 80048 BASIC METABOLIC PNL TOTAL CA: CPT

## 2021-03-06 PROCEDURE — 85027 COMPLETE CBC AUTOMATED: CPT

## 2021-03-06 PROCEDURE — 74011000250 HC RX REV CODE- 250: Performed by: NURSE PRACTITIONER

## 2021-03-06 PROCEDURE — 74011250636 HC RX REV CODE- 250/636: Performed by: INTERNAL MEDICINE

## 2021-03-06 PROCEDURE — 65660000001 HC RM ICU INTERMED STEPDOWN

## 2021-03-06 PROCEDURE — 77010033678 HC OXYGEN DAILY

## 2021-03-06 PROCEDURE — 74011250637 HC RX REV CODE- 250/637: Performed by: HOSPITALIST

## 2021-03-06 RX ORDER — DILTIAZEM HYDROCHLORIDE 5 MG/ML
5 INJECTION INTRAVENOUS ONCE
Status: COMPLETED | OUTPATIENT
Start: 2021-03-06 | End: 2021-03-06

## 2021-03-06 RX ORDER — DILTIAZEM HYDROCHLORIDE 5 MG/ML
15 INJECTION INTRAVENOUS ONCE
Status: COMPLETED | OUTPATIENT
Start: 2021-03-06 | End: 2021-03-06

## 2021-03-06 RX ORDER — DILTIAZEM HYDROCHLORIDE 30 MG/1
30 TABLET, FILM COATED ORAL
Status: DISCONTINUED | OUTPATIENT
Start: 2021-03-06 | End: 2021-03-08

## 2021-03-06 RX ADMIN — Medication 3 MG: at 21:15

## 2021-03-06 RX ADMIN — GEMFIBROZIL 600 MG: 600 TABLET ORAL at 08:00

## 2021-03-06 RX ADMIN — IPRATROPIUM BROMIDE AND ALBUTEROL 1 PUFF: 20; 100 SPRAY, METERED RESPIRATORY (INHALATION) at 15:12

## 2021-03-06 RX ADMIN — TAMSULOSIN HYDROCHLORIDE 0.4 MG: 0.4 CAPSULE ORAL at 17:18

## 2021-03-06 RX ADMIN — Medication 10 ML: at 07:55

## 2021-03-06 RX ADMIN — METOPROLOL TARTRATE 2.5 MG: 1 INJECTION, SOLUTION INTRAVENOUS at 00:02

## 2021-03-06 RX ADMIN — FUROSEMIDE 40 MG: 40 TABLET ORAL at 08:00

## 2021-03-06 RX ADMIN — Medication 10 ML: at 21:16

## 2021-03-06 RX ADMIN — CHOLECALCIFEROL TAB 25 MCG (1000 UNIT) 1000 UNITS: 25 TAB at 08:00

## 2021-03-06 RX ADMIN — DILTIAZEM HYDROCHLORIDE 15 MG: 5 INJECTION INTRAVENOUS at 07:50

## 2021-03-06 RX ADMIN — APIXABAN 5 MG: 5 TABLET, FILM COATED ORAL at 08:00

## 2021-03-06 RX ADMIN — PROPRANOLOL HYDROCHLORIDE 120 MG: 60 CAPSULE, EXTENDED RELEASE ORAL at 17:18

## 2021-03-06 RX ADMIN — MULTIPLE VITAMINS W/ MINERALS TAB 1 TABLET: TAB at 08:00

## 2021-03-06 RX ADMIN — PANTOPRAZOLE SODIUM 40 MG: 40 TABLET, DELAYED RELEASE ORAL at 07:55

## 2021-03-06 RX ADMIN — PREDNISONE 40 MG: 20 TABLET ORAL at 07:55

## 2021-03-06 RX ADMIN — APIXABAN 5 MG: 5 TABLET, FILM COATED ORAL at 17:18

## 2021-03-06 RX ADMIN — DILTIAZEM HYDROCHLORIDE 30 MG: 30 TABLET, FILM COATED ORAL at 11:58

## 2021-03-06 RX ADMIN — Medication 10 ML: at 17:20

## 2021-03-06 RX ADMIN — DILTIAZEM HYDROCHLORIDE 30 MG: 30 TABLET, FILM COATED ORAL at 17:18

## 2021-03-06 RX ADMIN — DILTIAZEM HYDROCHLORIDE 5 MG: 5 INJECTION INTRAVENOUS at 01:41

## 2021-03-06 RX ADMIN — IPRATROPIUM BROMIDE AND ALBUTEROL 1 PUFF: 20; 100 SPRAY, METERED RESPIRATORY (INHALATION) at 19:43

## 2021-03-06 RX ADMIN — ASPIRIN 81 MG: 81 TABLET, COATED ORAL at 17:18

## 2021-03-06 RX ADMIN — DEXTROSE MONOHYDRATE 10 MG/HR: 50 INJECTION, SOLUTION INTRAVENOUS at 07:54

## 2021-03-06 RX ADMIN — ROSUVASTATIN CALCIUM 40 MG: 40 TABLET, FILM COATED ORAL at 21:15

## 2021-03-06 RX ADMIN — OMEGA-3 FATTY ACIDS CAP 1000 MG 1 CAPSULE: 1000 CAP at 08:00

## 2021-03-06 RX ADMIN — DILTIAZEM HYDROCHLORIDE 30 MG: 30 TABLET, FILM COATED ORAL at 21:15

## 2021-03-06 RX ADMIN — DILTIAZEM HYDROCHLORIDE 30 MG: 30 TABLET, FILM COATED ORAL at 09:46

## 2021-03-06 RX ADMIN — FINASTERIDE 5 MG: 5 TABLET, FILM COATED ORAL at 17:18

## 2021-03-06 NOTE — PROGRESS NOTES
Hospitalist Progress Note    NAME: Bernardo Dominguez   :  1950   MRN:  [id number]       Assessment / Plan:  Acute hypoxic and hypercapnic respiratory failure , POA due to  New onset chf and COPD exacerbation   COPD exacerbation due to chf exacerbation  COVID-19 rule out  --on 4L, CTA negative PE, + mild pulmonary edema and b/l atx and trace b/l pleural effusions, probnp elevated 1192.  --rapid covid indeterminate, pcr is negative  --Was started on diuretics, -2 L fluid balance, currently looks euvolemic  -Continue Lasix 40 mg daily  -ABG from admission shows pH of 7.2 with PCO2 elevated  -Continue prednisone as patient is still wheezing   -continue bronchodilator treatment   Continue Breo  --I/o, daily weight  --No antibiotics as procalcitonin level is less than 0.05  -Appreciated cardiology consultation     KADIE, untreated  --need to f/u with his sleep doctor to get cpap. This will help keep chf under control     P. SVT/new onset atrial fibrillation with rapid ventricular response  --currently sinus, continue propranolol  -Patient was noted to be having new onset A. fib with RVR, he had history of paroxysmal SVT  -Was started on Cardizem drip, rate is controlled now will transition to oral Cardizem  -Is started on Eliquis  -Appreciated cardiology follow-up     BPH  --continue proscar, flomax     GERD  --continue PPI     Obesity  --will be placed on reduced calorie diet in hospital  Body mass index is 38.45 kg/m².     Code: full  DVT prophylaxis:  On Eliquis now  Surrogate decision maker:  Wife Nancyann Cranker         Subjective:     Chief Complaint / Reason for Physician Visit  \" Shortness of breath improved, noticed to be in rapid A. fib last night. Denies any orthopnea or PND. Denies any chest pain\". Discussed with RN events overnight.      Review of Systems:  Symptom Y/N Comments  Symptom Y/N Comments   Fever/Chills n   Chest Pain n    Poor Appetite n   Edema n    Cough n   Abdominal Pain n    Sputum n Joint Pain     SOB/BOWDEN y   Pruritis/Rash     Nausea/vomit n   Tolerating PT/OT     Diarrhea n   Tolerating Diet     Constipation n   Other       Could NOT obtain due to:      Objective:     VITALS:   Last 24hrs VS reviewed since prior progress note. Most recent are:  Patient Vitals for the past 24 hrs:   Temp Pulse Resp BP SpO2   03/06/21 1114 98.3 °F (36.8 °C) (!) 104 18 127/73 93 %   03/06/21 0946  (!) 117  132/85    03/06/21 0845  (!) 113  125/85 93 %   03/06/21 0801  89  (!) 88/64 92 %   03/06/21 0800  90  96/68 90 %   03/06/21 0750  (!) 139  112/61    03/06/21 0740 98.3 °F (36.8 °C) (!) 119 20 105/75 96 %   03/06/21 0431  (!) 113      03/06/21 0223 98.4 °F (36.9 °C) (!) 105 20 (!) 121/97 96 %   03/06/21 0141  (!) 119  105/60    03/06/21 0015  (!) 128      03/05/21 2334 98.3 °F (36.8 °C) 90 22 125/75 95 %   03/05/21 2332  (!) 128      03/05/21 2247 98.6 °F (37 °C) (!) 116 22 122/73 94 %   03/05/21 1913 98.5 °F (36.9 °C) 70 24 135/75 90 %   03/05/21 1707  77  115/68    03/05/21 1601    125/61    03/05/21 1600  78      03/05/21 1526 98.2 °F (36.8 °C) 90 19 125/61 90 %       Intake/Output Summary (Last 24 hours) at 3/6/2021 1244  Last data filed at 3/6/2021 1116  Gross per 24 hour   Intake 700 ml   Output 1750 ml   Net -1050 ml   To face encounter was provided on March 6, 2021 with the following findings    PHYSICAL EXAM:  General: WD, WN. Alert, cooperative, no acute distress    EENT:  EOMI. Anicteric sclerae. MMM  Resp:  Wheezing in bilateral bases. No accessory muscle use  CV:  Irregularly irregular rhythm,  No edema  GI:  Soft, Non distended, Non tender.  +Bowel sounds  Neurologic:  Alert and oriented X 3, normal speech,   Psych:   Good insight. Not anxious nor agitated  Skin:  No rashes.   No jaundice    Reviewed most current lab test results and cultures  YES  Reviewed most current radiology test results   YES  Review and summation of old records today    NO  Reviewed patient's current orders and MAR    YES  PMH/SH reviewed - no change compared to H&P  ________________________________________________________________________  Care Plan discussed with:    Comments   Patient x    Family      RN x    Care Manager     Consultant                        Multidiciplinary team rounds were held today with , nursing, pharmacist and clinical coordinator. Patient's plan of care was discussed; medications were reviewed and discharge planning was addressed. ________________________________________________________________________  Total NON critical care TIME:  30  Minutes    Total CRITICAL CARE TIME Spent:   Minutes non procedure based      Comments   >50% of visit spent in counseling and coordination of care     ________________________________________________________________________  Sam Diaz MD     Procedures: see electronic medical records for all procedures/Xrays and details which were not copied into this note but were reviewed prior to creation of Plan. LABS:  I reviewed today's most current labs and imaging studies.   Pertinent labs include:  Recent Labs     03/06/21 0152 03/04/21  0837   WBC 9.9 10.0   HGB 13.0 14.0   HCT 43.6 45.8    208     Recent Labs     03/06/21  0152 03/05/21  0442 03/04/21  0846 03/04/21  0837   * 137  --  136   K 4.8 4.6  --  4.7   CL 94* 97  --  98   CO2 39* 38*  --  33*   * 112*  --  156*   BUN 34* 20  --  24*   CREA 1.06 1.05  --  1.15   CA 8.8 9.0  --  8.8   MG 2.1  --  2.0  --    ALB  --   --   --  3.5   TBILI  --   --   --  0.4   ALT  --   --   --  43       Signed: Sam Diaz MD

## 2021-03-06 NOTE — PROGRESS NOTES
03/05/21 2247   Vital Signs   Temp 98.6 °F (37 °C)   Temp Source Oral   Pulse (Heart Rate) (!) 116   Cardiac Rhythm A Fib   Resp Rate 22   O2 Sat (%) 94 %   Level of Consciousness Alert   /73   MAP (Calculated) 89   MEWS Score 4   Pain 1   Pain Scale 1 Numeric (0 - 10)   Pain Intensity 1 0   Patient Stated Pain Goal 0   NP aware.

## 2021-03-06 NOTE — PROGRESS NOTES
End of Shift Note    Bedside shift change report given to Gibran Arrington (oncoming nurse) by Simeon Henry (offgoing nurse). Report included the following information SBAR    Shift worked:  night     Shift summary and any significant changes:     286: Pt converted into new onset afib rvr. Orders for x2 metoprolol pushes received, HR unchanged. Received order for x1 cardizem push, HR came down to 90 - 110's. HR came back to 100 - 130, fluctuating and tachying up to 150 at times. NP aware, does not want to initiate Cardizem drip. No further orders received. Attempted to page cardiologist but answering machine was turned off and transferred to no one. 4 [facility] cardiology and was told to call Dr. Dumont Pogoseat line [phone]. Called that number and was told to call [facility] cardiology back, gave the  instructions to talk to them and just make sure that a cardiologist looks at this patient today. Pt is extremely unsteady on feet, swaying and nearly tipping everytime he stands up. Gives conflicting report of when mobility issues started, says that prednisone is causing it but he also already had problems. Concerns for physician to address:        Zone phone for oncoming shift:           Activity:  Activity Level:  Up with Assistance  Number times ambulated in hallways past shift: 0  Number of times OOB to chair past shift: 2    Cardiac:   Cardiac Monitoring: Yes      Cardiac Rhythm: Atrial fibrillation    Access:   Current line(s): PIV     Genitourinary:   Urinary status: voiding    Respiratory:   O2 Device: Nasal cannula  Chronic home O2 use?: NO  Incentive spirometer at bedside: YES     GI:  Last Bowel Movement Date: 03/05/21  Current diet:  DIET CARDIAC Regular; 2 GM NA (House Low NA)  Passing flatus: YES  Tolerating current diet: YES  % Diet Eaten: 100 %    Pain Management:   Patient states pain is manageable on current regimen: YES    Skin:  Benedict Score: 20  Interventions: increase time out of bed    Patient Safety:  Fall Score:  Total Score: 3  Interventions: bed/chair alarm  High Fall Risk: Yes    Length of Stay:  Expected LOS: 3d 14h  Actual LOS: 2      Yoselin Parker

## 2021-03-06 NOTE — PROGRESS NOTES
6423: Cardizem bolus given and continuous drip started at 10mg/hr   0802: Cardizem drip stopped due to BP 88/64.  Cardiologist paged   1123: converted to NSR

## 2021-03-06 NOTE — PROGRESS NOTES
Received notification from bedside RN about patient with regards to: rhythm change, suspect afib  VS: /75, , RR 24, O2 sat 90% on NC 6 L    Intervention given: EKG ordered: Atrial fibrillation with rapid ventricular response   - Started on Eliquis 5 mg BID  - IV Metoprolol 2.5 mg x 1 dose (-140's) x 2 doses  - already being followed by cardiology    0126: persistent Afib 1in low 100's-130's noted   VS: /75, , RR 20, O sat 95 %%    - Cardizem 5 mg IV x 1 dose ordered,     0239: Notified of 13 beats VT, HR down to 90's-110's after Cardizem IV, still in Afib  VS: /97, , RR 20, O2 sat 96% on NC 6 L    - Added Magnesium to am labs

## 2021-03-06 NOTE — PROGRESS NOTES
End of Shift Note    Bedside shift change report given to [address] [place] [address] Rd (oncoming nurse) by Iraj Jaquez, BRO (offgoing nurse). Report included the following information SBAR and Kardex    Shift worked:  Day     Shift summary and any significant changes:     0754: Cardizem bolus given and continuous drip started at 10mg/hr   0802: Cardizem drip stopped due to BP 88/64. Cardiologist paged   1120: converted to NSR  On 6L NC      Concerns for physician to address:  NA     Zone phone for oncoming shift:          Activity:  Activity Level: Up with Assistance  Number times ambulated in hallways past shift: 0  Number of times OOB to chair past shift: 1    Cardiac:   Cardiac Monitoring: Yes      Cardiac Rhythm: Normal sinus rhythm    Access:   Current line(s): PIV     Genitourinary:   Urinary status: voiding    Respiratory:   O2 Device: Nasal cannula  Chronic home O2 use?: NO  Incentive spirometer at bedside: YES     GI:  Last Bowel Movement Date: 03/05/21  Current diet:  DIET CARDIAC Regular; 2 GM NA (House Low NA)  Passing flatus: YES  Tolerating current diet: YES  % Diet Eaten: 100 %    Pain Management:   Patient states pain is manageable on current regimen: YES    Skin:  Benedict Score: 20  Interventions: float heels, increase time out of bed and PT/OT consult    Patient Safety:  Fall Score:  Total Score: 3  Interventions: bed/chair alarm, assistive device (walker, cane, etc) and pt to call before getting OOB  High Fall Risk: Yes    Length of Stay:  Expected LOS: 3d 14h  Actual LOS: 1001 Bernadine Mendez, RN

## 2021-03-06 NOTE — PROGRESS NOTES
Problem: Falls - Risk of  Goal: *Absence of Falls  Description: Document Shilpa Herring Fall Risk and appropriate interventions in the flowsheet. Outcome: Progressing Towards Goal  Note: Fall Risk Interventions:  Mobility Interventions: Bed/chair exit alarm         Medication Interventions: Assess postural VS orthostatic hypotension, Bed/chair exit alarm, Patient to call before getting OOB         History of Falls Interventions: Bed/chair exit alarm         Problem: Patient Education: Go to Patient Education Activity  Goal: Patient/Family Education  Outcome: Progressing Towards Goal     Problem: Risk for Spread of Infection  Goal: Prevent transmission of infectious organism to others  Description: Prevent the transmission of infectious organisms to other patients, staff members, and visitors.   Outcome: Progressing Towards Goal     Problem: Patient Education:  Go to Education Activity  Goal: Patient/Family Education  Outcome: Progressing Towards Goal     Problem: Breathing Pattern - Ineffective  Goal: *Absence of hypoxia  Outcome: Progressing Towards Goal  Goal: *Use of effective breathing techniques  Outcome: Progressing Towards Goal  Goal: *PALLIATIVE CARE:  Alleviation of Dyspnea  Outcome: Progressing Towards Goal     Problem: Patient Education: Go to Patient Education Activity  Goal: Patient/Family Education  Outcome: Progressing Towards Goal

## 2021-03-06 NOTE — PROGRESS NOTES
Problem: Falls - Risk of  Goal: *Absence of Falls  Description: Document Bard  Fall Risk and appropriate interventions in the flowsheet.   Note: Fall Risk Interventions:  Mobility Interventions: Assess mobility with egress test, Bed/chair exit alarm, Patient to call before getting OOB         Medication Interventions: Bed/chair exit alarm, Patient to call before getting OOB         History of Falls Interventions: Bed/chair exit alarm, Investigate reason for fall

## 2021-03-06 NOTE — CONSULTS
[facility]    Name:  Matt Salazar  MR#:  [id number]  :  1950  ACCOUNT #:  [de-identified]  DATE OF SERVICE:  2021    HISTORY OF PRESENT ILLNESS:  The patient is a 68-year-old gentleman seen in reference to increasing difficulty with shortness of breath and possibility of congestive heart failure. He was last seen in 2017. At that time, an echocardiogram revealed normal left ventricular function, and a Lexiscan was negative with normal EF. He has not had chest pain. He has had increasing difficulty with shortness of breath. At time of his evaluation in the ED, he was found to have a negative CT angiogram for pulmonary embolism, but there was mild interstitial edema and bilateral pleural effusions. At the time I evaluated him, he weighed 267 pounds. He had a history of chronic obstructive pulmonary disease. MEDICATIONS:  His outpatient medications are believed to be Inderal, Nexium, Lipitor 20 and melatonin. PAST MEDICAL HISTORY:  History of SVT by history, a history of COPD with wheezing, obesity, dyslipidemia. He has been hypertensive. SOCIAL HISTORY:  He is a retired [employer]  and spent 22 years in the [employer]. He smoked up until some years ago, smoked for 40+ years. He has a daughter, 28, in [place]. FAMILY HISTORY:  Mother  at 59. Father  at 77. REVIEW OF SYSTEMS:  Essentially negative except for shortness of breath. No peripheral edema. No chest pain. He has some difficulty with constipation and some frequency. PHYSICAL EXAMINATION:  GENERAL:  He is an obese gentleman, alert and oriented, stable on supplemental oxygen. NECK:  Supple. No neck vein distention. HEENT:  No facial asymmetry. LUNGS:  Diminished breath sounds bilaterally with expiratory wheezes. HEART:  Regular rhythm. ABDOMEN:  Nontender. EXTREMITIES:  No edema. Pulses diminished.     DIAGNOSTIC DATA:  EKG:  Regular sinus rhythm, low voltage, nonspecific ST-T changes. CTA:  Negative pulmonary embolism with bilateral pleural effusions and \"interstitial edema. \"    PROBLEMS:  1. Respiratory failure. 2.  Chronic obstructive pulmonary disease. 3.  History of hypertension. 4.  History of tachycardia. DISCUSSION AND RECOMMENDATIONS:  We will obtain 2-D echo Doppler and color flow study. His ejection fraction when done previously in 2017 revealed EF of 50%-55%. An echo should be very helpful in his assessment. This would appear to be primarily chronic obstructive pulmonary disease with a mild element of left ventricular failure. Previously, he had a negative Lexiscan  three years ago. Gentle diuresis with careful attention to oxygen levels and his rising pCO2 will be appropriate. May need to add Diamox. Thank you for asking me to see this pleasant gentleman.       Fam Wadsworth MD      [id number]  D:  03/05/2021 14:10  T:  03/05/2021 19:11  JOB #:  [id number]  CC:  MD Mandi Jean MD

## 2021-03-06 NOTE — PROGRESS NOTES
Patient arrived on unit from PCU. Initially no complaints. Recent complaints of vision problems with words on the message board coming out at him and then going back onto the board. It was transient and then quit doing it. Also he has problems with jerking weakness of legs. He is sitting in chair and when holding legs up they suddenly fall. Lillian Burch He felt odd sensations on left hand also. He is attributing it to being on prednisone. Will notify the nurse who now has patient.

## 2021-03-07 LAB
ALBUMIN SERPL-MCNC: 3.3 G/DL (ref 3.5–5)
ALBUMIN/GLOB SERPL: 0.8 {RATIO} (ref 1.1–2.2)
ALP SERPL-CCNC: 67 U/L (ref 45–117)
ALT SERPL-CCNC: 31 U/L (ref 12–78)
ANION GAP SERPL CALC-SCNC: 3 MMOL/L (ref 5–15)
ARTERIAL PATENCY WRIST A: YES
AST SERPL-CCNC: 16 U/L (ref 15–37)
BASE EXCESS BLDA CALC-SCNC: 14.3 MMOL/L
BDY SITE: ABNORMAL
BILIRUB SERPL-MCNC: 0.4 MG/DL (ref 0.2–1)
BUN SERPL-MCNC: 30 MG/DL (ref 6–20)
BUN/CREAT SERPL: 29 (ref 12–20)
CA-I BLD-SCNC: 1.24 MMOL/L (ref 1.12–1.32)
CA-I BLD-SCNC: 1.25 MMOL/L (ref 1.12–1.32)
CALCIUM SERPL-MCNC: 9.2 MG/DL (ref 8.5–10.1)
CHLORIDE SERPL-SCNC: 92 MMOL/L (ref 97–108)
CO2 SERPL-SCNC: 42 MMOL/L (ref 21–32)
CREAT SERPL-MCNC: 1.04 MG/DL (ref 0.7–1.3)
ERYTHROCYTE [DISTWIDTH] IN BLOOD BY AUTOMATED COUNT: 15.6 % (ref 11.5–14.5)
GAS FLOW.O2 O2 DELIVERY SYS: 5 L/MIN
GAS FLOW.O2 O2 DELIVERY SYS: ABNORMAL L/MIN
GAS FLOW.O2 O2 DELIVERY SYS: ABNORMAL L/MIN
GAS FLOW.O2 SETTING OXYMISER: 5 L/M
GAS FLOW.O2 SETTING OXYMISER: 5 L/M
GLOBULIN SER CALC-MCNC: 4 G/DL (ref 2–4)
GLUCOSE BLD STRIP.AUTO-MCNC: 117 MG/DL (ref 65–100)
GLUCOSE SERPL-MCNC: 110 MG/DL (ref 65–100)
HCO3 BLDA-SCNC: 45 MMOL/L (ref 22–26)
HCT VFR BLD AUTO: 44.7 % (ref 36.6–50.3)
HGB BLD-MCNC: 13.1 G/DL (ref 12.1–17)
MCH RBC QN AUTO: 29.2 PG (ref 26–34)
MCHC RBC AUTO-ENTMCNC: 29.3 G/DL (ref 30–36.5)
MCV RBC AUTO: 99.6 FL (ref 80–99)
NRBC # BLD: 0 K/UL (ref 0–0.01)
NRBC BLD-RTO: 0 PER 100 WBC
PCO2 BLD: >90 MMHG (ref 35–45)
PCO2 BLD: >90 MMHG (ref 35–45)
PCO2 BLDA: 91 MMHG (ref 35–45)
PH BLD: 7.33 [PH] (ref 7.35–7.45)
PH BLD: 7.33 [PH] (ref 7.35–7.45)
PH BLDA: 7.32 [PH] (ref 7.35–7.45)
PLATELET # BLD AUTO: 193 K/UL (ref 150–400)
PMV BLD AUTO: 10.2 FL (ref 8.9–12.9)
PO2 BLD: 69 MMHG (ref 80–100)
PO2 BLD: 72 MMHG (ref 80–100)
PO2 BLDA: 72 MMHG (ref 80–100)
POTASSIUM SERPL-SCNC: 4.3 MMOL/L (ref 3.5–5.1)
PROT SERPL-MCNC: 7.3 G/DL (ref 6.4–8.2)
RBC # BLD AUTO: 4.49 M/UL (ref 4.1–5.7)
SAO2 % BLD: 92 % (ref 92–97)
SAO2% DEVICE SAO2% SENSOR NAME: ABNORMAL
SERVICE CMNT-IMP: ABNORMAL
SODIUM SERPL-SCNC: 137 MMOL/L (ref 136–145)
SPECIMEN SITE: ABNORMAL
SPECIMEN TYPE: ABNORMAL
SPECIMEN TYPE: ABNORMAL
TOTAL RESP. RATE, ITRR: 18
TOTAL RESP. RATE, ITRR: 18
TSH SERPL DL<=0.05 MIU/L-ACNC: 0.89 UIU/ML (ref 0.36–3.74)
WBC # BLD AUTO: 9.1 K/UL (ref 4.1–11.1)

## 2021-03-07 PROCEDURE — 36415 COLL VENOUS BLD VENIPUNCTURE: CPT

## 2021-03-07 PROCEDURE — 74011250637 HC RX REV CODE- 250/637: Performed by: INTERNAL MEDICINE

## 2021-03-07 PROCEDURE — 94660 CPAP INITIATION&MGMT: CPT

## 2021-03-07 PROCEDURE — 74011636637 HC RX REV CODE- 636/637: Performed by: INTERNAL MEDICINE

## 2021-03-07 PROCEDURE — 85027 COMPLETE CBC AUTOMATED: CPT

## 2021-03-07 PROCEDURE — 94640 AIRWAY INHALATION TREATMENT: CPT

## 2021-03-07 PROCEDURE — 77010033678 HC OXYGEN DAILY

## 2021-03-07 PROCEDURE — 74011250637 HC RX REV CODE- 250/637: Performed by: HOSPITALIST

## 2021-03-07 PROCEDURE — 82962 GLUCOSE BLOOD TEST: CPT

## 2021-03-07 PROCEDURE — 74011000250 HC RX REV CODE- 250: Performed by: INTERNAL MEDICINE

## 2021-03-07 PROCEDURE — 80053 COMPREHEN METABOLIC PANEL: CPT

## 2021-03-07 PROCEDURE — 5A09457 ASSISTANCE WITH RESPIRATORY VENTILATION, 24-96 CONSECUTIVE HOURS, CONTINUOUS POSITIVE AIRWAY PRESSURE: ICD-10-PCS | Performed by: INTERNAL MEDICINE

## 2021-03-07 PROCEDURE — 74011250637 HC RX REV CODE- 250/637: Performed by: NURSE PRACTITIONER

## 2021-03-07 PROCEDURE — 84443 ASSAY THYROID STIM HORMONE: CPT

## 2021-03-07 PROCEDURE — 65660000001 HC RM ICU INTERMED STEPDOWN

## 2021-03-07 PROCEDURE — 36600 WITHDRAWAL OF ARTERIAL BLOOD: CPT

## 2021-03-07 PROCEDURE — 82803 BLOOD GASES ANY COMBINATION: CPT

## 2021-03-07 RX ORDER — FUROSEMIDE 20 MG/1
20 TABLET ORAL DAILY
Status: DISCONTINUED | OUTPATIENT
Start: 2021-03-08 | End: 2021-03-10 | Stop reason: HOSPADM

## 2021-03-07 RX ORDER — IPRATROPIUM BROMIDE AND ALBUTEROL SULFATE 2.5; .5 MG/3ML; MG/3ML
3 SOLUTION RESPIRATORY (INHALATION)
Status: DISCONTINUED | OUTPATIENT
Start: 2021-03-07 | End: 2021-03-10 | Stop reason: HOSPADM

## 2021-03-07 RX ADMIN — FUROSEMIDE 40 MG: 40 TABLET ORAL at 08:06

## 2021-03-07 RX ADMIN — IPRATROPIUM BROMIDE AND ALBUTEROL 1 PUFF: 20; 100 SPRAY, METERED RESPIRATORY (INHALATION) at 03:45

## 2021-03-07 RX ADMIN — PANTOPRAZOLE SODIUM 40 MG: 40 TABLET, DELAYED RELEASE ORAL at 08:06

## 2021-03-07 RX ADMIN — IPRATROPIUM BROMIDE AND ALBUTEROL 1 PUFF: 20; 100 SPRAY, METERED RESPIRATORY (INHALATION) at 11:39

## 2021-03-07 RX ADMIN — Medication 10 ML: at 06:09

## 2021-03-07 RX ADMIN — Medication 10 ML: at 17:10

## 2021-03-07 RX ADMIN — TAMSULOSIN HYDROCHLORIDE 0.4 MG: 0.4 CAPSULE ORAL at 17:07

## 2021-03-07 RX ADMIN — DILTIAZEM HYDROCHLORIDE 30 MG: 30 TABLET, FILM COATED ORAL at 08:06

## 2021-03-07 RX ADMIN — IPRATROPIUM BROMIDE AND ALBUTEROL 1 PUFF: 20; 100 SPRAY, METERED RESPIRATORY (INHALATION) at 07:48

## 2021-03-07 RX ADMIN — Medication 10 ML: at 21:34

## 2021-03-07 RX ADMIN — GEMFIBROZIL 600 MG: 600 TABLET ORAL at 08:06

## 2021-03-07 RX ADMIN — FINASTERIDE 5 MG: 5 TABLET, FILM COATED ORAL at 17:07

## 2021-03-07 RX ADMIN — OMEGA-3 FATTY ACIDS CAP 1000 MG 1 CAPSULE: 1000 CAP at 08:06

## 2021-03-07 RX ADMIN — APIXABAN 5 MG: 5 TABLET, FILM COATED ORAL at 17:07

## 2021-03-07 RX ADMIN — Medication 3 MG: at 21:32

## 2021-03-07 RX ADMIN — PREDNISONE 40 MG: 20 TABLET ORAL at 08:06

## 2021-03-07 RX ADMIN — ASPIRIN 81 MG: 81 TABLET, COATED ORAL at 17:06

## 2021-03-07 RX ADMIN — GEMFIBROZIL 600 MG: 600 TABLET ORAL at 17:07

## 2021-03-07 RX ADMIN — PROPRANOLOL HYDROCHLORIDE 120 MG: 60 CAPSULE, EXTENDED RELEASE ORAL at 17:06

## 2021-03-07 RX ADMIN — IPRATROPIUM BROMIDE AND ALBUTEROL SULFATE 3 ML: .5; 3 SOLUTION RESPIRATORY (INHALATION) at 20:25

## 2021-03-07 RX ADMIN — APIXABAN 5 MG: 5 TABLET, FILM COATED ORAL at 08:07

## 2021-03-07 RX ADMIN — DILTIAZEM HYDROCHLORIDE 30 MG: 30 TABLET, FILM COATED ORAL at 17:07

## 2021-03-07 RX ADMIN — CHOLECALCIFEROL TAB 25 MCG (1000 UNIT) 1000 UNITS: 25 TAB at 08:07

## 2021-03-07 RX ADMIN — ROSUVASTATIN CALCIUM 40 MG: 40 TABLET, FILM COATED ORAL at 21:32

## 2021-03-07 RX ADMIN — DILTIAZEM HYDROCHLORIDE 30 MG: 30 TABLET, FILM COATED ORAL at 21:32

## 2021-03-07 RX ADMIN — MULTIPLE VITAMINS W/ MINERALS TAB 1 TABLET: TAB at 08:06

## 2021-03-07 RX ADMIN — DILTIAZEM HYDROCHLORIDE 30 MG: 30 TABLET, FILM COATED ORAL at 11:34

## 2021-03-07 NOTE — CONSULTS
Pulmonary    Reason:  Acute on chronic resp failure  Requesting:  Dr Juan Carlos Oquendo reviewed, images viewed. Prior PFTs viewed    64 yo former smoker  has a past medical history of Arrhythmia, Chronic obstructive pulmonary disease ([address] GERD (gastroesophageal reflux disease), Hypertension, and Thyroid disease. He was admitted on 3/4/21 when he presented with 5 days of increased paul, inablility to lie flat and decreased O2 sats. Found to have pulm edema on cxr and CTA with pulm edema, small effusions and no evidence of PE. Also found to have afib with RVR. Pro bnp was elevated. Seen by cardiology  He received diuretics, beta blocker and standard care for copd exacerbation. Procalcitonin was normal.  Home pulm meds include breo and albuterol. Currently on eliquis, asa, cardizem, lasix 40 mg daily, Prednisone 40 mg per day and combivent. Has a reported h/o untreated KADIE. This am an ABG was obtained and reported some confusion  On O2 at 5 L/min:  7.32 / / 72 and was ordered cpcp by hospitalist service    Prior PFT form  showed severe airflow obstruction with an FEV1 of 1.44L (41%), no BD responsiveness.   Air trapping and hyperinflation on lung volumes and mildly reduced DLCO (72/90)    Patient Vitals for the past 4 hrs:   BP Temp Pulse Resp SpO2 Weight   21 0759 121/76 97.4 °F (36.3 °C) 82  92 %    21 0749     94 %    21 0727 138/78  75 22 93 %    21 0650 128/77  76 20 95 %    21 0645     96 %    21 0637      122.7 kg (270 lb 9.6 oz)   21 0620 139/86  78 18 93 %    21 0605 (!) 141/74  80 16 93 %    21 0550 136/74  73 18 90 %    21 0530 128/78 97.6 °F (36.4 °C) 74 18 94 %    21 0515     95 %    Temp (24hrs), Av.1 °F (36.7 °C), Min:97.4 °F (36.3 °C), Max:98.7 °F (37.1 °C)      Intake/Output Summary (Last 24 hours) at 3/7/2021 0849  Last data filed at 3/7/2021 9481  Gross per 24 hour   Intake 1460 ml Output 1550 ml   Net -90 ml     Lab:  Recent Labs     03/07/21  0301 03/06/21  0152 03/05/21  0442   WBC 9.1 9.9  --    HGB 13.1 13.0  --     196  --     134* 137   K 4.3 4.8 4.6   CL 92* 94* 97   CO2 42* 39* 38*   BUN 30* 34* 20   CREA 1.04 1.06 1.05   * 109* 112*   CA 9.2 8.8 9.0   MG  --  2.1  --    TROIQ  --   --  <0.05   TBILI 0.4  --   --      ABG:  Recent Labs     03/04/21  1430   PHI 7.24*   PCO2I 89.5*   PO2I 92   HCO3I 38.6*   SO2I 95     Impression:  Acute on chronic hypoxemic and hypercarbic resp failure 2/2 COPD and CHF. Severe underlying COPD. ABG with chronic resp acidosis possibly worsened a bit by diuresis and increased HCO3 on chemistries    Paroxysmal afib - followed by cardiology - currently on cardizem, lasix, eliquis, and propranolol.   Pulm edema on presentation    Former smoker    Reported h/o untreated KADIE - has been seen by [employer] sleep    Pulm recommendations:  · bipap qhs and prn - will benefit from trilogy at discharge  · Continue copd treatment per hospitalist- follow up as outpatient with repeat PFTs  · CHF / Afib per cardiology - Preserved EF by echo 3/4/21 - no appreciable pulm htn noted  · Would hold off on diamox for now - even though his pCO2 and HCO3 are both elevated he still has a slightly acidotic pH and decreasing his serum buffer may increase his work of breathing    Service to follow on Monday    Kita Goodson MD

## 2021-03-07 NOTE — PROGRESS NOTES
End of Shift Note    Bedside shift change report given to Amita Traylor (oncoming nurse) by Ines Pate (offgoing nurse). Report included the following information SBAR    Shift worked:  night     Shift summary and any significant changes:     Pt woke up and ran into the bathroom, pulling off gown and wires, very confused and stating he thought he was being chased by a bear. Pt reoriented, has full memories prior to getting out of bed     Pt got out of bed again, this time much more confused, stating that we had traveled through time together on an alien space ship. Vitals stable, sugar 117, oriented x4. NP notified, charge at bedside, ABGs requested since they were high upon admission, requested inhalers be changed to nebulizers. ABG revealed CO2 of 91, NP ordered Bipap, placed by RT. Pt is tolerating it well, only complaining of dry mouth. Pt seems more alert, not making any more delusional statements. Concerns for physician to address:        Zone phone for oncoming shift:           Activity:  Activity Level: Up with Assistance  Number times ambulated in hallways past shift: 0  Number of times OOB to chair past shift: 3    Cardiac:   Cardiac Monitoring: Yes      Cardiac Rhythm: Normal sinus rhythm    Access:   Current line(s): PIV     Genitourinary:   Urinary status: voiding    Respiratory:   O2 Device: Nasal cannula  Chronic home O2 use?: NO  Incentive spirometer at bedside: YES     GI:  Last Bowel Movement Date: 03/06/21  Current diet:  DIET CARDIAC Regular; 2 GM NA (House Low NA)  Passing flatus: YES  Tolerating current diet: YES  % Diet Eaten: 100 %    Pain Management:   Patient states pain is manageable on current regimen: YES    Skin:  Benedict Score: 20  Interventions: turn team    Patient Safety:  Fall Score:  Total Score: 3  Interventions: bed/chair alarm  High Fall Risk: Yes    Length of Stay:  Expected LOS: 3d 14h  Actual LOS: 2      Ines Pate

## 2021-03-07 NOTE — PROGRESS NOTES
Hospitalist Progress Note    NAME: Una Zavala   :  1950   MRN:  [id number]       Assessment / Plan:  Acute hypoxic and hypercapnic respiratory failure , POA due to  New onset chf and COPD exacerbation   COPD exacerbation due to chf exacerbation  COVID-19 ruled out  --on 6 L, CTA negative PE, + mild pulmonary edema and b/l atx and trace b/l pleural effusions, probnp elevated 1192.  --rapid covid indeterminate, pcr is negative  --Was started on diuretics,, currently euvolemic. Transition Lasix to 20 mg daily  -Patient had worsening confusion last night and was noticed to have CO2 retention on ABG  -He was placed on BiPAP  -Currently on 6 L oxygen  -Continue oxygen supplementation to maintain above 88%  -Appreciated pulmonology consultation, will use BiPAP as needed and at bedtime     KADIE, untreated  --need to f/u with his sleep doctor to get cpap. This will help keep chf under control     P. SVT/new onset atrial fibrillation with rapid ventricular response  --currently sinus, continue propranolol  -Patient was noted to be having new onset A. fib with RVR, he had history of paroxysmal SVT  -Continue oral Cardizem now  -Is started on Eliquis  -Appreciated cardiology follow-up     BPH  --continue proscar, flomax     GERD  --continue PPI     Obesity  --will be placed on reduced calorie diet in hospital  Body mass index is 38.45 kg/m².     Code: full  DVT prophylaxis:  On Eliquis now  Surrogate decision maker:  Wife Nellie Leader         Subjective:     Chief Complaint / Reason for Physician Visit  \" Was noted to be confused last night with CO2 retention noticed on ABG. Is alert and awake sitting in the chair on 6 L. Denies any shortness of breath. Continues to report exertional dyspnea\". Discussed with RN events overnight.      Review of Systems:  Symptom Y/N Comments  Symptom Y/N Comments   Fever/Chills n   Chest Pain n    Poor Appetite n   Edema n    Cough n   Abdominal Pain n    Sputum n   Joint Pain SOB/BOWDEN y   Pruritis/Rash     Nausea/vomit n   Tolerating PT/OT     Diarrhea n   Tolerating Diet     Constipation n   Other       Could NOT obtain due to:      Objective:     VITALS:   Last 24hrs VS reviewed since prior progress note. Most recent are:  Patient Vitals for the past 24 hrs:   Temp Pulse Resp BP SpO2   03/07/21 0759 97.4 °F (36.3 °C) 82  121/76 92 %   03/07/21 0749     94 %   03/07/21 0727  75 22 138/78 93 %   03/07/21 0650  76 20 128/77 95 %   03/07/21 0645     96 %   03/07/21 0620  78 18 139/86 93 %   03/07/21 0605  80 16 (!) 141/74 93 %   03/07/21 0550  73 18 136/74 90 %   03/07/21 0530 97.6 °F (36.4 °C) 74 18 128/78 94 %   03/07/21 0515     95 %   03/07/21 0345     95 %   03/07/21 0250 97.8 °F (36.6 °C) 74 20 139/75 94 %   03/06/21 2300 98.7 °F (37.1 °C) 78 19 (!) 146/83 96 %   03/06/21 1944     92 %   03/06/21 1941 98.4 °F (36.9 °C) 79 18 (!) 145/76 92 %   03/06/21 1718  88  129/70    03/06/21 1513 98.6 °F (37 °C) 83 18 123/67 92 %   03/06/21 1114 98.3 °F (36.8 °C) (!) 104 18 127/73 93 %       Intake/Output Summary (Last 24 hours) at 3/7/2021 1031  Last data filed at 3/7/2021 0947  Gross per 24 hour   Intake 1460 ml   Output 1875 ml   Net -415 ml   To face encounter was provided on March 7, 2021 with the following findings    PHYSICAL EXAM:  General: WD, WN. Alert, cooperative, no acute distress    EENT:  EOMI. Anicteric sclerae. MMM  Resp:  Wheezing in bilateral bases. No accessory muscle use  CV:  regular rhythm,  No edema  GI:  Soft, Non distended, Non tender.  +Bowel sounds  Neurologic:  Alert and oriented X 3, normal speech,   Psych:   Good insight. Not anxious nor agitated  Skin:  No rashes.   No jaundice    Reviewed most current lab test results and cultures  YES  Reviewed most current radiology test results   YES  Review and summation of old records today    NO  Reviewed patient's current orders and MAR    YES  PMH/SH reviewed - no change compared to H&P  ________________________________________________________________________  Care Plan discussed with:    Comments   Patient x    Family      RN x    Care Manager     Consultant                        Multidiciplinary team rounds were held today with , nursing, pharmacist and clinical coordinator. Patient's plan of care was discussed; medications were reviewed and discharge planning was addressed. ________________________________________________________________________  Total NON critical care TIME:  30  Minutes    Total CRITICAL CARE TIME Spent:   Minutes non procedure based      Comments   >50% of visit spent in counseling and coordination of care     ________________________________________________________________________  Morena Nicholas MD     Procedures: see electronic medical records for all procedures/Xrays and details which were not copied into this note but were reviewed prior to creation of Plan. LABS:  I reviewed today's most current labs and imaging studies.   Pertinent labs include:  Recent Labs     03/07/21 0301 03/06/21  0152   WBC 9.1 9.9   HGB 13.1 13.0   HCT 44.7 43.6    196     Recent Labs     03/07/21  0301 03/06/21  0152 03/05/21  0442    134* 137   K 4.3 4.8 4.6   CL 92* 94* 97   CO2 42* 39* 38*   * 109* 112*   BUN 30* 34* 20   CREA 1.04 1.06 1.05   CA 9.2 8.8 9.0   MG  --  2.1  --    ALB 3.3*  --   --    TBILI 0.4  --   --    ALT 31  --   --        Signed: Morena Nicholas MD

## 2021-03-07 NOTE — PROGRESS NOTES
Received notification from bedside RN about patient with regards to: CO2 42 with am BMP, RN getting concerned about change in mentation, more confused.   VS: /75, HR 74, RR 20, O2 sat 95% on NC 5 L    Intervention given: ABG (pH 7.32, pCO2 91, pO2 72, HCO3 45, on NC 5 L)  - CPAP prn q hs ordered

## 2021-03-07 NOTE — PROGRESS NOTES
Progress Note      3/6/2021 7:05 PM  NAME: Carline Farmer   MRN:  [id number]   Admit Diagnosis: Acute respiratory failure with hypoxia ([facility] [J96.01]     Assessment:     PROBLEMS:  1. Respiratory failure. 2.  Chronic obstructive pulmonary disease. 3.  History of hypertension. 4. New onset of Atrial Fibrillation . Converted back to NSR>       Plan: Will adjust meds. Convert to PO meds in the AM if maintaining NSR>       [x]        High complexity decision making was performed    Subjective:     Carline Farmer denies chest pain, dyspnea. Discussed with RN events overnight. Patient Active Problem List   Diagnosis Code    Hypertension I10    Arrhythmia I49.9    Obesity (BMI 30.0-34. 9) E66.9    Acute respiratory failure with hypoxia ([facility]) J96.01       Review of Systems:    Symptom Y/N Comments  Symptom Y/N Comments   Fever/Chills N   Chest Pain N    Poor Appetite N   Edema N    Cough N   Abdominal Pain N    Sputum N   Joint Pain N    SOB/BOWDEN N   Pruritis/Rash N    Nausea/vomit N   Tolerating PT/OT Y    Diarrhea N   Tolerating Diet Y    Constipation N   Other       Could NOT obtain due to:      Objective:      Physical Exam:    Last 24hrs VS reviewed since prior progress note. Most recent are:    Visit Vitals  /70   Pulse 88   Temp 98.6 °F (37 °C)   Resp 18   Ht 6' (1.829 m)   Wt 128.3 kg (282 lb 12.8 oz)   SpO2 92%   BMI 38.35 kg/m²       Intake/Output Summary (Last 24 hours) at 3/6/2021 1905  Last data filed at 3/6/2021 1829  Gross per 24 hour   Intake 1160 ml   Output 1850 ml   Net -690 ml        General Appearance: Well developed, well nourished, alert & oriented x 3,    no acute distress. Ears/Nose/Mouth/Throat: Hearing grossly normal.  Neck: Supple. Chest: Lungs clear to auscultation bilaterally. Cardiovascular: Regular rate and rhythm, S1S2 normal, no murmur. Abdomen: Soft, non-tender, bowel sounds are active. Extremities: No edema bilaterally.   Skin: Warm and dry.    PMH/ reviewed - no change compared to H&P    Data Review    Telemetry: normal sinus rhythm     Lab Data Personally Reviewed:    Recent Labs     03/06/21  0152 03/04/21  0837   WBC 9.9 10.0   HGB 13.0 14.0   HCT 43.6 45.8    208   LABRCNT(INR:3,PTP:3,APTT:3,)  Recent Labs     03/06/21  0152 03/05/21  0442 03/04/21  0846 03/04/21  0837   * 137  --  136   K 4.8 4.6  --  4.7   CL 94* 97  --  98   CO2 39* 38*  --  33*   BUN 34* 20  --  24*   CREA 1.06 1.05  --  1.15   * 112*  --  156*   CA 8.8 9.0  --  8.8   MG 2.1  --  2.0  --    LABRCNT(CPK:3,CpKMB:3,ckndx:3,troiq:3)No results found for: CHOL, CHOLX, CHLST, CHOLV, HDL, HDLP, LDL, LDLC, DLDLP, TGLX, TRIGL, TRIGP, CHHD, CHHDXLABRCNT(sgot:3,gpt:3,ap:3,tbiL:3,TP:3,ALB:3,GLOB:3,ggt:3,aml:3,amyp:3,lpse:3,hlpse:3)No results for input(s): PH, PCO2, PO2 in the last 72 hours. No results found for: CHOL, CHOLX, CHLST, CHOLV, HDL, HDLP, LDL, LDLC, DLDLP, TGLX, TRIGL, TRIGP, CHHD, CHHDXMEDTABLEJosafat Marie MD  No results for input(s): PH, PCO2, PO2 in the last 72 hours.     Medications Personally Reviewed:    Current Facility-Administered Medications   Medication Dose Route Frequency    dilTIAZem IR (CARDIZEM) tablet 30 mg  30 mg Oral AC&HS    predniSONE (DELTASONE) tablet 40 mg  40 mg Oral DAILY WITH BREAKFAST    furosemide (LASIX) tablet 40 mg  40 mg Oral DAILY    apixaban (ELIQUIS) tablet 5 mg  5 mg Oral BID    sodium chloride (NS) flush 5-10 mL  5-10 mL IntraVENous PRN    sodium chloride (NS) flush 5-40 mL  5-40 mL IntraVENous Q8H    sodium chloride (NS) flush 5-40 mL  5-40 mL IntraVENous PRN    acetaminophen (TYLENOL) tablet 650 mg  650 mg Oral Q6H PRN    Or    acetaminophen (TYLENOL) suppository 650 mg  650 mg Rectal Q6H PRN    polyethylene glycol (MIRALAX) packet 17 g  17 g Oral DAILY PRN    promethazine (PHENERGAN) tablet 12.5 mg  12.5 mg Oral Q6H PRN    Or    ondansetron (ZOFRAN) injection 4 mg  4 mg IntraVENous Q6H PRN    aspirin delayed-release tablet 81 mg  81 mg Oral QPM    cholecalciferol (VITAMIN D3) (1000 Units /25 mcg) tablet 1,000 Units  1,000 Units Oral DAILY    pantoprazole (PROTONIX) tablet 40 mg  40 mg Oral ACB    finasteride (PROSCAR) tablet 5 mg  5 mg Oral QPM    gemfibroziL (LOPID) tablet 600 mg  600 mg Oral BID    melatonin tablet 3 mg  3 mg Oral QHS    multivitamin, tx-iron-ca-min (THERA-M w/ IRON) tablet 1 Tab  1 Tab Oral DAILY    omega 3-DHA-EPA-fish oil 1,000 mg (120 mg-180 mg) capsule 1 Cap  1 Cap Oral DAILY    propranolol LA (INDERAL LA) capsule 120 mg  120 mg Oral QPM    rosuvastatin (CRESTOR) tablet 40 mg  40 mg Oral QHS    tamsulosin (FLOMAX) capsule 0.4 mg  0.4 mg Oral QPM    ipratropium-albuterol (COMBIVENT RESPIMAT) 20 mcg-100 mcg inhalation spray  1 Puff Inhalation Q4H RT         Damion Ramos MD

## 2021-03-07 NOTE — PROGRESS NOTES
Problem: Falls - Risk of  Goal: *Absence of Falls  Description: Document Danne Lobe Fall Risk and appropriate interventions in the flowsheet. Outcome: Progressing Towards Goal  Note: Fall Risk Interventions:  Mobility Interventions: Bed/chair exit alarm         Medication Interventions: Assess postural VS orthostatic hypotension, Bed/chair exit alarm, Patient to call before getting OOB         History of Falls Interventions: Bed/chair exit alarm         Problem: Patient Education: Go to Patient Education Activity  Goal: Patient/Family Education  Outcome: Progressing Towards Goal     Problem: Risk for Spread of Infection  Goal: Prevent transmission of infectious organism to others  Description: Prevent the transmission of infectious organisms to other patients, staff members, and visitors.   Outcome: Progressing Towards Goal     Problem: Patient Education:  Go to Education Activity  Goal: Patient/Family Education  Outcome: Progressing Towards Goal     Problem: Breathing Pattern - Ineffective  Goal: *Absence of hypoxia  Outcome: Progressing Towards Goal  Goal: *Use of effective breathing techniques  Outcome: Progressing Towards Goal  Goal: *PALLIATIVE CARE:  Alleviation of Dyspnea  Outcome: Progressing Towards Goal     Problem: Patient Education: Go to Patient Education Activity  Goal: Patient/Family Education  Outcome: Progressing Towards Goal

## 2021-03-07 NOTE — PROGRESS NOTES
Progress Note      3/7/2021 7:05 PM  NAME: Sneha Caraballo   MRN:  [id number]   Admit Diagnosis: Acute respiratory failure with hypoxia ([facility] [J96.01]     Assessment:     PROBLEMS:  1. Respiratory failure. 2.  Chronic obstructive pulmonary disease. 3.  History of hypertension. 4. New onset of Atrial Fibrillation . Converted back to NSR>       Plan:     Maintaining NSR . On PO meds. Dr Tiara Cross will see in AM       [x]        High complexity decision making was performed    Subjective:     Sneha Caraballo denies chest pain, dyspnea. Discussed with RN events overnight. Patient Active Problem List   Diagnosis Code    Hypertension I10    Arrhythmia I49.9    Obesity (BMI 30.0-34. 9) E66.9    Acute respiratory failure with hypoxia ([facility]) J96.01       Review of Systems:    Symptom Y/N Comments  Symptom Y/N Comments   Fever/Chills N   Chest Pain N    Poor Appetite N   Edema N    Cough N   Abdominal Pain N    Sputum N   Joint Pain N    SOB/BOWDEN N   Pruritis/Rash N    Nausea/vomit N   Tolerating PT/OT Y    Diarrhea N   Tolerating Diet Y    Constipation N   Other       Could NOT obtain due to:      Objective:      Physical Exam:    Last 24hrs VS reviewed since prior progress note. Most recent are:    Visit Vitals  BP (!) 122/54 (BP 1 Location: Left upper arm, BP Patient Position: Sitting)   Pulse 78   Temp 98.2 °F (36.8 °C)   Resp 18   Ht 6' (1.829 m)   Wt 122.7 kg (270 lb 9.6 oz)   SpO2 90%   BMI 36.70 kg/m²       Intake/Output Summary (Last 24 hours) at 3/7/2021 1338  Last data filed at 3/7/2021 1248  Gross per 24 hour   Intake 1700 ml   Output 2865 ml   Net -1165 ml        General Appearance: Well developed, well nourished, alert & oriented x 3,    no acute distress. Ears/Nose/Mouth/Throat: Hearing grossly normal.  Neck: Supple. Chest: Lungs clear to auscultation bilaterally. Cardiovascular: Regular rate and rhythm, S1S2 normal, no murmur.   Abdomen: Soft, non-tender, bowel sounds are active. Extremities: No edema bilaterally. Skin: Warm and dry.     PMH/SH reviewed - no change compared to H&P    Data Review    Telemetry: normal sinus rhythm     Lab Data Personally Reviewed:    Recent Labs     03/07/21 0301 03/06/21 0152   WBC 9.1 9.9   HGB 13.1 13.0   HCT 44.7 43.6    196   LABRCNT(INR:3,PTP:3,APTT:3,)  Recent Labs     03/07/21 0301 03/06/21 0152 03/05/21 0442    134* 137   K 4.3 4.8 4.6   CL 92* 94* 97   CO2 42* 39* 38*   BUN 30* 34* 20   CREA 1.04 1.06 1.05   * 109* 112*   CA 9.2 8.8 9.0   MG  --  2.1  --    LABRCNT(CPK:3,CpKMB:3,ckndx:3,troiq:3)No results found for: CHOL, CHOLX, CHLST, CHOLV, HDL, HDLP, LDL, LDLC, DLDLP, TGLX, TRIGL, TRIGP, CHHD, CHHDXLABRCNT(sgot:3,gpt:3,ap:3,tbiL:3,TP:3,ALB:3,GLOB:3,ggt:3,aml:3,amyp:3,lpse:3,hlpse:3)  Recent Labs     03/07/21 0441   PH 7.32*   PCO2 91*   PO2 72*     No results found for: CHOL, CHOLX, CHLST, CHOLV, HDL, HDLP, LDL, LDLC, DLDLP, TGLX, TRIGL, TRIGP, CHHD, Mayelin Chatman MD  Recent Labs     03/07/21 0441   PH 7.32*   PCO2 91*   PO2 72*       Medications Personally Reviewed:    Current Facility-Administered Medications   Medication Dose Route Frequency    albuterol-ipratropium (DUO-NEB) 2.5 MG-0.5 MG/3 ML  3 mL Nebulization Q4H PRN    [START ON 3/8/2021] furosemide (LASIX) tablet 20 mg  20 mg Oral DAILY    dilTIAZem IR (CARDIZEM) tablet 30 mg  30 mg Oral AC&HS    predniSONE (DELTASONE) tablet 40 mg  40 mg Oral DAILY WITH BREAKFAST    apixaban (ELIQUIS) tablet 5 mg  5 mg Oral BID    sodium chloride (NS) flush 5-10 mL  5-10 mL IntraVENous PRN    sodium chloride (NS) flush 5-40 mL  5-40 mL IntraVENous Q8H    sodium chloride (NS) flush 5-40 mL  5-40 mL IntraVENous PRN    acetaminophen (TYLENOL) tablet 650 mg  650 mg Oral Q6H PRN    Or    acetaminophen (TYLENOL) suppository 650 mg  650 mg Rectal Q6H PRN    polyethylene glycol (MIRALAX) packet 17 g  17 g Oral DAILY PRN    promethazine (PHENERGAN) tablet 12.5 mg  12.5 mg Oral Q6H PRN    Or    ondansetron (ZOFRAN) injection 4 mg  4 mg IntraVENous Q6H PRN    aspirin delayed-release tablet 81 mg  81 mg Oral QPM    cholecalciferol (VITAMIN D3) (1000 Units /25 mcg) tablet 1,000 Units  1,000 Units Oral DAILY    pantoprazole (PROTONIX) tablet 40 mg  40 mg Oral ACB    finasteride (PROSCAR) tablet 5 mg  5 mg Oral QPM    gemfibroziL (LOPID) tablet 600 mg  600 mg Oral BID    melatonin tablet 3 mg  3 mg Oral QHS    multivitamin, tx-iron-ca-min (THERA-M w/ IRON) tablet 1 Tab  1 Tab Oral DAILY    omega 3-DHA-EPA-fish oil 1,000 mg (120 mg-180 mg) capsule 1 Cap  1 Cap Oral DAILY    propranolol LA (INDERAL LA) capsule 120 mg  120 mg Oral QPM    rosuvastatin (CRESTOR) tablet 40 mg  40 mg Oral QHS    tamsulosin (FLOMAX) capsule 0.4 mg  0.4 mg Oral QPM    ipratropium-albuterol (COMBIVENT RESPIMAT) 20 mcg-100 mcg inhalation spray  1 Puff Inhalation Q4H RT         Damion Ramos MD

## 2021-03-08 LAB
ERYTHROCYTE [DISTWIDTH] IN BLOOD BY AUTOMATED COUNT: 15.2 % (ref 11.5–14.5)
HCT VFR BLD AUTO: 43.8 % (ref 36.6–50.3)
HGB BLD-MCNC: 13.2 G/DL (ref 12.1–17)
MCH RBC QN AUTO: 29.5 PG (ref 26–34)
MCHC RBC AUTO-ENTMCNC: 30.1 G/DL (ref 30–36.5)
MCV RBC AUTO: 97.8 FL (ref 80–99)
NRBC # BLD: 0.03 K/UL (ref 0–0.01)
NRBC BLD-RTO: 0.4 PER 100 WBC
PLATELET # BLD AUTO: 180 K/UL (ref 150–400)
PMV BLD AUTO: 9.9 FL (ref 8.9–12.9)
RBC # BLD AUTO: 4.48 M/UL (ref 4.1–5.7)
WBC # BLD AUTO: 8.4 K/UL (ref 4.1–11.1)

## 2021-03-08 PROCEDURE — 94640 AIRWAY INHALATION TREATMENT: CPT

## 2021-03-08 PROCEDURE — 74011250637 HC RX REV CODE- 250/637: Performed by: INTERNAL MEDICINE

## 2021-03-08 PROCEDURE — 74011250637 HC RX REV CODE- 250/637: Performed by: HOSPITALIST

## 2021-03-08 PROCEDURE — 36415 COLL VENOUS BLD VENIPUNCTURE: CPT

## 2021-03-08 PROCEDURE — 65660000001 HC RM ICU INTERMED STEPDOWN

## 2021-03-08 PROCEDURE — 85027 COMPLETE CBC AUTOMATED: CPT

## 2021-03-08 PROCEDURE — 77010033678 HC OXYGEN DAILY

## 2021-03-08 PROCEDURE — 74011000250 HC RX REV CODE- 250: Performed by: INTERNAL MEDICINE

## 2021-03-08 PROCEDURE — 74011250637 HC RX REV CODE- 250/637: Performed by: NURSE PRACTITIONER

## 2021-03-08 PROCEDURE — 74011636637 HC RX REV CODE- 636/637: Performed by: INTERNAL MEDICINE

## 2021-03-08 PROCEDURE — 94660 CPAP INITIATION&MGMT: CPT

## 2021-03-08 RX ORDER — DILTIAZEM HYDROCHLORIDE 120 MG/1
120 CAPSULE, COATED, EXTENDED RELEASE ORAL DAILY
Status: DISCONTINUED | OUTPATIENT
Start: 2021-03-08 | End: 2021-03-10 | Stop reason: HOSPADM

## 2021-03-08 RX ADMIN — PROPRANOLOL HYDROCHLORIDE 120 MG: 60 CAPSULE, EXTENDED RELEASE ORAL at 17:38

## 2021-03-08 RX ADMIN — GEMFIBROZIL 600 MG: 600 TABLET ORAL at 17:38

## 2021-03-08 RX ADMIN — TAMSULOSIN HYDROCHLORIDE 0.4 MG: 0.4 CAPSULE ORAL at 17:38

## 2021-03-08 RX ADMIN — DILTIAZEM HYDROCHLORIDE 120 MG: 120 CAPSULE, COATED, EXTENDED RELEASE ORAL at 11:33

## 2021-03-08 RX ADMIN — GEMFIBROZIL 600 MG: 600 TABLET ORAL at 08:15

## 2021-03-08 RX ADMIN — ASPIRIN 81 MG: 81 TABLET, COATED ORAL at 17:38

## 2021-03-08 RX ADMIN — Medication 3 MG: at 21:54

## 2021-03-08 RX ADMIN — IPRATROPIUM BROMIDE AND ALBUTEROL SULFATE 3 ML: .5; 3 SOLUTION RESPIRATORY (INHALATION) at 13:32

## 2021-03-08 RX ADMIN — PANTOPRAZOLE SODIUM 40 MG: 40 TABLET, DELAYED RELEASE ORAL at 08:15

## 2021-03-08 RX ADMIN — CHOLECALCIFEROL TAB 25 MCG (1000 UNIT) 1000 UNITS: 25 TAB at 08:15

## 2021-03-08 RX ADMIN — Medication 10 ML: at 17:41

## 2021-03-08 RX ADMIN — Medication 10 ML: at 06:23

## 2021-03-08 RX ADMIN — MULTIPLE VITAMINS W/ MINERALS TAB 1 TABLET: TAB at 08:15

## 2021-03-08 RX ADMIN — OMEGA-3 FATTY ACIDS CAP 1000 MG 1 CAPSULE: 1000 CAP at 08:15

## 2021-03-08 RX ADMIN — Medication 10 ML: at 21:54

## 2021-03-08 RX ADMIN — FINASTERIDE 5 MG: 5 TABLET, FILM COATED ORAL at 17:38

## 2021-03-08 RX ADMIN — APIXABAN 5 MG: 5 TABLET, FILM COATED ORAL at 08:15

## 2021-03-08 RX ADMIN — IPRATROPIUM BROMIDE AND ALBUTEROL SULFATE 3 ML: .5; 3 SOLUTION RESPIRATORY (INHALATION) at 07:18

## 2021-03-08 RX ADMIN — FUROSEMIDE 20 MG: 20 TABLET ORAL at 08:15

## 2021-03-08 RX ADMIN — ROSUVASTATIN CALCIUM 40 MG: 40 TABLET, FILM COATED ORAL at 21:54

## 2021-03-08 RX ADMIN — IPRATROPIUM BROMIDE AND ALBUTEROL SULFATE 3 ML: .5; 3 SOLUTION RESPIRATORY (INHALATION) at 20:11

## 2021-03-08 RX ADMIN — PREDNISONE 40 MG: 20 TABLET ORAL at 08:15

## 2021-03-08 RX ADMIN — DILTIAZEM HYDROCHLORIDE 30 MG: 30 TABLET, FILM COATED ORAL at 08:15

## 2021-03-08 RX ADMIN — APIXABAN 5 MG: 5 TABLET, FILM COATED ORAL at 17:38

## 2021-03-08 NOTE — PROGRESS NOTES
Cardiology Progress Note  3/8/2021 4:47 PM  Admit Date: 3/4/2021  Admit Diagnosis/CC: Acute respiratory failure with hypoxia (HCC) [J96.01]  Subjective:   Patient reports:  Chest Pain:  [x] none,  consistent with [] non-cardiac  [] atypical  []  anginal chest pain             [x] none now    []  on-going  Dyspnea: [x] none  [] at rest  [] with exertion  [] improved  [] unchanged [] worsening  PND:       [x] none  [] overnight   [] current  Orthopnea: [x] none  [] improved  [] unchanged  [] worsening  Presyncope: [x] none  [] improved  [] unchanged  [] worsening  Ambulated in hallway without symptoms  [] Yes  Ambulated in room without symptoms  [x] Yes  ROS(2+other systems)   Hematuria: [] Yes  [x] No.   Dysuria: [] Yes  [x] No                                           Cough:       [] Yes  [x] No.   Sputum: [] Yes  [x] No                                            Hematochezia: [] Yes  [x] No.   Melena: [] Yes  [x] No                                            No change in family and social history from H&P/Consult note.     Current Facility-Administered Medications   Medication Dose Route Frequency    dilTIAZem ER (CARDIZEM CD) capsule 120 mg  120 mg Oral DAILY    albuterol-ipratropium (DUO-NEB) 2.5 MG-0.5 MG/3 ML  3 mL Nebulization Q4H PRN    furosemide (LASIX) tablet 20 mg  20 mg Oral DAILY    albuterol-ipratropium (DUO-NEB) 2.5 MG-0.5 MG/3 ML  3 mL Nebulization Q6H RT    predniSONE (DELTASONE) tablet 40 mg  40 mg Oral DAILY WITH BREAKFAST    apixaban (ELIQUIS) tablet 5 mg  5 mg Oral BID    sodium chloride (NS) flush 5-10 mL  5-10 mL IntraVENous PRN    sodium chloride (NS) flush 5-40 mL  5-40 mL IntraVENous Q8H    sodium chloride (NS) flush 5-40 mL  5-40 mL IntraVENous PRN    acetaminophen (TYLENOL) tablet 650 mg  650 mg Oral Q6H PRN    Or    acetaminophen (TYLENOL) suppository 650 mg  650 mg Rectal Q6H PRN    polyethylene glycol (MIRALAX) packet 17 g  17 g Oral DAILY PRN    promethazine (PHENERGAN) tablet 12.5 mg  12.5 mg Oral Q6H PRN    Or    ondansetron (ZOFRAN) injection 4 mg  4 mg IntraVENous Q6H PRN    aspirin delayed-release tablet 81 mg  81 mg Oral QPM    cholecalciferol (VITAMIN D3) (1000 Units /25 mcg) tablet 1,000 Units  1,000 Units Oral DAILY    pantoprazole (PROTONIX) tablet 40 mg  40 mg Oral ACB    finasteride (PROSCAR) tablet 5 mg  5 mg Oral QPM    gemfibroziL (LOPID) tablet 600 mg  600 mg Oral BID    melatonin tablet 3 mg  3 mg Oral QHS    multivitamin, tx-iron-ca-min (THERA-M w/ IRON) tablet 1 Tab  1 Tab Oral DAILY    omega 3-DHA-EPA-fish oil 1,000 mg (120 mg-180 mg) capsule 1 Cap  1 Cap Oral DAILY    propranolol LA (INDERAL LA) capsule 120 mg  120 mg Oral QPM    rosuvastatin (CRESTOR) tablet 40 mg  40 mg Oral QHS    tamsulosin (FLOMAX) capsule 0.4 mg  0.4 mg Oral QPM       Objective:    Physical Exam:  Last VS:   Visit Vitals  /81 (BP 1 Location: Left upper arm)   Pulse 78   Temp 98.1 °F (36.7 °C)   Resp 16   Ht 6' (1.829 m)   Wt 122.7 kg (270 lb 8 oz)   SpO2 93%   BMI 36.69 kg/m²    Temp (24hrs), Av.8 °F (36.6 °C), Min:97.5 °F (36.4 °C), Max:98.1 °F (36.7 °C)    24 hr VS reviewed. General Appearance:   [x] well developed, well nourished,  [x] NAD. [] agitated   [] lethargic but arousable  [] obtunded   ENT, Palate:    [x] WNL   [] dry palate/MM        Respiratory:    [x] CTA bilateral  [] rales  [] rhonchi  [] normal resp effort      [] similar to yesterday   [] worse    [] improved   Cardiovascular:   [x] RRR   [] Irregular rate and rhythm   [x] Normal S1, S2   [x] No gallop or rub.    [] no murmur   [x] no new murmur  [] murmur c/w:   [x] no edema  RLE: []1+ []2+ [] 3+;  LLE: []1+ []2+ []3+      [] edema similar to yesterday   [] worse    [] improved   [x] normal JVP   [] Elevated JVP    [x] JVP similar to yesterday   [] worse    [] improved   [x] carotid upstroke unchanged   [x] abd aorta not palpated   [x] no stigmata of peripheral emboli   GI:    [x] abd soft, non-distented,bowel sounds present, no                     organomegaly appreciated   Skin:  Neuro:    Cath Site:  [x] warm and dry [] cold extremities   [x] A/O x 3, grossly non-focal      [] intact w/o hematoma or bruit; distal pulse unchanged. Data Review:   Labs:    Recent Results (from the past 24 hour(s))   CBC W/O DIFF    Collection Time: 03/08/21  3:59 AM   Result Value Ref Range    WBC 8.4 4.1 - 11.1 K/uL    RBC 4.48 4.10 - 5.70 M/uL    HGB 13.2 12.1 - 17.0 g/dL    HCT 43.8 36.6 - 50.3 %    MCV 97.8 80.0 - 99.0 FL    MCH 29.5 26.0 - 34.0 PG    MCHC 30.1 30.0 - 36.5 g/dL    RDW 15.2 (H) 11.5 - 14.5 %    PLATELET 391 417 - 722 K/uL    MPV 9.9 8.9 - 12.9 FL    NRBC 0.4 (H) 0  WBC    ABSOLUTE NRBC 0.03 (H) 0.00 - 0.01 K/uL       Provided Telemetry: [x] sinus  [] chronic afib   [] paroxysmal afib  [] NSVT      Assessment:     Active Problems:    Acute respiratory failure with hypoxia (HCC) (3/4/2021)        Plan:     Atrial Fibrillation - Flutter  improved   Continue current meds    Back in RSR on meds. Copd a problem. For all other plans, see orders.    [x] High complexity decision making was performed

## 2021-03-08 NOTE — PROGRESS NOTES
Hospitalist Progress Note    NAME: Neptali Fulton   :  1950   MRN:  [id number]       Assessment / Plan:  Acute hypoxic and hypercapnic respiratory failure , POA due to  New onset chf and COPD exacerbation   COPD exacerbation due to chf exacerbation  COVID-19 ruled out  --on 6 L, CTA negative PE, + mild pulmonary edema and b/l atx and trace b/l pleural effusions, probnp elevated 1192.  --rapid covid indeterminate, pcr is negative  --Was started on diuretics,, currently euvolemic.    -Continue Lasix 20 mg daily  -Patient had worsening confusion 3/6 and was noticed to have CO2 retention on ABG  -He was placed on BiPAP  -Currently on 6 L oxygen  -Continue oxygen supplementation to maintain above 88%  -Appreciated pulmonology consultation, will use BiPAP as needed and at bedtime  -Continue prednisone  -Continue bronchodilator treatment  -Patient uses 1 to 2 L oxygen at home, he was not prescribed oxygen and brought her oxygen concentrator on  [address] by himself     KADIE, untreated  --need to f/u with his sleep doctor to get cpap. This will help keep chf under control     P. SVT/new onset atrial fibrillation with rapid ventricular response  --currently in sinus rhythm, continue propranolol  -Patient was noted to be having new onset A. fib with RVR, he had history of paroxysmal SVT  -Continue oral Cardizem now, transition to 120 mg CD  -continue Eliquis added this admission  -Appreciated cardiology follow-up     BPH  --continue proscar, flomax     GERD  --continue PPI     Obesity  --will be placed on reduced calorie diet in hospital  Body mass index is 38.45 kg/m².     Code: full  DVT prophylaxis:  On Eliquis now  Surrogate decision maker:  Wife Juan Diego Marley         Subjective:     Chief Complaint / Reason for Physician Visit  \" Reports improvement in shortness of breath, remains on 5 to 6 L oxygen. Denies any chest pain or orthopnea\". Discussed with RN events overnight.      Review of Systems:  Symptom Y/N Comments Symptom Y/N Comments   Fever/Chills n   Chest Pain n    Poor Appetite n   Edema n    Cough n   Abdominal Pain n    Sputum n   Joint Pain     SOB/BOWDEN n   Pruritis/Rash     Nausea/vomit n   Tolerating PT/OT     Diarrhea n   Tolerating Diet     Constipation n   Other       Could NOT obtain due to:      Objective:     VITALS:   Last 24hrs VS reviewed since prior progress note. Most recent are:  Patient Vitals for the past 24 hrs:   Temp Pulse Resp BP SpO2   03/08/21 1126 97.8 °F (36.6 °C) 73 18 118/62 91 %   03/08/21 0726 97.8 °F (36.6 °C) 72 16 127/73 100 %   03/08/21 0319 97.6 °F (36.4 °C) 64 22 (!) 130/55 93 %   03/08/21 0109     92 %   03/07/21 2323 97.5 °F (36.4 °C) 65 22 (!) 124/53 92 %   03/07/21 2251     94 %   03/07/21 2029     92 %   03/07/21 1950 97.7 °F (36.5 °C) (!) 103 18 (!) 155/95 90 %   03/07/21 1706  79  133/75    03/07/21 1600     92 %   03/07/21 1437 97.7 °F (36.5 °C) 88 18 117/75 92 %       Intake/Output Summary (Last 24 hours) at 3/8/2021 1310  Last data filed at 3/8/2021 0454  Gross per 24 hour   Intake 300 ml   Output 1180 ml   Net -880 ml   To face encounter was provided on March 8, 2021 with the following findings    PHYSICAL EXAM:  General: WD, WN. Alert, cooperative, no acute distress    EENT:  EOMI. Anicteric sclerae. MMM  Resp:  Wheezing in bilateral bases. No accessory muscle use  CV:  regular rhythm,  No edema  GI:  Soft, Non distended, Non tender.  +Bowel sounds  Neurologic:  Alert and oriented X 3, normal speech,   Psych:   Good insight. Not anxious nor agitated  Skin:  No rashes.   No jaundice    Reviewed most current lab test results and cultures  YES  Reviewed most current radiology test results   YES  Review and summation of old records today    NO  Reviewed patient's current orders and MAR    YES  PMH/SH reviewed - no change compared to H&P  ________________________________________________________________________  Care Plan discussed with:    Comments   Patient x Family      RN x    Care Manager     Consultant                        Multidiciplinary team rounds were held today with , nursing, pharmacist and clinical coordinator. Patient's plan of care was discussed; medications were reviewed and discharge planning was addressed. ________________________________________________________________________  Total NON critical care TIME:  30  Minutes    Total CRITICAL CARE TIME Spent:   Minutes non procedure based      Comments   >50% of visit spent in counseling and coordination of care     ________________________________________________________________________  Dalton Castillo MD     Procedures: see electronic medical records for all procedures/Xrays and details which were not copied into this note but were reviewed prior to creation of Plan. LABS:  I reviewed today's most current labs and imaging studies.   Pertinent labs include:  Recent Labs     03/08/21  0359 03/07/21 0301 03/06/21  0152   WBC 8.4 9.1 9.9   HGB 13.2 13.1 13.0   HCT 43.8 44.7 43.6    193 196     Recent Labs     03/07/21 0301 03/06/21  0152    134*   K 4.3 4.8   CL 92* 94*   CO2 42* 39*   * 109*   BUN 30* 34*   CREA 1.04 1.06   CA 9.2 8.8   MG  --  2.1   ALB 3.3*  --    TBILI 0.4  --    ALT 31  --        Signed: Dalton Castillo MD

## 2021-03-08 NOTE — PROGRESS NOTES
Problem: Falls - Risk of  Goal: *Absence of Falls  Description: Document Corey Tamika Fall Risk and appropriate interventions in the flowsheet. Outcome: Progressing Towards Goal  Note: Fall Risk Interventions:  Mobility Interventions: Bed/chair exit alarm    Mentation Interventions: Adequate sleep, hydration, pain control, Bed/chair exit alarm    Medication Interventions: Assess postural VS orthostatic hypotension, Bed/chair exit alarm         History of Falls Interventions: Bed/chair exit alarm         Problem: Patient Education: Go to Patient Education Activity  Goal: Patient/Family Education  Outcome: Progressing Towards Goal     Problem: Risk for Spread of Infection  Goal: Prevent transmission of infectious organism to others  Description: Prevent the transmission of infectious organisms to other patients, staff members, and visitors.   Outcome: Progressing Towards Goal     Problem: Patient Education:  Go to Education Activity  Goal: Patient/Family Education  Outcome: Progressing Towards Goal     Problem: Breathing Pattern - Ineffective  Goal: *Absence of hypoxia  Outcome: Progressing Towards Goal  Goal: *Use of effective breathing techniques  Outcome: Progressing Towards Goal  Goal: *PALLIATIVE CARE:  Alleviation of Dyspnea  Outcome: Progressing Towards Goal     Problem: Patient Education: Go to Patient Education Activity  Goal: Patient/Family Education  Outcome: Progressing Towards Goal

## 2021-03-08 NOTE — PROGRESS NOTES
Home Oxygen Test  Date of test: 03/08/2021  Time of test: 1620    Sa02 97 % on room air AT REST. Sa02 86 % on room air DURING AMBULATION. Sa02 86 % on 1 Liters DURING AMBULATION. Sa02  85 % on 2 Liters DURING AMBULATION. Sa02  85 % on 3 Liters DURING AMBULATION. Sa02  86 % on 4 Liters DURING AMBULATION. Sa02  90 % on 6 Liters DURING AMBULATION. Sa02 98 % on 4 Liters AT REST/AFTER AMBULATION.

## 2021-03-08 NOTE — PROGRESS NOTES
End of Shift Note Bedside shift change report given to *** (oncoming nurse) by Anurag Saha (offgoing nurse). Report included the following information SBAR Shift worked:  night Shift summary and any significant changes:  
  Pt tolerated bipap fairly well overnight. Some situational confusion persists but no psychosis overnight. Concerns for physician to address:    
  
Zone phone for oncoming shift:     
  
 
Activity: 
Activity Level: Up with Assistance Number times ambulated in hallways past shift: 0 Number of times OOB to chair past shift: 3 Cardiac:  
Cardiac Monitoring: Yes     
Cardiac Rhythm: Normal sinus rhythm Access:  
Current line(s): PIV Genitourinary:  
Urinary status: voiding Respiratory:  
O2 Device: Nasal cannula Chronic home O2 use?: NO Incentive spirometer at bedside: YES Actual Volume (ml): 1500 ml GI: 
Last Bowel Movement Date: 03/08/21 Current diet:  DIET CARDIAC Regular; 2 GM NA (House Low NA) Passing flatus: YES Tolerating current diet: YES 
% Diet Eaten: 100 % Pain Management:  
Patient states pain is manageable on current regimen: YES Skin: 
Benedict Score: 20 Interventions: increase time out of bed Patient Safety: 
Fall Score: Total Score: 3 Interventions: bed/chair alarm High Fall Risk: Yes Length of Stay: 
Expected LOS: 3d 14h Actual LOS: 4 Anurag Saha

## 2021-03-08 NOTE — CONSULTS
Pulmonary    3-8-21: Pt was seen this am.   NO distress. Was eating breakfast.   NO chest pain, no back pain, no leg pain. NO issue last pm. Has been using his bipap. Suspected KADIE. No headaches. Feels that his thinking is clear. Reason:  Acute on chronic resp failure  Requesting:  Dr Sonia Rudolph reviewed, images viewed. Prior PFTs viewed    64 yo former smoker  has a past medical history of Arrhythmia, Chronic obstructive pulmonary disease ([address] GERD (gastroesophageal reflux disease), Hypertension, and Thyroid disease. He was admitted on 3/4/21 when he presented with 5 days of increased paul, inablility to lie flat and decreased O2 sats. Found to have pulm edema on cxr and CTA with pulm edema, small effusions and no evidence of PE. Also found to have afib with RVR. Pro bnp was elevated. Seen by cardiology  He received diuretics, beta blocker and standard care for copd exacerbation. Procalcitonin was normal.  Home pulm meds include breo and albuterol. Currently on eliquis, asa, cardizem, lasix 40 mg daily, Prednisone 40 mg per day and combivent. Has a reported h/o untreated KADIE. This am an ABG was obtained and reported some confusion  On O2 at 5 L/min:  7.32 / 91/ 72 and was ordered cpcp by hospitalist service    Prior PFT form  showed severe airflow obstruction with an FEV1 of 1.44L (41%), no BD responsiveness. Air trapping and hyperinflation on lung volumes and mildly reduced DLCO (72/90)    Patient Vitals for the past 4 hrs:   BP Temp Pulse Resp SpO2 Weight   21 0726 127/73 97.8 °F (36.6 °C) 72 16 100 %    21 0633      122.7 kg (270 lb 8 oz)   Temp (24hrs), Av.8 °F (36.6 °C), Min:97.5 °F (36.4 °C), Max:98.2 °F (36.8 °C)      Gen: No distress. Normal speech. Neck: Supple, no JVP  Lungs: CTA anteriorly, some decreased BS in bases. CV: Nl s1,2. NO MRG  Abd; +BS, Soft, NT, ND  Extrem: NO C, C, E.  Neuro: nonfocal, motor seems to be intact.   Psych: no overt, anxiety or depression. Intake/Output Summary (Last 24 hours) at 3/8/2021 1002  Last data filed at 3/8/2021 0454  Gross per 24 hour   Intake 540 ml   Output 2420 ml   Net -1880 ml     Lab:  Recent Labs     03/08/21  0359 03/07/21  0301 03/06/21  0152   WBC 8.4 9.1 9.9   HGB 13.2 13.1 13.0    193 196   NA  --  137 134*   K  --  4.3 4.8   CL  --  92* 94*   CO2  --  42* 39*   BUN  --  30* 34*   CREA  --  1.04 1.06   GLU  --  110* 109*   CA  --  9.2 8.8   MG  --   --  2.1   TBILI  --  0.4  --      ABG:  Recent Labs     03/07/21  0435 03/07/21  0429   PHI 7.33* 7.33*   PCO2I >90.0* >90.0*   PO2I 72* 69*     Impression:  Acute on chronic hypoxemic and hypercarbic resp failure 2/2 COPD and CHF. Severe underlying COPD. ABG with chronic resp acidosis possibly worsened a bit by diuresis and increased HCO3 on chemistries  Will recheck the ABG in am to ensure trending in a better direction. Paroxysmal afib - followed by cardiology - currently on cardizem, lasix, eliquis, and propranolol. Pulm edema on presentation    Former smoker    Reported h/o untreated KADIE - has been seen by [facility] sleep, Can get him to follow up with us upon discharge. Pulm recommendations:  · bipap qhs and prn - Since he has KADIE needs a repeat sleep study and titration of PAP as an outpt.    · Continue copd treatment per hospitalist- follow up as outpatient with repeat PFTs  · CHF / Afib per cardiology - Preserved EF by echo 3/4/21 - no appreciable pulm htn noted  · Would hold off on diamox for now - even though his pCO2 and HCO3 are both elevated he still has a slightly acidotic pH and decreasing his serum buffer may increase his work of breathing    Service to follow on Monday    Edy Samuels MD

## 2021-03-08 NOTE — INTERDISCIPLINARY ROUNDS
[facility] Cardiopulmonary Care Interdisciplinary Rounds were held today to discuss patient's plan of care and outcomes. The following members were present: NP/Physician, Pharmacy, Nursing and Case Management. PLAN OF CARE:  
Continue current treatment plan Expected Length of Stay:  3d 14h

## 2021-03-09 ENCOUNTER — APPOINTMENT (OUTPATIENT)
Dept: GENERAL RADIOLOGY | Age: 71
DRG: 189 | End: 2021-03-09
Attending: INTERNAL MEDICINE
Payer: MEDICARE

## 2021-03-09 LAB
ALBUMIN SERPL-MCNC: 3.2 G/DL (ref 3.5–5)
ALBUMIN/GLOB SERPL: 0.8 {RATIO} (ref 1.1–2.2)
ALP SERPL-CCNC: 68 U/L (ref 45–117)
ALT SERPL-CCNC: 27 U/L (ref 12–78)
ANION GAP SERPL CALC-SCNC: 2 MMOL/L (ref 5–15)
ARTERIAL PATENCY WRIST A: YES
AST SERPL-CCNC: 17 U/L (ref 15–37)
BASE EXCESS BLD CALC-SCNC: 24 MMOL/L
BASOPHILS # BLD: 0 K/UL (ref 0–0.1)
BASOPHILS NFR BLD: 0 % (ref 0–1)
BDY SITE: ABNORMAL
BILIRUB SERPL-MCNC: 0.5 MG/DL (ref 0.2–1)
BNP SERPL-MCNC: 92 PG/ML
BUN SERPL-MCNC: 25 MG/DL (ref 6–20)
BUN/CREAT SERPL: 25 (ref 12–20)
CA-I BLD-SCNC: 1.23 MMOL/L (ref 1.12–1.32)
CALCIUM SERPL-MCNC: 8.7 MG/DL (ref 8.5–10.1)
CHLORIDE SERPL-SCNC: 92 MMOL/L (ref 97–108)
CO2 SERPL-SCNC: 41 MMOL/L (ref 21–32)
CREAT SERPL-MCNC: 1.02 MG/DL (ref 0.7–1.3)
DIFFERENTIAL METHOD BLD: ABNORMAL
EOSINOPHIL # BLD: 0 K/UL (ref 0–0.4)
EOSINOPHIL NFR BLD: 0 % (ref 0–7)
ERYTHROCYTE [DISTWIDTH] IN BLOOD BY AUTOMATED COUNT: 15 % (ref 11.5–14.5)
GAS FLOW.O2 O2 DELIVERY SYS: ABNORMAL L/MIN
GLOBULIN SER CALC-MCNC: 4 G/DL (ref 2–4)
GLUCOSE SERPL-MCNC: 102 MG/DL (ref 65–100)
HCO3 BLD-SCNC: 49.3 MMOL/L (ref 22–26)
HCT VFR BLD AUTO: 45.3 % (ref 36.6–50.3)
HGB BLD-MCNC: 13.8 G/DL (ref 12.1–17)
IMM GRANULOCYTES # BLD AUTO: 0 K/UL (ref 0–0.04)
IMM GRANULOCYTES NFR BLD AUTO: 0 % (ref 0–0.5)
LYMPHOCYTES # BLD: 1.4 K/UL (ref 0.8–3.5)
LYMPHOCYTES NFR BLD: 17 % (ref 12–49)
MAGNESIUM SERPL-MCNC: 2.4 MG/DL (ref 1.6–2.4)
MCH RBC QN AUTO: 29.4 PG (ref 26–34)
MCHC RBC AUTO-ENTMCNC: 30.5 G/DL (ref 30–36.5)
MCV RBC AUTO: 96.6 FL (ref 80–99)
MONOCYTES # BLD: 0.8 K/UL (ref 0–1)
MONOCYTES NFR BLD: 10 % (ref 5–13)
NEUTS SEG # BLD: 5.9 K/UL (ref 1.8–8)
NEUTS SEG NFR BLD: 73 % (ref 32–75)
NRBC # BLD: 0 K/UL (ref 0–0.01)
NRBC BLD-RTO: 0 PER 100 WBC
O2/TOTAL GAS SETTING VFR VENT: 50 %
PCO2 BLD: 89.9 MMHG (ref 35–45)
PEEP RESPIRATORY: 6 CMH2O
PH BLD: 7.35 [PH] (ref 7.35–7.45)
PHOSPHATE SERPL-MCNC: 3.8 MG/DL (ref 2.6–4.7)
PIP ISTAT,IPIP: 15
PLATELET # BLD AUTO: 207 K/UL (ref 150–400)
PMV BLD AUTO: 10.1 FL (ref 8.9–12.9)
PO2 BLD: 94 MMHG (ref 80–100)
POTASSIUM SERPL-SCNC: 4.4 MMOL/L (ref 3.5–5.1)
PROCALCITONIN SERPL-MCNC: <0.05 NG/ML
PROT SERPL-MCNC: 7.2 G/DL (ref 6.4–8.2)
RBC # BLD AUTO: 4.69 M/UL (ref 4.1–5.7)
SAO2 % BLD: 96 % (ref 92–97)
SODIUM SERPL-SCNC: 135 MMOL/L (ref 136–145)
SPECIMEN TYPE: ABNORMAL
TOTAL RESP. RATE, ITRR: 22
WBC # BLD AUTO: 8.1 K/UL (ref 4.1–11.1)

## 2021-03-09 PROCEDURE — 71045 X-RAY EXAM CHEST 1 VIEW: CPT

## 2021-03-09 PROCEDURE — 74011250637 HC RX REV CODE- 250/637: Performed by: NURSE PRACTITIONER

## 2021-03-09 PROCEDURE — 83735 ASSAY OF MAGNESIUM: CPT

## 2021-03-09 PROCEDURE — 36600 WITHDRAWAL OF ARTERIAL BLOOD: CPT

## 2021-03-09 PROCEDURE — 94660 CPAP INITIATION&MGMT: CPT

## 2021-03-09 PROCEDURE — 74011250637 HC RX REV CODE- 250/637: Performed by: HOSPITALIST

## 2021-03-09 PROCEDURE — 80053 COMPREHEN METABOLIC PANEL: CPT

## 2021-03-09 PROCEDURE — 94640 AIRWAY INHALATION TREATMENT: CPT

## 2021-03-09 PROCEDURE — 74011636637 HC RX REV CODE- 636/637: Performed by: INTERNAL MEDICINE

## 2021-03-09 PROCEDURE — 74011250637 HC RX REV CODE- 250/637: Performed by: INTERNAL MEDICINE

## 2021-03-09 PROCEDURE — 74011000250 HC RX REV CODE- 250: Performed by: INTERNAL MEDICINE

## 2021-03-09 PROCEDURE — 77010033678 HC OXYGEN DAILY

## 2021-03-09 PROCEDURE — 83880 ASSAY OF NATRIURETIC PEPTIDE: CPT

## 2021-03-09 PROCEDURE — 85025 COMPLETE CBC W/AUTO DIFF WBC: CPT

## 2021-03-09 PROCEDURE — 84145 PROCALCITONIN (PCT): CPT

## 2021-03-09 PROCEDURE — 65660000001 HC RM ICU INTERMED STEPDOWN

## 2021-03-09 PROCEDURE — 36415 COLL VENOUS BLD VENIPUNCTURE: CPT

## 2021-03-09 PROCEDURE — 84100 ASSAY OF PHOSPHORUS: CPT

## 2021-03-09 PROCEDURE — 82803 BLOOD GASES ANY COMBINATION: CPT

## 2021-03-09 RX ORDER — PREDNISONE 20 MG/1
40 TABLET ORAL
Qty: 40 TAB | Refills: 0 | Status: ON HOLD | OUTPATIENT
Start: 2021-03-10 | End: 2021-07-03

## 2021-03-09 RX ORDER — DILTIAZEM HYDROCHLORIDE 120 MG/1
120 CAPSULE, COATED, EXTENDED RELEASE ORAL DAILY
Qty: 30 CAP | Refills: 0 | Status: ON HOLD | OUTPATIENT
Start: 2021-03-10 | End: 2021-07-03

## 2021-03-09 RX ORDER — FUROSEMIDE 20 MG/1
TABLET ORAL
Qty: 30 TAB | Refills: 0 | Status: SHIPPED | OUTPATIENT
Start: 2021-03-10

## 2021-03-09 RX ADMIN — DILTIAZEM HYDROCHLORIDE 120 MG: 120 CAPSULE, COATED, EXTENDED RELEASE ORAL at 08:58

## 2021-03-09 RX ADMIN — APIXABAN 5 MG: 5 TABLET, FILM COATED ORAL at 18:23

## 2021-03-09 RX ADMIN — GEMFIBROZIL 600 MG: 600 TABLET ORAL at 08:59

## 2021-03-09 RX ADMIN — PROPRANOLOL HYDROCHLORIDE 120 MG: 60 CAPSULE, EXTENDED RELEASE ORAL at 18:23

## 2021-03-09 RX ADMIN — GEMFIBROZIL 600 MG: 600 TABLET ORAL at 18:23

## 2021-03-09 RX ADMIN — MULTIPLE VITAMINS W/ MINERALS TAB 1 TABLET: TAB at 08:59

## 2021-03-09 RX ADMIN — FUROSEMIDE 20 MG: 20 TABLET ORAL at 08:59

## 2021-03-09 RX ADMIN — APIXABAN 5 MG: 5 TABLET, FILM COATED ORAL at 08:59

## 2021-03-09 RX ADMIN — PREDNISONE 40 MG: 20 TABLET ORAL at 08:59

## 2021-03-09 RX ADMIN — IPRATROPIUM BROMIDE AND ALBUTEROL SULFATE 3 ML: .5; 3 SOLUTION RESPIRATORY (INHALATION) at 07:52

## 2021-03-09 RX ADMIN — IPRATROPIUM BROMIDE AND ALBUTEROL SULFATE 3 ML: .5; 3 SOLUTION RESPIRATORY (INHALATION) at 13:58

## 2021-03-09 RX ADMIN — ASPIRIN 81 MG: 81 TABLET, COATED ORAL at 18:23

## 2021-03-09 RX ADMIN — TAMSULOSIN HYDROCHLORIDE 0.4 MG: 0.4 CAPSULE ORAL at 18:23

## 2021-03-09 RX ADMIN — PANTOPRAZOLE SODIUM 40 MG: 40 TABLET, DELAYED RELEASE ORAL at 08:59

## 2021-03-09 RX ADMIN — CHOLECALCIFEROL TAB 25 MCG (1000 UNIT) 1000 UNITS: 25 TAB at 08:58

## 2021-03-09 RX ADMIN — IPRATROPIUM BROMIDE AND ALBUTEROL SULFATE 3 ML: .5; 3 SOLUTION RESPIRATORY (INHALATION) at 19:41

## 2021-03-09 RX ADMIN — Medication 10 ML: at 06:00

## 2021-03-09 RX ADMIN — OMEGA-3 FATTY ACIDS CAP 1000 MG 1 CAPSULE: 1000 CAP at 08:59

## 2021-03-09 RX ADMIN — Medication 10 ML: at 15:07

## 2021-03-09 RX ADMIN — Medication 3 MG: at 22:15

## 2021-03-09 RX ADMIN — Medication 10 ML: at 22:15

## 2021-03-09 RX ADMIN — ROSUVASTATIN CALCIUM 40 MG: 40 TABLET, FILM COATED ORAL at 22:15

## 2021-03-09 RX ADMIN — FINASTERIDE 5 MG: 5 TABLET, FILM COATED ORAL at 18:23

## 2021-03-09 NOTE — PROGRESS NOTES
Hospitalist Progress Note    NAME: Maryam Callahan   :  1950   MRN:  [id number]       Assessment / Plan:  Acute hypoxic and hypercapnic respiratory failure , POA due to  New onset chf and COPD exacerbation   COPD exacerbation due to chf exacerbation  COVID-19 ruled out  Patient was on 4 L of oxygen this morning looks comfortable and he wants to go home. Patient was tested negative for Covid. Patient is currently on Lasix 20 mg he was on a BiPAP but now he is off BiPAP. Patient is on prednisone and breathing treatments. Plan was to discharge him today. After discussion with pulmonary medicine probably is better to discharge patient tomorrow when he requires less oxygen. Patient clearly requires more than 4 L when on exertion. Baseline home oxygen level is unclear from patient. KADIE, untreated  need to f/u with his sleep doctor to get cpap. This will help keep chf under control  P. SVT/new onset atrial fibrillation with rapid ventricular response  Patient is currently in sinus rhythm on a beta-blocker. Patient is also on Cardizem 120 mg CD. He will be continued on Eliquis as well. Cardiology input was appreciated. BPH  continue proscar, flomax  GERD  continue PPI  Obesity  will be placed on reduced calorie diet in hospital  Body mass index is 38.45 kg/m². Code: full  DVT prophylaxis:  On Eliquis now  Surrogate decision maker:  Wife Chey Saldaña     Subjective:     Chief Complaint / Reason for Physician Visit  \" Patient was seen and examined this morning. He was seen sitting on the bed comfortably. He was at 4 L of oxygen. He wants to go home. After discussion with pulmonary patient probably would better discharge tomorrow done today. \". Discussed with RN events overnight.      Review of Systems:  Symptom Y/N Comments  Symptom Y/N Comments   Fever/Chills n   Chest Pain n    Poor Appetite n   Edema n    Cough n   Abdominal Pain n    Sputum n   Joint Pain     SOB/BOWDEN n   Pruritis/Rash Nausea/vomit n   Tolerating PT/OT     Diarrhea n   Tolerating Diet     Constipation n   Other       Could NOT obtain due to:      Objective:     VITALS:   Last 24hrs VS reviewed since prior progress note. Most recent are:  Patient Vitals for the past 24 hrs:   Temp Pulse Resp BP SpO2   03/09/21 0758 97.9 °F (36.6 °C) 69 20 (!) 104/52 91 %   03/09/21 0428     95 %   03/09/21 0224 98 °F (36.7 °C) 60 18 130/78 96 %   03/08/21 2345     94 %   03/08/21 2308 98.3 °F (36.8 °C) 67 16 119/69 93 %   03/08/21 2011     94 %   03/08/21 1926 98 °F (36.7 °C) 88 16 (!) 154/88 90 %   03/08/21 1738  88  (!) 143/83    03/08/21 1501 98.1 °F (36.7 °C) 78 16 130/81 93 %   03/08/21 1126 97.8 °F (36.6 °C) 73 18 118/62 91 %       Intake/Output Summary (Last 24 hours) at 3/9/2021 5690  Last data filed at 3/9/2021 0800  Gross per 24 hour   Intake 220 ml   Output 3925 ml   Net -3705 ml   To face encounter was provided on March 8, 2021 with the following findings    PHYSICAL EXAM:  General: WD, WN. Alert, cooperative, no acute distress    EENT:  EOMI. Anicteric sclerae. MMM  Resp:  Wheezing in bilateral bases. No accessory muscle use  CV:  regular rhythm,  No edema  GI:  Soft, Non distended, Non tender.  +Bowel sounds  Neurologic:  Alert and oriented X 3, normal speech,   Psych:   Good insight. Not anxious nor agitated  Skin:  No rashes. No jaundice    Reviewed most current lab test results and cultures  YES  Reviewed most current radiology test results   YES  Review and summation of old records today    NO  Reviewed patient's current orders and MAR    YES  PMH/SH reviewed - no change compared to H&P  ________________________________________________________________________  Care Plan discussed with:    Comments   Patient x    Family      RN x    Care Manager     Consultant                        Multidiciplinary team rounds were held today with , nursing, pharmacist and clinical coordinator.   Patient's plan of care was discussed; medications were reviewed and discharge planning was addressed. ________________________________________________________________________  Total NON critical care TIME:  30  Minutes    Total CRITICAL CARE TIME Spent:   Minutes non procedure based      Comments   >50% of visit spent in counseling and coordination of care     ________________________________________________________________________  Kunal Cason MD     Procedures: see electronic medical records for all procedures/Xrays and details which were not copied into this note but were reviewed prior to creation of Plan. LABS:  I reviewed today's most current labs and imaging studies. Pertinent labs include:  Recent Labs     03/09/21  0214 03/08/21  0359 03/07/21  0301   WBC 8.1 8.4 9.1   HGB 13.8 13.2 13.1   HCT 45.3 43.8 44.7    180 193     Recent Labs     03/09/21  0214 03/07/21  0301   * 137   K 4.4 4.3   CL 92* 92*   CO2 41* 42*   * 110*   BUN 25* 30*   CREA 1.02 1.04   CA 8.7 9.2   MG 2.4  --    PHOS 3.8  --    ALB 3.2* 3.3*   TBILI 0.5 0.4   ALT 27 31       Signed:  Kunal Cason MD

## 2021-03-09 NOTE — ROUTINE PROCESS
End of Shift Note Bedside shift change report given to  (oncoming nurse) by Chantel Valero RN (offgoing nurse). Report included the following information SBAR Shift worked:  7p Shift summary and any significant changes:  
  
  
Concerns for physician to address:   
  
Zone phone for oncoming shift:    
  
 
Activity: 
Activity Level: Up with Assistance Number times ambulated in hallways past shift: 0 Number of times OOB to chair past shift: 0 Cardiac:  
Cardiac Monitoring: Yes     
Cardiac Rhythm: Normal sinus rhythm Access:  
Current line(s): PIV Genitourinary:  
Urinary status: voiding Respiratory:  
O2 Device: Nasal cannula Chronic home O2 use?: NO Incentive spirometer at bedside: YES Actual Volume (ml): 1500 ml GI: 
Last Bowel Movement Date: 03/08/21 Current diet:  DIET CARDIAC Regular; 2 GM NA (House Low NA) Passing flatus: YES Tolerating current diet: YES 
% Diet Eaten: 100 % Pain Management:  
Patient states pain is manageable on current regimen: YES Skin: 
Benedict Score: 20 Interventions: float heels, increase time out of bed, PT/OT consult, limit briefs and internal/external urinary devices Patient Safety: 
Fall Score: Total Score: 2 Interventions: bed/chair alarm, assistive device (walker, cane, etc), gripper socks, pt to call before getting OOB and stay with me (per policy) High Fall Risk: Yes Length of Stay: 
Expected LOS: 3d 14h Actual LOS: 5 Chantel Valero RN

## 2021-03-09 NOTE — PROGRESS NOTES
Cardiology Progress Note  3/9/2021 8:43 AM  Admit Date: 3/4/2021  Admit Diagnosis/CC: Acute respiratory failure with hypoxia (HCC) [J96.01]  Subjective:   Patient reports:  Chest Pain:  [x] none,  consistent with [] non-cardiac  [] atypical  []  anginal chest pain             [x] none now    []  on-going  Dyspnea: [x] none  [] at rest  [] with exertion  [] improved  [] unchanged [] worsening  PND:       [x] none  [] overnight   [] current  Orthopnea: [x] none  [] improved  [] unchanged  [] worsening  Presyncope: [x] none  [] improved  [] unchanged  [] worsening  Ambulated in hallway without symptoms  [] Yes  Ambulated in room without symptoms  [x] Yes  ROS(2+other systems)   Hematuria: [] Yes  [x] No.   Dysuria: [] Yes  [x] No                                           Cough:       [] Yes  [x] No.   Sputum: [] Yes  [x] No                                            Hematochezia: [] Yes  [x] No.   Melena: [] Yes  [x] No                                            No change in family and social history from H&P/Consult note.     Current Facility-Administered Medications   Medication Dose Route Frequency    dilTIAZem ER (CARDIZEM CD) capsule 120 mg  120 mg Oral DAILY    albuterol-ipratropium (DUO-NEB) 2.5 MG-0.5 MG/3 ML  3 mL Nebulization Q4H PRN    furosemide (LASIX) tablet 20 mg  20 mg Oral DAILY    albuterol-ipratropium (DUO-NEB) 2.5 MG-0.5 MG/3 ML  3 mL Nebulization Q6H RT    predniSONE (DELTASONE) tablet 40 mg  40 mg Oral DAILY WITH BREAKFAST    apixaban (ELIQUIS) tablet 5 mg  5 mg Oral BID    sodium chloride (NS) flush 5-10 mL  5-10 mL IntraVENous PRN    sodium chloride (NS) flush 5-40 mL  5-40 mL IntraVENous Q8H    sodium chloride (NS) flush 5-40 mL  5-40 mL IntraVENous PRN    acetaminophen (TYLENOL) tablet 650 mg  650 mg Oral Q6H PRN    Or    acetaminophen (TYLENOL) suppository 650 mg  650 mg Rectal Q6H PRN    polyethylene glycol (MIRALAX) packet 17 g  17 g Oral DAILY PRN    promethazine (PHENERGAN) tablet 12.5 mg  12.5 mg Oral Q6H PRN    Or    ondansetron (ZOFRAN) injection 4 mg  4 mg IntraVENous Q6H PRN    aspirin delayed-release tablet 81 mg  81 mg Oral QPM    cholecalciferol (VITAMIN D3) (1000 Units /25 mcg) tablet 1,000 Units  1,000 Units Oral DAILY    pantoprazole (PROTONIX) tablet 40 mg  40 mg Oral ACB    finasteride (PROSCAR) tablet 5 mg  5 mg Oral QPM    gemfibroziL (LOPID) tablet 600 mg  600 mg Oral BID    melatonin tablet 3 mg  3 mg Oral QHS    multivitamin, tx-iron-ca-min (THERA-M w/ IRON) tablet 1 Tab  1 Tab Oral DAILY    omega 3-DHA-EPA-fish oil 1,000 mg (120 mg-180 mg) capsule 1 Cap  1 Cap Oral DAILY    propranolol LA (INDERAL LA) capsule 120 mg  120 mg Oral QPM    rosuvastatin (CRESTOR) tablet 40 mg  40 mg Oral QHS    tamsulosin (FLOMAX) capsule 0.4 mg  0.4 mg Oral QPM       Objective:    Physical Exam:  Last VS:   Visit Vitals  BP (!) 104/52   Pulse 69   Temp 97.9 °F (36.6 °C)   Resp 20   Ht 6' (1.829 m)   Wt 125.1 kg (275 lb 12.8 oz)   SpO2 91%   BMI 37.41 kg/m²    Temp (24hrs), Av °F (36.7 °C), Min:97.8 °F (36.6 °C), Max:98.3 °F (36.8 °C)    24 hr VS reviewed. General Appearance:   [x] well developed, well nourished,  [x] NAD. [] agitated   [] lethargic but arousable  [] obtunded   ENT, Palate:    [x] WNL   [] dry palate/MM        Respiratory:    [x] CTA bilateral  [] rales  [] rhonchi  [] normal resp effort      [] similar to yesterday   [] worse    [] improved   Cardiovascular:   [x] RRR   [] Irregular rate and rhythm   [x] Normal S1, S2   [x] No gallop or rub.    [] no murmur   [x] no new murmur  [] murmur c/w:   [x] no edema  RLE: []1+ []2+ [] 3+;  LLE: []1+ []2+ []3+      [] edema similar to yesterday   [] worse    [] improved   [x] normal JVP   [] Elevated JVP    [x] JVP similar to yesterday   [] worse    [] improved   [x] carotid upstroke unchanged   [x] abd aorta not palpated   [x] no stigmata of peripheral emboli   GI:    [x] abd soft, non-distented,bowel sounds present, no                     organomegaly appreciated   Skin:  Neuro:    Cath Site:  [x] warm and dry [] cold extremities   [x] A/O x 3, grossly non-focal      [] intact w/o hematoma or bruit; distal pulse unchanged. Data Review:   Labs:    Recent Results (from the past 24 hour(s))   CBC WITH AUTOMATED DIFF    Collection Time: 03/09/21  2:14 AM   Result Value Ref Range    WBC 8.1 4.1 - 11.1 K/uL    RBC 4.69 4.10 - 5.70 M/uL    HGB 13.8 12.1 - 17.0 g/dL    HCT 45.3 36.6 - 50.3 %    MCV 96.6 80.0 - 99.0 FL    MCH 29.4 26.0 - 34.0 PG    MCHC 30.5 30.0 - 36.5 g/dL    RDW 15.0 (H) 11.5 - 14.5 %    PLATELET 444 662 - 169 K/uL    MPV 10.1 8.9 - 12.9 FL    NRBC 0.0 0  WBC    ABSOLUTE NRBC 0.00 0.00 - 0.01 K/uL    NEUTROPHILS 73 32 - 75 %    LYMPHOCYTES 17 12 - 49 %    MONOCYTES 10 5 - 13 %    EOSINOPHILS 0 0 - 7 %    BASOPHILS 0 0 - 1 %    IMMATURE GRANULOCYTES 0 0.0 - 0.5 %    ABS. NEUTROPHILS 5.9 1.8 - 8.0 K/UL    ABS. LYMPHOCYTES 1.4 0.8 - 3.5 K/UL    ABS. MONOCYTES 0.8 0.0 - 1.0 K/UL    ABS. EOSINOPHILS 0.0 0.0 - 0.4 K/UL    ABS. BASOPHILS 0.0 0.0 - 0.1 K/UL    ABS. IMM. GRANS. 0.0 0.00 - 0.04 K/UL    DF AUTOMATED     METABOLIC PANEL, COMPREHENSIVE    Collection Time: 03/09/21  2:14 AM   Result Value Ref Range    Sodium 135 (L) 136 - 145 mmol/L    Potassium 4.4 3.5 - 5.1 mmol/L    Chloride 92 (L) 97 - 108 mmol/L    CO2 41 (HH) 21 - 32 mmol/L    Anion gap 2 (L) 5 - 15 mmol/L    Glucose 102 (H) 65 - 100 mg/dL    BUN 25 (H) 6 - 20 MG/DL    Creatinine 1.02 0.70 - 1.30 MG/DL    BUN/Creatinine ratio 25 (H) 12 - 20      GFR est AA >60 >60 ml/min/1.73m2    GFR est non-AA >60 >60 ml/min/1.73m2    Calcium 8.7 8.5 - 10.1 MG/DL    Bilirubin, total 0.5 0.2 - 1.0 MG/DL    ALT (SGPT) 27 12 - 78 U/L    AST (SGOT) 17 15 - 37 U/L    Alk.  phosphatase 68 45 - 117 U/L    Protein, total 7.2 6.4 - 8.2 g/dL    Albumin 3.2 (L) 3.5 - 5.0 g/dL    Globulin 4.0 2.0 - 4.0 g/dL    A-G Ratio 0.8 (L) 1.1 - 2.2     MAGNESIUM Collection Time: 03/09/21  2:14 AM   Result Value Ref Range    Magnesium 2.4 1.6 - 2.4 mg/dL   PHOSPHORUS    Collection Time: 03/09/21  2:14 AM   Result Value Ref Range    Phosphorus 3.8 2.6 - 4.7 MG/DL   NT-PRO BNP    Collection Time: 03/09/21  2:14 AM   Result Value Ref Range    NT pro-BNP 92 <125 PG/ML   PROCALCITONIN    Collection Time: 03/09/21  2:14 AM   Result Value Ref Range    Procalcitonin <0.05 ng/mL   POC EG7    Collection Time: 03/09/21  5:12 AM   Result Value Ref Range    Calcium, ionized (POC) 1.23 1.12 - 1.32 mmol/L    FIO2 (POC) 50 %    pH (POC) 7.35 7.35 - 7.45      pCO2 (POC) 89.9 (H) 35.0 - 45.0 MMHG    pO2 (POC) 94 80 - 100 MMHG    HCO3 (POC) 49.3 (H) 22 - 26 MMOL/L    Base excess (POC) 24 mmol/L    sO2 (POC) 96 92 - 97 %    Site RIGHT RADIAL      Device: BIPAP      PEEP/CPAP (POC) 6 cmH2O    PIP (POC) 15      Allens test (POC) YES      Specimen type (POC) ARTERIAL      Total resp. rate 22         Provided Telemetry: [x] sinus  [] chronic afib   [] paroxysmal afib  [] NSVT      Assessment:     Active Problems:    Acute respiratory failure with hypoxia (HCC) (3/4/2021)        Plan:     Atrial Fibrillation - Flutter  improved   Continue current meds    He is in RSR doing well. Cor; rsr. For all other plans, see orders.    [x] High complexity decision making was performed

## 2021-03-09 NOTE — PROGRESS NOTES
Problem: Falls - Risk of  Goal: *Absence of Falls  Description: Document Vel Pérez Fall Risk and appropriate interventions in the flowsheet. Outcome: Progressing Towards Goal  Note: Fall Risk Interventions:  Mobility Interventions: Bed/chair exit alarm, Communicate number of staff needed for ambulation/transfer, OT consult for ADLs, Patient to call before getting OOB, PT Consult for mobility concerns, PT Consult for assist device competence    Mentation Interventions: Adequate sleep, hydration, pain control, Bed/chair exit alarm, Door open when patient unattended, Evaluate medications/consider consulting pharmacy, Eyeglasses and hearing aids, Familiar objects from home, Family/sitter at bedside, Gait belt with transfers/ambulation    Medication Interventions: Bed/chair exit alarm, Evaluate medications/consider consulting pharmacy, Patient to call before getting OOB, Teach patient to arise slowly, Utilize gait belt for transfers/ambulation         History of Falls Interventions: Bed/chair exit alarm, Consult care management for discharge planning, Door open when patient unattended, Room close to nurse's station, Utilize gait belt for transfer/ambulation, Assess for delayed presentation/identification of injury for 48 hrs (comment for end date)         Problem: Patient Education: Go to Patient Education Activity  Goal: Patient/Family Education  Outcome: Progressing Towards Goal     Problem: Risk for Spread of Infection  Goal: Prevent transmission of infectious organism to others  Description: Prevent the transmission of infectious organisms to other patients, staff members, and visitors.   Outcome: Progressing Towards Goal     Problem: Patient Education:  Go to Education Activity  Goal: Patient/Family Education  Outcome: Progressing Towards Goal     Problem: Breathing Pattern - Ineffective  Goal: *Absence of hypoxia  Outcome: Progressing Towards Goal  Goal: *Use of effective breathing techniques  Outcome: Progressing Towards Goal  Goal: *PALLIATIVE CARE:  Alleviation of Dyspnea  Outcome: Progressing Towards Goal     Problem: Patient Education: Go to Patient Education Activity  Goal: Patient/Family Education  Outcome: Progressing Towards Goal

## 2021-03-09 NOTE — PROGRESS NOTES
Pulmonary    Pt was seen this am.   NO distress. Was eating breakfast.   NO chest pain, no back pain, no leg pain. NO issue last pm. Has been using his bipap. Suspected KADIE. No headaches. Feels that his thinking is clear. Patient Vitals for the past 4 hrs:   BP Temp Pulse Resp SpO2 Weight   21 0758 (!) 104/52 97.9 °F (36.6 °C) 69 20 91 %    21 0614      125.1 kg (275 lb 12.8 oz)   Temp (24hrs), Av °F (36.7 °C), Min:97.8 °F (36.6 °C), Max:98.3 °F (36.8 °C)      Gen: No distress. Normal speech. Neck: Supple, no JVP  Lungs: CTA anteriorly, some decreased BS in bases. CV: Nl s1,2. NO MRG  Abd; +BS, Soft, NT, ND  Extrem: NO C, C, E.  Neuro: nonfocal, motor seems to be intact. Psych: no overt, anxiety or depression. Intake/Output Summary (Last 24 hours) at 3/9/2021 0927  Last data filed at 3/9/2021 0800  Gross per 24 hour   Intake 220 ml   Output 3925 ml   Net -3705 ml     Lab:  Recent Labs     21  0214 21  0359 21  0301   WBC 8.1 8.4 9.1   HGB 13.8 13.2 13.1    180 193   *  --  137   K 4.4  --  4.3   CL 92*  --  92*   CO2 41*  --  42*   BUN 25*  --  30*   CREA 1.02  --  1.04   *  --  110*   CA 8.7  --  9.2   MG 2.4  --   --    PHOS 3.8  --   --    TBILI 0.5  --  0.4     ABG:  Recent Labs     21  0512 21  0435 21  0429   PHI 7.35 7.33* 7.33*   PCO2I 89.9* >90.0* >90.0*   PO2I 94 72* 69*   HCO3I 49.3*  --   --    SO2I 96  --   --    FIO2I 50  --   --      Impression:  Acute on chronic hypoxemic and hypercarbic resp failure 2/2 COPD and CHF. Severe underlying COPD. ABG with chronic resp acidosis possibly worsened a bit by diuresis and increased HCO3 on chemistries  Will recheck the ABG in am to ensure trending in a better direction. Paroxysmal afib - followed by cardiology - currently on cardizem, lasix, eliquis, and propranolol.   Pulm edema on presentation    Former smoker    Reported h/o untreated KADIE - has been seen by [facility] sleep, Can get him to follow up with us upon discharge.      Pulm recommendations:  · Pt needs non invasive ventilator in the form of AVAP/AE qhs and prn due to severe hypercapnic respiratory failure to lower PCO2, avoid readmission to hospital  · Continue copd treatment per hospitalist- follow up as outpatient with repeat PFTs  · Will needs out pt sleep eval with us  · CHF / Afib per cardiology - Preserved EF by echo 3/4/21 - no appreciable pulm htn noted  · mobilize    Jose Alberto Scott MD

## 2021-03-09 NOTE — PROGRESS NOTES
0700: Bedside shift change report given to Matt RN (oncoming nurse) by Betty Hurtado RN (offgoing nurse). Report included the following information SBAR and Kardex.

## 2021-03-09 NOTE — PROGRESS NOTES
[facility] INTERDISCIPLINARY ROUNDS    Cardiopulmonary Care Interdisciplinary Rounds were held today to discuss patient's plan of care and outcomes. The following members were present: NP/Physician, Pharmacy, Nursing and Case Management. PLAN OF CARE:   Continue current treatment plan, likely to discharge home tomorrow with home O2.      Expected Length of Stay:  3d 14h

## 2021-03-09 NOTE — PROGRESS NOTES
Transition of Care Plan:     Disposition: Home with spouse   Follow up appointments: PCP  DME needed: Home oxygen  Transportation at Discharge: Pt's wife  Jason Clear or means to access home: Pt's wife will have keys        IM Medicare letter: Will need 2nd [facility] letter prior to d/c  Caregiver Contact: Pt's Santiago Elias p) [phone]  Discharge Caregiver contacted prior to discharge? CM will contact caregiver prior to discharge    12:30pm- CM met with pt at bedside to to discuss d/c plan. CM informed pt that home oxygen and trilogy will be delivered to him before d/c. CM inquired with pt about any needs. No new needs at this time. Medicare pt has received, reviewed, and signed 2nd IM letter informing them of their right to appeal the discharge. Signed copy has been placed on pt bedside chart. 9:20am- sent referral to [address] for home oxygen via [employer]. 9:20am- sent emailed to [facility] Specialist to schedule pt's follow-up appointment. CM will continue to follow patient for discharge planning needs and arrange for services as deemed necessary.     Blayne Diana [address] [place] [address], [facility]  [phone]

## 2021-03-10 ENCOUNTER — APPOINTMENT (OUTPATIENT)
Dept: GENERAL RADIOLOGY | Age: 71
End: 2021-03-10
Attending: EMERGENCY MEDICINE
Payer: MEDICARE

## 2021-03-10 ENCOUNTER — HOSPITAL ENCOUNTER (EMERGENCY)
Age: 71
Discharge: HOME | End: 2021-03-10
Attending: EMERGENCY MEDICINE
Payer: MEDICARE

## 2021-03-10 VITALS
WEIGHT: 267 LBS | HEIGHT: 71 IN | BODY MASS INDEX: 37.38 KG/M2 | RESPIRATION RATE: 16 BRPM | OXYGEN SATURATION: 94 % | SYSTOLIC BLOOD PRESSURE: 140 MMHG | HEART RATE: 126 BPM | TEMPERATURE: 98.7 F | DIASTOLIC BLOOD PRESSURE: 64 MMHG

## 2021-03-10 VITALS
OXYGEN SATURATION: 90 % | TEMPERATURE: 98 F | HEIGHT: 72 IN | DIASTOLIC BLOOD PRESSURE: 73 MMHG | BODY MASS INDEX: 36.27 KG/M2 | RESPIRATION RATE: 20 BRPM | SYSTOLIC BLOOD PRESSURE: 117 MMHG | HEART RATE: 74 BPM | WEIGHT: 267.8 LBS

## 2021-03-10 DIAGNOSIS — R00.0 TACHYCARDIA: Primary | ICD-10-CM

## 2021-03-10 LAB
ALBUMIN SERPL-MCNC: 3.4 G/DL (ref 3.5–5)
ALBUMIN SERPL-MCNC: 3.5 G/DL (ref 3.5–5)
ALBUMIN/GLOB SERPL: 0.8 {RATIO} (ref 1.1–2.2)
ALBUMIN/GLOB SERPL: 0.9 {RATIO} (ref 1.1–2.2)
ALP SERPL-CCNC: 64 U/L (ref 45–117)
ALP SERPL-CCNC: 82 U/L (ref 45–117)
ALT SERPL-CCNC: 25 U/L (ref 12–78)
ALT SERPL-CCNC: 30 U/L (ref 12–78)
ANION GAP SERPL CALC-SCNC: 1 MMOL/L (ref 5–15)
ANION GAP SERPL CALC-SCNC: 1 MMOL/L (ref 5–15)
AST SERPL-CCNC: 16 U/L (ref 15–37)
AST SERPL-CCNC: 20 U/L (ref 15–37)
BACTERIA SPEC CULT: NORMAL
BACTERIA SPEC CULT: NORMAL
BASOPHILS # BLD: 0 K/UL (ref 0–0.1)
BASOPHILS NFR BLD: 0 % (ref 0–1)
BILIRUB SERPL-MCNC: 0.4 MG/DL (ref 0.2–1)
BILIRUB SERPL-MCNC: 0.6 MG/DL (ref 0.2–1)
BUN SERPL-MCNC: 26 MG/DL (ref 6–20)
BUN SERPL-MCNC: 31 MG/DL (ref 6–20)
BUN/CREAT SERPL: 24 (ref 12–20)
BUN/CREAT SERPL: 26 (ref 12–20)
CALCIUM SERPL-MCNC: 8.3 MG/DL (ref 8.5–10.1)
CALCIUM SERPL-MCNC: 9 MG/DL (ref 8.5–10.1)
CHLORIDE SERPL-SCNC: 93 MMOL/L (ref 97–108)
CHLORIDE SERPL-SCNC: 96 MMOL/L (ref 97–108)
CO2 SERPL-SCNC: 37 MMOL/L (ref 21–32)
CO2 SERPL-SCNC: 40 MMOL/L (ref 21–32)
CREAT SERPL-MCNC: 1.08 MG/DL (ref 0.7–1.3)
CREAT SERPL-MCNC: 1.2 MG/DL (ref 0.7–1.3)
DIFFERENTIAL METHOD BLD: ABNORMAL
EOSINOPHIL # BLD: 0 K/UL (ref 0–0.4)
EOSINOPHIL NFR BLD: 0 % (ref 0–7)
ERYTHROCYTE [DISTWIDTH] IN BLOOD BY AUTOMATED COUNT: 15.1 % (ref 11.5–14.5)
GLOBULIN SER CALC-MCNC: 4.1 G/DL (ref 2–4)
GLOBULIN SER CALC-MCNC: 4.1 G/DL (ref 2–4)
GLUCOSE SERPL-MCNC: 100 MG/DL (ref 65–100)
GLUCOSE SERPL-MCNC: 178 MG/DL (ref 65–100)
HCT VFR BLD AUTO: 48.8 % (ref 36.6–50.3)
HGB BLD-MCNC: 15.1 G/DL (ref 12.1–17)
IMM GRANULOCYTES # BLD AUTO: 0 K/UL (ref 0–0.04)
IMM GRANULOCYTES NFR BLD AUTO: 0 % (ref 0–0.5)
LYMPHOCYTES # BLD: 0.6 K/UL (ref 0.8–3.5)
LYMPHOCYTES NFR BLD: 8 % (ref 12–49)
MCH RBC QN AUTO: 29.5 PG (ref 26–34)
MCHC RBC AUTO-ENTMCNC: 30.9 G/DL (ref 30–36.5)
MCV RBC AUTO: 95.3 FL (ref 80–99)
MONOCYTES # BLD: 0.4 K/UL (ref 0–1)
MONOCYTES NFR BLD: 5 % (ref 5–13)
NEUTS SEG # BLD: 7.1 K/UL (ref 1.8–8)
NEUTS SEG NFR BLD: 87 % (ref 32–75)
NRBC # BLD: 0 K/UL (ref 0–0.01)
NRBC BLD-RTO: 0 PER 100 WBC
PLATELET # BLD AUTO: 207 K/UL (ref 150–400)
PMV BLD AUTO: 10 FL (ref 8.9–12.9)
POTASSIUM SERPL-SCNC: 4.2 MMOL/L (ref 3.5–5.1)
POTASSIUM SERPL-SCNC: 4.4 MMOL/L (ref 3.5–5.1)
PROT SERPL-MCNC: 7.5 G/DL (ref 6.4–8.2)
PROT SERPL-MCNC: 7.6 G/DL (ref 6.4–8.2)
RBC # BLD AUTO: 5.12 M/UL (ref 4.1–5.7)
RBC MORPH BLD: ABNORMAL
RBC MORPH BLD: ABNORMAL
SERVICE CMNT-IMP: NORMAL
SERVICE CMNT-IMP: NORMAL
SODIUM SERPL-SCNC: 134 MMOL/L (ref 136–145)
SODIUM SERPL-SCNC: 134 MMOL/L (ref 136–145)
TROPONIN I SERPL-MCNC: <0.05 NG/ML
WBC # BLD AUTO: 8.1 K/UL (ref 4.1–11.1)

## 2021-03-10 PROCEDURE — 94640 AIRWAY INHALATION TREATMENT: CPT

## 2021-03-10 PROCEDURE — 74011250637 HC RX REV CODE- 250/637: Performed by: INTERNAL MEDICINE

## 2021-03-10 PROCEDURE — 74011250636 HC RX REV CODE- 250/636: Performed by: EMERGENCY MEDICINE

## 2021-03-10 PROCEDURE — 71045 X-RAY EXAM CHEST 1 VIEW: CPT

## 2021-03-10 PROCEDURE — 74011250637 HC RX REV CODE- 250/637: Performed by: HOSPITALIST

## 2021-03-10 PROCEDURE — 36415 COLL VENOUS BLD VENIPUNCTURE: CPT

## 2021-03-10 PROCEDURE — 74011250637 HC RX REV CODE- 250/637: Performed by: NURSE PRACTITIONER

## 2021-03-10 PROCEDURE — 99285 EMERGENCY DEPT VISIT HI MDM: CPT

## 2021-03-10 PROCEDURE — 80053 COMPREHEN METABOLIC PANEL: CPT

## 2021-03-10 PROCEDURE — 94660 CPAP INITIATION&MGMT: CPT

## 2021-03-10 PROCEDURE — 96360 HYDRATION IV INFUSION INIT: CPT

## 2021-03-10 PROCEDURE — 74011000250 HC RX REV CODE- 250: Performed by: INTERNAL MEDICINE

## 2021-03-10 PROCEDURE — 74011636637 HC RX REV CODE- 636/637: Performed by: INTERNAL MEDICINE

## 2021-03-10 PROCEDURE — 85025 COMPLETE CBC W/AUTO DIFF WBC: CPT

## 2021-03-10 PROCEDURE — 93005 ELECTROCARDIOGRAM TRACING: CPT

## 2021-03-10 PROCEDURE — 84484 ASSAY OF TROPONIN QUANT: CPT

## 2021-03-10 PROCEDURE — 77010033678 HC OXYGEN DAILY

## 2021-03-10 RX ADMIN — MULTIPLE VITAMINS W/ MINERALS TAB 1 TABLET: TAB at 10:00

## 2021-03-10 RX ADMIN — PREDNISONE 40 MG: 20 TABLET ORAL at 09:59

## 2021-03-10 RX ADMIN — SODIUM CHLORIDE 500 ML: 900 INJECTION, SOLUTION INTRAVENOUS at 19:54

## 2021-03-10 RX ADMIN — DILTIAZEM HYDROCHLORIDE 120 MG: 120 CAPSULE, COATED, EXTENDED RELEASE ORAL at 09:59

## 2021-03-10 RX ADMIN — CHOLECALCIFEROL TAB 25 MCG (1000 UNIT) 1000 UNITS: 25 TAB at 10:00

## 2021-03-10 RX ADMIN — GEMFIBROZIL 600 MG: 600 TABLET ORAL at 10:00

## 2021-03-10 RX ADMIN — PANTOPRAZOLE SODIUM 40 MG: 40 TABLET, DELAYED RELEASE ORAL at 10:00

## 2021-03-10 RX ADMIN — Medication 10 ML: at 06:50

## 2021-03-10 RX ADMIN — IPRATROPIUM BROMIDE AND ALBUTEROL SULFATE 3 ML: .5; 3 SOLUTION RESPIRATORY (INHALATION) at 08:34

## 2021-03-10 RX ADMIN — APIXABAN 5 MG: 5 TABLET, FILM COATED ORAL at 10:00

## 2021-03-10 RX ADMIN — OMEGA-3 FATTY ACIDS CAP 1000 MG 1 CAPSULE: 1000 CAP at 10:00

## 2021-03-10 RX ADMIN — FUROSEMIDE 20 MG: 20 TABLET ORAL at 10:00

## 2021-03-10 NOTE — ED NOTES
Assumed care of pt via triage. Pt states he was discharged from [facility] today- pt states he was admitted for respiratory problems. Pt has history of COPD but was recently put on oxygen during his hospital stay. Pt uses 4L nc at rest and 6L nc when ambulating. Pt having some increased SOB with ambulation and feeling like his heart is racing. Pt denies any significant chest pain. Pt denies n/v.    Pt resting comfortably on stretcher in position of comfort. Pt in no apparent distress at this time. Call bell within reach. Side rails x2. Connected to monitor x3. Pt a/o x4. Stretcher locked in lowest position. Pt aware of plan to await for MD/PA-C/NP assessment, and pt/family verbalizes understanding. Will continue to monitor pt condition.

## 2021-03-10 NOTE — PROGRESS NOTES
End of Shift Note    Bedside shift change report given to Alvin Morgan RN (oncoming nurse) by Alex Sherman (offgoing nurse). Report included the following information SBAR and Kardex    Shift worked:  [id number]     Shift summary and any significant changes:    none   Concerns for physician to address: none   Zone phone for oncoming shift:  [phone]     Activity:  Activity Level: Up with Assistance  Number times ambulated in hallways past shift: 0  Number of times OOB to chair past shift: 0    Cardiac:   Cardiac Monitoring: Yes      Cardiac Rhythm: Normal sinus rhythm    Access:   Current line(s): PIV     Genitourinary:   Urinary status: voiding    Respiratory:   O2 Device: BIPAP  Chronic home O2 use?: NO  Incentive spirometer at bedside: YES  Actual Volume (ml): 1500 ml  GI:  Last Bowel Movement Date: 03/08/21  Current diet:  DIET CARDIAC Regular; 2 GM NA (House Low NA)  Passing flatus: YES  Tolerating current diet: YES  % Diet Eaten: 100 %    Pain Management:   Patient states pain is manageable on current regimen: YES    Skin:  Benedict Score: 20  Interventions: turn team and increase time out of bed    Patient Safety:  Fall Score:  Total Score: 2  Interventions: gripper socks  High Fall Risk: Yes    Length of Stay:  Expected LOS: 3d 14h  Actual LOS: [facility]

## 2021-03-10 NOTE — PROGRESS NOTES
End of Shift Note    Bedside shift change report given to Trudy Mccrary RN (oncoming nurse) by Joe Keller (offgoing nurse). Report included the following information SBAR and Kardex    Shift worked:  day     Shift summary and any significant changes:     awaiting discharge with pulm     Concerns for physician to address:       Zone phone for oncoming shift:          Activity:  Activity Level: Up with Assistance  Number times ambulated in hallways past shift: 1  Number of times OOB to chair past shift: 2    Cardiac:   Cardiac Monitoring: Yes      Cardiac Rhythm: Normal sinus rhythm    Access:   Current line(s): PIV     Genitourinary:   Urinary status: voiding    Respiratory:   O2 Device: Nasal cannula  Chronic home O2 use?: YES  Incentive spirometer at bedside: YES  Actual Volume (ml): 1500 ml  GI:  Last Bowel Movement Date: 03/08/21  Current diet:  DIET CARDIAC Regular; 2 GM NA (House Low NA)  Passing flatus: YES  Tolerating current diet: YES  % Diet Eaten: 100 %    Pain Management:   Patient states pain is manageable on current regimen: YES    Skin:  Benedict Score: 20  Interventions: increase time out of bed, PT/OT consult and nutritional support     Patient Safety:  Fall Score:  Total Score: 2  Interventions: bed/chair alarm, assistive device (walker, cane, etc), gripper socks and pt to call before getting OOB  High Fall Risk: Yes    Length of Stay:  Expected LOS: 3d 14h  Actual LOS: [address] [facility] [address]

## 2021-03-10 NOTE — PROGRESS NOTES
Transition of Care Plan:     Disposition: Home with spouse   Follow up appointments: PCP  DME needed: Home oxygen  Transportation at Discharge: Pt's wife  Hettie Moles or means to access home: Pt's wife will have Jasen Souza  IM Medicare letter: Given  Caregiver Contact: Pt's Roleanastasiya herman) [phone]  Discharge Caregiver contacted prior to discharge? Yes    9:20am-No further CM needs identified. CM notified pt's nurse of d/c.    9:05am- CM called pt's wife via phone to discuss d/c plan.     9:00am- Home oxygen delivered to pt's room. 8:30am- CM met with pt at bedside to discuss d/c plan. CM informed pt that home oxygen will be delivered to the hospital and trilogy will be delivered to the home. Pt stated that his wife will transport him home.      Blayne Henderson [address]  [phone]

## 2021-03-10 NOTE — PROGRESS NOTES
Spiritual Care Assessment/Progress Note  [facility]      NAME: Higinio Welsh      MRN: [id number]  AGE: 65 y.o.  SEX: male  Mu-ism Affiliation: Shinto   Language: English     3/10/2021     Total Time (in minutes): 11     Spiritual Assessment begun in [facility] CARDIOPULMONARY CARE through conversation with:         [x]Patient        [] Family    [] Friend(s)        Reason for Consult: Initial/Spiritual assessment, patient floor     Spiritual beliefs: (Please include comment if needed)     [] Identifies with a michelle tradition:         [] Supported by a michelle community:            [] Claims no spiritual orientation:           [] Seeking spiritual identity:                [] Adheres to an individual form of spirituality:           [x] Not able to assess:                           Identified resources for coping:      [] Prayer                               [] Music                  [] Guided Imagery     [x] Family/friends                 [] Pet visits     [] Devotional reading                         [] Unknown     [x] Other:  Conversation, home improvement                                             Interventions offered during this visit: (See comments for more details)    Patient Interventions: Affirmation of emotions/emotional suffering, Catharsis/review of pertinent events in supportive environment, Coping skills reviewed/reinforced, Initial/Spiritual assessment, patient floor, Life review/legacy, Normalization of emotional/spiritual concerns, Prayer (assurance of)           Plan of Care:     [] Support spiritual and/or cultural needs    [] Support AMD and/or advance care planning process      [] Support grieving process   [] Coordinate Rites and/or Rituals    [] Coordination with community clergy   [] No spiritual needs identified at this time   [] Detailed Plan of Care below (See Comments)  [] Make referral to Music Therapy  [] Make referral to Pet Therapy     [] Make referral to Addiction services  [] Make referral to [facility]  [] Make referral to Spiritual Care Partner  [] No future visits requested        [x] Follow up upon further referrals     Comments: Provided initial spiritual assessment for pt Cony Sees on [address] Processed events leading up to LOS and history of COPD. Affirmed perceptions of improvement and ability for pt to exercise his sense of agency in care. Affirmed look toward a hot shower and greater rest upon discharge. Life review revealed sources of strength found in family and outlets for creativity. Assured of prayers and advised of  services. Consult with CYN Se[facility] 1 [facility] Provider   Paging Service [phone])

## 2021-03-10 NOTE — PROGRESS NOTES
Pulmonary    3-13-21: Pt was seen and examined. He feels ok this am. He reports that he is ready to go home. Discussed that he will be sent home on oxygen and trilogy. He has no chest pain, no back pain. NO leg pain. Feels moderate dyspnea with exertion. Patient Vitals for the past 4 hrs:   BP Temp Pulse Resp SpO2 Weight   03/10/21 0959   73      03/10/21 0834     95 %    03/10/21 0816 121/77 97.4 °F (36.3 °C) 77 20 96 %    03/10/21 0637      121.5 kg (267 lb 12.8 oz)   Temp (24hrs), Av °F (36.7 °C), Min:97.4 °F (36.3 °C), Max:98.6 °F (37 °C)    Pox is 95% on 4L Nc.   Gen: No distress. Normal speech. Neck: Supple, no JVP  Lungs: CTA anteriorly, some decreased BS in bases. CV: Nl s1,2. NO MRG  Abd; +BS, Soft, NT, ND, obese. Extrem: NO C, C, E.  Neuro: nonfocal, motor seems to be intact. Psych: no overt, anxiety or depression. Intake/Output Summary (Last 24 hours) at 3/10/2021 1008  Last data filed at 3/10/2021 7328  Gross per 24 hour   Intake    Output 1150 ml   Net -1150 ml     Lab:  Recent Labs     03/10/21  0224 21  0214 21  0359   WBC  --  8.1 8.4   HGB  --  13.8 13.2   PLT  --  207 180   * 135*  --    K 4.4 4.4  --    CL 93* 92*  --    CO2 40* 41*  --    BUN 26* 25*  --    CREA 1.08 1.02  --     102*  --    CA 9.0 8.7  --    MG  --  2.4  --    PHOS  --  3.8  --    TBILI 0.6 0.5  --      ABG:  Recent Labs     21  0512   PHI 7.35   PCO2I 89.9*   PO2I 94   HCO3I 49.3*   SO2I 96   FIO2I 50     CXR:   3-9-21: CXR;    EXAM: CXR Portable.     FINDINGS: Portable chest shows no significant change since . There is no  apparent pneumothorax. Lungs show no consolidation. Heart size is normal. There  is no overt pulmonary edema.     IMPRESSION  No acute finding or significant change. Impression:  Acute on chronic hypoxemic and hypercarbic resp failure 2/2 COPD and CHF. Severe underlying COPD.   ABG with chronic resp acidosis possibly worsened a bit by diuresis and increased HCO3 on chemistries. ABG on 3/9/21: 7.35/89.9/94/49.3. ON bipap RR was 22, IPAP 15, EPAP of 6. Paroxysmal afib - followed by cardiology - currently on cardizem, lasix, eliquis, and propranolol. Pulm edema on presentation    Former smoker         Pulm recommendations:  · Pt needs non invasive ventilator in the form of AVAP/AE qhs and prn due to severe hypercapnic respiratory failure to lower PCO2, avoid readmission to hospital and due to bipap not being sufficient for his hypercarbia being very increased. (co2 of 89.9), Will have him set up with trilogy per Dana. · Continue copd treatment per hospitalist- follow up as outpatient with repeat PFTs  · CHF / Afib per cardiology - Preserved EF by echo 3/4/21 - no appreciable pulm htn noted  · Mobilize  · Agree with discharge home and follow up with me in office next week for hospital follow up.    · Discussed with Dr. Lisy Islas and his nurse this am.       Edy Samuels MD

## 2021-03-10 NOTE — DISCHARGE SUMMARY
Hospitalist Discharge Summary     Patient ID:  Georgiana Correia  [id number]  12 y.o.  1950    PCP on record: Mark Carty MD    Admit date: 3/4/2021  Discharge date and time: 3/10/2021      DISCHARGE DIAGNOSIS:  Acute hypoxic and hypercapnic respiratory failure    CONSULTATIONS:  IP CONSULT TO HOSPITALIST  IP CONSULT TO CARDIOLOGY  IP CONSULT TO PULMONOLOGY    Excerpted HPI from H&P of Fernanda Calle MD:  Georgiana Correia is a 65 y.o. male with copd (not on home O2), KADIE by sleep study about 1 year ago but did not follow up to get cpap, HTN, hx SVT presents with 5 days of progressively worsening SOB. This occurred after he fell down 6-7 steps from the stairs and landed on left side 2 weeks ago with bruising of entire left trunk.     He reports baseline O2 sat on his home pulse oximeter is 89-90% but over past 5-6 days, O2 sats have been low, down to 70% RA (he though the pulse oximeter was going bad). Last night unable to lay down to sleep. SOB worse with exertion, now has to rest after walking 10 feet.  +wheezing. No change in chronic cough, no sputum production, no fever, chills, chest pain. Last night also felt his heart beat much stronger than usual but HR was 85. He was diagnosed with KADIE last year but did not follow up for cpap.     He has not got this Covid vaccine as of yet. Hima Dash has never had Covid before.  No history of DVT or blood clots. Denies any personal hx chf but it is prevalent in his family.       ______________________________________________________________________  DISCHARGE SUMMARY/HOSPITAL COURSE:  for full details see H&P, daily progress notes, labs, consult notes. Acute hypoxic and hypercapnic respiratory failure , POA due to  New onset chf and COPD exacerbation   COPD exacerbation due to chf exacerbation  COVID-19 ruled out  Patient was on 4 L of oxygen this morning looks comfortable and he wants to go home. Patient was tested negative for Covid. Patient is currently on Lasix 20 mg he was on a BiPAP but now he is off BiPAP. Pt needs non invasive ventilator in the form of AVAP/AE qhs and prn due to severe hypercapnic respiratory failure to lower PCO2, avoid readmission to hospital and due to bipap not being sufficient for his hypercarbia being very increased. (co2 of 89.9), Will have him set up with trilogy per Dana  KADIE, untreated  need to f/u with his sleep doctor to get cpap.  This will help keep chf under control  P. SVT/new onset atrial fibrillation with rapid ventricular response  Patient is currently in sinus rhythm on a beta-blocker. Patient is also on Cardizem 120 mg CD. He will be continued on Eliquis as well. Cardiology input was appreciated. BPH  continue proscar, flomax  GERD  continue PPI  Obesity  will be placed on reduced calorie diet in hospital        _______________________________________________________________________  Patient seen and examined by me on discharge day. Pertinent Findings:  Gen:    Not in distress  Chest: Clear lungs  CVS:   Regular rhythm. No edema  Abd:  Soft, not distended, not tender  Neuro:  Alert, times 4   _______________________________________________________________________  DISCHARGE MEDICATIONS:   Current Discharge Medication List      START taking these medications    Details   predniSONE (DELTASONE) 20 mg tablet Take 40 mg by mouth daily (with breakfast). 4 tabs for 3 days   3 tabs for 3 days   2 tabs for 3 days  1 tabs for 3 days  Then stop. Qty: 40 Tab, Refills: 0      furosemide (LASIX) 20 mg tablet 1 tab po daily  Qty: 30 Tab, Refills: 0      dilTIAZem ER (CARDIZEM CD) 120 mg capsule Take 1 Cap by mouth daily. Qty: 30 Cap, Refills: 0      apixaban (ELIQUIS) 5 mg tablet Take 1 Tab by mouth two (2) times a day. Qty: 60 Tab, Refills: 0         CONTINUE these medications which have NOT CHANGED    Details   rosuvastatin (Crestor) 40 mg tablet Take 40 mg by mouth nightly.       finasteride (PROSCAR) 5 mg tablet Take 10 mg by mouth daily. Pt unsure of strength       propranolol LA (INDERAL LA) 120 mg SR capsule Take 120 mg by mouth daily. tamsulosin (FLOMAX) 0.4 mg capsule Take 0.4 mg by mouth daily. esomeprazole (NEXIUM) 40 mg capsule Take 40 mg by mouth daily. melatonin 3 mg tablet Take 3 mg by mouth nightly. albuterol (VENTOLIN HFA) 90 mcg/actuation inhaler Take 1 Puff by inhalation every four (4) hours as needed. fluticasone-vilanterol (BREO ELLIPTA) 100-25 mcg/dose inhaler Take 1 Puff by inhalation daily. gemfibrozil (LOPID) 600 mg tablet Take 600 mg by mouth two (2) times a day. cholecalciferol (VITAMIN D3) 1,000 unit cap Take 1,000 Units by mouth daily. MULTIVIT-MIN/FA/LYCOPEN/LUTEIN (CENTRUM SILVER ULTRA MEN'S PO) Take 1 Tab by mouth daily. Omega-3-DHA-EPA-Fish Oil 1,000 mg (120 mg-180 mg) cap Take 1 Cap by mouth daily. aspirin delayed-release 81 mg tablet Take 81 mg by mouth daily. STOP taking these medications       vit A/vit C/vit E/zinc/copper (ICAPS AREDS PO) Comments:   Reason for Stopping:         lecithin 1,200 mg cap Comments:   Reason for Stopping:               My Recommended Diet, Activity, Wound Care, and follow-up labs are listed in the patient's Discharge Insturctions which I have personally completed and reviewed.     ______________________________________________________________________    Risk of deterioration: Moderate    Condition at Discharge:  Stable  ______________________________________________________________________    Disposition  Home with family and home health services    ______________________________________________________________________    Care Plan discussed with:   Patient, Family, RN, Care Manager, Consultant    Comment:   ______________________________________________________________________  Total NON critical care TIME: 35 Minutes    Code Status: Full Code  ___

## 2021-03-11 ENCOUNTER — PATIENT OUTREACH (OUTPATIENT)
Dept: CASE MANAGEMENT | Age: 71
End: 2021-03-11

## 2021-03-11 LAB
ATRIAL RATE: 124 BPM
CALCULATED P AXIS, ECG09: 33 DEGREES
CALCULATED R AXIS, ECG10: 52 DEGREES
CALCULATED T AXIS, ECG11: 67 DEGREES
DIAGNOSIS, 93000: NORMAL
P-R INTERVAL, ECG05: 172 MS
Q-T INTERVAL, ECG07: 294 MS
QRS DURATION, ECG06: 86 MS
QTC CALCULATION (BEZET), ECG08: 422 MS
VENTRICULAR RATE, ECG03: 124 BPM

## 2021-03-11 NOTE — PROGRESS NOTES
Care Transitions Initial Follow Up Call    Call within 2 business days of discharge: Yes     Patient: Ute Magallanes Patient : 1950 MRN: [id number]    Last Discharge [address]       Complaint Diagnosis Description Type Department Provider    3/10/21 Palpitations Tachycardia ED (DISCHARGE) CLAUDIA Azevedo,             Challenges to be reviewed by the provider   Additional needs identified to be addressed with provider yes  medications- Will discuss need for Ellipta with pcp/pulmonary and advance care planning- no ACP on file   Trilogy machine - patient educated at hospital on use. Method of communication with provider : face to face, chart routing, staff message, phone, none    Discussed COVID-19 related testing which was available at this time. Test results were negative. Patient informed of results, if available? yes     Advance Care Planning:   Does patient have an Advance Directive: not on file; education provided     Inpatient Readmission Risk score: Unplanned Readmit Risk Score: 16    Was this a readmission? no   Patient stated reason for the admission: sob    Patients top risk factors for readmission: medical condition COPD and acute respiratory failure  Interventions to address risk factors: Scheduled appointment with PCP-NATHANAEL with pcp 3/15, Scheduled appointment with Pam Spatz will call Dr.Drew Tomás Carpenter, to schedule f/u. and Obtained and reviewed discharge summary and/or continuity of care documents    Care Transition Nurse contacted the patient by telephone to perform post hospital discharge assessment. Verified name and  with patient as identifiers. Provided introduction to self, and explanation of the Care Transition Nurse role. Reports he is doing well. Using O2 at 4 liters at rest, can increase to 6 liters with activity. Did not use Trilogy last night, plans to start tonight.  Feels comfortable in knowing how to use from education provided at hospital. Reminded to check weight q am and record, watch for gains, eat low salt diet. Care Transition Nurse reviewed discharge instructions, medical action plan and red flags with patient who verbalized understanding. Were discharge instructions available to patient? yes. Reviewed appropriate site of care based on symptoms and resources available to patient including: PCP, Specialist, After hours contact number-pcp number and Urgent Care Clinics. Patient given an opportunity to ask questions and does not have any further questions or concerns at this time. The patient agrees to contact the PCP office for questions related to their healthcare. Medication reconciliation was performed with patient, who verbalizes understanding of administration of home medications. Advised obtaining a 90-day supply of all daily and as-needed medications. Referral to Pharm D needed: no     Home Health/Outpatient orders at discharge: [address]:   Date of initial visit: na    Durable Medical Equipment ordered at discharge: home Oxygen and [place] 6: [address] received: 3/10/21    Covid Risk Education    Patient has following risk factors of: COPD and acute respiratory failure. Education provided regarding infection prevention, and signs and symptoms of COVID-19 and when to seek medical attention with patient who verbalized understanding. Discussed exposure protocols and quarantine From CDC: Are you at higher risk for severe illness?  and given an opportunity for questions and concerns. The patient agrees to contact the COVID-19 hotline [phone] or PCP office for questions related to COVID-19. For more information on steps you can take to protect yourself, see CDC's How to Protect Yourself     Was patient discharged with a pulse oximeter?  no Discussed and confirmed pulse oximeter discharge instructions and when to notify provider or seek emergency care.    Patient/family/caregiver given information for [employer] and agrees to enroll no  Patient's preferred e-mail: declines  Patient's preferred phone number: declines    Discussed follow-up appointments. If no appointment was previously scheduled, appointment scheduling offered: yes Is follow up appointment scheduled within 7 days of discharge? yes   [facility] follow up appointment(s): No future appointments. [facility] follow up appointment(s): Dr.Drew Meghann Padron, pulmonary, patient to call and schedule. Sonia Casiano- 3/15, pcp. Plan for follow-up call in 7-10 days based on severity of symptoms and risk factors. Plan for next call: symptom management-assess current symptoms, self management-managing Trilogy machine and checking pulse oximeter, follow up appointment-attended pcp and pulmonary appointments and medication management-taking meds as directed  Care Transition Nurse provided contact information for future needs. Goals Addressed                 This Visit's Progress     Prevent complications post hospitalization. 3/11/21 [facility] 3/4-3/10 Acute resp failure with hypoxia/CHF; Also [facility] ED 3/10 for Tachycardia   Reviewed discharge instructions with patient   Reviewed meds- education on new meds provided. Had not started Prednison this am, wife was on way to pick it up.  Reviewed red flags: Pulse ox less than 90%, sob despite O2, chest pain, tachycardia, fever,nausea,vomiting,diarrhea.  NATHANAEL with pcp 3/15;   Reminded to call pulmonary, Dr.Drew Meghann Padron and sched f/u.  Reminded to call cardio, Dr.Stanley Luis Mejia and sched f/u.  Reviewed need to follow low salt diet, check weigh q am and record. Notify cardio if weight increases by > 3 lb in 2 days or 5 lb in one week.  Watch for swelling in extremities.  Given info on [facility] as resource.  Given CTN contact info if questions/concerns.    CTN to check back in about a week, sooner [email or web address]

## 2021-03-11 NOTE — PROGRESS NOTES
DISCHARGE SUMMARY FROM [address] [place] [address] Duran NURSE    The patient is stable for discharge. I have reviewed the discharge instructions with the patient and spouse. The patient and spouse verbalized understanding. All questions were fully answered. The patient and spouse verbalized no complaints. Hard scripts and medication handouts were given and reviewed with the patient and spouse. Appropriate educational materials and medication side effects teaching were provided also provided. Cardiac monitor and IV line(s) were removed. Patient was discharged with O2 tank at 4L. The following personal items collected during admission were returned to the patient/family  Home medications:    Dental Appliance: Dental Appliances: None  Vision: Visual Aid: Glasses, With patient  Hearing Aid:    Jewelry: Jewelry: Ring  Clothing: Clothing: Jacket/Coat, Socks, Pants, Footwear, Undergarments  Other Valuables: Other Valuables: Cell Phone, Money (comment), With patient  Valuables sent to safe:      OR _________________________________________________________________________________________    There were no personal belongings, valuables or home medications left at patient's bedside,  or safe.

## 2021-03-11 NOTE — ED PROVIDER NOTES
EMERGENCY DEPARTMENT HISTORY AND PHYSICAL EXAM      Date: 3/10/2021  Patient Name: Jenniffer Hayes    History of Presenting Illness     Chief Complaint   Patient presents with    Palpitations     Elevated HR at home; EMs reports ST with trigeminy. Denies any CP. D/c from ED [facility] today for CHF on home oxygen       History Provided By: Patient and EMS    HPI: Jenniffer Hayes, 65 y.o. male presents to the ED with cc of Tachycardia. Pt PMHx sig for COPD, SVT. Pt had been discharged from hospital today, Pt dc home with home O2. He is also on Eliquis and has not missed any doses. Pt states at home he noted palpitations associated with elevated HR. He denies any CP or SOA. He denies any fever or chills. There has been no coughing. He denies abd pain, n/v/d. Pt states his tachycardia is not uncommon but because he was just discharged he presented to the ED for eval. He states he has been on Inderal in the past and is followed by Dr. Sruthi Ferguson. There are no other complaints, changes, or physical findings at this time.     PCP: Valerie Ayala MD    Current Facility-Administered Medications on File Prior to Encounter   Medication Dose Route Frequency Provider Last Rate Last Admin    [DISCONTINUED] dilTIAZem ER (CARDIZEM CD) capsule 120 mg  120 mg Oral DAILY Marina Cuellar MD   120 mg at 03/10/21 0959    [DISCONTINUED] albuterol-ipratropium (DUO-NEB) 2.5 MG-0.5 MG/3 ML  3 mL Nebulization Q4H PRN Bernard Butcher NP        [DISCONTINUED] furosemide (LASIX) tablet 20 mg  20 mg Oral DAILY Marina Cuellar MD   20 mg at 03/10/21 1000    [DISCONTINUED] albuterol-ipratropium (DUO-NEB) 2.5 MG-0.5 MG/3 ML  3 mL Nebulization Q6H RT Ekaterina JACOBS MD   3 mL at 03/10/21 0834    [DISCONTINUED] predniSONE (DELTASONE) tablet 40 mg  40 mg Oral DAILY WITH BREAKFAST Ekaterina JACOBS MD   40 mg at 03/10/21 0959    [DISCONTINUED] apixaban (ELIQUIS) tablet 5 mg  5 mg Oral BID Bernard Butcher NP   5 mg at 03/10/21 1000    [DISCONTINUED] sodium chloride (NS) flush 5-10 mL  5-10 mL IntraVENous PRN Satinder Izquierdo MD   10 mL at 03/04/21 0932    [DISCONTINUED] sodium chloride (NS) flush 5-40 mL  5-40 mL IntraVENous Q8H Caitlyn Michel MD   10 mL at 03/10/21 0650    [DISCONTINUED] sodium chloride (NS) flush 5-40 mL  5-40 mL IntraVENous PRN Caitlyn Michel MD        [DISCONTINUED] acetaminophen (TYLENOL) tablet 650 mg  650 mg Oral Q6H PRN Caitlyn Michel MD        [DISCONTINUED] acetaminophen (TYLENOL) suppository 650 mg  650 mg Rectal Q6H PRN Caitlyn Michel MD        [DISCONTINUED] polyethylene glycol (MIRALAX) packet 17 g  17 g Oral DAILY PRN Caitlyn Michel MD        [DISCONTINUED] promethazine (PHENERGAN) tablet 12.5 mg  12.5 mg Oral Q6H PRN Caitlyn Michel MD        [DISCONTINUED] ondansetron [facility]) injection 4 mg  4 mg IntraVENous Q6H PRN Caitlyn Michel MD        [DISCONTINUED] aspirin delayed-release tablet 81 mg  81 mg Oral QPM Caitlyn Michel MD   81 mg at 03/09/21 1823    [DISCONTINUED] cholecalciferol (VITAMIN D3) (1000 Units /25 mcg) tablet 1,000 Units  1,000 Units Oral DAILY Caitlyn Michel MD   1,000 Units at 03/10/21 1000    [DISCONTINUED] pantoprazole (PROTONIX) tablet 40 mg  40 mg Oral ACB Caitlyn Michel MD   40 mg at 03/10/21 1000    [DISCONTINUED] finasteride (PROSCAR) tablet 5 mg  5 mg Oral QPM Caitlyn Michel MD   5 mg at 03/09/21 1823    [DISCONTINUED] gemfibroziL (LOPID) tablet 600 mg  600 mg Oral BID Caitlyn Michel MD   600 mg at 03/10/21 1000    [DISCONTINUED] melatonin tablet 3 mg  3 mg Oral QHS Caitlyn Michel MD   3 mg at 03/09/21 2215    [DISCONTINUED] multivitamin, tx-iron-ca-min (THERA-M w/ IRON) tablet 1 Tab  1 Tab Oral DAILY Caitlyn Michel MD   1 Tab at 03/10/21 1000    [DISCONTINUED] omega 3-DHA-EPA-fish oil 1,000 mg (120 mg-180 mg) capsule 1 Cap  1 Cap Oral DAILY Caitlyn Michel MD   1 Cap at 03/10/21 1000    [DISCONTINUED] propranolol LA (INDERAL LA) capsule 120 mg  120 mg Oral QPM Mindi Montes MD AKIN   120 mg at 03/09/21 1823    [DISCONTINUED] rosuvastatin (CRESTOR) tablet 40 mg  40 mg Oral QHS Tyra Elliott MD   40 mg at 03/09/21 2215    [DISCONTINUED] tamsulosin (FLOMAX) capsule 0.4 mg  0.4 mg Oral QPM Tyra Elliott MD   0.4 mg at 03/09/21 2562     Current Outpatient Medications on File Prior to Encounter   Medication Sig Dispense Refill    predniSONE (DELTASONE) 20 mg tablet Take 40 mg by mouth daily (with breakfast). 4 tabs for 3 days   3 tabs for 3 days   2 tabs for 3 days  1 tabs for 3 days  Then stop. 40 Tab 0    furosemide (LASIX) 20 mg tablet 1 tab po daily 30 Tab 0    dilTIAZem ER (CARDIZEM CD) 120 mg capsule Take 1 Cap by mouth daily. 30 Cap 0    apixaban (ELIQUIS) 5 mg tablet Take 1 Tab by mouth two (2) times a day. 60 Tab 0    rosuvastatin (Crestor) 40 mg tablet Take 40 mg by mouth nightly.  finasteride (PROSCAR) 5 mg tablet Take 10 mg by mouth daily. Pt unsure of strength       propranolol LA (INDERAL LA) 120 mg SR capsule Take 120 mg by mouth daily.  tamsulosin (FLOMAX) 0.4 mg capsule Take 0.4 mg by mouth daily.  esomeprazole (NEXIUM) 40 mg capsule Take 40 mg by mouth daily.  aspirin delayed-release 81 mg tablet Take 81 mg by mouth daily.  melatonin 3 mg tablet Take 3 mg by mouth nightly.  albuterol (VENTOLIN HFA) 90 mcg/actuation inhaler Take 1 Puff by inhalation every four (4) hours as needed.  fluticasone-vilanterol (BREO ELLIPTA) 100-25 mcg/dose inhaler Take 1 Puff by inhalation daily.  gemfibrozil (LOPID) 600 mg tablet Take 600 mg by mouth two (2) times a day.  cholecalciferol (VITAMIN D3) 1,000 unit cap Take 1,000 Units by mouth daily.  MULTIVIT-MIN/FA/LYCOPEN/LUTEIN (CENTRUM SILVER ULTRA MEN'S PO) Take 1 Tab by mouth daily.  Omega-3-DHA-EPA-Fish Oil 1,000 mg (120 mg-180 mg) cap Take 1 Cap by mouth daily.          Past History     Past Medical History:  Past Medical History:   Diagnosis Date    Arrhythmia     SVT;  [facility] Fall 2018    Chronic obstructive pulmonary disease (HCC)     mild    GERD (gastroesophageal reflux disease)     Hypertension     Thyroid disease     benign thyroid growth, checked annually by Dr. Arlys Duane       Past Surgical History:  Past Surgical History:   Procedure Laterality Date    COLONOSCOPY N/A 2/5/2019    COL[facility] performed [facility] Sol [facility] at [facility] ENDOSCOPY    COLONOSCOPY,REMV RAVI,SNARE  2/5/2019         HX HEENT Bilateral     cataract extraction    AL COLON CA SCRN NOT HI RSK IND  2/5/2019            Family History:  Family History   Problem Relation Age of Onset    Hypertension Mother     Heart Disease Mother     Hypertension Father     Diabetes Father        Social History:  Social History     Tobacco Use    Smoking status: Former Smoker     Years: 5.00    Smokeless tobacco: Never Used   Substance Use Topics    Alcohol use: Yes     Alcohol/week: 7.0 standard drinks     Types: 7 Glasses of wine per week    Drug use: No       Allergies:  No Known Allergies      Review of Systems   Review of Systems   Constitutional: Negative. Negative for appetite change, chills, fatigue and fever. HENT: Negative. Negative for congestion, rhinorrhea, sinus pressure and sore throat. Eyes: Negative. Respiratory: Negative. Negative for cough, choking, chest tightness, shortness of breath and wheezing. Cardiovascular: Positive for palpitations. Negative for chest pain and leg swelling. Gastrointestinal: Negative for abdominal pain, constipation, diarrhea, nausea and vomiting. Endocrine: Negative. Genitourinary: Negative. Negative for difficulty urinating, dysuria, flank pain and urgency. Musculoskeletal: Negative. Skin: Negative. Neurological: Negative. Negative for dizziness, speech difficulty, weakness, light-headedness, numbness and headaches. Psychiatric/Behavioral: Negative. All other systems reviewed and are negative.       Physical Exam   Physical Exam  Vitals signs and nursing note reviewed. Constitutional:       General: He is not in acute distress. Appearance: He is well-developed. He is obese. He is not diaphoretic. HENT:      Head: Normocephalic and atraumatic. Mouth/Throat:      Pharynx: No oropharyngeal exudate. Eyes:      Conjunctiva/sclera: Conjunctivae normal.      Pupils: Pupils are equal, round, and reactive to light. Neck:      Musculoskeletal: Normal range of motion and neck supple. Vascular: No JVD. Trachea: No tracheal deviation. Cardiovascular:      Rate and Rhythm: Regular rhythm. Tachycardia present. Heart sounds: Normal heart sounds. No murmur. Pulmonary:      Effort: Pulmonary effort is normal. No respiratory distress. Breath sounds: Normal breath sounds. No stridor. No wheezing or rales. Chest:      Chest wall: No tenderness. Abdominal:      General: There is no distension. Palpations: Abdomen is soft. Tenderness: There is no abdominal tenderness. There is no guarding or rebound. Musculoskeletal: Normal range of motion. General: No tenderness. Skin:     General: Skin is warm and dry. Neurological:      Mental Status: He is alert and oriented to person, place, and time. Cranial Nerves: No cranial nerve deficit.       Comments: No gross motor or sensory deficits    Psychiatric:         Behavior: Behavior normal.         Diagnostic Study Results     Labs -     Recent Results (from the past 12 hour(s))   EKG, 12 LEAD, INITIAL    Collection Time: 03/10/21  5:41 PM   Result Value Ref Range    Ventricular Rate 124 BPM    Atrial Rate 124 BPM    P-R Interval 172 ms    QRS Duration 86 ms    Q-T Interval 294 ms    QTC Calculation (Bezet) 422 ms    Calculated P Axis 33 degrees    Calculated R Axis 52 degrees    Calculated T Axis 67 degrees    Diagnosis       Sinus tachycardia  Nonspecific ST abnormality  When compared with ECG of 05-MAR-2021 22:31,  Sinus rhythm has replaced Atrial fibrillation     CBC WITH AUTOMATED DIFF    Collection Time: 03/10/21  6:21 PM   Result Value Ref Range    WBC 8.1 4.1 - 11.1 K/uL    RBC 5.12 4.10 - 5.70 M/uL    HGB 15.1 12.1 - 17.0 g/dL    HCT 48.8 36.6 - 50.3 %    MCV 95.3 80.0 - 99.0 FL    MCH 29.5 26.0 - 34.0 PG    MCHC 30.9 30.0 - 36.5 g/dL    RDW 15.1 (H) 11.5 - 14.5 %    PLATELET 908 030 - 847 K/uL    MPV 10.0 8.9 - 12.9 FL    NRBC 0.0 0  WBC    ABSOLUTE NRBC 0.00 0.00 - 0.01 K/uL    NEUTROPHILS 87 (H) 32 - 75 %    LYMPHOCYTES 8 (L) 12 - 49 %    MONOCYTES 5 5 - 13 %    EOSINOPHILS 0 0 - 7 %    BASOPHILS 0 0 - 1 %    IMMATURE GRANULOCYTES 0 0.0 - 0.5 %    ABS. NEUTROPHILS 7.1 1.8 - 8.0 K/UL    ABS. LYMPHOCYTES 0.6 (L) 0.8 - 3.5 K/UL    ABS. MONOCYTES 0.4 0.0 - 1.0 K/UL    ABS. EOSINOPHILS 0.0 0.0 - 0.4 K/UL    ABS. BASOPHILS 0.0 0.0 - 0.1 K/UL    ABS. IMM. GRANS. 0.0 0.00 - 0.04 K/UL    DF SMEAR SCANNED      RBC COMMENTS ANISOCYTOSIS  1+        RBC COMMENTS BASOPHILIC STIPPLING  PRESENT       METABOLIC PANEL, COMPREHENSIVE    Collection Time: 03/10/21  6:21 PM   Result Value Ref Range    Sodium 134 (L) 136 - 145 mmol/L    Potassium 4.2 3.5 - 5.1 mmol/L    Chloride 96 (L) 97 - 108 mmol/L    CO2 37 (H) 21 - 32 mmol/L    Anion gap 1 (L) 5 - 15 mmol/L    Glucose 178 (H) 65 - 100 mg/dL    BUN 31 (H) 6 - 20 MG/DL    Creatinine 1.20 0.70 - 1.30 MG/DL    BUN/Creatinine ratio 26 (H) 12 - 20      GFR est AA >60 >60 ml/min/1.73m2    GFR est non-AA 60 (L) >60 ml/min/1.73m2    Calcium 8.3 (L) 8.5 - 10.1 MG/DL    Bilirubin, total 0.4 0.2 - 1.0 MG/DL    ALT (SGPT) 30 12 - 78 U/L    AST (SGOT) 20 15 - 37 U/L    Alk.  phosphatase 82 45 - 117 U/L    Protein, total 7.6 6.4 - 8.2 g/dL    Albumin 3.5 3.5 - 5.0 g/dL    Globulin 4.1 (H) 2.0 - 4.0 g/dL    A-G Ratio 0.9 (L) 1.1 - 2.2     TROPONIN I    Collection Time: 03/10/21  6:21 PM   Result Value Ref Range    Troponin-I, Qt. <0.05 <0.05 ng/mL       Radiologic Studies -   XR CHEST PORT   Final Result No acute findings. CT Results  (Last 48 hours)    None        CXR Results  (Last 48 hours)               03/10/21 2018  XR CHEST PORT Final result    Impression:  No acute findings. Narrative:  EXAM: XR CHEST PORT       INDICATION: Tachycardia       COMPARISON: Chest x-ray 3/9/2021 and CT thorax 3/4/2021. FINDINGS: A portable AP radiograph of the chest was obtained at 19:39 hours. The   patient is on a cardiac monitor. The lungs are clear. The cardiac and   mediastinal contours and pulmonary vascularity are normal. The bones and soft   tissues are grossly within normal limits. 03/09/21 0544  XR CHEST PORT Final result    Impression:  No acute finding or significant change. Narrative:  INDICATION: Short of breath. Emphysema. interval changes       EXAM: CXR Portable. FINDINGS: Portable chest shows no significant change since March 4. There is no   apparent pneumothorax. Lungs show no consolidation. Heart size is normal. There   is no overt pulmonary edema. Medical Decision Making   I am the first provider for this patient. I reviewed the vital signs, available nursing notes, past medical history, past surgical history, family history and social history. Vital Signs-Reviewed the patient's vital signs. Patient Vitals for the past 12 hrs:   Temp Pulse Resp BP SpO2   03/10/21 2045  (!) 126 16 (!) 140/64 94 %   03/10/21 1812  (!) 123 19  97 %   03/10/21 1809    (!) 155/78    03/10/21 1735 98.7 °F (37.1 °C) (!) 129 18 (!) 164/103 91 %       EKG interpretation: (Preliminary)  ST, rate 124, normal axis/pr/qrs, NSST, Penelope Brar DO      Records Reviewed: Nursing Notes, Old Medical Records, Previous electrocardiograms, Previous Radiology Studies and Previous Laboratory Studies, Admx 3/4-3/10 due to COPD, dc with home O2.  Pt also on diuretic    Provider Notes (Medical Decision Making):   DDx- Tachycardia, dehydration, electrolyte abnormality    ED Course:   Initial assessment performed. The patients presenting problems have been discussed, and they are in agreement with the care plan formulated and outlined with them. I have encouraged them to ask questions as they arise throughout their visit. Pt had been just discharged from the hospital. He states he has a hx of tachycardia. He has been on Inderal for quite some time due to tachycardia and is followed by Dr. Yajaira Hickey. He is currently on Eliquis and has been complaint. He states he had just been in the hospital for COPD exacerbation. If he had not been just released he would not have given his fast heart rate any thought because it happens often. He is not symptomatic and labs are all unremarkable. His CXR was clear. He states that he has been diuresed and feels thirsty and dehydrated. Pt given 500ml fluid bolus and was feeling better and did have some improvement in his hear rate. He is due for his pm dose of Inderal when he gets home. Disposition:  DC    DISCHARGE PLAN:  1. Discharge Medication List as of 3/10/2021  9:31 PM        2. Follow-up Information     Follow up With Specialties Details Why Contact Info    Booker Mason, [address]   [facility]  [address]      Joey Nicholson MD Cardiology   [address] [place] [phone]          3. Return to ED if worse     Diagnosis     Clinical Impression:   1. Tachycardia    2. COPD    Attestations:    Misa Olvera, DO    Please note that this dictation was completed with [employer], the computer voice recognition software. Quite often unanticipated grammatical, syntax, homophones, and other interpretive errors are inadvertently transcribed by the computer software. Please disregard these errors. Please excuse any errors that have escaped final proofreading. Thank you.

## 2021-03-23 ENCOUNTER — PATIENT OUTREACH (OUTPATIENT)
Dept: CASE MANAGEMENT | Age: 71
End: 2021-03-23

## 2021-03-23 NOTE — PROGRESS NOTES
Called and spoke to patient. Goals      Prevent complications post hospitalization. 3/11/21 [address] 3/4-3/10 Acute resp failure with hypoxia/CHF; [facility]   Reviewed discharge instructions with patient   Reviewed meds- education on new meds provided. Had not started Prednison this am, wife was on way to pick it up.  Reviewed red flags: Pulse ox less than 90%, sob despite O2, chest pain, tachycardia, fever,nausea,vomiting,diarrhea.  NATHANAEL with pcp 3/15;   Reminded to call pulmonary, Dr.Drew Ada Bunch and sched f/u.  Reminded to call cardio, Dr.Stanley Casandra Dobson and sched f/u.  Reviewed need to follow low salt diet, check weigh q am and record. Notify cardio if weight increases by > 3 lb in 2 days or 5 lb in one week.  Watch for swelling in extremities.  Given info on [employer] as resource.  Given CTN contact info if questions/concerns.  CTN to check back in about a week, sooner prn.mbt  3/23/21  Patient reports he is doing well. No sob. Heart beat irratic at times, normal for him for years. Sees cardio, , next week. Goes to sleep center next week re O2. Has been weaning off. Using Trilogy at night. No swelling in legs or feet and weight remains stable. No red flags noted. Call if any questions/concerns. CTN to check back in about a week. mbt

## 2021-04-08 ENCOUNTER — PATIENT OUTREACH (OUTPATIENT)
Dept: CASE MANAGEMENT | Age: 71
End: 2021-04-08

## 2021-04-08 NOTE — PROGRESS NOTES
Called and spoke to patient. Goals      Prevent complications post hospitalization. 3/11/21 [address] 3/4-3/10 Acute resp failure with hypoxia/CHF; Also [address] [facility] ED 3/10 for Tachycardia   Reviewed discharge instructions with patient   Reviewed meds- education on new meds provided. Had not started Prednison this am, wife was on way to pick it up.  Reviewed red flags: Pulse ox less than 90%, sob despite O2, chest pain, tachycardia, fever,nausea,vomiting,diarrhea.  NATHANAEL with pcp 3/15;   Reminded to call pulmonary, Dr.Drew Marvin Cunha and sched f/u.  Reminded to call cardio,  [facility] and sched f/u.  Reviewed need to follow low salt diet, check weigh q am and record. Notify cardio if weight increases by > 3 lb in 2 days or 5 lb in one week.  Watch for swelling in extremities.  Given info on [employer] as resource.  Given CTN contact info if questions/concerns.  CTN to check back in about a week, sooner prn.mbt  3/23/21  Patient reports he is doing well. No sob. Heart beat irratic at times, normal for him for years. Sees cardio, , next week. Goes to sleep center next week re O2. Has been weaning off. Using Trilogy at night. No swelling in legs or feet and weight remains stable. No red flags noted. Call if any questions/concerns. CTN to check back in about a week. mbt  4/8/21  Reports he is doing great! Saw cardio last week. Has developed Afib but cardio wants to get his lungs taken care of before addressing Afib. Next week has 6 minute walk. End of April sees respiratory therapist.  Down to 2liters of O2. Uses prn. Just walked downstairs without o2- pulse ox was 93-95%. No sob. Advised to continue current POC. Call if concerns or red flags. mbt

## 2021-04-13 ENCOUNTER — PATIENT OUTREACH (OUTPATIENT)
Dept: CASE MANAGEMENT | Age: 71
End: 2021-04-13

## 2021-04-13 NOTE — PROGRESS NOTES
Patient has graduated from the Transitions of Care Coordination  program on 4/13/21. Patient/family has the ability to self-manage at this time Care management goals have been completed. Patient was not referred to the [facility] team for further management. Goals Addressed                 This Visit's Progress     COMPLETED: [facility] post [facility]. [facility] with [facility]F; [facility]   Reviewed discharge instructions with patient   Reviewed meds- education on new meds provided. Had not started Prednison this am, wife was on way to pick it up.  Reviewed red flags: Pulse ox less than 90%, sob despite O2, chest pain, tachycardia, fever,nausea,vomiting,diarrhea.  NATHANAEL with pcp 3/15;   Reminded to call pulmonary, Dr.Drew Toni Garrett and sched f/u.  Reminded to call cardio, Dr.Stanley Chacorta Butler and sched f/u.  Reviewed need to follow low salt diet, check weigh q am and record. Notify cardio if weight increases by > 3 lb in 2 days or 5 lb in one week.  Watch for swelling in extremities.  Given info on [employer] as resource.  Given CTN contact info if questions/concerns.  CTN to check back in about a week, sooner prn.mbt  3/23/21  Patient reports he is doing well. No sob. Heart beat irratic at times, normal for him for years. Sees cardio, , next week. Goes to sleep center next week re O2. Has been weaning off. Using Trilogy at night. No swelling in legs or feet and weight remains stable. No red flags noted. Call if any questions/concerns. CTN to check back in about a week. mbt  4/8/21  Reports he is doing great! Saw cardio last week. Has developed Afib but cardio wants to get his lungs taken care of before addressing Afib. Next week has 6 minute walk. End of April sees respiratory therapist.  Down to 2liters of O2. Uses prn. Just walked downstairs without o2- pulse ox was 93-95%. No sob.   Advised to continue current POC. Call if concerns or red flags. mbt  4/13/21  Reports he is doing fine. Goes for his 6 minute walk test tomorrow. No complaints, no red flags. Wished him well, advised to continue to f/u as recommended. mbt            Patient has Care Transition Nurse's contact information for any further questions, concerns, or needs. Patients upcoming visits:  No future appointments.

## 2021-04-19 ENCOUNTER — TRANSCRIBE ORDER (OUTPATIENT)
Dept: SCHEDULING | Age: 71
End: 2021-04-19

## 2021-04-19 DIAGNOSIS — J96.12 CHRONIC HYPERCAPNIC RESPIRATORY FAILURE (HCC): Primary | ICD-10-CM

## 2021-04-27 ENCOUNTER — TRANSCRIBE ORDER (OUTPATIENT)
Dept: SCHEDULING | Age: 71
End: 2021-04-27

## 2021-04-27 DIAGNOSIS — J96.12 CHRONIC HYPERCAPNIC RESPIRATORY FAILURE (HCC): Primary | ICD-10-CM

## 2021-05-03 ENCOUNTER — HOSPITAL ENCOUNTER (OUTPATIENT)
Dept: PULMONOLOGY | Age: 71
Discharge: HOME | End: 2021-05-03
Attending: PHYSICIAN ASSISTANT
Payer: MEDICARE

## 2021-05-03 DIAGNOSIS — J96.12 CHRONIC HYPERCAPNIC RESPIRATORY FAILURE (HCC): ICD-10-CM

## 2021-05-03 LAB
ARTERIAL PATENCY WRIST A: YES
BASE EXCESS BLD CALC-SCNC: 4 MMOL/L
BDY SITE: ABNORMAL
CA-I BLD-SCNC: 1.24 MMOL/L (ref 1.12–1.32)
GAS FLOW.O2 O2 DELIVERY SYS: ABNORMAL L/MIN
GAS FLOW.O2 SETTING OXYMISER: 2 L/M
HCO3 BLD-SCNC: 28.8 MMOL/L (ref 22–26)
PCO2 BLD: 49.4 MMHG (ref 35–45)
PH BLD: 7.37 [PH] (ref 7.35–7.45)
PO2 BLD: 83 MMHG (ref 80–100)
SAO2 % BLD: 96 % (ref 92–97)
SPECIMEN TYPE: ABNORMAL

## 2021-05-03 PROCEDURE — 82803 BLOOD GASES ANY COMBINATION: CPT

## 2021-05-03 PROCEDURE — 36600 WITHDRAWAL OF ARTERIAL BLOOD: CPT

## 2021-05-21 ENCOUNTER — HOSPITAL ENCOUNTER (OUTPATIENT)
Dept: NON INVASIVE DIAGNOSTICS | Age: 71
Discharge: HOME | End: 2021-05-21
Attending: INTERNAL MEDICINE
Payer: MEDICARE

## 2021-05-21 VITALS
WEIGHT: 260 LBS | HEART RATE: 72 BPM | RESPIRATION RATE: 23 BRPM | OXYGEN SATURATION: 100 % | DIASTOLIC BLOOD PRESSURE: 75 MMHG | BODY MASS INDEX: 36.26 KG/M2 | SYSTOLIC BLOOD PRESSURE: 131 MMHG

## 2021-05-21 DIAGNOSIS — I48.91 ATRIAL FIBRILLATION, UNSPECIFIED TYPE (HCC): ICD-10-CM

## 2021-05-21 PROCEDURE — 93005 ELECTROCARDIOGRAM TRACING: CPT

## 2021-05-21 PROCEDURE — 77030018729 HC ELECTRD DEFIB PAD CARD -B

## 2021-05-21 PROCEDURE — 74011250636 HC RX REV CODE- 250/636: Performed by: INTERNAL MEDICINE

## 2021-05-21 PROCEDURE — 99152 MOD SED SAME PHYS/QHP 5/>YRS: CPT

## 2021-05-21 PROCEDURE — 92960 CARDIOVERSION ELECTRIC EXT: CPT

## 2021-05-21 RX ORDER — MIDAZOLAM HYDROCHLORIDE 1 MG/ML
.5-2 INJECTION, SOLUTION INTRAMUSCULAR; INTRAVENOUS
Status: DISCONTINUED | OUTPATIENT
Start: 2021-05-21 | End: 2021-05-21

## 2021-05-21 RX ORDER — FENTANYL CITRATE 50 UG/ML
12.5-5 INJECTION, SOLUTION INTRAMUSCULAR; INTRAVENOUS
Status: DISCONTINUED | OUTPATIENT
Start: 2021-05-21 | End: 2021-05-21

## 2021-05-21 RX ADMIN — MIDAZOLAM HYDROCHLORIDE 1 MG: 1 INJECTION, SOLUTION INTRAMUSCULAR; INTRAVENOUS at 08:03

## 2021-05-21 RX ADMIN — FENTANYL CITRATE 50 MCG: 50 INJECTION, SOLUTION INTRAMUSCULAR; INTRAVENOUS at 07:59

## 2021-05-21 RX ADMIN — MIDAZOLAM HYDROCHLORIDE 2 MG: 1 INJECTION, SOLUTION INTRAMUSCULAR; INTRAVENOUS at 08:00

## 2021-05-21 RX ADMIN — MIDAZOLAM HYDROCHLORIDE 1 MG: 1 INJECTION, SOLUTION INTRAMUSCULAR; INTRAVENOUS at 08:02

## 2021-05-21 NOTE — DISCHARGE INSTRUCTIONS
DISCHARGE SUMMARY       The following personal items collected during your admission are returned to you:   Clothing:  Shirt, mask, watch, O2 tank        PATIENT INSTRUCTIONS: Continue taking all the same medications . The cardioversion procedure can cause redness to the skin where the patches were placed. You may treat this with Aloe or Cortisone cream over the counter lotion. If the skin appears very reddened or blistered contact your physician      Call to make an appointment with Dr. Chacorta Butler in 2-3 week(s). What to do at Home:  Recommended activity: No driving today      The discharge information has been reviewed with the PATIENT . The PATIENT  verbalized understanding.

## 2021-05-21 NOTE — PROGRESS NOTES
Pt sedated with 4mg Versed and 50mcg Fentanyl for Cardioversion, given 1 synchronized shock(s) at 360 Joules, afib converted to NSR. (monitored sedation from 0758 to 0810)

## 2021-05-21 NOTE — PROGRESS NOTES
Patient arrived to Non-Invasive Cardiology Lab for Out Patient [facility] Procedure. Staff introduced to patient. Patient identifiers verified with Name and Date of Birth. Procedure verified with patient. Consent forms reviewed and signed by patient or authorized representative and verified. Allergies verified. Patient informed of procedure and plan of care. Questions answered with review. Patient on cardiac monitor, non-invasive blood pressure, SPO2 monitor. On RA. Patient is A&Ox3. Patient reports no complaints. Patient on stretcher, in low position, with side rails up. Patient instructed to call for assistance as needed. Family in waiting room.  Wife Jay Jay Ivory

## 2021-05-21 NOTE — Clinical Note
ID band present and verified. Family is in the waiting area. [facility].

## 2021-05-22 LAB
ATRIAL RATE: 94 BPM
CALCULATED P AXIS, ECG09: 13 DEGREES
CALCULATED R AXIS, ECG10: 9 DEGREES
CALCULATED T AXIS, ECG11: 63 DEGREES
DIAGNOSIS, 93000: NORMAL
P-R INTERVAL, ECG05: 214 MS
Q-T INTERVAL, ECG07: 360 MS
QRS DURATION, ECG06: 102 MS
QTC CALCULATION (BEZET), ECG08: 450 MS
VENTRICULAR RATE, ECG03: 94 BPM

## 2021-07-01 ENCOUNTER — HOSPITAL ENCOUNTER (OUTPATIENT)
Dept: NON INVASIVE DIAGNOSTICS | Age: 71
Discharge: HOME | DRG: 309 | End: 2021-07-01
Payer: MEDICARE

## 2021-07-01 VITALS
HEART RATE: 93 BPM | DIASTOLIC BLOOD PRESSURE: 80 MMHG | SYSTOLIC BLOOD PRESSURE: 125 MMHG | WEIGHT: 260 LBS | OXYGEN SATURATION: 93 % | BODY MASS INDEX: 36.26 KG/M2 | RESPIRATION RATE: 18 BRPM

## 2021-07-01 DIAGNOSIS — I48.91 ATRIAL FIBRILLATION, UNSPECIFIED TYPE (HCC): ICD-10-CM

## 2021-07-01 LAB
ATRIAL RATE: 90 BPM
CALCULATED P AXIS, ECG09: 71 DEGREES
CALCULATED R AXIS, ECG10: 5 DEGREES
CALCULATED T AXIS, ECG11: 57 DEGREES
DIAGNOSIS, 93000: NORMAL
P-R INTERVAL, ECG05: 204 MS
Q-T INTERVAL, ECG07: 378 MS
QRS DURATION, ECG06: 100 MS
QTC CALCULATION (BEZET), ECG08: 462 MS
VENTRICULAR RATE, ECG03: 90 BPM

## 2021-07-01 PROCEDURE — 77030018729 HC ELECTRD DEFIB PAD CARD -B

## 2021-07-01 PROCEDURE — 99152 MOD SED SAME PHYS/QHP 5/>YRS: CPT

## 2021-07-01 PROCEDURE — 93005 ELECTROCARDIOGRAM TRACING: CPT

## 2021-07-01 PROCEDURE — 74011250636 HC RX REV CODE- 250/636: Performed by: INTERNAL MEDICINE

## 2021-07-01 PROCEDURE — 92960 CARDIOVERSION ELECTRIC EXT: CPT

## 2021-07-01 RX ORDER — MIDAZOLAM HYDROCHLORIDE 1 MG/ML
.5-2 INJECTION, SOLUTION INTRAMUSCULAR; INTRAVENOUS
Status: DISCONTINUED | OUTPATIENT
Start: 2021-07-01 | End: 2021-07-01

## 2021-07-01 RX ORDER — FENTANYL CITRATE 50 UG/ML
12.5-5 INJECTION, SOLUTION INTRAMUSCULAR; INTRAVENOUS
Status: DISCONTINUED | OUTPATIENT
Start: 2021-07-01 | End: 2021-07-01

## 2021-07-01 RX ORDER — METOPROLOL SUCCINATE 25 MG/1
25 TABLET, EXTENDED RELEASE ORAL DAILY
Status: ON HOLD | COMMUNITY
End: 2021-12-14 | Stop reason: SDUPTHER

## 2021-07-01 RX ADMIN — MIDAZOLAM 1 MG: 1 INJECTION INTRAMUSCULAR; INTRAVENOUS at 08:10

## 2021-07-01 RX ADMIN — FENTANYL CITRATE 50 MCG: 50 INJECTION, SOLUTION INTRAMUSCULAR; INTRAVENOUS at 08:09

## 2021-07-01 RX ADMIN — MIDAZOLAM 2 MG: 1 INJECTION INTRAMUSCULAR; INTRAVENOUS at 08:09

## 2021-07-01 NOTE — PROGRESS NOTES
Pt sedated with 3mg Versed and 50mcg Fentanyl for Cardioversion, given 1 synchronized shock(s) at 360 Joules, afib converted to NSR. (monitored sedation from 0809 to 0820)

## 2021-07-01 NOTE — DISCHARGE INSTRUCTIONS
DISCHARGE SUMMARY       The following personal items collected during your admission are returned to you:   Dental Appliance:  Left at home  Clothing:  Shirt, mask  Oxygen tank. PATIENT INSTRUCTIONS: Continue taking all the same medications. The cardioversion procedure can cause redness to the skin where the patches were placed. You may treat this with Aloe or Cortisone cream over the counter lotion. If the skin appears very reddened or blistered contact your physician      Dr. Destiny Adhikari office will call you to schedule a follow up appointment. What to do at Home:  Recommended activity: No driving today      The discharge information has been reviewed with the PATIENT . The PATIENT  verbalized understanding.

## 2021-07-02 ENCOUNTER — HOSPITAL ENCOUNTER (INPATIENT)
Age: 71
LOS: 3 days | Discharge: HOME | DRG: 309 | End: 2021-07-05
Attending: EMERGENCY MEDICINE | Admitting: INTERNAL MEDICINE
Payer: MEDICARE

## 2021-07-02 ENCOUNTER — APPOINTMENT (OUTPATIENT)
Dept: GENERAL RADIOLOGY | Age: 71
DRG: 309 | End: 2021-07-02
Attending: EMERGENCY MEDICINE
Payer: MEDICARE

## 2021-07-02 DIAGNOSIS — I48.92 ATRIAL FLUTTER WITH RAPID VENTRICULAR RESPONSE (HCC): Primary | ICD-10-CM

## 2021-07-02 LAB
ALBUMIN SERPL-MCNC: 3.8 G/DL (ref 3.5–5)
ALBUMIN/GLOB SERPL: 0.9 {RATIO} (ref 1.1–2.2)
ALP SERPL-CCNC: 77 U/L (ref 45–117)
ALT SERPL-CCNC: 33 U/L (ref 12–78)
ANION GAP SERPL CALC-SCNC: 6 MMOL/L (ref 5–15)
AST SERPL-CCNC: 20 U/L (ref 15–37)
ATRIAL RATE: 258 BPM
BASOPHILS # BLD: 0 K/UL (ref 0–0.1)
BASOPHILS NFR BLD: 1 % (ref 0–1)
BILIRUB SERPL-MCNC: 0.8 MG/DL (ref 0.2–1)
BUN SERPL-MCNC: 21 MG/DL (ref 6–20)
BUN/CREAT SERPL: 18 (ref 12–20)
CALCIUM SERPL-MCNC: 8.9 MG/DL (ref 8.5–10.1)
CALCULATED P AXIS, ECG09: -104 DEGREES
CALCULATED R AXIS, ECG10: -2 DEGREES
CALCULATED T AXIS, ECG11: 64 DEGREES
CHLORIDE SERPL-SCNC: 103 MMOL/L (ref 97–108)
CO2 SERPL-SCNC: 30 MMOL/L (ref 21–32)
COMMENT, HOLDF: NORMAL
CREAT SERPL-MCNC: 1.16 MG/DL (ref 0.7–1.3)
DIAGNOSIS, 93000: NORMAL
DIFFERENTIAL METHOD BLD: ABNORMAL
EOSINOPHIL # BLD: 0.1 K/UL (ref 0–0.4)
EOSINOPHIL NFR BLD: 1 % (ref 0–7)
ERYTHROCYTE [DISTWIDTH] IN BLOOD BY AUTOMATED COUNT: 13.6 % (ref 11.5–14.5)
GLOBULIN SER CALC-MCNC: 4.1 G/DL (ref 2–4)
GLUCOSE SERPL-MCNC: 109 MG/DL (ref 65–100)
HCT VFR BLD AUTO: 45.5 % (ref 36.6–50.3)
HGB BLD-MCNC: 14.3 G/DL (ref 12.1–17)
IMM GRANULOCYTES # BLD AUTO: 0.1 K/UL (ref 0–0.04)
IMM GRANULOCYTES NFR BLD AUTO: 1 % (ref 0–0.5)
LYMPHOCYTES # BLD: 2.5 K/UL (ref 0.8–3.5)
LYMPHOCYTES NFR BLD: 29 % (ref 12–49)
MAGNESIUM SERPL-MCNC: 2.3 MG/DL (ref 1.6–2.4)
MCH RBC QN AUTO: 29.2 PG (ref 26–34)
MCHC RBC AUTO-ENTMCNC: 31.4 G/DL (ref 30–36.5)
MCV RBC AUTO: 93 FL (ref 80–99)
MONOCYTES # BLD: 0.8 K/UL (ref 0–1)
MONOCYTES NFR BLD: 9 % (ref 5–13)
NEUTS SEG # BLD: 5 K/UL (ref 1.8–8)
NEUTS SEG NFR BLD: 59 % (ref 32–75)
NRBC # BLD: 0 K/UL (ref 0–0.01)
NRBC BLD-RTO: 0 PER 100 WBC
P-R INTERVAL, ECG05: 158 MS
PLATELET # BLD AUTO: 194 K/UL (ref 150–400)
PMV BLD AUTO: 10.7 FL (ref 8.9–12.9)
POTASSIUM SERPL-SCNC: 4.5 MMOL/L (ref 3.5–5.1)
PROT SERPL-MCNC: 7.9 G/DL (ref 6.4–8.2)
Q-T INTERVAL, ECG07: 342 MS
QRS DURATION, ECG06: 88 MS
QTC CALCULATION (BEZET), ECG08: 523 MS
RBC # BLD AUTO: 4.89 M/UL (ref 4.1–5.7)
SAMPLES BEING HELD,HOLD: NORMAL
SODIUM SERPL-SCNC: 139 MMOL/L (ref 136–145)
TROPONIN I SERPL-MCNC: <0.05 NG/ML
TROPONIN I SERPL-MCNC: <0.05 NG/ML
VENTRICULAR RATE, ECG03: 141 BPM
WBC # BLD AUTO: 8.5 K/UL (ref 4.1–11.1)

## 2021-07-02 PROCEDURE — 74011250637 HC RX REV CODE- 250/637: Performed by: INTERNAL MEDICINE

## 2021-07-02 PROCEDURE — 99285 EMERGENCY DEPT VISIT HI MDM: CPT

## 2021-07-02 PROCEDURE — 74011250636 HC RX REV CODE- 250/636: Performed by: INTERNAL MEDICINE

## 2021-07-02 PROCEDURE — 74011000250 HC RX REV CODE- 250: Performed by: INTERNAL MEDICINE

## 2021-07-02 PROCEDURE — 74011250636 HC RX REV CODE- 250/636: Performed by: EMERGENCY MEDICINE

## 2021-07-02 PROCEDURE — 85025 COMPLETE CBC W/AUTO DIFF WBC: CPT

## 2021-07-02 PROCEDURE — 83735 ASSAY OF MAGNESIUM: CPT

## 2021-07-02 PROCEDURE — 84484 ASSAY OF TROPONIN QUANT: CPT

## 2021-07-02 PROCEDURE — 96376 TX/PRO/DX INJ SAME DRUG ADON: CPT

## 2021-07-02 PROCEDURE — 96374 THER/PROPH/DIAG INJ IV PUSH: CPT

## 2021-07-02 PROCEDURE — 74011000250 HC RX REV CODE- 250: Performed by: EMERGENCY MEDICINE

## 2021-07-02 PROCEDURE — 71045 X-RAY EXAM CHEST 1 VIEW: CPT

## 2021-07-02 PROCEDURE — 93005 ELECTROCARDIOGRAM TRACING: CPT

## 2021-07-02 PROCEDURE — 36415 COLL VENOUS BLD VENIPUNCTURE: CPT

## 2021-07-02 PROCEDURE — 74011000258 HC RX REV CODE- 258: Performed by: INTERNAL MEDICINE

## 2021-07-02 PROCEDURE — 80053 COMPREHEN METABOLIC PANEL: CPT

## 2021-07-02 PROCEDURE — 65660000001 HC RM ICU INTERMED STEPDOWN

## 2021-07-02 RX ORDER — SODIUM CHLORIDE 0.9 % (FLUSH) 0.9 %
5-40 SYRINGE (ML) INJECTION AS NEEDED
Status: DISCONTINUED | OUTPATIENT
Start: 2021-07-02 | End: 2021-07-05 | Stop reason: HOSPADM

## 2021-07-02 RX ORDER — POLYETHYLENE GLYCOL 3350 17 G/17G
17 POWDER, FOR SOLUTION ORAL DAILY PRN
Status: DISCONTINUED | OUTPATIENT
Start: 2021-07-02 | End: 2021-07-05 | Stop reason: HOSPADM

## 2021-07-02 RX ORDER — METOPROLOL SUCCINATE 25 MG/1
25 TABLET, EXTENDED RELEASE ORAL DAILY
Status: DISCONTINUED | OUTPATIENT
Start: 2021-07-02 | End: 2021-07-05 | Stop reason: HOSPADM

## 2021-07-02 RX ORDER — PANTOPRAZOLE SODIUM 40 MG/1
40 TABLET, DELAYED RELEASE ORAL
Status: DISCONTINUED | OUTPATIENT
Start: 2021-07-03 | End: 2021-07-05 | Stop reason: HOSPADM

## 2021-07-02 RX ORDER — ACETAMINOPHEN 325 MG/1
650 TABLET ORAL
Status: DISCONTINUED | OUTPATIENT
Start: 2021-07-02 | End: 2021-07-05 | Stop reason: HOSPADM

## 2021-07-02 RX ORDER — FINASTERIDE 5 MG/1
10 TABLET, FILM COATED ORAL DAILY
Status: DISCONTINUED | OUTPATIENT
Start: 2021-07-03 | End: 2021-07-05 | Stop reason: HOSPADM

## 2021-07-02 RX ORDER — SODIUM CHLORIDE 0.9 % (FLUSH) 0.9 %
5-40 SYRINGE (ML) INJECTION EVERY 8 HOURS
Status: DISCONTINUED | OUTPATIENT
Start: 2021-07-02 | End: 2021-07-05 | Stop reason: HOSPADM

## 2021-07-02 RX ORDER — ACETAMINOPHEN 650 MG/1
650 SUPPOSITORY RECTAL
Status: DISCONTINUED | OUTPATIENT
Start: 2021-07-02 | End: 2021-07-05 | Stop reason: HOSPADM

## 2021-07-02 RX ORDER — TAMSULOSIN HYDROCHLORIDE 0.4 MG/1
0.4 CAPSULE ORAL DAILY
Status: DISCONTINUED | OUTPATIENT
Start: 2021-07-03 | End: 2021-07-05 | Stop reason: HOSPADM

## 2021-07-02 RX ORDER — ASPIRIN 81 MG/1
81 TABLET ORAL DAILY
Status: DISCONTINUED | OUTPATIENT
Start: 2021-07-03 | End: 2021-07-05 | Stop reason: HOSPADM

## 2021-07-02 RX ORDER — ROSUVASTATIN CALCIUM 40 MG/1
40 TABLET, COATED ORAL
Status: DISCONTINUED | OUTPATIENT
Start: 2021-07-02 | End: 2021-07-05 | Stop reason: HOSPADM

## 2021-07-02 RX ORDER — FUROSEMIDE 20 MG/1
20 TABLET ORAL DAILY
Status: DISCONTINUED | OUTPATIENT
Start: 2021-07-03 | End: 2021-07-05 | Stop reason: HOSPADM

## 2021-07-02 RX ORDER — DILTIAZEM HYDROCHLORIDE 120 MG/1
120 CAPSULE, COATED, EXTENDED RELEASE ORAL DAILY
Status: DISCONTINUED | OUTPATIENT
Start: 2021-07-03 | End: 2021-07-02

## 2021-07-02 RX ORDER — GEMFIBROZIL 600 MG/1
600 TABLET, FILM COATED ORAL 2 TIMES DAILY
Status: DISCONTINUED | OUTPATIENT
Start: 2021-07-02 | End: 2021-07-05 | Stop reason: HOSPADM

## 2021-07-02 RX ORDER — DILTIAZEM HYDROCHLORIDE 5 MG/ML
10 INJECTION INTRAVENOUS
Status: COMPLETED | OUTPATIENT
Start: 2021-07-02 | End: 2021-07-02

## 2021-07-02 RX ORDER — LANOLIN ALCOHOL/MO/W.PET/CERES
3 CREAM (GRAM) TOPICAL
Status: DISCONTINUED | OUTPATIENT
Start: 2021-07-02 | End: 2021-07-05 | Stop reason: HOSPADM

## 2021-07-02 RX ORDER — ONDANSETRON 2 MG/ML
4 INJECTION INTRAMUSCULAR; INTRAVENOUS
Status: DISCONTINUED | OUTPATIENT
Start: 2021-07-02 | End: 2021-07-05 | Stop reason: HOSPADM

## 2021-07-02 RX ORDER — ACETAMINOPHEN 325 MG/1
650 TABLET ORAL
Status: DISCONTINUED | OUTPATIENT
Start: 2021-07-02 | End: 2021-07-02

## 2021-07-02 RX ORDER — MELATONIN
1000 DAILY
Status: DISCONTINUED | OUTPATIENT
Start: 2021-07-03 | End: 2021-07-05 | Stop reason: HOSPADM

## 2021-07-02 RX ADMIN — GEMFIBROZIL 600 MG: 600 TABLET ORAL at 20:20

## 2021-07-02 RX ADMIN — MELATONIN 3 MG: at 23:00

## 2021-07-02 RX ADMIN — APIXABAN 5 MG: 5 TABLET, FILM COATED ORAL at 20:20

## 2021-07-02 RX ADMIN — METOPROLOL SUCCINATE 25 MG: 25 TABLET, FILM COATED, EXTENDED RELEASE ORAL at 14:52

## 2021-07-02 RX ADMIN — DEXTROSE MONOHYDRATE 2.5 MG/HR: 50 INJECTION, SOLUTION INTRAVENOUS at 13:35

## 2021-07-02 RX ADMIN — AMIODARONE HYDROCHLORIDE 1 MG/MIN: 50 INJECTION, SOLUTION INTRAVENOUS at 16:04

## 2021-07-02 RX ADMIN — DILTIAZEM HYDROCHLORIDE 10 MG: 5 INJECTION INTRAVENOUS at 14:52

## 2021-07-02 RX ADMIN — DILTIAZEM HYDROCHLORIDE 10 MG: 5 INJECTION INTRAVENOUS at 13:32

## 2021-07-02 RX ADMIN — ROSUVASTATIN CALCIUM 40 MG: 40 TABLET, FILM COATED ORAL at 23:00

## 2021-07-02 RX ADMIN — DEXTROSE MONOHYDRATE 12.5 MG/HR: 50 INJECTION, SOLUTION INTRAVENOUS at 23:05

## 2021-07-02 RX ADMIN — Medication 10 ML: at 20:20

## 2021-07-02 RX ADMIN — Medication 10 ML: at 23:06

## 2021-07-02 NOTE — PROGRESS NOTES
Note dictated. He has At. Fib. With RVR. REC: Diltiazem iv tp slow rate. Dr. Cheryl Coe  To see in consult.

## 2021-07-02 NOTE — PROGRESS NOTES
TRANSFER - IN REPORT:    Verbal report received from Quang Walters (name) on Marylin Riley  being received from ED(unit) for routine progression of care      Report consisted of patients Situation, Background, Assessment and   Recommendations(SBAR). Information from the following report(s) SBAR, ED Summary, MAR and Cardiac Rhythm A flutter with RVR was reviewed with the receiving nurse. Opportunity for questions and clarification was provided. Assessment completed upon patients arrival to unit and care assumed.

## 2021-07-02 NOTE — CONSULTS
EP/ ARRHYTHMIA CONSULT requested by Dr. Siobhan Espino secondary to atrial flutter    Patient ID:  Patient: Leander Cabral  MRN: [id number]  Age: 65 y.o.  : 1950    Date of  Admission: 2021 12:24 PM   PCP:  Shelby Farley MD    Assessment: 1. Typical right atrial flutter with variable block (rapid HR's). 2. Persistent atrial fibrillation with a history of cardioversion in the past off Multaq and lately on Multaq .  3. Reported history of paroxysmal SVT diagnosed in the . 4. Multaq therapy recently stopped. 5. COPD with exacerbation in , hypercapnea, atrial fibrillation was diagnosed. 6. Hypertension. 7. Full code. Plan:     1. I discussed options with him including changing antiarrhythmic therapy (he was put on amio in the ER) versus Afib/flutter/SVT ablation. We are going to try both. In the shortterm, will use amiodarone and change to oral tomorrow. 2. Increase his oral diltiazem to get HR's in the 's range. He was on diltiazem  mg daily PTA, currently on an infusion. 3. OK with low dose metoprolol. He is not on propranolol. 4. No urgent need to cardiovert again. I have his information. Will arrange for an OP ablation. From my standpoint, as long as he's rate controlled and anticoagulated, OK to discharge. [x]       High complexity decision making was performed in this patient at high risk for decompensation with multiple organ involvement. Leander Cabral is a 65 y.o. male with a history of recurrent atrial arrhythmia, now in the ER with tachycardia. His ECG shows atrial flutter with variable block in the ER. This has a typical morphology. He was cardioverted yesterday for apparent atrial fibrillation, I don't have the tracings to review. In 2021, he was cardioverted for atrial fibrillation.   It was the ECG from 2021 that shows atrial fibrillation, this was when this arrhythmia was newly-diagnosed. He is unaware of his tachycardia. Currently denies syncope, dizziness, palpitations. No orthopnea, PND, or edema. He is on chronic anticoagulation and without bleeding issues. Past Medical History:   Diagnosis Date    Arrhythmia     SVT; Dr. Mathur Bio Fall 2018    Chronic obstructive pulmonary disease (HCC)     mild    GERD (gastroesophageal reflux disease)     Hypertension     Thyroid disease     benign thyroid growth, checked annually by Dr. Keesha Almaraz        Past Surgical History:   Procedure Laterality Date    COLONOSCOPY N/A 2/5/2019    COLONOS[facility] performed [facility] Cole Preciado [facility]COPY    COLONOSCOPY,MAYELA RODRIGUES,SNARE  2/5/2019         HX HEENT Bilateral     cataract extraction    SD COLON CA SCRN NOT  [address]  2/5/2019            Social History     Tobacco Use    Smoking status: Former Smoker     Years: 5.00    Smokeless tobacco: Never Used   Substance Use Topics    Alcohol use:  Yes     Alcohol/week: 7.0 standard drinks     Types: 7 Glasses of wine per week        Family History   Problem Relation Age of Onset    Hypertension Mother     Heart Disease Mother     Hypertension Father     Diabetes Father         No Known Allergies       Current Facility-Administered Medications   Medication Dose Route Frequency    dilTIAZem (CARDIZEM) 100 mg in dextrose 5% (MBP/ADV) 100 mL infusion  0-15 mg/hr IntraVENous TITRATE    metoprolol succinate (TOPROL-XL) XL tablet 25 mg  25 mg Oral DAILY    amiodarone (CORDARONE) 375 mg/250 mL D5W infusion  1 mg/min IntraVENous TITRATE    ondansetron (ZOFRAN) injection 4 mg  4 mg IntraVENous Q4H PRN    [START ON 7/3/2021] arformoterol 15 mcg/budesonide 0.5 mg neb solution   Nebulization DAILY    apixaban (ELIQUIS) tablet 5 mg  5 mg Oral BID    [START ON 7/3/2021] aspirin delayed-release tablet 81 mg  81 mg Oral DAILY    [START ON 7/3/2021] cholecalciferol (VITAMIN D3) (1000 Units /25 mcg) tablet 1,000 Units  1,000 Units Oral DAILY    [START ON 7/3/2021] pantoprazole (PROTONIX) tablet 40 mg  40 mg Oral ACB    [START ON 7/3/2021] finasteride (PROSCAR) tablet 10 mg  10 mg Oral DAILY    [START ON 7/3/2021] furosemide (LASIX) tablet 20 mg  20 mg Oral DAILY    gemfibroziL (LOPID) tablet 600 mg  600 mg Oral BID    melatonin tablet 3 mg  3 mg Oral QHS    rosuvastatin (CRESTOR) tablet 40 mg  40 mg Oral QHS    [START ON 7/3/2021] tamsulosin (FLOMAX) capsule 0.4 mg  0.4 mg Oral DAILY    sodium chloride (NS) flush 5-40 mL  5-40 mL IntraVENous Q8H    sodium chloride (NS) flush 5-40 mL  5-40 mL IntraVENous PRN    acetaminophen (TYLENOL) tablet 650 mg  650 mg Oral Q6H PRN    Or    acetaminophen (TYLENOL) suppository 650 mg  650 mg Rectal Q6H PRN    polyethylene glycol (MIRALAX) packet 17 g  17 g Oral DAILY PRN     Current Outpatient Medications   Medication Sig    dronedarone (Multaq) tab tablet Take 400 mg by mouth two (2) times daily (with meals).  metoprolol succinate (Toprol XL) 25 mg XL tablet Take  by mouth daily.  predniSONE (DELTASONE) 20 mg tablet Take 40 mg by mouth daily (with breakfast). 4 tabs for 3 days   3 tabs for 3 days   2 tabs for 3 days  1 tabs for 3 days  Then stop.  furosemide (LASIX) 20 mg tablet 1 tab po daily    dilTIAZem ER (CARDIZEM CD) 120 mg capsule Take 1 Cap by mouth daily.  apixaban (ELIQUIS) 5 mg tablet Take 1 Tab by mouth two (2) times a day.  rosuvastatin (Crestor) 40 mg tablet Take 40 mg by mouth nightly.  finasteride (PROSCAR) 5 mg tablet Take 10 mg by mouth daily. Pt unsure of strength     propranolol LA (INDERAL LA) 120 mg SR capsule Take 120 mg by mouth daily.  tamsulosin (FLOMAX) 0.4 mg capsule Take 0.4 mg by mouth daily.  esomeprazole (NEXIUM) 40 mg capsule Take 40 mg by mouth daily.  aspirin delayed-release 81 mg tablet Take 81 mg by mouth daily.  melatonin 3 mg tablet Take 3 mg by mouth nightly.     albuterol (VENTOLIN HFA) 90 mcg/actuation inhaler Take 1 Puff by inhalation every four (4) hours as needed.  fluticasone-vilanterol (BREO ELLIPTA) 100-25 mcg/dose inhaler Take 1 Puff by inhalation daily.  gemfibrozil (LOPID) 600 mg tablet Take 600 mg by mouth two (2) times a day.  cholecalciferol (VITAMIN D3) 1,000 unit cap Take 1,000 Units by mouth daily.  MULTIVIT-MIN/FA/LYCOPEN/LUTEIN (CENTRUM SILVER ULTRA MEN'S PO) Take 1 Tab by mouth daily.  Omega-3-DHA-EPA-Fish Oil 1,000 mg (120 mg-180 mg) cap Take 1 Cap by mouth daily. Review of Symptoms:  See HPI as well.   General: negative for fever, chills, sweats, weakness, weight loss   Eyes: negative for blurred vision, eye pain, loss of vision, diplopia   Ear Nose and Throat: negative for rhinorrhea, pharyngitis, otalgia, tinnitus, speech or swallowing difficulties   Respiratory: negative for SOB, cough, sputum production, wheezing, BOWDEN, pleuritic pain   Cardiology: negative for chest pain, palpitations, orthopnea, PND, edema, syncope   Gastrointestinal: negative for abdominal pain, N/V, dysphagia, change in bowel habits, bleeding   Genitourinary: negative for frequency, urgency, dysuria, hematuria, incontinence   Muskuloskeletal : negative for arthralgia, myalgia   Hematology: negative for easy bruising, bleeding, lymphadenopathy   Dermatological: negative for rash, ulceration, mole change, new lesion   Endocrine: negative for hot flashes or polydipsia   Neurological: negative for headache, dizziness, confusion, focal weakness, paresthesia, memory loss, gait disturbance   Psychological: negative for anxiety, depression, agitation       Objective:      Physical Exam:  Temp (24hrs), Av.4 °F (36.3 °C), Min:97.4 °F (36.3 °C), Max:97.4 °F (36.3 °C)    Patient Vitals for the past 8 hrs:   Pulse   21 1628 (!) 115   21 1615 (!) 110   21 1528 (!) 130   21 1513 (!) 129   21 1413 (!) 130   21 1358 (!) 130   21 1332 (!) 130   21 1228 (!) 128    Patient Vitals for the past 8 hrs:   Resp   07/02/21 1228 18    Patient Vitals for the past 8 hrs:   BP   07/02/21 1528 132/88   07/02/21 1513 125/89   07/02/21 1413 101/80   07/02/21 1358 (!) 127/98   07/02/21 1332 (!) 150/94   07/02/21 1228 (!) 167/118      No intake or output data in the 24 hours ending 07/02/21 1649    Nondiaphoretic, not in acute distress. Supple, no palpable thyromegaly. No scleral icterus, mucous membranes moist, conjuctivae pink, no xanthelasma. Unlabored, clear to auscultation bilaterally, symmetric air movement. Tachy and irregular rhythm, no murmur, pericardial rub, knock, or gallop. No JVD or peripheral edema. Palpable radial pulses bilaterally. Abdomen obese, soft, nontender, nondistended. No abdominal bruit or pulstatile masses. Extremities without cyanosis or clubbing. Muscle tone and bulk normal.  Skin warm and dry. No rashes or ulcers. Neuro grossly nonfocal.  No tremor. Awake and appropriate. CARDIOGRAPHICS and STUDIES, I reviewed:    Telemetry:  Atrial flutter with variable block. ECG: Atrial flutter with variable block. Labs:  Recent Labs     07/02/21  1243   TROIQ <0.05     No results found for: CHOL, CHOLX, CHLST, CHOLV, HDL, HDLP, LDL, LDLC, DLDLP, TGLX, TRIGL, TRIGP, CHHD, CHHDX  No results for input(s): INR, PTP, APTT, INREXT in the last 72 hours. Recent Labs     07/02/21  1243      K 4.5      CO2 30   BUN 21*   CREA 1.16   *   CA 8.9   ALB 3.8   WBC 8.5   HGB 14.3   HCT 45.5        Recent Labs     07/02/21  1243   AP 77   TP 7.9   ALB 3.8   GLOB 4.1*     No components found for: GLPOC  No results for input(s): PH, PCO2, PO2 in the last 72 hours.         Luisa Sanabria MD  7/2/2021

## 2021-07-02 NOTE — ED NOTES
TRANSFER - OUT REPORT:    Verbal report given to Maury(name) on Gregory Handley  being transferred to [facility](unit) for routine progression of care       Report consisted of patients Situation, Background, Assessment and   Recommendations(SBAR). Information from the following report(s) SBAR, ED Summary, [facility] and Recent Results was reviewed with the receiving nurse. Lines:   Peripheral IV 07/02/21 Right; Inner Forearm (Active)   Site Assessment Clean, dry, & intact 07/02/21 1246   Phlebitis Assessment 0 07/02/21 1246   Infiltration Assessment 0 07/02/21 1246   Dressing Status Clean, dry, & intact 07/02/21 1246   Dressing Type Transparent 07/02/21 1246   Hub Color/Line Status Pink 07/02/21 1246   Action Taken Blood drawn 07/02/21 1246   Alcohol Cap Used Yes 07/02/21 1246        Opportunity for questions and clarification was provided.       Patient transported with:   Monitor  Registered Nurse

## 2021-07-02 NOTE — ED PROVIDER NOTES
EMERGENCY DEPARTMENT HISTORY AND PHYSICAL EXAM      Date: 7/2/2021  Patient Name: Soledad Brown    History of Presenting Illness     Chief Complaint   Patient presents with    Rapid Heart Rate     Cardioverted yesterday at Dr. Stanford Brown office for a fib. Ths morning when he was checking his pulse oximeter, he noticed that his HR was fast.  [facility] told him to come over here. History Provided By: Patient    HPI: Soledad Brown, 65 y.o. male with history of hypertension, SVT, atrial flutter/atrial fibrillation anticoagulated on Eliquis status post cardioversion yesterday 7/1/2021, GERD, COPD, former smoker presents to the ED with cc of palpitations and heart racing sensation. Patient was sent to the ED by Dr. Yolette Lopez his cardiologist for rapid heart rate. Patient underwent cardioversion yesterday and was doing well, he restarted his Eliquis. He felt palpitations worsening today described as heart racing sensation. Denies any chest tightness, chest pain, shortness of breath, lightheadedness, near syncope, nausea, vomiting. He has otherwise been feeling well and has been compliant with his medications. There are no other complaints, changes, or physical findings at this time. PCP: Cirilo Blake MD    No current facility-administered medications on file prior to encounter. Current Outpatient Medications on File Prior to Encounter   Medication Sig Dispense Refill    dronedarone (Multaq) tab tablet Take 400 mg by mouth two (2) times daily (with meals).  metoprolol succinate (Toprol XL) 25 mg XL tablet Take  by mouth daily.  predniSONE (DELTASONE) 20 mg tablet Take 40 mg by mouth daily (with breakfast). 4 tabs for 3 days   3 tabs for 3 days   2 tabs for 3 days  1 tabs for 3 days  Then stop. 40 Tab 0    furosemide (LASIX) 20 mg tablet 1 tab po daily 30 Tab 0    dilTIAZem ER (CARDIZEM CD) 120 mg capsule Take 1 Cap by mouth daily.  30 Cap 0    apixaban (ELIQUIS) 5 mg tablet Take 1 Tab by mouth two (2) times a day. 60 Tab 0    rosuvastatin (Crestor) 40 mg tablet Take 40 mg by mouth nightly.  finasteride (PROSCAR) 5 mg tablet Take 10 mg by mouth daily. Pt unsure of strength       propranolol LA (INDERAL LA) 120 mg SR capsule Take 120 mg by mouth daily.  tamsulosin (FLOMAX) 0.4 mg capsule Take 0.4 mg by mouth daily.  esomeprazole (NEXIUM) 40 mg capsule Take 40 mg by mouth daily.  aspirin delayed-release 81 mg tablet Take 81 mg by mouth daily.  melatonin 3 mg tablet Take 3 mg by mouth nightly.  albuterol (VENTOLIN HFA) 90 mcg/actuation inhaler Take 1 Puff by inhalation every four (4) hours as needed.  fluticasone-vilanterol (BREO ELLIPTA) 100-25 mcg/dose inhaler Take 1 Puff by inhalation daily.  gemfibrozil (LOPID) 600 mg tablet Take 600 mg by mouth two (2) times a day.  cholecalciferol (VITAMIN D3) 1,000 unit cap Take 1,000 Units by mouth daily.  MULTIVIT-MIN/FA/LYCOPEN/LUTEIN (CENTRUM SILVER ULTRA MEN'S PO) Take 1 Tab by mouth daily.  Omega-3-DHA-EPA-Fish Oil 1,000 mg (120 mg-180 mg) cap Take 1 Cap by mouth daily.          Past History     Past Medical History:  Past Medical History:   Diagnosis Date    Arrhythmia     SVT; Dr. Avilez Locus Fall 2018    Chronic obstructive pulmonary disease (HCC)     mild    GERD (gastroesophageal reflux disease)     Hypertension     Thyroid disease     benign thyroid growth, checked annually by Dr. Tia Feldman       Past Surgical History:  Past Surgical History:   Procedure Laterality Date    COLONOSCOPY N/A 2/5/2019    COLONOSCOPY performed by [facility]a Fischer [facility] at [facility]    COLONOSCOPY,REMV LESN,SNARE  2/5/2019         HX HEENT Bilateral     cataract extraction    NH COLON CA SCRN NOT HI RSK IND  2/5/2019            Family History:  Family History   Problem Relation Age of Onset    Hypertension Mother     Heart Disease Mother     Hypertension Father     Diabetes Father        Social History:  Social History     Tobacco Use    Smoking status: Former Smoker     Years: 5.00    Smokeless tobacco: Never Used   Substance Use Topics    Alcohol use: Yes     Alcohol/week: 7.0 standard drinks     Types: 7 Glasses of wine per week    Drug use: No       Allergies:  No Known Allergies      Review of Systems   Review of Systems   Constitutional: Negative for chills and fever. HENT: Negative. Eyes: Negative for visual disturbance. Respiratory: Negative for cough, chest tightness and shortness of breath. Cardiovascular: Positive for palpitations. Negative for chest pain and leg swelling. Gastrointestinal: Negative for abdominal pain, nausea and vomiting. Genitourinary: Negative. Musculoskeletal: Negative for back pain and gait problem. Skin: Negative for color change and rash. Neurological: Negative for dizziness, weakness, light-headedness and headaches. Hematological: Bruises/bleeds easily. All other systems reviewed and are negative. Physical Exam   Physical Exam  Vitals and nursing note reviewed. Constitutional:       General: He is not in acute distress. Appearance: Normal appearance. He is obese. He is not ill-appearing or toxic-appearing. HENT:      Head: Normocephalic and atraumatic. Nose: Nose normal.      Mouth/Throat:      Mouth: Mucous membranes are moist.   Eyes:      Extraocular Movements: Extraocular movements intact. Pupils: Pupils are equal, round, and reactive to light. Cardiovascular:      Rate and Rhythm: Regular rhythm. Tachycardia present. Heart sounds: No murmur heard. Pulmonary:      Effort: Pulmonary effort is normal. No respiratory distress. Breath sounds: Normal breath sounds. No wheezing. Abdominal:      General: There is no distension. Palpations: Abdomen is soft. Tenderness: There is no abdominal tenderness. There is no guarding or rebound.    Musculoskeletal: General: No swelling or tenderness. Normal range of motion. Cervical back: Normal range of motion and neck supple. Right lower leg: No edema. Left lower leg: No edema. Skin:     General: Skin is warm and dry. Coloration: Skin is not pale. Findings: No erythema. Neurological:      General: No focal deficit present. Mental Status: He is alert and oriented to person, place, and time. Diagnostic Study Results     Labs -     Recent Results (from the past 12 hour(s))   EKG, 12 LEAD, INITIAL    Collection Time: 07/02/21 12:31 PM   Result Value Ref Range    Ventricular Rate 141 BPM    Atrial Rate 258 BPM    P-R Interval 158 ms    QRS Duration 88 ms    Q-T Interval 342 ms    QTC Calculation (Bezet) 523 ms    Calculated P Axis -104 degrees    Calculated R Axis -2 degrees    Calculated T Axis 64 degrees    Diagnosis       Poor data quality  Probable Atrial flutter  Nonspecific ST abnormality  When compared with ECG of 01-JUL-2021 08:11,  Probable Atrial flutter is a new finding  Confirmed by Aruna Turner ([id number]) on 7/2/2021 4:06:56 PM     CBC WITH AUTOMATED DIFF    Collection Time: 07/02/21 12:43 PM   Result Value Ref Range    WBC 8.5 4.1 - 11.1 K/uL    RBC 4.89 4. 10 - 5.70 M/uL    HGB 14.3 12.1 - 17.0 g/dL    HCT 45.5 36.6 - 50.3 %    MCV 93.0 80.0 - 99.0 FL    MCH 29.2 26.0 - 34.0 PG    MCHC 31.4 30.0 - 36.5 g/dL    RDW 13.6 11.5 - 14.5 %    PLATELET 874 434 - 030 K/uL    MPV 10.7 8.9 - 12.9 FL    NRBC 0.0 0  WBC    ABSOLUTE NRBC 0.00 0.00 - 0.01 K/uL    NEUTROPHILS 59 32 - 75 %    LYMPHOCYTES 29 12 - 49 %    MONOCYTES 9 5 - 13 %    EOSINOPHILS 1 0 - 7 %    BASOPHILS 1 0 - 1 %    IMMATURE GRANULOCYTES 1 (H) 0.0 - 0.5 %    ABS. NEUTROPHILS 5.0 1.8 - 8.0 K/UL    ABS. LYMPHOCYTES 2.5 0.8 - 3.5 K/UL    ABS. MONOCYTES 0.8 0.0 - 1.0 K/UL    ABS. EOSINOPHILS 0.1 0.0 - 0.4 K/UL    ABS. BASOPHILS 0.0 0.0 - 0.1 K/UL    ABS. IMM.  GRANS. 0.1 (H) 0.00 - 0.04 K/UL    DF AUTOMATED METABOLIC PANEL, COMPREHENSIVE    Collection Time: 07/02/21 12:43 PM   Result Value Ref Range    Sodium 139 136 - 145 mmol/L    Potassium 4.5 3.5 - 5.1 mmol/L    Chloride 103 97 - 108 mmol/L    CO2 30 21 - 32 mmol/L    Anion gap 6 5 - 15 mmol/L    Glucose 109 (H) 65 - 100 mg/dL    BUN 21 (H) 6 - 20 MG/DL    Creatinine 1.16 0.70 - 1.30 MG/DL    BUN/Creatinine ratio 18 12 - 20      GFR est AA >60 >60 ml/min/1.73m2    GFR est non-AA >60 >60 ml/min/1.73m2    Calcium 8.9 8.5 - 10.1 MG/DL    Bilirubin, total 0.8 0.2 - 1.0 MG/DL    ALT (SGPT) 33 12 - 78 U/L    AST (SGOT) 20 15 - 37 U/L    Alk. phosphatase 77 45 - 117 U/L    Protein, total 7.9 6.4 - 8.2 g/dL    Albumin 3.8 3.5 - 5.0 g/dL    Globulin 4.1 (H) 2.0 - 4.0 g/dL    A-G Ratio 0.9 (L) 1.1 - 2.2     TROPONIN I    Collection Time: 07/02/21 12:43 PM   Result Value Ref Range    Troponin-I, Qt. <0.05 <0.05 ng/mL   SAMPLES BEING HELD    Collection Time: 07/02/21 12:43 PM   Result Value Ref Range    SAMPLES BEING HELD BLUE     COMMENT        Add-on orders for these samples will be processed based on acceptable specimen integrity and analyte stability, which may vary by analyte. MAGNESIUM    Collection Time: 07/02/21 12:43 PM   Result Value Ref Range    Magnesium 2.3 1.6 - 2.4 mg/dL       Radiologic Studies -   XR CHEST PORT   Final Result   No acute findings. CT Results  (Last 48 hours)    None        CXR Results  (Last 48 hours)               07/02/21 1302  XR CHEST PORT Final result    Impression:  No acute findings. Narrative:  EXAM: XR CHEST PORT       INDICATION: rapid heart rate       COMPARISON: March 10       FINDINGS: A portable AP radiograph of the chest was obtained at 1249 hours. The   patient is on a cardiac monitor. The lungs are clear. There is atherosclerosis   of the aorta. The bones and soft tissues are grossly within normal limits. Medical Decision Making   I am the first provider for this patient.     I reviewed the vital signs, available nursing notes, past medical history, past surgical history, family history and social history. Vital Signs-Reviewed the patient's vital signs. Patient Vitals for the past 12 hrs:   Temp Pulse Resp BP SpO2   07/02/21 1628  (!) 115   94 %   07/02/21 1615  (!) 110   90 %   07/02/21 1528  (!) 130  132/88    07/02/21 1513  (!) 129  125/89 93 %   07/02/21 1413  (!) 130  101/80 91 %   07/02/21 1358  (!) 130  (!) 127/98 91 %   07/02/21 1332  (!) 130  (!) 150/94    07/02/21 1228 97.4 °F (36.3 °C) (!) 128 18 (!) 167/118 93 %     Records Reviewed: Nursing Notes, Old Medical Records, Previous Radiology Studies and Previous Laboratory Studies  I personally reviewed cardioversion records from yesterday 7/1/2021. Provider Notes (Medical Decision Making):   77-year-old male status post recent cardioversion for atrial fibrillation here with tachyarrhythmia. He is noted to have rapid ventricular response at 130 bpm here in the ED, EKG appears to be consistent with atrial flutter although SVT is also a consideration. He is hemodynamically stable and has no other complaints. Denies chest pain or shortness of breath today. Afebrile and vital signs are stable. Will treat with diltiazem and start on drip. I discussed with Dr. Sharmaine Barriga, if patient cardiovert and is stable then he can be discharged home over the weekend, otherwise we will plan for admission for further cardiology evaluation. Will evaluate with troponin, electrolytes, chest x-ray, cardiac monitoring, and reassess. ED Course:   Initial assessment performed. The patients presenting problems have been discussed, and they are in agreement with the care plan formulated and outlined with them. I have encouraged them to ask questions as they arise throughout their visit.     ED Course as of Jul 02 1752 Fri Jul 02, 2021   1322 EKG per my interpretation rapid atrial flutter versus SVT, rate 141 bpm, nonspecific ST-T wave abnormality    [AK]      ED Course User Index  [AK] Hamzah Lewis MD         Cardiac Monitoring: The cardiac monitor revealed the following rhythm as interpreted by me: Atrial Flutter rate 132 bpm  The cardiac monitor was ordered secondary to the patient's reported complaint of palpitations and to monitor the patient for dysrhythmia. Duncan Burk MD      Admission Note:  Patient is being admitted to the hospital by Dr. Melchor Jacobs, Service: Hospitalist.  The results of their tests and reasons for their admission have been discussed with them and available family. They convey agreement and understanding for the need to be admitted and for their admission diagnosis. Critical Care Documentation  I have spent 48 minutes of critical care time in evaluating and treating this patient. This includes time spent at bedside, time with family and decision makers, documentation, review of labs and imaging, and/or consultation with specialists. It does not include time spent on separately billed procedures. This patient presents with a critical illness or injury that acutely impairs one or more vital organ systems such that there is a high probability of imminent or life threatening deterioration in the patient's condition. This case involved decision making of high complexity to assess, manipulate, and support vital organ system failure and/or to prevent further life threatening deterioration of the patient's condition. Failure to initiate these interventions on an urgent basis would likely result in sudden, clinically significant or life threatening deterioration in the patient's condition. This case required my direct attention, intervention, and personal management for the time documented above. This time does not include separately billed interventions/procedures which are separately documented.      Abnormal findings supporting critical care: Atrial flutter with rapid ventricular response  Interventions to support critical care: Initiation and titration of diltiazem drip, initiation and titration of amiodarone drip, frequent reassessment of hemodynamics, consultation with specialist  Failure to intervene may result in: Worsening arrhythmia, hemodynamic instability, hypotension, endorgan damage, ischemia, death  Corinna Gallegos MD      Disposition:  Admit    Diagnosis     Clinical Impression:   1. Atrial flutter with rapid ventricular response (HCC)        Attestations:  I am the first and primary provider of record for this patient's ED encounter. I personally performed the services described above in this documentation. Corinna Gallegos MD    Please note that this dictation was completed with [employer], the computer voice recognition software. Quite often unanticipated grammatical, syntax, homophones, and other interpretive errors are inadvertently transcribed by the computer software. Please disregard these errors. Please excuse any errors that have escaped final proofreading. Thank you.

## 2021-07-03 LAB
ANION GAP SERPL CALC-SCNC: 5 MMOL/L (ref 5–15)
BUN SERPL-MCNC: 17 MG/DL (ref 6–20)
BUN/CREAT SERPL: 16 (ref 12–20)
CALCIUM SERPL-MCNC: 9 MG/DL (ref 8.5–10.1)
CHLORIDE SERPL-SCNC: 106 MMOL/L (ref 97–108)
CO2 SERPL-SCNC: 27 MMOL/L (ref 21–32)
CREAT SERPL-MCNC: 1.06 MG/DL (ref 0.7–1.3)
ERYTHROCYTE [DISTWIDTH] IN BLOOD BY AUTOMATED COUNT: 13.7 % (ref 11.5–14.5)
GLUCOSE SERPL-MCNC: 106 MG/DL (ref 65–100)
HCT VFR BLD AUTO: 42 % (ref 36.6–50.3)
HGB BLD-MCNC: 13.6 G/DL (ref 12.1–17)
MCH RBC QN AUTO: 29.6 PG (ref 26–34)
MCHC RBC AUTO-ENTMCNC: 32.4 G/DL (ref 30–36.5)
MCV RBC AUTO: 91.3 FL (ref 80–99)
NRBC # BLD: 0 K/UL (ref 0–0.01)
NRBC BLD-RTO: 0 PER 100 WBC
PLATELET # BLD AUTO: 184 K/UL (ref 150–400)
PMV BLD AUTO: 10.5 FL (ref 8.9–12.9)
POTASSIUM SERPL-SCNC: 4 MMOL/L (ref 3.5–5.1)
RBC # BLD AUTO: 4.6 M/UL (ref 4.1–5.7)
SODIUM SERPL-SCNC: 138 MMOL/L (ref 136–145)
TROPONIN I SERPL-MCNC: <0.05 NG/ML
WBC # BLD AUTO: 9.2 K/UL (ref 4.1–11.1)

## 2021-07-03 PROCEDURE — 36415 COLL VENOUS BLD VENIPUNCTURE: CPT

## 2021-07-03 PROCEDURE — 85027 COMPLETE CBC AUTOMATED: CPT

## 2021-07-03 PROCEDURE — 74011250636 HC RX REV CODE- 250/636: Performed by: INTERNAL MEDICINE

## 2021-07-03 PROCEDURE — 77010033678 HC OXYGEN DAILY

## 2021-07-03 PROCEDURE — 74011000258 HC RX REV CODE- 258: Performed by: INTERNAL MEDICINE

## 2021-07-03 PROCEDURE — 93005 ELECTROCARDIOGRAM TRACING: CPT

## 2021-07-03 PROCEDURE — 80048 BASIC METABOLIC PNL TOTAL CA: CPT

## 2021-07-03 PROCEDURE — 74011000250 HC RX REV CODE- 250: Performed by: INTERNAL MEDICINE

## 2021-07-03 PROCEDURE — 65660000001 HC RM ICU INTERMED STEPDOWN

## 2021-07-03 PROCEDURE — 74011250637 HC RX REV CODE- 250/637: Performed by: INTERNAL MEDICINE

## 2021-07-03 PROCEDURE — 94640 AIRWAY INHALATION TREATMENT: CPT

## 2021-07-03 PROCEDURE — 84484 ASSAY OF TROPONIN QUANT: CPT

## 2021-07-03 RX ORDER — LANOLIN ALCOHOL/MO/W.PET/CERES
1000 CREAM (GRAM) TOPICAL DAILY
COMMUNITY

## 2021-07-03 RX ORDER — FLUTICASONE FUROATE, UMECLIDINIUM BROMIDE AND VILANTEROL TRIFENATATE 100; 62.5; 25 UG/1; UG/1; UG/1
1 POWDER RESPIRATORY (INHALATION) DAILY
COMMUNITY

## 2021-07-03 RX ORDER — DILTIAZEM HYDROCHLORIDE 240 MG/1
240 CAPSULE, COATED, EXTENDED RELEASE ORAL DAILY
Status: DISCONTINUED | OUTPATIENT
Start: 2021-07-03 | End: 2021-07-05 | Stop reason: HOSPADM

## 2021-07-03 RX ORDER — ASCORBIC ACID 500 MG
500 TABLET ORAL DAILY
COMMUNITY

## 2021-07-03 RX ORDER — AMIODARONE HYDROCHLORIDE 200 MG/1
400 TABLET ORAL 2 TIMES DAILY
Status: DISCONTINUED | OUTPATIENT
Start: 2021-07-03 | End: 2021-07-05 | Stop reason: HOSPADM

## 2021-07-03 RX ORDER — FINASTERIDE 5 MG/1
5 TABLET, FILM COATED ORAL DAILY
COMMUNITY

## 2021-07-03 RX ORDER — IBUPROFEN 200 MG
400 TABLET ORAL
COMMUNITY
End: 2021-12-14

## 2021-07-03 RX ORDER — PANTOPRAZOLE SODIUM 40 MG/1
40 TABLET, DELAYED RELEASE ORAL DAILY
COMMUNITY

## 2021-07-03 RX ADMIN — DILTIAZEM HYDROCHLORIDE 240 MG: 240 CAPSULE, COATED, EXTENDED RELEASE ORAL at 15:48

## 2021-07-03 RX ADMIN — Medication 10 ML: at 06:12

## 2021-07-03 RX ADMIN — TAMSULOSIN HYDROCHLORIDE 0.4 MG: 0.4 CAPSULE ORAL at 09:27

## 2021-07-03 RX ADMIN — Medication 10 ML: at 17:03

## 2021-07-03 RX ADMIN — CHOLECALCIFEROL TAB 25 MCG (1000 UNIT) 1000 UNITS: 25 TAB at 09:27

## 2021-07-03 RX ADMIN — AMIODARONE HYDROCHLORIDE 400 MG: 200 TABLET ORAL at 15:48

## 2021-07-03 RX ADMIN — DEXTROSE MONOHYDRATE 10 MG/HR: 50 INJECTION, SOLUTION INTRAVENOUS at 21:38

## 2021-07-03 RX ADMIN — GEMFIBROZIL 600 MG: 600 TABLET ORAL at 17:10

## 2021-07-03 RX ADMIN — AMIODARONE HYDROCHLORIDE 1 MG/MIN: 50 INJECTION, SOLUTION INTRAVENOUS at 06:21

## 2021-07-03 RX ADMIN — ARFORMOTEROL TARTRATE: 15 SOLUTION RESPIRATORY (INHALATION) at 07:48

## 2021-07-03 RX ADMIN — GEMFIBROZIL 600 MG: 600 TABLET ORAL at 09:26

## 2021-07-03 RX ADMIN — FUROSEMIDE 20 MG: 20 TABLET ORAL at 09:26

## 2021-07-03 RX ADMIN — ROSUVASTATIN CALCIUM 40 MG: 40 TABLET, FILM COATED ORAL at 21:38

## 2021-07-03 RX ADMIN — APIXABAN 5 MG: 5 TABLET, FILM COATED ORAL at 09:26

## 2021-07-03 RX ADMIN — Medication 10 ML: at 21:38

## 2021-07-03 RX ADMIN — METOPROLOL SUCCINATE 25 MG: 25 TABLET, FILM COATED, EXTENDED RELEASE ORAL at 09:27

## 2021-07-03 RX ADMIN — FINASTERIDE 10 MG: 5 TABLET, FILM COATED ORAL at 09:27

## 2021-07-03 RX ADMIN — MELATONIN 3 MG: at 21:38

## 2021-07-03 RX ADMIN — AMIODARONE HYDROCHLORIDE 1 MG/MIN: 50 INJECTION, SOLUTION INTRAVENOUS at 00:09

## 2021-07-03 RX ADMIN — ASPIRIN 81 MG: 81 TABLET, COATED ORAL at 09:27

## 2021-07-03 RX ADMIN — DEXTROSE MONOHYDRATE 12.5 MG/HR: 50 INJECTION, SOLUTION INTRAVENOUS at 06:08

## 2021-07-03 RX ADMIN — APIXABAN 5 MG: 5 TABLET, FILM COATED ORAL at 17:02

## 2021-07-03 RX ADMIN — PANTOPRAZOLE SODIUM 40 MG: 40 TABLET, DELAYED RELEASE ORAL at 09:27

## 2021-07-03 NOTE — PROGRESS NOTES
Hospitalist Progress Note    NAME: Elda Marie   :  1950   MRN:  [id number]     Interim Hospital Summary: 65 y.o. male whom presented on 2021 with      Assessment / Plan:    Atrial flutter with variable block (rapid HR)  Persistent atrial fibrillation  Hx Paroxysmal SVT, 1980s  -transitioned to oral amiodarone and cardizem today. Continue to monitor.  -Continue Eliquis     Diastolic congestive heart failure  -Continue home Lasix and metoprolol     Hypertension  Dyslipidemia  History of COPD   BPH  GERD  -Continue metoprolol, statin, home inhalers, Flomax, PPI       Code Status: full   Surrogate Decision Maker: Wife Patrica Hi     DVT Prophylaxis: Eliquis     Baseline: From home independent     Subjective:     Chief Complaint / Reason for Physician Visit  Follow up of atrial flutter, CHF, HTN  Chart reviewed in detail. Discussed with RN events overnight. Review of Systems:  Symptom Y/N Comments  Symptom Y/N Comments   Fever/Chills    Chest Pain     Poor Appetite    Edema     Cough    Abdominal Pain     Sputum    Joint Pain     SOB/BOWDEN    Pruritis/Rash     Nausea/vomit    Tolerating PT/OT     Diarrhea    Tolerating Diet     Constipation    Other       Could NOT obtain due to:      PO intake: No data found. Objective:     VITALS:   Last 24hrs VS reviewed since prior progress note.  Most recent are:  Patient Vitals for the past 24 hrs:   Temp Pulse Resp BP SpO2   21 1142 97.3 °F (36.3 °C) 86 22 125/78 96 %   21 1139 97.7 °F (36.5 °C) 90 22  94 %   21 0813 97.6 °F (36.4 °C) 89 23 123/84 93 %   21 0750     93 %   21 0239 97.7 °F (36.5 °C) 81 18 139/70 93 %   21 2357 98 °F (36.7 °C) (!) 103 18 131/86 91 %   21 2039 98 °F (36.7 °C) (!) 101 16 118/83 90 %   21 1628  (!) 115   94 %   21 1615  (!) 110   90 %   21 1528  (!) 130  132/88    21 1513  (!) 129  125/89 93 % 07/02/21 1413  (!) 130  101/80 91 %       Intake/Output Summary (Last 24 hours) at 7/3/2021 1405  Last data filed at 7/3/2021 1351  Gross per 24 hour   Intake    Output 2055 ml   Net -2055 ml        I had a face to face encounter, and independently examined this patient on 7/3/2021, as outlined below:  PHYSICAL EXAM:  General: WD, WN. Alert, cooperative, no acute distress    EENT:  EOMI. Anicteric sclerae. MMM  Resp:  CTA bilaterally, no wheezing or rales. No accessory muscle use  CV:  Regular  rhythm,  No edema  GI:  Soft, Non distended, Non tender. +Bowel sounds  Neurologic:  Alert and oriented X 3, normal speech,   Psych:   Good insight. Not anxious nor agitated  Skin:  No rashes. No jaundice    Reviewed most current lab test results and cultures  YES  Reviewed most current radiology test results   YES  Review and summation of old records today    NO  Reviewed patient's current orders and MAR    YES  PMH/SH reviewed - no change compared to H&P  ________________________________________________________________________  Care Plan discussed with:    Comments   Patient x    Family      RN     Care Manager     Consultant                        Multidiciplinary team rounds were held today with , nursing, pharmacist and clinical coordinator. Patient's plan of care was discussed; medications were reviewed and discharge planning was addressed.      ________________________________________________________________________  Total NON critical care TIME:  25   Minutes    Total CRITICAL CARE TIME Spent:   Minutes non procedure based      Comments   >50% of visit spent in counseling and coordination of care x     This includes time during multidisciplinary rounds if indicated above   ________________________________________________________________________  Genejen Perry MD     Procedures: see electronic medical records for all procedures/Xrays and details which were not copied into this note but were reviewed prior to creation of Plan. LABS:  I reviewed today's most current labs and imaging studies.   Pertinent labs include:  Recent Labs     07/03/21  0323 07/02/21  1243   WBC 9.2 8.5   HGB 13.6 14.3   HCT 42.0 45.5    194     Recent Labs     07/03/21  0323 07/02/21  1243    139   K 4.0 4.5    103   CO2 27 30   * 109*   BUN 17 21*   CREA 1.06 1.16   CA 9.0 8.9   MG  --  2.3   ALB  --  3.8   TBILI  --  0.8   ALT  --  33

## 2021-07-03 NOTE — PROGRESS NOTES
End of Shift Note    Bedside shift change report given to  (oncoming nurse) by Carmen Griffith RN (offgoing nurse). Report included the following information SBAR    Shift worked:  night     Shift summary and any significant changes:     continue dilt drip on 12.5 hr remained 70s-80s still irregular  contnue amiodrip    BP stable, no complaints over night. Trop neg x3     Concerns for physician to address:       Zone phone for oncoming shift:          Activity:  Activity Level: Bed Rest  Number times ambulated in hallways past shift: 0  Number of times OOB to chair past shift: 0    Cardiac:   Cardiac Monitoring: Yes      Cardiac Rhythm: Atrial Fib    Access:   Current line(s): PIV     Genitourinary:   Urinary status: voiding    Respiratory:   O2 Device: Nasal cannula  Chronic home O2 use?: YES  Incentive spirometer at bedside: NO     GI:     Current diet:  ADULT DIET Regular; Low Fat/Low Chol/High Fiber/LETA  Passing flatus: YES  Tolerating current diet: YES       Pain Management:   Patient states pain is manageable on current regimen: YES    Skin:  Benedict Score: 20  Interventions: float heels, limit briefs and nutritional support     Patient Safety:  Fall Score:  Total Score: 4  Interventions: bed/chair alarm, assistive device (walker, cane, etc), gripper socks, pt to call before getting OOB and stay with me (per policy)  High Fall Risk: Yes    Length of Stay:  Expected LOS: - - -  Actual LOS: 1      Carmen Griffith RN

## 2021-07-03 NOTE — PROGRESS NOTES
ADULT PROTOCOL: JET AEROSOL ASSESSMENT    Patient  Mau Lovett     65 y.o.   male     7/3/2021  8:33 AM    Breath Sounds Pre Procedure:                                      Breath Sounds Post Procedure:                                        Breathing pattern: Pre procedure Breathing Pattern: Regular          Post procedure Breathing Pattern: Regular    Heart Rate: Pre procedure Pulse: 84           Post procedure Pulse: 79    Resp Rate: Pre procedure Respirations: 18           Post procedure Respirations: 18    POxygen: 2L/NC     Changed: NO    SpO2: Pre procedure SpO2: 93 %                Post procedure SpO2: 93 %      Nebulizer Therapy: Current medications Aerosolized Medications: Brovana, Pulmicort      Changed: NO      Problem List:   Patient Active Problem List   Diagnosis Code    Hypertension I10    Arrhythmia I49.9    Obesity (BMI 30.0-34. 9) E66.9    Acute respiratory failure with hypoxia ([facility]) J96.01    Atrial flutter with rapid ventricular response [facility]) I48.92       Respiratory Therapist: RT Beryl

## 2021-07-03 NOTE — PROGRESS NOTES
Progress Note      7/3/2021 1:44 PM  NAME: Roselyn Dumont   MRN:  [id number]   Admit Diagnosis: Atrial flutter with rapid ventricular response ([facility] [address] [I48.92]     Assessment:     Assessment: 1. Typical right atrial flutter with variable block (rapid HR's). 2. Persistent atrial fibrillation with a history of cardioversion in the past off Multaq and lately on Multaq 7/1.  3. Reported history of paroxysmal SVT diagnosed in the 1980's. 4. Multaq therapy recently stopped. 5. COPD with exacerbation in /2021, hypercapnea, atrial fibrillation was diagnosed. 6. Hypertension. 7. Full code.     Plan:      1. I discussed options with him including changing antiarrhythmic therapy (he was put on amio in the ER) versus Afib/flutter/SVT ablation. We are going to try both. In the shortterm, will use amiodarone and change to oral tomorrow. 2. Increase his oral diltiazem to get HR's in the 's range. He was on diltiazem  mg daily PTA, currently on an infusion. 3. OK with low dose metoprolol. He is not on propranolol. 4. No urgent need to cardiovert again.     I have his information. Will arrange for an OP ablation. From my standpoint, as long as he's rate controlled and anticoagulated, OK to discharge. Plan:     7/3    Heart rate is better controlled. Feels better. Will transition to PO meds. Stop IV diltiazem and IV amiodarone. [x]        High complexity decision making was performed    Subjective:     Roselyn Dumont denies chest pain, dyspnea. Discussed with RN events overnight. Patient Active Problem List   Diagnosis Code    Hypertension I10    Arrhythmia I49.9    Obesity (BMI 30.0-34. 9) E66.9    Acute respiratory failure with hypoxia (HCC) J96.01    Atrial flutter with rapid ventricular response ([facility]) I48.92       Review of Systems:    Symptom Y/N Comments  Symptom Y/N Comments   Fever/Chills N   Chest Pain N    Poor Appetite N   Edema N    Cough N   Abdominal Pain N    Sputum N   Joint Pain N    SOB/BOWDEN N   Pruritis/Rash N    Nausea/vomit N   Tolerating PT/OT Y    Diarrhea N   Tolerating Diet Y    Constipation N   Other       Could NOT obtain due to:      Objective:      Physical Exam:    Last 24hrs VS reviewed since prior progress note. Most recent are:    Visit Vitals  /78   Pulse 86   Temp 97.3 °F (36.3 °C)   Resp 22   Ht 5' 11\" (1.803 m)   Wt 119.6 kg (263 lb 10.7 oz)   SpO2 96%   BMI 36.77 kg/m²       Intake/Output Summary (Last 24 hours) at 7/3/2021 1344  Last data filed at 7/3/2021 1140  Gross per 24 hour   Intake    Output 1700 ml   Net -1700 ml        General Appearance: Well developed, well nourished, alert & oriented x 3,    no acute distress. Ears/Nose/Mouth/Throat: Hearing grossly normal.  Neck: Supple. Chest: Lungs clear to auscultation bilaterally. Cardiovascular: Regular rate and rhythm, S1S2 normal, no murmur. Abdomen: Soft, non-tender, bowel sounds are active. Extremities: No edema bilaterally. Skin: Warm and dry. PMH/SH reviewed - no change compared to H&P    Data Review    Telemetry: normal sinus rhythm     Lab Data Personally Reviewed:    Recent Labs     07/03/21  0323 07/02/21  1243   WBC 9.2 8.5   HGB 13.6 14.3   HCT 42.0 45.5    194   LABRCNT(INR:3,PTP:3,APTT:3,)  Recent Labs     07/03/21  0323 07/02/21  1243    139   K 4.0 4.5    103   CO2 27 30   BUN 17 21*   CREA 1.06 1.16   * 109*   CA 9.0 8.9   MG  --  2.3   LABRCNT(CPK:3,CpKMB:3,ckndx:3,troiq:3)No results found for: CHOL, CHOLX, CHLST, CHOLV, HDL, HDLP, LDL, LDLC, DLDLP, TGLX, TRIGL, TRIGP, CHHD, CHHDXLABRCNT(sgot:3,gpt:3,ap:3,tbiL:3,TP:3,ALB:3,GLOB:3,ggt:3,aml:3,amyp:3,lpse:3,hlpse:3)No results for input(s): PH, PCO2, PO2 in the last 72 hours.   No results found for: CHOL, CHOLX, CHLST, CHOLV, HDL, HDLP, LDL, LDLC, DLDLP, TGLX, TRIGL, TRIGP, CHHD, CHHDXMEDTABLETimothy Tj Payton MD  No results for input(s): PH, PCO2, PO2 in the last 72 hours.    Medications Personally Reviewed:    Current Facility-Administered Medications   Medication Dose Route Frequency    dilTIAZem (CARDIZEM) 100 mg in dextrose 5% (MBP/ADV) 100 mL infusion  0-15 mg/hr IntraVENous TITRATE    metoprolol succinate (TOPROL-XL) XL tablet 25 mg  25 mg Oral DAILY    amiodarone (CORDARONE) 375 mg/250 mL D5W infusion  1 mg/min IntraVENous TITRATE    ondansetron (ZOFRAN) injection 4 mg  4 mg IntraVENous Q4H PRN    arformoterol 15 mcg/budesonide 0.5 mg neb solution   Nebulization DAILY    apixaban (ELIQUIS) tablet 5 mg  5 mg Oral BID    aspirin delayed-release tablet 81 mg  81 mg Oral DAILY    cholecalciferol (VITAMIN D3) (1000 Units /25 mcg) tablet 1,000 Units  1,000 Units Oral DAILY    pantoprazole (PROTONIX) tablet 40 mg  40 mg Oral ACB    finasteride (PROSCAR) tablet 10 mg  10 mg Oral DAILY    furosemide (LASIX) tablet 20 mg  20 mg Oral DAILY    gemfibroziL (LOPID) tablet 600 mg  600 mg Oral BID    melatonin tablet 3 mg  3 mg Oral QHS    rosuvastatin (CRESTOR) tablet 40 mg  40 mg Oral QHS    tamsulosin (FLOMAX) capsule 0.4 mg  0.4 mg Oral DAILY    sodium chloride (NS) flush 5-40 mL  5-40 mL IntraVENous Q8H    sodium chloride (NS) flush 5-40 mL  5-40 mL IntraVENous PRN    acetaminophen (TYLENOL) tablet 650 mg  650 mg Oral Q6H PRN    Or    acetaminophen (TYLENOL) suppository 650 mg  650 mg Rectal Q6H PRN    polyethylene glycol (MIRALAX) packet 17 g  17 g Oral DAILY PRN         Mayela Romero MD

## 2021-07-03 NOTE — H&P
Hospitalist Admission Note    NAME: Gregory Handley   :     MRN:  [id number]     Date/Time:  2021 8:07 PM    Patient PCP: Danni Hartley MD  ________________________________________________________________________    My assessment of this patient's clinical condition and my plan of care is as follows. Assessment / Plan:  Atrial flutter with rapid rate  -Evaluated by cardiology in the emergency department and recommended to continue amiodarone and also Cardizem drip  -Continue home metoprolol  -Consider changing to p.o. Cardizem and increasing the dose in a.m.  -Check BMP and magnesium in a.m.  -He is currently hemodynamically stable  -Cardiology is following  -Continue Eliquis    Diastolic congestive heart failure  -Continue home Lasix and metoprolol    Hypertension  Dyslipidemia  History of COPD  BPH  GERD  -Continue metoprolol, statin, home inhalers, Flomax, PPI              Code Status: full   Surrogate Decision Maker: Wife Daisy Nones    DVT Prophylaxis: Eliquis      Baseline: From home independent        Subjective:   CHIEF COMPLAINT: Palpitations    HISTORY OF PRESENT ILLNESS:     Agnes Yee is a 65 y.o.   male who presents with past medical history of atrial fibrillation is coming the hospital chief complaints of palpitations. Patient reports that she had a cardioversion yesterday Dr. Renée Lopez office and this morning he was checking his pulse ox when he noticed that his heart rate was around 1 30-1 40 for which she called Dr. Teodora Jacques and he advised him to come to the emergency department for further evaluation. He does not report any chest pain or shortness of breath. He reports being compliant with his medications he does not report any abdominal pain nausea or vomiting.     On arrival to emergency department, his vital signs are within normal limits, he was noted to have atrial fibrillation with rapid ventricular rate for which she was started on Cardizem drip and cardiology was contacted and the recommended to continue Cardizem drip along with amiodarone drip. We were asked to admit for work up and evaluation of the above problems. Past Medical History:   Diagnosis Date    Arrhythmia     SVT; Dr. Domingo Lynne Fall 2018    Chronic obstructive pulmonary disease (HCC)     mild    GERD (gastroesophageal reflux disease)     Hypertension     Thyroid disease     benign thyroid growth, checked annually by Dr. Jodie Guerrero        Past Surgical History:   Procedure Laterality Date    COLONOSCOPY N/A 2/5/2019    COLONOSCOPY performed by Glenys Vail MD at [facility] ENDOSCOPY    COLONOSCOPY,MAYELA RODRIGUES,SNARE  2/5/2019         HX HEENT Bilateral     cataract extraction    NE COLON CA SCRN NOT  [address] IND  2/5/2019            Social History     Tobacco Use    Smoking status: Former Smoker     Years: 5.00    Smokeless tobacco: Never Used   Substance Use Topics    Alcohol use: Yes     Alcohol/week: 7.0 standard drinks     Types: 7 Glasses of wine per week        Family History   Problem Relation Age of Onset    Hypertension Mother     Heart Disease Mother     Hypertension Father     Diabetes Father      No Known Allergies     Prior to Admission medications    Medication Sig Start Date End Date Taking? Authorizing Provider   dronedarone (Multaq) tab tablet Take 400 mg by mouth two (2) times daily (with meals). Provider, Historical   metoprolol succinate (Toprol XL) 25 mg XL tablet Take  by mouth daily. Provider, Historical   predniSONE (DELTASONE) 20 mg tablet Take 40 mg by mouth daily (with breakfast). 4 tabs for 3 days   3 tabs for 3 days   2 tabs for 3 days  1 tabs for 3 days  Then stop. 3/10/21   Hodan Garvin MD   furosemide (LASIX) 20 mg tablet 1 tab po daily 3/10/21   Hodan Garvin MD   dilTIAZem ER (CARDIZEM CD) 120 mg capsule Take 1 Cap by mouth daily.  3/10/21   Hodan Garvin MD   apixaban (ELIQUIS) 5 mg tablet Take 1 Tab by mouth two (2) times a day. 3/9/21   Karmen Yee MD   rosuvastatin (Crestor) 40 mg tablet Take 40 mg by mouth nightly. Elaina Ruiz MD   finasteride (PROSCAR) 5 mg tablet Take 10 mg by mouth daily. Pt unsure of strength     Elaina Ruiz MD   propranolol LA (INDERAL LA) 120 mg SR capsule Take 120 mg by mouth daily. Provider, Historical   tamsulosin (FLOMAX) 0.4 mg capsule Take 0.4 mg by mouth daily. Provider, Historical   esomeprazole (NEXIUM) 40 mg capsule Take 40 mg by mouth daily. Provider, Historical   aspirin delayed-release 81 mg tablet Take 81 mg by mouth daily. Provider, Historical   melatonin 3 mg tablet Take 3 mg by mouth nightly. Provider, Historical   albuterol (VENTOLIN HFA) 90 mcg/actuation inhaler Take 1 Puff by inhalation every four (4) hours as needed. Provider, Historical   fluticasone-vilanterol (BREO ELLIPTA) 100-25 mcg/dose inhaler Take 1 Puff by inhalation daily. Provider, Historical   gemfibrozil (LOPID) 600 mg tablet Take 600 mg by mouth two (2) times a day. Provider, Historical   cholecalciferol (VITAMIN D3) 1,000 unit cap Take 1,000 Units by mouth daily. Provider, Historical   MULTIVIT-MIN/FA/LYCOPEN/LUTEIN (CENTRUM SILVER ULTRA MEN'S PO) Take 1 Tab by mouth daily. Provider, Historical   Omega-3-DHA-EPA-Fish Oil 1,000 mg (120 mg-180 mg) cap Take 1 Cap by mouth daily. Provider, Historical       REVIEW OF SYSTEMS:     I am not able to complete the review of systems because:    The patient is intubated and sedated    The patient has altered mental status due to his acute medical problems    The patient has baseline aphasia from prior stroke(s)    The patient has baseline dementia and is not reliable historian    The patient is in acute medical distress and unable to provide information           Total of 12 systems reviewed as follows:       POSITIVE= underlined text  Negative = text not underlined  General:  fever, chills, sweats, generalized weakness, weight loss/gain,      loss of appetite   Eyes:    blurred vision, eye pain, loss of vision, double vision  ENT:    rhinorrhea, pharyngitis   Respiratory:   cough, sputum production, SOB, BOWDEN, wheezing, pleuritic pain   Cardiology:   chest pain, palpitations, orthopnea, PND, edema, syncope   Gastrointestinal:  abdominal pain , N/V, diarrhea, dysphagia, constipation, bleeding   Genitourinary:  frequency, urgency, dysuria, hematuria, incontinence   Muskuloskeletal :  arthralgia, myalgia, back pain  Hematology:  easy bruising, nose or gum bleeding, lymphadenopathy   Dermatological: rash, ulceration, pruritis, color change / jaundice  Endocrine:   hot flashes or polydipsia   Neurological:  headache, dizziness, confusion, focal weakness, paresthesia,     Speech difficulties, memory loss, gait difficulty  Psychological: Feelings of anxiety, depression, agitation    Objective:   VITALS:    Visit Vitals  /88   Pulse (!) 115   Temp 97.4 °F (36.3 °C)   Resp 18   Ht 5' 11\" (1.803 m)   Wt 119.6 kg (263 lb 10.7 oz)   SpO2 94%   BMI 36.77 kg/m²       PHYSICAL EXAM:    General:    Alert, cooperative, no distress, appears stated age. HEENT: Atraumatic, anicteric sclerae, pink conjunctivae     No oral ulcers, mucosa moist, throat clear, dentition fair  Neck:  Supple, symmetrical,  thyroid: non tender  Lungs:   Clear to auscultation bilaterally. No Wheezing or Rhonchi. No rales. Chest wall:  No tenderness  No Accessory muscle use. Heart:   Irregular  rhythm,  No  murmur   No edema  Abdomen:   Soft, non-tender. Not distended. Bowel sounds normal  Extremities: No cyanosis. No clubbing,      Skin turgor normal, Capillary refill normal, Radial dial pulse 2+  Skin:     Not pale. Not Jaundiced  No rashes   Psych:  Not anxious or agitated. Neurologic: EOMs intact. No facial asymmetry. No aphasia or slurred speech. Symmetrical strength, Sensation grossly intact. Alert and oriented X 4. _______________________________________________________________________  Care Plan discussed with:    Comments   Patient y    Family      RN y    Care Manager                    Consultant:      _______________________________________________________________________  Expected  Disposition:   Home with Family y   HH/PT/OT/RN    SNF/LTC    ANOOP    ________________________________________________________________________  TOTAL TIME:  61  Minutes    Critical Care Provided     Minutes non procedure based      Comments    y Reviewed previous records   >50% of visit spent in counseling and coordination of care y Discussion with patient and/or family and questions answered       ________________________________________________________________________  Signed: Anay Duque MD    Procedures: see electronic medical records for all procedures/Xrays and details which were not copied into this note but were reviewed prior to creation of Plan. LAB DATA REVIEWED:    Recent Results (from the past 24 hour(s))   EKG, 12 LEAD, INITIAL    Collection Time: 07/02/21 12:31 PM   Result Value Ref Range    Ventricular Rate 141 BPM    Atrial Rate 258 BPM    P-R Interval 158 ms    QRS Duration 88 ms    Q-T Interval 342 ms    QTC Calculation (Bezet) 523 ms    Calculated P Axis -104 degrees    Calculated R Axis -2 degrees    Calculated T Axis 64 degrees    Diagnosis       Poor data quality  Probable Atrial flutter  Nonspecific ST abnormality  When compared with ECG of 01-JUL-2021 08:11,  Probable Atrial flutter is a new finding  Confirmed by Mirta Snyder ([id number]) on 7/2/2021 4:06:56 PM     CBC WITH AUTOMATED DIFF    Collection Time: 07/02/21 12:43 PM   Result Value Ref Range    WBC 8.5 4.1 - 11.1 K/uL    RBC 4.89 4. 10 - 5.70 M/uL    HGB 14.3 12.1 - 17.0 g/dL    HCT 45.5 36.6 - 50.3 %    MCV 93.0 80.0 - 99.0 FL    MCH 29.2 26.0 - 34.0 PG    MCHC 31.4 30.0 - 36.5 g/dL    RDW 13.6 11.5 - 14.5 %    PLATELET 237 683 - 322 K/uL    MPV 10.7 8.9 - 12.9 FL    NRBC 0.0 0  WBC    ABSOLUTE NRBC 0.00 0.00 - 0.01 K/uL    NEUTROPHILS 59 32 - 75 %    LYMPHOCYTES 29 12 - 49 %    MONOCYTES 9 5 - 13 %    EOSINOPHILS 1 0 - 7 %    BASOPHILS 1 0 - 1 %    IMMATURE GRANULOCYTES 1 (H) 0.0 - 0.5 %    ABS. NEUTROPHILS 5.0 1.8 - 8.0 K/UL    ABS. LYMPHOCYTES 2.5 0.8 - 3.5 K/UL    ABS. MONOCYTES 0.8 0.0 - 1.0 K/UL    ABS. EOSINOPHILS 0.1 0.0 - 0.4 K/UL    ABS. BASOPHILS 0.0 0.0 - 0.1 K/UL    ABS. IMM. GRANS. 0.1 (H) 0.00 - 0.04 K/UL    DF AUTOMATED     METABOLIC PANEL, COMPREHENSIVE    Collection Time: 07/02/21 12:43 PM   Result Value Ref Range    Sodium 139 136 - 145 mmol/L    Potassium 4.5 3.5 - 5.1 mmol/L    Chloride 103 97 - 108 mmol/L    CO2 30 21 - 32 mmol/L    Anion gap 6 5 - 15 mmol/L    Glucose 109 (H) 65 - 100 mg/dL    BUN 21 (H) 6 - 20 MG/DL    Creatinine 1.16 0.70 - 1.30 MG/DL    BUN/Creatinine ratio 18 12 - 20      GFR est AA >60 >60 ml/min/1.73m2    GFR est non-AA >60 >60 ml/min/1.73m2    Calcium 8.9 8.5 - 10.1 MG/DL    Bilirubin, total 0.8 0.2 - 1.0 MG/DL    ALT (SGPT) 33 12 - 78 U/L    AST (SGOT) 20 15 - 37 U/L    Alk. phosphatase 77 45 - 117 U/L    Protein, total 7.9 6.4 - 8.2 g/dL    Albumin 3.8 3.5 - 5.0 g/dL    Globulin 4.1 (H) 2.0 - 4.0 g/dL    A-G Ratio 0.9 (L) 1.1 - 2.2     TROPONIN I    Collection Time: 07/02/21 12:43 PM   Result Value Ref Range    Troponin-I, Qt. <0.05 <0.05 ng/mL   SAMPLES BEING HELD    Collection Time: 07/02/21 12:43 PM   Result Value Ref Range    SAMPLES BEING HELD BLUE     COMMENT        Add-on orders for these samples will be processed based on acceptable specimen integrity and analyte stability, which may vary by analyte.    MAGNESIUM    Collection Time: 07/02/21 12:43 PM   Result Value Ref Range    Magnesium 2.3 1.6 - 2.4 mg/dL

## 2021-07-03 NOTE — PROGRESS NOTES
Pharmacy Medication Reconciliation     The patient was interviewed regarding current PTA medication list, use and drug allergies. The patient was questioned regarding use of any other inhalers, topical products, over the counter medications, herbal medications, vitamin products or ophthalmic/nasal/otic medication use. Allergy Update: Patient has no known allergies. Recommendations/Findings: The following amendments were made to the patient's active medication list on file at [id number]:   1) Additions:        - Trelegy       - AREDs       - Vitamin C       - Vitamin B12       - Ibuprofen       - Protonix    2) Deletions:        - Breo       - Diltiazem       - Dronedarone       - Prednisone       - Propranolol       - Nexium    3) Changes: None      Pertinent Findings: None    Clarified PTA med list with Rx Query and the patient. PTA medication list was corrected to the following:     Prior to Admission Medications   Prescriptions Last Dose Informant Taking? MULTIVIT-MIN/FA/LYCOPEN/LUTEIN (CENTRUM SILVER ULTRA MEN'S PO)   No   Sig: Take 1 Tab by mouth daily. Omega-3-DHA-EPA-Fish Oil 1,000 mg (120 mg-180 mg) cap   No   Sig: Take 1 Cap by mouth daily. albuterol (VENTOLIN HFA) 90 mcg/actuation inhaler   No   Sig: Take 1 Puff by inhalation every four (4) hours as needed. apixaban (ELIQUIS) 5 mg tablet   No   Sig: Take 1 Tab by mouth two (2) times a day. ascorbic acid, vitamin C, (Vitamin C) 500 mg tablet   Yes   Sig: Take 500 mg by mouth daily. aspirin delayed-release 81 mg tablet   No   Sig: Take 81 mg by mouth daily. cholecalciferol (VITAMIN D3) 1,000 unit cap   No   Sig: Take 1,000 Units by mouth daily. cyanocobalamin (Vitamin B-12) 1,000 mcg tablet   Yes   Sig: Take 1,000 mcg by mouth daily. finasteride (PROSCAR) 5 mg tablet   Yes   Sig: Take 5 mg by mouth daily. fluticasone-umeclidinium-vilanterol (Trelegy Ellipta) 100-62.5-25 mcg inhaler   Yes   Sig: Take 1 Puff by inhalation daily. furosemide (LASIX) 20 mg tablet   No   Si tab po daily   gemfibrozil (LOPID) 600 mg tablet   No   Sig: Take 600 mg by mouth two (2) times a day. ibuprofen (MOTRIN) 200 mg tablet   Yes   Sig: Take 400 mg by mouth every eight (8) hours as needed for Pain.   melatonin 3 mg tablet   No   Sig: Take 3 mg by mouth nightly. metoprolol succinate (Toprol XL) 25 mg XL tablet   No   Sig: Take 25 mg by mouth daily. pantoprazole (Protonix) 40 mg tablet   Yes   Sig: Take 40 mg by mouth daily. rosuvastatin (Crestor) 40 mg tablet   No   Sig: Take 40 mg by mouth nightly. tamsulosin (FLOMAX) 0.4 mg capsule   No   Sig: Take 0.4 mg by mouth daily. vit A/vit C/vit E/zinc/copper (PRESERVISION AREDS PO)   Yes   Sig: Take 1 Caplet by mouth two (2) times a day.       Facility-Administered Medications: None        Thank you,  Aramis Red, PHARMD

## 2021-07-03 NOTE — CONSULTS
[address]    Name:  Stacy Sandoval  MR#:  [id number]  :  1950  ACCOUNT #:  [de-identified]  DATE OF SERVICE:  2021    HISTORY OF PRESENT ILLNESS:  The patient is a 68-year-old gentleman who just underwent cardioversion on 2021. At that time, he was taking Multaq 400 mg twice daily, Eliquis 5 twice daily, furosemide 20, and diltiazem had been discontinued as well as Inderal LA. He was on Toprol 25 as well as rosuvastatin 40. Cardioversion was carried out uneventfully. The patient had sinus rhythm and sinus tachycardia at the time of discharge. His initial bout of atrial fibrillation occurred when he had a bout of pneumonia and respiratory failure approximately 6 weeks ago. The patient denies chest pain or shortness of breath. Today, he noted the onset of rapid pulse and was advised to come to emergency room where he was found to be in atrial fibrillation with rapid ventricular response. He had no chest pain. He utilizes oxygen at home and is not short of breath with house oxygen. MEDICATIONS:  His current medications are Eliquis 5 twice daily, Multaq 400 mg twice daily, furosemide 20. Inderal has been discontinued. He is on Toprol XL 25 one daily. Diltiazem has been discontinued. He is also on rosuvastatin 40 . SOCIAL HISTORY:  He is . He is retired from [employer]. His wife is very attentive. REVIEW OF SYSTEMS:  GENERAL:  His appetite is good. He has not lost weight. He has not noted chest pain or syncope. He has no difficulty with his appetite. HEENT:  No headaches, blurred vision, double vision, glaucoma, or cataracts. RESPIRATORY:  He has shortness of breath chronically, but O2 sats at home were usually in the 90s. He is followed by Dr. Mei Camejo. CARDIOVASCULAR:  No peripheral edema. GI:  No abdominal discomfort. GENITOURINARY:  No genitourinary complaints.     PHYSICAL EXAMINATION:  VITAL SIGNS:  His weight is 266, BMI is 37. Blood pressure 120/80 per [facility], pulse is 140-150. GENERAL:  He is alert and oriented. NECK:  Supple. Carotids are full. LUNGS:  Revealed diminished breath sounds, but no rales, rhonchi or wheezes. HEART:  Irregular rhythm, rapid rate. ABDOMEN:  Nontender. EXTREMITIES:  No edema. Pulses present. SKIN:  Warm and dry. EKG:  Atrial fibrillation with rapid ventricular response. Nonspecific ST changes. PROBLEMS:  1. Atrial fibrillation with rapid ventricular response, status post cardioversion - 07/01/2021.  2.  Chronic obstructive pulmonary disease. 3.  Status post initial cardioversion approximately 1 month ago with recurrence. 4.  Dyslipidemia. 5.  Obesity. The patient will be admitted for rate control with diltiazem. Consultation with Dr. Karina Ny will be obtained. He may need ablation for atrial fibrillation.       Mica Eli MD      [id number]  D:  07/02/2021 14:52  T:  07/02/2021 20:22  JOB #:  [id number]  CC:  MD Shabnam Johnson MD

## 2021-07-03 NOTE — ACP (ADVANCE CARE PLANNING)
Advance Care Planning Note      NAME: Livia Hashimoto   :  1950   MRN:  [id number]     Date/Time:  2021 8:16 PM    Active Diagnoses:  Hospital Problems  Date Reviewed: 2019        Codes Class Noted POA    Atrial flutter with rapid ventricular response [facility]) ICD-10-CM: H30.41  ICD-9-CM: 427.32  2021 Unknown          Diastolic CHF  Hypertension  Dyslipidemia    These active diagnoses are of sufficient risk that focused discussion on advance care planning is indicated in order to allow the patient to thoughtfully consider personal goals of care, and if situations arise that prevent the ability to personally give input, to ensure appropriate representation of their personal desires for different levels and aggressiveness of care. Discussion:   Code status addressed and wants to be a Full code. Patient wants central line and vasopressors if needed. Patient  would like to assign wife Yasmeen Mcdaniels  as the surrogate decision maker. Persons present and participating in discussion: Livia Hashimoto, Randye Love, MD .       Time Spent:   Total time spent face-to-face in education and discussion:  16   minutes.          Laura Campbell MD   Hospitalist

## 2021-07-04 LAB
ATRIAL RATE: 125 BPM
CALCULATED P AXIS, ECG09: 88 DEGREES
CALCULATED R AXIS, ECG10: 12 DEGREES
CALCULATED T AXIS, ECG11: 60 DEGREES
DIAGNOSIS, 93000: NORMAL
P-R INTERVAL, ECG05: 134 MS
Q-T INTERVAL, ECG07: 296 MS
QRS DURATION, ECG06: 122 MS
QTC CALCULATION (BEZET), ECG08: 427 MS
VENTRICULAR RATE, ECG03: 125 BPM

## 2021-07-04 PROCEDURE — 74011250637 HC RX REV CODE- 250/637: Performed by: INTERNAL MEDICINE

## 2021-07-04 PROCEDURE — 77010033678 HC OXYGEN DAILY

## 2021-07-04 PROCEDURE — 65660000001 HC RM ICU INTERMED STEPDOWN

## 2021-07-04 PROCEDURE — 94640 AIRWAY INHALATION TREATMENT: CPT

## 2021-07-04 PROCEDURE — 74011000250 HC RX REV CODE- 250: Performed by: INTERNAL MEDICINE

## 2021-07-04 RX ADMIN — ASPIRIN 81 MG: 81 TABLET, COATED ORAL at 08:06

## 2021-07-04 RX ADMIN — GEMFIBROZIL 600 MG: 600 TABLET ORAL at 08:06

## 2021-07-04 RX ADMIN — AMIODARONE HYDROCHLORIDE 400 MG: 200 TABLET ORAL at 17:34

## 2021-07-04 RX ADMIN — APIXABAN 5 MG: 5 TABLET, FILM COATED ORAL at 08:06

## 2021-07-04 RX ADMIN — AMIODARONE HYDROCHLORIDE 400 MG: 200 TABLET ORAL at 08:06

## 2021-07-04 RX ADMIN — Medication 10 ML: at 06:59

## 2021-07-04 RX ADMIN — DILTIAZEM HYDROCHLORIDE 240 MG: 240 CAPSULE, COATED, EXTENDED RELEASE ORAL at 08:06

## 2021-07-04 RX ADMIN — ROSUVASTATIN CALCIUM 40 MG: 40 TABLET, FILM COATED ORAL at 21:42

## 2021-07-04 RX ADMIN — TAMSULOSIN HYDROCHLORIDE 0.4 MG: 0.4 CAPSULE ORAL at 08:06

## 2021-07-04 RX ADMIN — FINASTERIDE 10 MG: 5 TABLET, FILM COATED ORAL at 08:06

## 2021-07-04 RX ADMIN — CHOLECALCIFEROL TAB 25 MCG (1000 UNIT) 1000 UNITS: 25 TAB at 08:06

## 2021-07-04 RX ADMIN — FUROSEMIDE 20 MG: 20 TABLET ORAL at 08:06

## 2021-07-04 RX ADMIN — PANTOPRAZOLE SODIUM 40 MG: 40 TABLET, DELAYED RELEASE ORAL at 07:18

## 2021-07-04 RX ADMIN — Medication 10 ML: at 21:43

## 2021-07-04 RX ADMIN — ARFORMOTEROL TARTRATE: 15 SOLUTION RESPIRATORY (INHALATION) at 10:10

## 2021-07-04 RX ADMIN — APIXABAN 5 MG: 5 TABLET, FILM COATED ORAL at 17:34

## 2021-07-04 RX ADMIN — Medication 10 ML: at 17:35

## 2021-07-04 RX ADMIN — MELATONIN 3 MG: at 21:42

## 2021-07-04 RX ADMIN — GEMFIBROZIL 600 MG: 600 TABLET ORAL at 17:34

## 2021-07-04 RX ADMIN — METOPROLOL SUCCINATE 25 MG: 25 TABLET, FILM COATED, EXTENDED RELEASE ORAL at 08:06

## 2021-07-04 NOTE — PROGRESS NOTES
Progress Note      7/4/2021 1:44 PM  NAME: Valentín Gill   MRN:  [id number]   Admit Diagnosis: Atrial flutter with rapid ventricular response ([address] [I48.92]     Assessment:     Assessment: 1. Typical right atrial flutter with variable block (rapid HR's). 2. Persistent atrial fibrillation with a history of cardioversion in the past off Multaq and lately on Multaq 7/1.  3. Reported history of paroxysmal SVT diagnosed in the 1980's. 4. Multaq therapy recently stopped. 5. COPD with exacerbation in /2021, hypercapnea, atrial fibrillation was diagnosed. 6. Hypertension. 7. Full code.     Plan:      1. I discussed options with him including changing antiarrhythmic therapy (he was put on amio in the ER) versus Afib/flutter/SVT ablation. We are going to try both. In the shortterm, will use amiodarone and change to oral tomorrow. 2. Increase his oral diltiazem to get HR's in the 's range. He was on diltiazem  mg daily PTA, currently on an infusion. 3. OK with low dose metoprolol. He is not on propranolol. 4. No urgent need to cardiovert again.     I have his information. Will arrange for an OP ablation. From my standpoint, as long as he's rate controlled and anticoagulated, OK to discharge. Plan:     7/3    Heart rate is better controlled. Feels better. Will transition to PO meds. Stop IV diltiazem and IV amiodarone. 7/4 had recurrence of tahcycardia last night and put back on IV  Cardizem . Rate came under control. IV stopped earlier today . Rate is OK     If remains stable plan discharge tomorrow . [x]        High complexity decision making was performed    Subjective:     Valentín Gill denies chest pain, dyspnea. Discussed with RN events overnight. Patient Active Problem List   Diagnosis Code    Hypertension I10    Arrhythmia I49.9    Obesity (BMI 30.0-34. 9) E66.9    Acute respiratory failure with hypoxia (HCC) J96.01    Atrial flutter with rapid ventricular response (HCC) I48.92       Review of Systems:    Symptom Y/N Comments  Symptom Y/N Comments   Fever/Chills N   Chest Pain N    Poor Appetite N   Edema N    Cough N   Abdominal Pain N    Sputum N   Joint Pain N    SOB/BOWDEN N   Pruritis/Rash N    Nausea/vomit N   Tolerating PT/OT Y    Diarrhea N   Tolerating Diet Y    Constipation N   Other       Could NOT obtain due to:      Objective:      Physical Exam:    Last 24hrs VS reviewed since prior progress note. Most recent are:    Visit Vitals  /69 (BP 1 Location: Left upper arm, BP Patient Position: Sitting)   Pulse 94   Temp 97.7 °F (36.5 °C)   Resp 16   Ht 5' 11\" (1.803 m)   Wt 119.6 kg (263 lb 10.7 oz)   SpO2 92%   BMI 36.77 kg/m²       Intake/Output Summary (Last 24 hours) at 7/4/2021 1541  Last data filed at 7/4/2021 1211  Gross per 24 hour   Intake    Output 1376 ml   Net -1376 ml        General Appearance: Well developed, well nourished, alert & oriented x 3,    no acute distress. Ears/Nose/Mouth/Throat: Hearing grossly normal.  Neck: Supple. Chest: Lungs clear to auscultation bilaterally. Cardiovascular: Regular rate and rhythm, S1S2 normal, no murmur. Abdomen: Soft, non-tender, bowel sounds are active. Extremities: No edema bilaterally. Skin: Warm and dry.     PMH/SH reviewed - no change compared to H&P    Data Review    Telemetry: normal sinus rhythm     Lab Data Personally Reviewed:    Recent Labs     07/03/21  0323 07/02/21  1243   WBC 9.2 8.5   HGB 13.6 14.3   HCT 42.0 45.5    194   LABRCNT(INR:3,PTP:3,APTT:3,)  Recent Labs     07/03/21  0323 07/02/21  1243    139   K 4.0 4.5    103   CO2 27 30   BUN 17 21*   CREA 1.06 1.16   * 109*   CA 9.0 8.9   MG  --  2.3   LABRCNT(CPK:3,CpKMB:3,ckndx:3,troiq:3)No results found for: CHOL, CHOLX, CHLST, CHOLV, HDL, HDLP, LDL, LDLC, DLDLP, TGLX, TRIGL, TRIGP, CHHD, CHHDXLABRCNT(sgot:3,gpt:3,ap:3,tbiL:3,TP:3,ALB:3,GLOB:3,ggt:3,aml:3,amyp:3,lpse:3,hlpse:3)No results for input(s): PH, PCO2, PO2 in the last 72 hours. No results found for: CHOL, CHOLX, CHLST, CHOLV, HDL, HDLP, LDL, LDLC, DLDLP, TGLX, TRIGL, TRIGP, CHHD, CHHDXMEDTABLETimelvira Cheema MD  No results for input(s): PH, PCO2, PO2 in the last 72 hours.     Medications Personally Reviewed:    Current Facility-Administered Medications   Medication Dose Route Frequency    amiodarone (CORDARONE) tablet 400 mg  400 mg Oral BID    dilTIAZem ER (CARDIZEM CD) capsule 240 mg  240 mg Oral DAILY    dilTIAZem (CARDIZEM) 100 mg in dextrose 5% (MBP/ADV) 100 mL infusion  0-15 mg/hr IntraVENous TITRATE    metoprolol succinate (TOPROL-XL) XL tablet 25 mg  25 mg Oral DAILY    ondansetron (ZOFRAN) injection 4 mg  4 mg IntraVENous Q4H PRN    arformoterol 15 mcg/budesonide 0.5 mg neb solution   Nebulization DAILY    apixaban (ELIQUIS) tablet 5 mg  5 mg Oral BID    aspirin delayed-release tablet 81 mg  81 mg Oral DAILY    cholecalciferol (VITAMIN D3) (1000 Units /25 mcg) tablet 1,000 Units  1,000 Units Oral DAILY    pantoprazole (PROTONIX) tablet 40 mg  40 mg Oral ACB    finasteride (PROSCAR) tablet 10 mg  10 mg Oral DAILY    furosemide (LASIX) tablet 20 mg  20 mg Oral DAILY    gemfibroziL (LOPID) tablet 600 mg  600 mg Oral BID    melatonin tablet 3 mg  3 mg Oral QHS    rosuvastatin (CRESTOR) tablet 40 mg  40 mg Oral QHS    tamsulosin (FLOMAX) capsule 0.4 mg  0.4 mg Oral DAILY    sodium chloride (NS) flush 5-40 mL  5-40 mL IntraVENous Q8H    sodium chloride (NS) flush 5-40 mL  5-40 mL IntraVENous PRN    acetaminophen (TYLENOL) tablet 650 mg  650 mg Oral Q6H PRN    Or    acetaminophen (TYLENOL) suppository 650 mg  650 mg Rectal Q6H PRN    polyethylene glycol (MIRALAX) packet 17 g  17 g Oral DAILY PRN         April Neri MD

## 2021-07-04 NOTE — PROGRESS NOTES
End of Shift Note    Bedside shift change report given to (oncoming nurse) by Mandi Dumont RN (offgoing nurse). Report included the following information SBAR    Shift worked:  night     Shift summary and any significant changes:    1900: received shift report and patient HR is 126 regular rhythm patient states he has history of tachycardia  Cardiology paged waiting to hear back  2100: EKG preformed; sinus tach? aflutter? 2115: Spoke with Dr. Carlitos Benavides, ordered to restart dilt drip at 10ml/hr  2150: restarted on dilt 10ml/hr  2340: patient rhythm strip rate controlled aflutter, BP stable  0034: HR <60 titrate to 5ml/hr    Patient has remained Aflutter rate controlled over night on dilt drip   Concerns for physician to address:       Zone phone for oncoming shift:          Activity:  Activity Level: Up with Assistance  Number times ambulated in hallways past shift: 0  Number of times OOB to chair past shift: 1    Cardiac:   Cardiac Monitoring: Yes      A. Eloy    Access:   Current line(s): PIV     Genitourinary:   Urinary status: voiding    Respiratory:   O2 Device: Nasal cannula  Chronic home O2 use?: YES  Incentive spirometer at bedside: NO     GI:     Current diet:  ADULT DIET Regular; Low Fat/Low Chol/High Fiber/LETA  Passing flatus: YES  Tolerating current diet: YES       Pain Management:   Patient states pain is manageable on current regimen: YES    Skin:  Benedict Score: 20  Interventions: increase time out of bed    Patient Safety:  Fall Score:  Total Score: 4  Interventions: bed/chair alarm, assistive device (walker, cane, etc), gripper socks, pt to call before getting OOB and stay with me (per policy)  High Fall Risk: Yes    Length of Stay:  Expected LOS: - - -  Actual LOS: 1      Mandi Dumont RN

## 2021-07-04 NOTE — PROGRESS NOTES
Hospitalist Progress Note    NAME: Soledad Brown   :  1950   MRN:  [id number]     Interim Hospital Summary: 65 y.o. male whom presented on 2021 with      Assessment / Plan:    Atrial flutter with variable block (rapid HR)  Persistent atrial fibrillation  Hx Paroxysmal SVT, 1980s  -continue oral amiodarone and cardizem today. -IV cardizem infusion restarted last night.    -Continue Eliquis     Diastolic congestive heart failure  -Continue home Lasix and metoprolol     Hypertension  Dyslipidemia  History of COPD   BPH  GERD  -Continue metoprolol, statin, home inhalers, Flomax, PPI       Code Status: full   Surrogate Decision Maker: Wife Stew Galeano     DVT Prophylaxis: Eliquis     Baseline: From home independent     Subjective:     Chief Complaint / Reason for Physician Visit  Follow up of atrial flutter, CHF, HTN  Chart reviewed in detail. Discussed with RN events overnight. Review of Systems:  Symptom Y/N Comments  Symptom Y/N Comments   Fever/Chills    Chest Pain     Poor Appetite    Edema     Cough    Abdominal Pain     Sputum    Joint Pain     SOB/BOWDEN    Pruritis/Rash     Nausea/vomit    Tolerating PT/OT     Diarrhea    Tolerating Diet     Constipation    Other       Could NOT obtain due to:      PO intake: No data found. Objective:     VITALS:   Last 24hrs VS reviewed since prior progress note.  Most recent are:  Patient Vitals for the past 24 hrs:   Temp Pulse Resp BP SpO2   21 1049 97.7 °F (36.5 °C) 94 16 125/69 92 %   21 1015     92 %   21 0700 97.9 °F (36.6 °C) (!) 106 20 130/85 92 %   21 0630  84 15 117/89 93 %   21 0230 98 °F (36.7 °C) 60 15 109/63 94 %   21 0104  76 17  90 %   21 0100    105/66    21 2330  79  (!) 116/56    21 2315 98.3 °F (36.8 °C) 82 18 128/63 93 %   21 2301  81      21 2247  84      21 2244  100      21 2241  (!) 119      07/03/21 2039 98.1 °F (36.7 °C) (!) 125 19 (!) 137/98 93 %   07/03/21 2032 98 °F (36.7 °C) (!) 126 21 (!) 157/94 91 %   07/03/21 1603 97.6 °F (36.4 °C) 88 22 (!) 148/86 96 %       Intake/Output Summary (Last 24 hours) at 7/4/2021 1148  Last data filed at 7/4/2021 1048  Gross per 24 hour   Intake    Output 1506 ml   Net -1506 ml        I had a face to face encounter, and independently examined this patient on 7/4/2021, as outlined below:  PHYSICAL EXAM:  General: WD, WN. Alert, cooperative, no acute distress    EENT:  EOMI. Anicteric sclerae. MMM  Resp:  CTA bilaterally, no wheezing or rales. No accessory muscle use  CV:  Regular  rhythm,  No edema  GI:  Soft, Non distended, Non tender. +Bowel sounds  Neurologic:  Alert and oriented X 3, normal speech,   Psych:   Good insight. Not anxious nor agitated  Skin:  No rashes. No jaundice    Reviewed most current lab test results and cultures  YES  Reviewed most current radiology test results   YES  Review and summation of old records today    NO  Reviewed patient's current orders and MAR    YES  PMH/ reviewed - no change compared to H&P  ________________________________________________________________________  Care Plan discussed with:    Comments   Patient x    Family      RN     Care Manager     Consultant                        Multidiciplinary team rounds were held today with , nursing, pharmacist and clinical coordinator. Patient's plan of care was discussed; medications were reviewed and discharge planning was addressed.      ________________________________________________________________________  Total NON critical care TIME:  25   Minutes    Total CRITICAL CARE TIME Spent:   Minutes non procedure based      Comments   >50% of visit spent in counseling and coordination of care x     This includes time during multidisciplinary rounds if indicated above ________________________________________________________________________  Mehnaz Crespo MD     Procedures: see electronic medical records for all procedures/Xrays and details which were not copied into this note but were reviewed prior to creation of Plan. LABS:  I reviewed today's most current labs and imaging studies.   Pertinent labs include:  Recent Labs     07/03/21  0323 07/02/21  1243   WBC 9.2 8.5   HGB 13.6 14.3   HCT 42.0 45.5    194     Recent Labs     07/03/21  0323 07/02/21  1243    139   K 4.0 4.5    103   CO2 27 30   * 109*   BUN 17 21*   CREA 1.06 1.16   CA 9.0 8.9   MG  --  2.3   ALB  --  3.8   TBILI  --  0.8   ALT  --  33

## 2021-07-05 VITALS
WEIGHT: 263.67 LBS | TEMPERATURE: 97.9 F | BODY MASS INDEX: 36.91 KG/M2 | HEIGHT: 71 IN | OXYGEN SATURATION: 93 % | RESPIRATION RATE: 18 BRPM | HEART RATE: 81 BPM | SYSTOLIC BLOOD PRESSURE: 114 MMHG | DIASTOLIC BLOOD PRESSURE: 74 MMHG

## 2021-07-05 PROCEDURE — 77010033678 HC OXYGEN DAILY

## 2021-07-05 PROCEDURE — 74011250637 HC RX REV CODE- 250/637: Performed by: INTERNAL MEDICINE

## 2021-07-05 PROCEDURE — 94640 AIRWAY INHALATION TREATMENT: CPT

## 2021-07-05 PROCEDURE — 74011000250 HC RX REV CODE- 250: Performed by: INTERNAL MEDICINE

## 2021-07-05 RX ORDER — METOPROLOL TARTRATE 25 MG/1
25 TABLET, FILM COATED ORAL ONCE
Status: COMPLETED | OUTPATIENT
Start: 2021-07-05 | End: 2021-07-05

## 2021-07-05 RX ORDER — AMIODARONE HYDROCHLORIDE 400 MG/1
TABLET ORAL
Qty: 60 TABLET | Refills: 1 | Status: SHIPPED | OUTPATIENT
Start: 2021-07-05 | End: 2021-09-08

## 2021-07-05 RX ORDER — DILTIAZEM HYDROCHLORIDE 240 MG/1
240 CAPSULE, COATED, EXTENDED RELEASE ORAL DAILY
Qty: 30 CAPSULE | Refills: 1 | Status: SHIPPED | OUTPATIENT
Start: 2021-07-06 | End: 2021-09-04

## 2021-07-05 RX ADMIN — Medication 10 ML: at 05:38

## 2021-07-05 RX ADMIN — ASPIRIN 81 MG: 81 TABLET, COATED ORAL at 08:24

## 2021-07-05 RX ADMIN — METOPROLOL SUCCINATE 25 MG: 25 TABLET, FILM COATED, EXTENDED RELEASE ORAL at 08:25

## 2021-07-05 RX ADMIN — METOPROLOL TARTRATE 25 MG: 25 TABLET, FILM COATED ORAL at 09:32

## 2021-07-05 RX ADMIN — GEMFIBROZIL 600 MG: 600 TABLET ORAL at 08:25

## 2021-07-05 RX ADMIN — AMIODARONE HYDROCHLORIDE 400 MG: 200 TABLET ORAL at 08:24

## 2021-07-05 RX ADMIN — PANTOPRAZOLE SODIUM 40 MG: 40 TABLET, DELAYED RELEASE ORAL at 08:25

## 2021-07-05 RX ADMIN — ARFORMOTEROL TARTRATE: 15 SOLUTION RESPIRATORY (INHALATION) at 08:51

## 2021-07-05 RX ADMIN — CHOLECALCIFEROL TAB 25 MCG (1000 UNIT) 1000 UNITS: 25 TAB at 08:25

## 2021-07-05 RX ADMIN — TAMSULOSIN HYDROCHLORIDE 0.4 MG: 0.4 CAPSULE ORAL at 08:25

## 2021-07-05 RX ADMIN — FUROSEMIDE 20 MG: 20 TABLET ORAL at 08:25

## 2021-07-05 RX ADMIN — FINASTERIDE 10 MG: 5 TABLET, FILM COATED ORAL at 08:25

## 2021-07-05 RX ADMIN — DILTIAZEM HYDROCHLORIDE 240 MG: 240 CAPSULE, COATED, EXTENDED RELEASE ORAL at 08:25

## 2021-07-05 RX ADMIN — APIXABAN 5 MG: 5 TABLET, FILM COATED ORAL at 08:25

## 2021-07-05 NOTE — PROGRESS NOTES
Transition of Care Plan:    RUR: 11%  Disposition: home with spouse   Follow up appointments: PCP, Cardiology   DME needed: N/A  Transportation at Discharge: wife will transport home, ETA 2PM  [place] or means to access home: yes      IM Medicare letter: 2nd IMM reviewed on 7/5/21  Caregiver Contact: Eden Christensen (spouse) [phone]  Discharge Caregiver contacted prior to discharge? No, pt contacted spouse    Reason for Admission: atrial flutter with rapid ventricular response                      RUR Score: 11%                 Plan for utilizing home health: No    PCP: First and Last name:  Mathieu Bah MD     Name of Practice:    Are you a current patient: Yes/No: yes   Approximate date of last visit: May 2021   Can you participate in a virtual visit with your PCP: yes                    Current Advanced Directive/Advance Care Plan: Full Code     [address] (ACP) Conversation    Date of Conversation: 7/5/2021  Conducted with: Patient with [address] Decision Maker:     Click here to complete [address] including selection of the [address] Relationship (ie \"Primary\")  Today we documented Decision Maker(s) consistent with Legal Next of Kin hierarchy. Content/Action Overview:   DECLINED ACP conversation - will revisit periodically   Reviewed DNR/DNI and patient elects Full Code (Attempt Resuscitation)     Length of Voluntary ACP Conversation in minutes:  <16 minutes (Non-Billable)    Erin Both                     Transition of Care Plan:   Home w/ spouse and outpatient follow up appointments     Chart reviewed. CM aware of discharge order. CM contacted pt by phone to complete initial assessment and to finalize discharge plan. Pt resides with spouse in a 2 level home with 3 steps to enter. There are 13 steps within to get to 2nd floor.  Pt reports being completely independent at home with ambulation, IADLs/ADLs. Pt owns home oxygen provided by [employer] which he says he only uses as needed. Preferred pharmacy is [facility] in [place]. PCP is Dr. Josee Todd who pt last saw in May 2021. Pt is a [place], though he reports not being service connected with the [facility]. No prior hx of SNF/Rehab or HHC reported at this time. No ACP on file. Pt states his PCP office gave him the documentation and that he just needs to fill it out. Spouse is legal NOK. Pt is FULL code. No CM needs identified at this time. Pt is eager to discharge home. His spouse is on the way to hospital to transport him home. Medicare pt has received, reviewed, and signed 2nd IM letter informing them of their right to appeal the discharge. Signed copy has been placed on pt bedside chart. Pt was provided with copy of 2nd IMM letter to keep. CM unable to schedule follow up appointments at this time due to observed holiday/offices are closed. Pt aware. Pt is ready for d/c from a CM standpoint. Assigned RN informed. Care Management Interventions  PCP Verified by CM: Yes (Dr. Josee Todd )  Last Visit to PCP: 05/05/20  Palliative Care Criteria Met (RRAT>21 & CHF Dx)?: No  Mode of Transport at Discharge:  Other (see comment) (spouse)  Transition of Care Consult (CM Consult): Discharge Planning  Discharge Durable Medical Equipment: No  Physical Therapy Consult: No  Occupational Therapy Consult: No  Speech Therapy Consult: No  Current Support Network: Lives with Spouse, Own Home  Confirm Follow Up Transport: Family  The Patient and/or Patient Representative was Provided with a Choice of Provider and Agrees with the Discharge Plan?: Yes  Freedom of Choice List was Provided with Basic Dialogue that Supports the Patient's Individualized Plan of Care/Goals, Treatment Preferences and Shares the Quality Data Associated with the Providers?: No  [place] Resource Information Provided?: No  Discharge Location  Discharge Placement: Home with family assistance    JOSELO Inman   Care Manager, [facility]  [phone]

## 2021-07-05 NOTE — PROGRESS NOTES
DISCHARGE SUMMARY FROM [facility] NURSE    The patient is stable for discharge. I have reviewed the discharge instructions with the patient. The patient verbalized understanding. All questions were fully answered. The patient verbalized no complaints. Scripts sent to pharmacy and medication handouts were given and reviewed with the patient. Appropriate educational materials and medication side effects teaching were provided also provided. Cardiac monitor and IV line(s) were removed. The following personal items collected during admission were returned to the patient/family  Home medications:   Dental Appliance: Dental Appliances: With patient  Vision: Visual Aid: At bedside  Hearing Aid:    Jewelry: Jewelry: Watch  Clothing: Clothing: Socks, Slippers, Pants  Other Valuables: Other Valuables: Cell Phone  Valuables sent to safe:      OR _________________________________________________________________________________________    There were no personal belongings, valuables or home medications left at patient's bedside,  or safe.

## 2021-07-05 NOTE — PROGRESS NOTES
Progress Note      7/5/2021 1:44 PM  NAME: Elda Marie   MRN:  [id number]   Admit Diagnosis: Atrial flutter with rapid ventricular response ([facility] [address] [I48.92]     Assessment:     Assessment: 1. Typical right atrial flutter with variable block (rapid HR's). 2. Persistent atrial fibrillation with a history of cardioversion in the past off Multaq and lately on Multaq 7/1.  3. Reported history of paroxysmal SVT diagnosed in the 1980's. 4. Multaq therapy recently stopped. 5. COPD with exacerbation in /2021, hypercapnea, atrial fibrillation was diagnosed. 6. Hypertension. 7. Full code.     Plan:      1. I discussed options with him including changing antiarrhythmic therapy (he was put on amio in the ER) versus Afib/flutter/SVT ablation. We are going to try both. In the shortterm, will use amiodarone and change to oral tomorrow. 2. Increase his oral diltiazem to get HR's in the 's range. He was on diltiazem  mg daily PTA, currently on an infusion. 3. OK with low dose metoprolol. He is not on propranolol. 4. No urgent need to cardiovert again.     I have his information. Will arrange for an OP ablation. From my standpoint, as long as he's rate controlled and anticoagulated, OK to discharge. Plan:     7/3    Heart rate is better controlled. Feels better. Will transition to PO meds. Stop IV diltiazem and IV amiodarone. 7/4 had recurrence of tahcycardia last night and put back on IV  Cardizem . Rate came under control. IV stopped earlier today . Rate is OK     If remains stable plan discharge tomorrow . 7/5  meds adjusted . Rate is better . OK to discharge. [x]        High complexity decision making was performed    Subjective:     Elda Marie denies chest pain, dyspnea. Discussed with RN events overnight. Patient Active Problem List   Diagnosis Code    Hypertension I10    Arrhythmia I49.9    Obesity (BMI 30.0-34. 9) E66.9  Acute respiratory failure with hypoxia ([facility]) J96.01    Atrial flutter with rapid ventricular response ([facility]) I48.92       Review of Systems:    Symptom Y/N Comments  Symptom Y/N Comments   Fever/Chills N   Chest Pain N    Poor Appetite N   Edema N    Cough N   Abdominal Pain N    Sputum N   Joint Pain N    SOB/BOWDEN N   Pruritis/Rash N    Nausea/vomit N   Tolerating PT/OT Y    Diarrhea N   Tolerating Diet Y    Constipation N   Other       Could NOT obtain due to:      Objective:      Physical Exam:    Last 24hrs VS reviewed since prior progress note. Most recent are:    Visit Vitals  /74   Pulse 81   Temp 97.9 °F (36.6 °C)   Resp 18   Ht 5' 11\" (1.803 m)   Wt 119.6 kg (263 lb 10.7 oz)   SpO2 93%   BMI 36.77 kg/m²       Intake/Output Summary (Last 24 hours) at 7/5/2021 1144  Last data filed at 7/5/2021 1100  Gross per 24 hour   Intake 240 ml   Output 3180 ml   Net -2940 ml        General Appearance: Well developed, well nourished, alert & oriented x 3,    no acute distress. Ears/Nose/Mouth/Throat: Hearing grossly normal.  Neck: Supple. Chest: Lungs clear to auscultation bilaterally. Cardiovascular: Regular rate and rhythm, S1S2 normal, no murmur. Abdomen: Soft, non-tender, bowel sounds are active. Extremities: No edema bilaterally. Skin: Warm and dry.     PMH/SH reviewed - no change compared to H&P    Data Review    Telemetry: normal sinus rhythm     Lab Data Personally Reviewed:    Recent Labs     07/03/21  0323 07/02/21  1243   WBC 9.2 8.5   HGB 13.6 14.3   HCT 42.0 45.5    194   LABRCNT(INR:3,PTP:3,APTT:3,)  Recent Labs     07/03/21  0323 07/02/21  1243    139   K 4.0 4.5    103   CO2 27 30   BUN 17 21*   CREA 1.06 1.16   * 109*   CA 9.0 8.9   MG  --  2.3   LABRCNT(CPK:3,CpKMB:3,ckndx:3,troiq:3)No results found for: CHOL, CHOLX, CHLST, CHOLV, HDL, HDLP, LDL, LDLC, DLDLP, TGLX, TRIGL, TRIGP, CHHD, CHHDXLABRCNT(sgot:3,gpt:3,ap:3,tbiL:3,TP:3,ALB:3,GLOB:3,ggt:3,aml:3,amyp:3,lpse:3,hlpse:3)No results for input(s): PH, PCO2, PO2 in the last 72 hours. No results found for: CHOL, CHOLX, CHLST, CHOLV, HDL, HDLP, LDL, LDLC, DLDLP, TGLX, TRIGL, TRIGP, CHHD, CHHDXMEDTABLEJosafat Arita MD  No results for input(s): PH, PCO2, PO2 in the last 72 hours.     Medications Personally Reviewed:    Current Facility-Administered Medications   Medication Dose Route Frequency    amiodarone (CORDARONE) tablet 400 mg  400 mg Oral BID    dilTIAZem ER (CARDIZEM CD) capsule 240 mg  240 mg Oral DAILY    dilTIAZem (CARDIZEM) 100 mg in dextrose 5% (MBP/ADV) 100 mL infusion  0-15 mg/hr IntraVENous TITRATE    metoprolol succinate (TOPROL-XL) XL tablet 25 mg  25 mg Oral DAILY    ondansetron (ZOFRAN) injection 4 mg  4 mg IntraVENous Q4H PRN    apixaban (ELIQUIS) tablet 5 mg  5 mg Oral BID    aspirin delayed-release tablet 81 mg  81 mg Oral DAILY    cholecalciferol (VITAMIN D3) (1000 Units /25 mcg) tablet 1,000 Units  1,000 Units Oral DAILY    pantoprazole (PROTONIX) tablet 40 mg  40 mg Oral ACB    finasteride (PROSCAR) tablet 10 mg  10 mg Oral DAILY    furosemide (LASIX) tablet 20 mg  20 mg Oral DAILY    gemfibroziL (LOPID) tablet 600 mg  600 mg Oral BID    melatonin tablet 3 mg  3 mg Oral QHS    rosuvastatin (CRESTOR) tablet 40 mg  40 mg Oral QHS    tamsulosin (FLOMAX) capsule 0.4 mg  0.4 mg Oral DAILY    sodium chloride (NS) flush 5-40 mL  5-40 mL IntraVENous Q8H    sodium chloride (NS) flush 5-40 mL  5-40 mL IntraVENous PRN    acetaminophen (TYLENOL) tablet 650 mg  650 mg Oral Q6H PRN    Or    acetaminophen (TYLENOL) suppository 650 mg  650 mg Rectal Q6H PRN    polyethylene glycol (MIRALAX) packet 17 g  17 g Oral DAILY PRN         Roz Dorado MD

## 2021-07-05 NOTE — PROGRESS NOTES
End of Shift Note    Bedside shift change report given to (oncoming nurse) by Skyla Restrepo RN (offgoing nurse). Report included the following information SBAR    Shift worked:  night     Shift summary and any significant changes:     no significant changes    Remained rate controlled a flutter     Concerns for physician to address:      Zone phone for oncoming shift:          Activity:  Activity Level: Up with Assistance  Number times ambulated in hallways past shift: 0  Number of times OOB to chair past shift: 0    Cardiac:   Cardiac Monitoring: Yes      Cardiac Rhythm: A flutter    Access:   Current line(s): PIV     Genitourinary:   Urinary status: voiding    Respiratory:   O2 Device: Nasal cannula  Chronic home O2 use?: yes  Incentive spirometer at bedside: NO     GI:     Current diet:  ADULT DIET Regular; Low Fat/Low Chol/High Fiber/LETA  Passing flatus: YES  Tolerating current diet: YES       Pain Management:   Patient states pain is manageable on current regimen: YES    Skin:  Benedict Score: 20  Interventions: increase time out of bed    Patient Safety:  Fall Score:  Total Score: 4  Interventions: bed/chair alarm, assistive device (walker, cane, etc), gripper socks, pt to call before getting OOB and stay with me (per policy)  High Fall Risk: Yes    Length of Stay:  Expected LOS: - - -  Actual LOS: 3      Skyla Restrepo RN

## 2021-07-05 NOTE — DISCHARGE INSTRUCTIONS
HOSPITALIST DISCHARGE INSTRUCTIONS    NAME: Valentín Gill   :     MRN:  [id number]     Date/Time:  2021 12:00 PM    ADMIT DATE: 2021   DISCHARGE DATE: 2021         · It is important that you take the medication exactly as they are prescribed. · Keep your medication in the bottles provided by the pharmacist and keep a list of the medication names, dosages, and times to be taken in your wallet. · Do not take other medications without consulting your doctor. What to do at [address]:  Cardiac Diet    Recommended activity: Activity as tolerated      If you have questions regarding the hospital related prescriptions or hospital related issues please call [facility] at [phone]. You can always direct your questions to your primary care doctor if you are unable to reach your hospital physician; your PCP works as an extension of your hospital doctor just like your hospital doctor is an extension of your PCP for your time at the hospital [facility], [facility])    If you experience any of the following symptoms then please call your primary care physician or return to the emergency room if you cannot get hold of your doctor:    Fever, chills, nausea, vomiting, or persistent diarrhea  Worsening weakness or new problems with your speech or balance  Dark stools or visible blood in your stools  New Leg swelling or shortness of breath as these could be signs of a clot    Additional Instructions:      Bring these papers with you to your follow up appointments. The papers will help your doctors be sure to continue the care plan from the hospital.              Information obtained by :  I understand that if any problems occur once I am at home I am to contact my physician. I understand and acknowledge receipt of the instructions indicated above. Physician's or R.N.'s Signature                                                                  Date/Time                                                                                                                                              Patient or Representative Signature

## 2021-07-05 NOTE — PROGRESS NOTES
Patients heart rate is ranging from 100's to 130's. Cardiology notified and one time dose of metoprolol 25mg ordered for now.

## 2021-07-05 NOTE — PROGRESS NOTES
Problem: Falls - Risk of  Goal: *Absence of Falls  Description: Document Alesha Coronado Fall Risk and appropriate interventions in the flowsheet.   Outcome: Progressing Towards Goal  Note: Fall Risk Interventions:  Mobility Interventions: Bed/chair exit alarm         Medication Interventions: Bed/chair exit alarm    Elimination Interventions: Bed/chair exit alarm, Call light in reach    History of Falls Interventions: Bed/chair exit alarm

## 2021-07-05 NOTE — DISCHARGE SUMMARY
Hospitalist Discharge Summary     Patient ID:  Shila Hamilton  [id number]  1 y.o.  1950    PCP on record: Jonathan Nuñez MD    Admit date: 7/2/2021  Discharge date and time: 7/5/2021      DISCHARGE DIAGNOSIS:    Atrial flutter with variable block (rapid HR)  Persistent atrial fibrillation  Hx Paroxysmal SVT, 4886Z  Diastolic congestive heart failure  Hypertension  Dyslipidemia  History of COPD   BPH  GERD      CONSULTATIONS:  IP CONSULT TO CARDIOLOGY  IP CONSULT TO HOSPITALIST    Excerpted HPI from H&P of Abran Alcocer MD:  CHIEF COMPLAINT: Palpitations     HISTORY OF PRESENT ILLNESS:     Yolande Zelaya is a 65 y.o.   male who presents with past medical history of atrial fibrillation is coming the hospital chief complaints of palpitations. Patient reports that she had a cardioversion yesterday Dr. Angie Rodrigues office and this morning he was checking his pulse ox when he noticed that his heart rate was around 1 30-1 40 for which she called Dr. Eliecer Street and he advised him to come to the emergency department for further evaluation. He does not report any chest pain or shortness of breath. He reports being compliant with his medications he does not report any abdominal pain nausea or vomiting.     On arrival to emergency department, his vital signs are within normal limits, he was noted to have atrial fibrillation with rapid ventricular rate for which she was started on Cardizem drip and cardiology was contacted and the recommended to continue Cardizem drip along with amiodarone drip.  ______________________________________________________________________  DISCHARGE SUMMARY/HOSPITAL COURSE:  for full details see H&P, daily progress notes, labs, consult notes. Atrial flutter with variable block (rapid HR)  Persistent atrial fibrillation  Hx Paroxysmal SVT, 1980s  -rate controlled with IV amiodarone and IV diltiazem.   Eventually transitioned to PO, monitored over 48 hours and he did well.       -Continue Eliquis  -appreciate Dr Cornelius Chairez and Dr Gokul Dodson help and input.    -follow up with Dr Pat Youssef after discharge.      Diastolic congestive heart failure  -Continue home Lasix and metoprolol     Hypertension  Dyslipidemia  History of COPD   BPH  GERD  -Continue metoprolol, statin, home inhalers, Flomax, PPI    _______________________________________________________________________  Patient seen and examined by me on discharge day. Pertinent Findings:  Gen:    Not in distress  Chest: Clear lungs  CVS:   Regular rhythm. No edema  Abd:  Soft, not distended, not tender  Neuro:  Alert, Oriented x 4, grossly non focal exam  _______________________________________________________________________  DISCHARGE MEDICATIONS:   Current Discharge Medication List      START taking these medications    Details   dilTIAZem ER (CARDIZEM CD) 240 mg capsule Take 1 Capsule by mouth daily for 60 days. Qty: 30 Capsule, Refills: 1  Start date: 7/6/2021, End date: 9/4/2021      amiodarone (PACERONE) 400 mg tablet Take 400mg BID for 5 days then 400mg daily thereafter. Qty: 60 Tablet, Refills: 1  Start date: 7/5/2021    Comments: Next refill can be just 30 tablets         CONTINUE these medications which have NOT CHANGED    Details   finasteride (PROSCAR) 5 mg tablet Take 5 mg by mouth daily. vit A/vit C/vit E/zinc/copper (PRESERVISION AREDS PO) Take 1 Caplet by mouth two (2) times a day. ascorbic acid, vitamin C, (Vitamin C) 500 mg tablet Take 500 mg by mouth daily. cyanocobalamin (Vitamin B-12) 1,000 mcg tablet Take 1,000 mcg by mouth daily. ibuprofen (MOTRIN) 200 mg tablet Take 400 mg by mouth every eight (8) hours as needed for Pain. fluticasone-umeclidinium-vilanterol (Trelegy Ellipta) 100-62.5-25 mcg inhaler Take 1 Puff by inhalation daily. pantoprazole (Protonix) 40 mg tablet Take 40 mg by mouth daily.       metoprolol succinate (Toprol XL) 25 mg XL tablet Take 25 mg by mouth daily.      furosemide (LASIX) 20 mg tablet 1 tab po daily  Qty: 30 Tab, Refills: 0      apixaban (ELIQUIS) 5 mg tablet Take 1 Tab by mouth two (2) times a day. Qty: 60 Tab, Refills: 0      rosuvastatin (Crestor) 40 mg tablet Take 40 mg by mouth nightly. tamsulosin (FLOMAX) 0.4 mg capsule Take 0.4 mg by mouth daily. aspirin delayed-release 81 mg tablet Take 81 mg by mouth daily. melatonin 3 mg tablet Take 3 mg by mouth nightly. albuterol (VENTOLIN HFA) 90 mcg/actuation inhaler Take 1 Puff by inhalation every four (4) hours as needed. gemfibrozil (LOPID) 600 mg tablet Take 600 mg by mouth two (2) times a day. cholecalciferol (VITAMIN D3) 1,000 unit cap Take 1,000 Units by mouth daily. MULTIVIT-MIN/FA/LYCOPEN/LUTEIN (CENTRUM SILVER ULTRA MEN'S PO) Take 1 Tab by mouth daily. Omega-3-DHA-EPA-Fish Oil 1,000 mg (120 mg-180 mg) cap Take 1 Cap by mouth daily. STOP taking these medications       esomeprazole (NEXIUM) 40 mg capsule Comments:   Reason for Stopping:               My Recommended Diet, Activity, Wound Care, and follow-up labs are listed in the patient's Discharge Insturctions which I have personally completed and reviewed.   Risk of deterioration: Low    Condition at Discharge:  Stable  _____________________________________________________________________    Disposition  Home with family, no needs  ____________________________________________________________________    Care Plan discussed with:   Patient, Family, RN, Care Manager, Consultant    ____________________________________________________________________    Code Status: Full Code  ____________________________________________________________________      Condition at Discharge:  Stable  _____________________________________________________________________  Follow up with:   PCP : Alejandra Monae MD  Follow-up Information     Follow up With Specialties Details Why Contact Info    Alejandra Monae MD [facility]  [phone]      Armando Persaud MD Cardiology In 1 week  [phone] [phone]                Total time in minutes spent coordinating this discharge (includes going over instructions, follow-up, prescriptions, and preparing report for sign off to her PCP) :  35 minutes    Signed:  Linda Richmond MD

## 2021-07-05 NOTE — PROGRESS NOTES
Problem: Falls - Risk of  Goal: *Absence of Falls  Description: Document Latha Dillon Fall Risk and appropriate interventions in the flowsheet.   Outcome: Progressing Towards Goal  Note: Fall Risk Interventions:  Mobility Interventions: Bed/chair exit alarm, Patient to call before getting OOB         Medication Interventions: Bed/chair exit alarm, Patient to call before getting OOB, Teach patient to arise slowly    Elimination Interventions: Bed/chair exit alarm, Call light in reach, Patient to call for help with toileting needs, Urinal in reach, Stay With Me (per policy)    History of Falls Interventions: Bed/chair exit alarm

## 2021-07-05 NOTE — PROGRESS NOTES
Problem: Falls - Risk of  Goal: *Absence of Falls  Description: Document Ilda Moise Fall Risk and appropriate interventions in the flowsheet.   7/5/2021 1529 by Baldo Luevano  Outcome: Resolved/Met  7/5/2021 0943 by Baldo Luevano  Outcome: Progressing Towards Goal  Note: Fall Risk Interventions:  Mobility Interventions: Bed/chair exit alarm         Medication Interventions: Bed/chair exit alarm    Elimination Interventions: Bed/chair exit alarm, Call light in reach    History of Falls Interventions: Bed/chair exit alarm         Problem: Patient Education: Go to Patient Education Activity  Goal: Patient/Family Education  Outcome: Resolved/Met     Problem: Arrhythmia Pathway (Adult)  Goal: *Absence of arrhythmia  Outcome: Resolved/Met     Problem: Patient Education: Go to Patient Education Activity  Goal: Patient/Family Education  Outcome: Resolved/Met     Problem: Hypertension  Goal: *Blood pressure within specified parameters  Outcome: Resolved/Met  Goal: *Fluid volume balance  Outcome: Resolved/Met  Goal: *Labs within defined limits  Outcome: Resolved/Met     Problem: Patient Education: Go to Patient Education Activity  Goal: Patient/Family Education  Outcome: Resolved/Met

## 2021-08-27 NOTE — PERIOP NOTES
Patient denied any COVID-19 symptoms or possible exposure in the last 30 days. Patient has been fully vaccinated greater than two weeks and will bring immunization card day of surgery/procedure.

## 2021-09-07 ENCOUNTER — ANESTHESIA (OUTPATIENT)
Dept: CARDIAC CATH/INVASIVE PROCEDURES | Age: 71
End: 2021-09-07
Payer: MEDICARE

## 2021-09-07 ENCOUNTER — HOSPITAL ENCOUNTER (OUTPATIENT)
Age: 71
Discharge: HOME | End: 2021-09-08
Attending: INTERNAL MEDICINE | Admitting: INTERNAL MEDICINE
Payer: MEDICARE

## 2021-09-07 ENCOUNTER — ANESTHESIA EVENT (OUTPATIENT)
Dept: CARDIAC CATH/INVASIVE PROCEDURES | Age: 71
End: 2021-09-07
Payer: MEDICARE

## 2021-09-07 ENCOUNTER — APPOINTMENT (OUTPATIENT)
Dept: CARDIAC CATH/INVASIVE PROCEDURES | Age: 71
End: 2021-09-07
Attending: INTERNAL MEDICINE
Payer: MEDICARE

## 2021-09-07 DIAGNOSIS — I48.91 ATRIAL FIBRILLATION, UNSPECIFIED TYPE (HCC): ICD-10-CM

## 2021-09-07 LAB
ACT BLD: 131 SECS (ref 79–138)
ACT BLD: 224 SECS (ref 79–138)
ACT BLD: 246 SECS (ref 79–138)
ACT BLD: 252 SECS (ref 79–138)
ACT BLD: 257 SECS (ref 79–138)
ACT BLD: 268 SECS (ref 79–138)
ACT BLD: 279 SECS (ref 79–138)
ANION GAP SERPL CALC-SCNC: 6 MMOL/L (ref 5–15)
BUN SERPL-MCNC: 24 MG/DL (ref 6–20)
BUN/CREAT SERPL: 18 (ref 12–20)
CALCIUM SERPL-MCNC: 8.9 MG/DL (ref 8.5–10.1)
CHLORIDE SERPL-SCNC: 102 MMOL/L (ref 97–108)
CO2 SERPL-SCNC: 30 MMOL/L (ref 21–32)
CREAT SERPL-MCNC: 1.35 MG/DL (ref 0.7–1.3)
ERYTHROCYTE [DISTWIDTH] IN BLOOD BY AUTOMATED COUNT: 13.6 % (ref 11.5–14.5)
GLUCOSE SERPL-MCNC: 122 MG/DL (ref 65–100)
HCT VFR BLD AUTO: 43.9 % (ref 36.6–50.3)
HGB BLD-MCNC: 14.6 G/DL (ref 12.1–17)
MAGNESIUM SERPL-MCNC: 2.3 MG/DL (ref 1.6–2.4)
MCH RBC QN AUTO: 29.6 PG (ref 26–34)
MCHC RBC AUTO-ENTMCNC: 33.3 G/DL (ref 30–36.5)
MCV RBC AUTO: 88.9 FL (ref 80–99)
NRBC # BLD: 0 K/UL (ref 0–0.01)
NRBC BLD-RTO: 0 PER 100 WBC
PLATELET # BLD AUTO: 164 K/UL (ref 150–400)
PMV BLD AUTO: 11.2 FL (ref 8.9–12.9)
POTASSIUM SERPL-SCNC: 4 MMOL/L (ref 3.5–5.1)
RBC # BLD AUTO: 4.94 M/UL (ref 4.1–5.7)
SODIUM SERPL-SCNC: 138 MMOL/L (ref 136–145)
WBC # BLD AUTO: 8.9 K/UL (ref 4.1–11.1)

## 2021-09-07 PROCEDURE — 77030010880 HC CBL EP SUPRME STJU -C: Performed by: INTERNAL MEDICINE

## 2021-09-07 PROCEDURE — 77030027107 HC PTCH EXT REF CARTO3 J&J -F: Performed by: INTERNAL MEDICINE

## 2021-09-07 PROCEDURE — 76210000016 HC OR PH I REC 1 TO 1.5 HR

## 2021-09-07 PROCEDURE — 74011250636 HC RX REV CODE- 250/636: Performed by: INTERNAL MEDICINE

## 2021-09-07 PROCEDURE — 74011000258 HC RX REV CODE- 258: Performed by: NURSE ANESTHETIST, CERTIFIED REGISTERED

## 2021-09-07 PROCEDURE — 74011000250 HC RX REV CODE- 250: Performed by: NURSE ANESTHETIST, CERTIFIED REGISTERED

## 2021-09-07 PROCEDURE — 93312 ECHO TRANSESOPHAGEAL: CPT

## 2021-09-07 PROCEDURE — 77030021678 HC GLIDESCP STAT DISP VERT -B: Performed by: NURSE ANESTHETIST, CERTIFIED REGISTERED

## 2021-09-07 PROCEDURE — 83735 ASSAY OF MAGNESIUM: CPT

## 2021-09-07 PROCEDURE — 77030005513 HC CATH URETH FOL11 MDII -B: Performed by: INTERNAL MEDICINE

## 2021-09-07 PROCEDURE — 76060000039 HC ANESTHESIA 4 TO 4.5 HR: Performed by: INTERNAL MEDICINE

## 2021-09-07 PROCEDURE — 77030035291 HC TBNG PMP SMARTABLATE J&J -B: Performed by: INTERNAL MEDICINE

## 2021-09-07 PROCEDURE — 2709999900 HC NON-CHARGEABLE SUPPLY: Performed by: INTERNAL MEDICINE

## 2021-09-07 PROCEDURE — 74011250636 HC RX REV CODE- 250/636: Performed by: NURSE ANESTHETIST, CERTIFIED REGISTERED

## 2021-09-07 PROCEDURE — 77030013797 HC KT TRNSDUC PRSSR EDWD -A: Performed by: INTERNAL MEDICINE

## 2021-09-07 PROCEDURE — 93656 COMPRE EP EVAL ABLTJ ATR FIB: CPT | Performed by: INTERNAL MEDICINE

## 2021-09-07 PROCEDURE — 80048 BASIC METABOLIC PNL TOTAL CA: CPT

## 2021-09-07 PROCEDURE — 77030013079 HC BLNKT BAIR HGGR 3M -A: Performed by: NURSE ANESTHETIST, CERTIFIED REGISTERED

## 2021-09-07 PROCEDURE — C1730 CATH, EP, 19 OR FEW ELECT: HCPCS | Performed by: INTERNAL MEDICINE

## 2021-09-07 PROCEDURE — 85027 COMPLETE CBC AUTOMATED: CPT

## 2021-09-07 PROCEDURE — 77030029359 HC PRB ESOPH TEMP CATH ANTM -F: Performed by: INTERNAL MEDICINE

## 2021-09-07 PROCEDURE — 93657 TX L/R ATRIAL FIB ADDL: CPT | Performed by: INTERNAL MEDICINE

## 2021-09-07 PROCEDURE — 36415 COLL VENOUS BLD VENIPUNCTURE: CPT

## 2021-09-07 PROCEDURE — 77030015398 HC CBL EP EXT STJU -C: Performed by: INTERNAL MEDICINE

## 2021-09-07 PROCEDURE — 77030041009 HC ADTR CBL EP DX J&J -D: Performed by: INTERNAL MEDICINE

## 2021-09-07 PROCEDURE — C1894 INTRO/SHEATH, NON-LASER: HCPCS | Performed by: INTERNAL MEDICINE

## 2021-09-07 PROCEDURE — C1732 CATH, EP, DIAG/ABL, 3D/VECT: HCPCS | Performed by: INTERNAL MEDICINE

## 2021-09-07 PROCEDURE — 74011250637 HC RX REV CODE- 250/637: Performed by: NURSE PRACTITIONER

## 2021-09-07 PROCEDURE — 77030026438 HC STYL ET INTUB CARD -A: Performed by: NURSE ANESTHETIST, CERTIFIED REGISTERED

## 2021-09-07 PROCEDURE — 77030008684 HC TU ET CUF COVD -B: Performed by: NURSE ANESTHETIST, CERTIFIED REGISTERED

## 2021-09-07 PROCEDURE — C1893 INTRO/SHEATH, FIXED,NON-PEEL: HCPCS | Performed by: INTERNAL MEDICINE

## 2021-09-07 PROCEDURE — 77030008771 HC TU NG SALEM SUMP -A: Performed by: NURSE ANESTHETIST, CERTIFIED REGISTERED

## 2021-09-07 PROCEDURE — 77030018729 HC ELECTRD DEFIB PAD CARD -B: Performed by: INTERNAL MEDICINE

## 2021-09-07 PROCEDURE — C1759 CATH, INTRA ECHOCARDIOGRAPHY: HCPCS | Performed by: INTERNAL MEDICINE

## 2021-09-07 PROCEDURE — 85347 COAGULATION TIME ACTIVATED: CPT

## 2021-09-07 RX ORDER — ROCURONIUM BROMIDE 10 MG/ML
INJECTION, SOLUTION INTRAVENOUS AS NEEDED
Status: DISCONTINUED | OUTPATIENT
Start: 2021-09-07 | End: 2021-09-07 | Stop reason: HOSPADM

## 2021-09-07 RX ORDER — ROSUVASTATIN CALCIUM 40 MG/1
40 TABLET, COATED ORAL
Status: DISCONTINUED | OUTPATIENT
Start: 2021-09-07 | End: 2021-09-08 | Stop reason: HOSPADM

## 2021-09-07 RX ORDER — VASOPRESSIN 20 U/ML
INJECTION PARENTERAL AS NEEDED
Status: DISCONTINUED | OUTPATIENT
Start: 2021-09-07 | End: 2021-09-07 | Stop reason: HOSPADM

## 2021-09-07 RX ORDER — SODIUM CHLORIDE 0.9 % (FLUSH) 0.9 %
5-40 SYRINGE (ML) INJECTION EVERY 8 HOURS
Status: DISCONTINUED | OUTPATIENT
Start: 2021-09-07 | End: 2021-09-08 | Stop reason: HOSPADM

## 2021-09-07 RX ORDER — GLYCOPYRROLATE 0.2 MG/ML
INJECTION INTRAMUSCULAR; INTRAVENOUS AS NEEDED
Status: DISCONTINUED | OUTPATIENT
Start: 2021-09-07 | End: 2021-09-07 | Stop reason: HOSPADM

## 2021-09-07 RX ORDER — SUCCINYLCHOLINE CHLORIDE 20 MG/ML
INJECTION INTRAMUSCULAR; INTRAVENOUS AS NEEDED
Status: DISCONTINUED | OUTPATIENT
Start: 2021-09-07 | End: 2021-09-07 | Stop reason: HOSPADM

## 2021-09-07 RX ORDER — PANTOPRAZOLE SODIUM 40 MG/1
40 TABLET, DELAYED RELEASE ORAL DAILY
Status: DISCONTINUED | OUTPATIENT
Start: 2021-09-08 | End: 2021-09-08 | Stop reason: HOSPADM

## 2021-09-07 RX ORDER — HEPARIN SODIUM 1000 [USP'U]/ML
INJECTION, SOLUTION INTRAVENOUS; SUBCUTANEOUS AS NEEDED
Status: DISCONTINUED | OUTPATIENT
Start: 2021-09-07 | End: 2021-09-07 | Stop reason: HOSPADM

## 2021-09-07 RX ORDER — MIDAZOLAM HYDROCHLORIDE 1 MG/ML
INJECTION, SOLUTION INTRAMUSCULAR; INTRAVENOUS AS NEEDED
Status: DISCONTINUED | OUTPATIENT
Start: 2021-09-07 | End: 2021-09-07 | Stop reason: HOSPADM

## 2021-09-07 RX ORDER — SODIUM CHLORIDE 9 MG/ML
INJECTION, SOLUTION INTRAVENOUS
Status: DISCONTINUED | OUTPATIENT
Start: 2021-09-07 | End: 2021-09-07 | Stop reason: HOSPADM

## 2021-09-07 RX ORDER — LIDOCAINE HYDROCHLORIDE 20 MG/ML
INJECTION, SOLUTION EPIDURAL; INFILTRATION; INTRACAUDAL; PERINEURAL AS NEEDED
Status: DISCONTINUED | OUTPATIENT
Start: 2021-09-07 | End: 2021-09-07 | Stop reason: HOSPADM

## 2021-09-07 RX ORDER — PROTAMINE SULFATE 10 MG/ML
INJECTION, SOLUTION INTRAVENOUS AS NEEDED
Status: DISCONTINUED | OUTPATIENT
Start: 2021-09-07 | End: 2021-09-07 | Stop reason: HOSPADM

## 2021-09-07 RX ORDER — FENTANYL CITRATE 50 UG/ML
INJECTION, SOLUTION INTRAMUSCULAR; INTRAVENOUS AS NEEDED
Status: DISCONTINUED | OUTPATIENT
Start: 2021-09-07 | End: 2021-09-07 | Stop reason: HOSPADM

## 2021-09-07 RX ORDER — HEPARIN SODIUM 200 [USP'U]/100ML
INJECTION, SOLUTION INTRAVENOUS
Status: COMPLETED | OUTPATIENT
Start: 2021-09-07 | End: 2021-09-07

## 2021-09-07 RX ORDER — FUROSEMIDE 20 MG/1
20 TABLET ORAL DAILY
Status: DISCONTINUED | OUTPATIENT
Start: 2021-09-08 | End: 2021-09-08 | Stop reason: HOSPADM

## 2021-09-07 RX ORDER — SODIUM CHLORIDE 0.9 % (FLUSH) 0.9 %
5-40 SYRINGE (ML) INJECTION AS NEEDED
Status: DISCONTINUED | OUTPATIENT
Start: 2021-09-07 | End: 2021-09-08 | Stop reason: HOSPADM

## 2021-09-07 RX ORDER — GEMFIBROZIL 600 MG/1
600 TABLET, FILM COATED ORAL 2 TIMES DAILY
Status: DISCONTINUED | OUTPATIENT
Start: 2021-09-07 | End: 2021-09-08 | Stop reason: HOSPADM

## 2021-09-07 RX ORDER — PROPOFOL 10 MG/ML
INJECTION, EMULSION INTRAVENOUS AS NEEDED
Status: DISCONTINUED | OUTPATIENT
Start: 2021-09-07 | End: 2021-09-07 | Stop reason: HOSPADM

## 2021-09-07 RX ORDER — PHENYLEPHRINE HCL IN 0.9% NACL 0.4MG/10ML
SYRINGE (ML) INTRAVENOUS AS NEEDED
Status: DISCONTINUED | OUTPATIENT
Start: 2021-09-07 | End: 2021-09-07 | Stop reason: HOSPADM

## 2021-09-07 RX ORDER — FINASTERIDE 5 MG/1
5 TABLET, FILM COATED ORAL DAILY
Status: DISCONTINUED | OUTPATIENT
Start: 2021-09-08 | End: 2021-09-08 | Stop reason: HOSPADM

## 2021-09-07 RX ORDER — AMIODARONE HYDROCHLORIDE 400 MG/1
200 TABLET ORAL DAILY
Qty: 15 TABLET | Refills: 0 | Status: SHIPPED
Start: 2021-09-08 | End: 2021-09-08

## 2021-09-07 RX ORDER — ALBUTEROL SULFATE 0.83 MG/ML
2.5 SOLUTION RESPIRATORY (INHALATION)
Status: DISCONTINUED | OUTPATIENT
Start: 2021-09-07 | End: 2021-09-08 | Stop reason: HOSPADM

## 2021-09-07 RX ORDER — ZOLPIDEM TARTRATE 5 MG/1
5 TABLET ORAL
Status: DISCONTINUED | OUTPATIENT
Start: 2021-09-07 | End: 2021-09-08 | Stop reason: HOSPADM

## 2021-09-07 RX ORDER — DEXAMETHASONE SODIUM PHOSPHATE 4 MG/ML
INJECTION, SOLUTION INTRA-ARTICULAR; INTRALESIONAL; INTRAMUSCULAR; INTRAVENOUS; SOFT TISSUE AS NEEDED
Status: DISCONTINUED | OUTPATIENT
Start: 2021-09-07 | End: 2021-09-07 | Stop reason: HOSPADM

## 2021-09-07 RX ORDER — EPHEDRINE SULFATE/0.9% NACL/PF 50 MG/5 ML
SYRINGE (ML) INTRAVENOUS AS NEEDED
Status: DISCONTINUED | OUTPATIENT
Start: 2021-09-07 | End: 2021-09-07 | Stop reason: HOSPADM

## 2021-09-07 RX ORDER — ACETAMINOPHEN 325 MG/1
650 TABLET ORAL
Status: DISCONTINUED | OUTPATIENT
Start: 2021-09-07 | End: 2021-09-08 | Stop reason: HOSPADM

## 2021-09-07 RX ORDER — TAMSULOSIN HYDROCHLORIDE 0.4 MG/1
0.4 CAPSULE ORAL DAILY
Status: DISCONTINUED | OUTPATIENT
Start: 2021-09-08 | End: 2021-09-08 | Stop reason: HOSPADM

## 2021-09-07 RX ORDER — METOPROLOL SUCCINATE 25 MG/1
25 TABLET, EXTENDED RELEASE ORAL DAILY
Status: DISCONTINUED | OUTPATIENT
Start: 2021-09-08 | End: 2021-09-08 | Stop reason: HOSPADM

## 2021-09-07 RX ORDER — ASPIRIN 81 MG/1
81 TABLET ORAL DAILY
Status: DISCONTINUED | OUTPATIENT
Start: 2021-09-08 | End: 2021-09-08 | Stop reason: HOSPADM

## 2021-09-07 RX ORDER — NEOSTIGMINE METHYLSULFATE 1 MG/ML
INJECTION, SOLUTION INTRAVENOUS AS NEEDED
Status: DISCONTINUED | OUTPATIENT
Start: 2021-09-07 | End: 2021-09-07 | Stop reason: HOSPADM

## 2021-09-07 RX ORDER — LANOLIN ALCOHOL/MO/W.PET/CERES
3 CREAM (GRAM) TOPICAL
Status: DISCONTINUED | OUTPATIENT
Start: 2021-09-07 | End: 2021-09-08 | Stop reason: HOSPADM

## 2021-09-07 RX ORDER — ONDANSETRON 2 MG/ML
INJECTION INTRAMUSCULAR; INTRAVENOUS AS NEEDED
Status: DISCONTINUED | OUTPATIENT
Start: 2021-09-07 | End: 2021-09-07 | Stop reason: HOSPADM

## 2021-09-07 RX ORDER — AMIODARONE HYDROCHLORIDE 200 MG/1
400 TABLET ORAL DAILY
Status: DISCONTINUED | OUTPATIENT
Start: 2021-09-08 | End: 2021-09-08 | Stop reason: HOSPADM

## 2021-09-07 RX ADMIN — ONDANSETRON HYDROCHLORIDE 4 MG: 2 INJECTION, SOLUTION INTRAMUSCULAR; INTRAVENOUS at 09:14

## 2021-09-07 RX ADMIN — MIDAZOLAM HYDROCHLORIDE 2 MG: 1 INJECTION, SOLUTION INTRAMUSCULAR; INTRAVENOUS at 08:34

## 2021-09-07 RX ADMIN — Medication 100 MCG: at 08:50

## 2021-09-07 RX ADMIN — HEPARIN SODIUM 3000 UNITS: 1000 INJECTION, SOLUTION INTRAVENOUS; SUBCUTANEOUS at 10:06

## 2021-09-07 RX ADMIN — Medication 10 ML: at 22:32

## 2021-09-07 RX ADMIN — SUCCINYLCHOLINE CHLORIDE 200 MG: 20 INJECTION, SOLUTION INTRAMUSCULAR; INTRAVENOUS at 08:46

## 2021-09-07 RX ADMIN — GEMFIBROZIL 600 MG: 600 TABLET ORAL at 18:12

## 2021-09-07 RX ADMIN — ROCURONIUM BROMIDE 10 MG: 10 INJECTION INTRAVENOUS at 11:16

## 2021-09-07 RX ADMIN — HEPARIN SODIUM 5000 UNITS: 1000 INJECTION, SOLUTION INTRAVENOUS; SUBCUTANEOUS at 09:42

## 2021-09-07 RX ADMIN — SODIUM CHLORIDE: 9 INJECTION, SOLUTION INTRAVENOUS at 08:17

## 2021-09-07 RX ADMIN — FENTANYL CITRATE 100 MCG: 50 INJECTION, SOLUTION INTRAMUSCULAR; INTRAVENOUS at 08:46

## 2021-09-07 RX ADMIN — LIDOCAINE HYDROCHLORIDE 100 MG: 20 INJECTION, SOLUTION INTRAVENOUS at 08:46

## 2021-09-07 RX ADMIN — HEPARIN SODIUM 4000 UNITS: 1000 INJECTION, SOLUTION INTRAVENOUS; SUBCUTANEOUS at 10:26

## 2021-09-07 RX ADMIN — Medication 3 MG: at 22:32

## 2021-09-07 RX ADMIN — ROSUVASTATIN CALCIUM 40 MG: 40 TABLET, FILM COATED ORAL at 22:32

## 2021-09-07 RX ADMIN — HEPARIN SODIUM 3000 UNITS: 1000 INJECTION, SOLUTION INTRAVENOUS; SUBCUTANEOUS at 11:16

## 2021-09-07 RX ADMIN — ONDANSETRON HYDROCHLORIDE 4 MG: 2 INJECTION, SOLUTION INTRAMUSCULAR; INTRAVENOUS at 11:36

## 2021-09-07 RX ADMIN — GLYCOPYRROLATE 0.4 MG: 0.2 INJECTION, SOLUTION INTRAMUSCULAR; INTRAVENOUS at 12:17

## 2021-09-07 RX ADMIN — ROCURONIUM BROMIDE 5 MG: 10 INJECTION INTRAVENOUS at 10:27

## 2021-09-07 RX ADMIN — ROCURONIUM BROMIDE 20 MG: 10 INJECTION INTRAVENOUS at 08:56

## 2021-09-07 RX ADMIN — Medication 10 MG: at 10:27

## 2021-09-07 RX ADMIN — DEXAMETHASONE SODIUM PHOSPHATE 8 MG: 4 INJECTION, SOLUTION INTRAMUSCULAR; INTRAVENOUS at 08:53

## 2021-09-07 RX ADMIN — SODIUM CHLORIDE: 9 INJECTION, SOLUTION INTRAVENOUS at 11:15

## 2021-09-07 RX ADMIN — HEPARIN SODIUM 12000 UNITS: 1000 INJECTION, SOLUTION INTRAVENOUS; SUBCUTANEOUS at 09:25

## 2021-09-07 RX ADMIN — PHENYLEPHRINE HYDROCHLORIDE 35 MCG/MIN: 10 INJECTION INTRAVENOUS at 08:54

## 2021-09-07 RX ADMIN — ROCURONIUM BROMIDE 5 MG: 10 INJECTION INTRAVENOUS at 08:46

## 2021-09-07 RX ADMIN — VASOPRESSIN 2 UNITS: 20 INJECTION INTRAVENOUS at 09:01

## 2021-09-07 RX ADMIN — NEOSTIGMINE METHYLSULFATE 4 MG: 1 INJECTION, SOLUTION INTRAVENOUS at 12:17

## 2021-09-07 RX ADMIN — Medication 200 MCG: at 08:58

## 2021-09-07 RX ADMIN — Medication 15 MG: at 09:10

## 2021-09-07 RX ADMIN — VASOPRESSIN 2 UNITS: 20 INJECTION INTRAVENOUS at 08:58

## 2021-09-07 RX ADMIN — PROPOFOL 150 MG: 10 INJECTION, EMULSION INTRAVENOUS at 08:46

## 2021-09-07 RX ADMIN — Medication 15 MG: at 09:17

## 2021-09-07 RX ADMIN — PROTAMINE SULFATE 70 MG: 10 INJECTION, SOLUTION INTRAVENOUS at 11:35

## 2021-09-07 RX ADMIN — ROCURONIUM BROMIDE 10 MG: 10 INJECTION INTRAVENOUS at 09:16

## 2021-09-07 RX ADMIN — Medication 200 MCG: at 08:53

## 2021-09-07 NOTE — PROGRESS NOTES
S/p Afib ablation. Cardioverted to sinus, voltage and activation map done. PVI using WACA. LA roof line done due to persistent AFib. Also, CTI line done due to known history of RA flutter with typical appearance on ECG. Vascade venous closures. No complications, full note to follow.

## 2021-09-07 NOTE — Clinical Note
Sheath #1: sheath exchanged for SHEATH INTRO PGTL 0.035 IN 55 DEG 81  CM D0 VERSACROSS. Hemostasis achieved.  8fr sheath RFV removed/ VersaCross access sheath advanced with RF VersaCross wire/ aspirated and flushed

## 2021-09-07 NOTE — Clinical Note
TRANSFER - OUT REPORT:     Verbal report given to: BRO Ac, (at bedside). Report consisted of patient's Situation, Background, Assessment and   Recommendations(SBAR). Opportunity for questions and clarification was provided. Patient transported with a Registered Nurse, Monitor and Oxygen. Oxygen used for patient = nasal cannula, @ 2 - 6 Liters.     Patient transported to: PACU.   transported with CRNA and EP Lab staff

## 2021-09-07 NOTE — Clinical Note
Transseptal Cath Performed under hemodynamic and ICE and Fluoro, RF VersaCross wire used/ [employer]

## 2021-09-07 NOTE — PERIOP NOTES
Handoff Report from Operating Room to PACU    Report received from [facility] and CLAUDIA Fontaine CRNA regarding Amberly Dy. Surgeon(s):  Anesthesia, Case  And Procedure(s) (LRB):  SPECIAL PROCEDURE OUTSIDE OF OR (N/A)  confirmed   with allergies and dressings discussed. Anesthesia type, drugs, patient history, complications, estimated blood loss, vital signs, intake and output, and last pain medication, lines, reversal medications and temperature were reviewed.

## 2021-09-07 NOTE — ANESTHESIA PREPROCEDURE EVALUATION
Relevant Problems   CARDIOVASCULAR   (+) Arrhythmia   (+) Atrial flutter with rapid ventricular response (HCC)   (+) Hypertension       Anesthetic History   No history of anesthetic complications            Review of Systems / Medical History  Patient summary reviewed, nursing notes reviewed and pertinent labs reviewed    Pulmonary  Within defined limits  COPD               Neuro/Psych   Within defined limits           Cardiovascular  Within defined limits  Hypertension        Dysrhythmias       Exercise tolerance: >4 METS     GI/Hepatic/Renal  Within defined limits   GERD           Endo/Other  Within defined limits    Hypothyroidism       Other Findings              Physical Exam    Airway  Mallampati: II  TM Distance: > 6 cm  Neck ROM: normal range of motion   Mouth opening: Normal     Cardiovascular  Regular rate and rhythm,  S1 and S2 normal,  no murmur, click, rub, or gallop  Rhythm: irregular           Dental  No notable dental hx       Pulmonary  Breath sounds clear to auscultation               Abdominal  GI exam deferred       Other Findings            Anesthetic Plan    ASA: 3  Anesthesia type: general    Monitoring Plan: BIS      Induction: Intravenous  Anesthetic plan and risks discussed with: Patient

## 2021-09-07 NOTE — PROGRESS NOTES
1410: Pt arrived from PACU. HOB elevated to 30 degrees. Bilateral groings CDI, no hematoma. 1600: Pt ambulated down hannon with no complaints. Bilateral groins CDI, no hematoma.

## 2021-09-07 NOTE — Clinical Note
Sheath #1: Sheath: inserted. Sheath inserted/placed in the right femoral  vein. Hemostasis achieved.  [facility] transeptal sheath advanced transeptal/ aspirated and flushed/ connected to pressure line / flush

## 2021-09-07 NOTE — PROGRESS NOTES
Cardiac Cath Lab Recovery Arrival Note:      David Posey arrived to Cardiac Cath Lab, Recovery Area. Staff introduced to patient. Patient identifiers verified with NAME and DATE OF BIRTH. Procedure verified with patient. Consent forms reviewed and signed by patient or authorized representative and verified. Allergies verified. Patient and family oriented to department. Patient and family informed of procedure and plan of care. Questions answered with review. Patient prepped for procedure, per orders from physician, prior to arrival.    Patient on cardiac monitor, non-invasive blood pressure, SPO2 monitor. On RA. Patient is A&Ox 4. Patient reports no complaints. Patient in stretcher, in low position, with side rails up, call bell within reach, patient instructed to call if assistance as needed. Patient prep in: [address], [place] [address]. Patient family has pager # none  Family in: [address] [facility] waiting area.    Prep by: Joby Machado RN and Kaykay Talavera

## 2021-09-07 NOTE — Clinical Note
Sheath #1: sheath exchanged for [facility] L10CM ID11FR GWIRE OD0.038IN SMOOTH. Hemostasis achieved.  Vizigo sheath removed/ 11fr sheath advanced RFV/ aspirated and flushed

## 2021-09-07 NOTE — ANESTHESIA POSTPROCEDURE EVALUATION
Post-Anesthesia Evaluation and Assessment    Patient: Cooper Guerra MRN: [id number]  SSN: [id number]    YOB: 1950  Age: 65 y.o. Sex: male       Cardiovascular Function/Vital Signs  Visit Vitals  /63   Pulse 90   Temp 36.7 °C (98.1 °F)   Resp 19   Ht 5' 11\" (1.803 m)   Wt 121.6 kg (268 lb)   SpO2 90%   BMI 37.38 kg/m²       Patient is status post General anesthesia for Procedure(s):  ABLATION A-FIB  W COMPLETE EP STUDY  Ep 3d Mapping  Intracardiac Echocardiogram  Ablation Following A-Fib  Addl. Nausea/Vomiting: None    Postoperative hydration reviewed and adequate. Pain:  Pain Scale 1: Numeric (0 - 10) (09/07/21 1300)  Pain Intensity 1: 0 (09/07/21 1300)   Managed    Neurological Status:   Neuro (WDL): Exceptions to WDL (09/07/21 1235)  Neuro  Neurologic State: Drowsy; Eyes open to voice (09/07/21 1235)  Orientation Level: Oriented to person;Oriented to situation (09/07/21 1235)  Cognition: Appropriate for age attention/concentration; Follows commands (09/07/21 1235)  Speech: Appropriate for age;Clear (09/07/21 1235)  LUE Motor Response: Purposeful (09/07/21 1235)  LLE Motor Response: Purposeful (09/07/21 1235)  RUE Motor Response: Purposeful (09/07/21 1235)  RLE Motor Response: Purposeful (09/07/21 1235)   At baseline    Mental Status and Level of Consciousness: Alert and oriented to person, place, and time    Pulmonary Status:   O2 Device: Nasal cannula (09/07/21 1315)   Adequate oxygenation and airway patent    Complications related to anesthesia: None    Post-anesthesia assessment completed.  No concerns    Signed By: Pelon Dash MD     September 7, 2021              Procedure(s):  ABLATION A-FIB  W COMPLETE EP STUDY  Ep 3d Mapping  Intracardiac Echocardiogram  Ablation Following A-Fib  Addl.    general    <BSHSIANPOST>    INITIAL Post-op Vital signs:   Vitals Value Taken Time   /63 09/07/21 1345   Temp 36.7 °C (98.1 °F) 09/07/21 1240   Pulse 90 09/07/21 1351   Resp 17 09/07/21 1351   SpO2 92 % 09/07/21 1351   Vitals shown include unvalidated device data.

## 2021-09-08 VITALS
WEIGHT: 268 LBS | RESPIRATION RATE: 18 BRPM | HEART RATE: 89 BPM | TEMPERATURE: 97.2 F | SYSTOLIC BLOOD PRESSURE: 148 MMHG | BODY MASS INDEX: 37.52 KG/M2 | OXYGEN SATURATION: 95 % | HEIGHT: 71 IN | DIASTOLIC BLOOD PRESSURE: 73 MMHG

## 2021-09-08 PROCEDURE — 74011250637 HC RX REV CODE- 250/637: Performed by: NURSE PRACTITIONER

## 2021-09-08 RX ORDER — AMIODARONE HYDROCHLORIDE 200 MG/1
200 TABLET ORAL DAILY
Qty: 30 TABLET | Refills: 2 | Status: SHIPPED
Start: 2021-09-08 | End: 2021-10-08

## 2021-09-08 RX ORDER — DILTIAZEM HYDROCHLORIDE 240 MG/1
240 CAPSULE, EXTENDED RELEASE ORAL DAILY
COMMUNITY

## 2021-09-08 RX ADMIN — METOPROLOL SUCCINATE 25 MG: 25 TABLET, EXTENDED RELEASE ORAL at 08:31

## 2021-09-08 RX ADMIN — AMIODARONE HYDROCHLORIDE 400 MG: 200 TABLET ORAL at 08:30

## 2021-09-08 RX ADMIN — GEMFIBROZIL 600 MG: 600 TABLET ORAL at 08:30

## 2021-09-08 RX ADMIN — PANTOPRAZOLE SODIUM 40 MG: 40 TABLET, DELAYED RELEASE ORAL at 08:31

## 2021-09-08 RX ADMIN — FUROSEMIDE 20 MG: 20 TABLET ORAL at 08:30

## 2021-09-08 RX ADMIN — ASPIRIN 81 MG: 81 TABLET, COATED ORAL at 08:30

## 2021-09-08 NOTE — PROGRESS NOTES
Discharge instructions discussed with patient. All questions answered. Patient verbalized understanding. Cath site CDI, no hematoma. Care instructions and restrictions reviewed. Patient instructed to make follow up appts per discharge instructions. Patient signed discharge instructions after reviewing them and duplicate copy placed in chart. Belongings gathered and accounted for. Telemetry monitor and IV removed. Tolerating ambulation in room and hallway. Denies CP, SOB, or dizziness. Escorted out via w/c, discharged home with family in a private vehicle.

## 2021-09-08 NOTE — PROGRESS NOTES
S/p Afib ablation and also RA flutter ablation yesterday with RF. Doing well. Reviewed discharge instructions. All questions answered. He will decrease his amiodarone to 200 mg daily.     Patient Vitals for the past 24 hrs:   Temp Pulse Resp BP SpO2   09/08/21 0720 97.2 °F (36.2 °C) 89 18 (!) 148/73 95 %   09/08/21 0321 97.4 °F (36.3 °C) 87 18 125/65 93 %   09/07/21 2237 97.5 °F (36.4 °C) 92 18 120/71 90 %   09/07/21 1912 97.8 °F (36.6 °C) 95 22 133/67 90 %   09/07/21 1611 98.5 °F (36.9 °C) 100 22 137/71 93 %   09/07/21 1545  89 10 119/67 92 %   09/07/21 1530  87 10 108/67 90 %   09/07/21 1515  88 13 123/69 93 %   09/07/21 1500  88 10 102/60 92 %   09/07/21 1410 98.5 °F (36.9 °C) 92 20 109/64 92 %   09/07/21 1345  90 19 118/63 90 %   09/07/21 1330 98.7 °F (37.1 °C) 91 15 (!) 99/57 93 %   09/07/21 1315  91 20 (!) 109/56 91 %   09/07/21 1300  94 21 (!) 105/57 92 %   09/07/21 1250  96 25 (!) 124/56 96 %   09/07/21 1245  (!) 101 19 121/61 97 %   09/07/21 1240 98.1 °F (36.7 °C) 98 18 126/62 97 %   09/07/21 1235 98.1 °F (36.7 °C) 99 18 129/66 95 %     Signed By: Alexandria Riley MD     September 8, 2021

## 2021-09-08 NOTE — DISCHARGE INSTRUCTIONS
POST-EP STUDY AND ABLATION DISCHARGE INSTRUCTIONS:    You had an atrial fibrillation ablation using radiofrequency energy with Dr. Memo Brady. In addition, treatment for right atrial flutter was done. Call to make an appointment with Dr. Lindsey Leslie in 2 weeks. Call to make an appointment with Dr. Memo Brady in 3 months, [phone]. Decrease your amiodarone to 200 mg daily. Hopefully, it can be stopped in 3 months. Do not drive, operate any machinery, or sign any legal documents for 24 hours after your procedure. You must have someone to drive you home. You may take a shower 24 hours after your cardiac procedure. Be sure to get the dressing wet and then remove it; gently wash the area with warm soapy water. Pat dry and leave open to air. To help prevent infections, be sure to keep the cath site clean and dry. No lotions, creams, powders, ointments, etc. in the cath site for approximately 1 week.  Do not take a tub bath, get in a hot tub or swimming pool for approximately 5 days or until the cath site is completely healed.  No strenuous activity or heavy lifting over 20 lbs. for 7 days.  After your procedure, some bruising or discomfort is common during the healing process. Tylenol, 1-2 tablets every 6 hours as needed, is recommended if you experience any discomfort. If you experience any signs or symptoms of infection such as fever, chills, or poorly healing incision, persistent tenderness or swelling in the groin, redness and/or warmth to the touch, numbness, significant tingling or pain at the groin site or affected extremity, rash, drainage from the site, or if the leg feels tight or swollen, call your physician right away.  If bleeding at the site occurs, take a clean gauze pad and apply direct pressure to the groin just above the puncture site, and call your physician right away.      If your procedure involved ablation therapy, you may feel some mild or vague chest discomfort due to delivery of heat therapy to the heart muscle. This should resolve in 1-2 days. If it gets worse or is associated with shortness of breath, dizziness, loss of consciousness, call your physician right away or call 911 if emergency medical care is needed.     Signed By: Angel Bergman MD     September 8, 2021

## 2021-09-08 NOTE — PROGRESS NOTES
Problem: Falls - Risk of  Goal: *Absence of Falls  Description: Document Debbie Crowell Fall Risk and appropriate interventions in the flowsheet.   Outcome: Resolved/Met  Note: Fall Risk Interventions:            Medication Interventions: Teach patient to arise slowly    Elimination Interventions: Call light in reach    History of Falls Interventions: Bed/chair exit alarm, Consult care management for discharge planning, Door open when patient unattended, Evaluate medications/consider consulting pharmacy, Investigate reason for fall, Room close to nurse's station, Utilize gait belt for transfer/ambulation, Assess for delayed presentation/identification of injury for 48 hrs (comment for end date), Vital signs minimum Q4HRs X 24 hrs (comment for end date)         Problem: Patient Education: Go to Patient Education Activity  Goal: Patient/Family Education  Outcome: Resolved/Met

## 2021-09-09 NOTE — H&P
EP/ ARRHYTHMIA Preprocedure Note    Patient ID:  Patient: Shannan Mcgee  MRN: [id number]  Age: 65 y.o.  : 1950    Date of  Admission: 2021  7:11 AM   PCP:  Demarco Shepherd MD    Assessment: 1. Persistent atrial fibrillation, symptomatic. Plan:     1. Given the potential benefits, risks, and alternatives, he agrees to proceed. Goal to get him off amiodarone. 2. A KIM will be done preprocedure to evaluate for DC thrombus. Shannan Mcgee is a 65 y.o. male with a history of the above, here for his procedure. Past Medical History:   Diagnosis Date    Arrhythmia     SVT; Dr. Lindsey Leslie 2018    Chronic obstructive pulmonary disease (HCC)     mild    GERD (gastroesophageal reflux disease)     Hypertension     Thyroid disease     benign thyroid growth, checked annually by Dr. Maryse More        Past Surgical History:   Procedure Laterality Date    COLONOSCOPY N/A 2019    COL[facility]OSCOPY performed by Randal Steel [facility] at [facility]COPY    COLONOSCOPY,MAYELA RODRIGUES,SNARE  2019         HX HEENT Bilateral     cataract extraction    [place] COLON [place] SCRN NOT  [address] IND  2019            Social History     Tobacco Use    Smoking status: Former Smoker     Years: 5.00    Smokeless tobacco: Never Used   Substance Use Topics    Alcohol use: Yes     Alcohol/week: 7.0 standard drinks     Types: 7 Glasses of wine per week        Family History   Problem Relation Age of Onset    Hypertension Mother     Heart Disease Mother     Hypertension Father     Diabetes Father         No Known Allergies       No current facility-administered medications for this encounter. Current Outpatient Medications   Medication Sig    dilTIAZem ER (TIAZAC) 240 mg capsule Take 240 mg by mouth daily. Indications: paroxysmal supraventricular tachycardia    amiodarone (CORDARONE) 200 mg tablet Take 1 Tablet by mouth daily for 30 days.     finasteride (PROSCAR) 5 mg tablet Take 5 mg by mouth daily.  vit A/vit C/vit E/zinc/copper (PRESERVISION AREDS PO) Take 1 Caplet by mouth two (2) times a day.  ascorbic acid, vitamin C, (Vitamin C) 500 mg tablet Take 500 mg by mouth daily.  cyanocobalamin (Vitamin B-12) 1,000 mcg tablet Take 1,000 mcg by mouth daily.  ibuprofen (MOTRIN) 200 mg tablet Take 400 mg by mouth every eight (8) hours as needed for Pain.  fluticasone-umeclidinium-vilanterol (Trelegy Ellipta) 100-62.5-25 mcg inhaler Take 1 Puff by inhalation daily.  pantoprazole (Protonix) 40 mg tablet Take 40 mg by mouth daily.  metoprolol succinate (Toprol XL) 25 mg XL tablet Take 25 mg by mouth daily.  furosemide (LASIX) 20 mg tablet 1 tab po daily    rosuvastatin (Crestor) 40 mg tablet Take 40 mg by mouth nightly.  tamsulosin (FLOMAX) 0.4 mg capsule Take 0.4 mg by mouth daily.  aspirin delayed-release 81 mg tablet Take 81 mg by mouth daily.  melatonin 3 mg tablet Take 3 mg by mouth nightly.  albuterol (VENTOLIN HFA) 90 mcg/actuation inhaler Take 1 Puff by inhalation every four (4) hours as needed.  gemfibrozil (LOPID) 600 mg tablet Take 600 mg by mouth two (2) times a day.  cholecalciferol (VITAMIN D3) 1,000 unit cap Take 1,000 Units by mouth daily.  MULTIVIT-MIN/FA/LYCOPEN/LUTEIN (CENTRUM SILVER ULTRA MEN'S PO) Take 1 Tab by mouth daily.  Omega-3-DHA-EPA-Fish Oil 1,000 mg (120 mg-180 mg) cap Take 1 Cap by mouth daily.  apixaban (ELIQUIS) 5 mg tablet Take 1 Tab by mouth two (2) times a day. Review of Symptoms:  See OV. Objective:      Physical Exam:  Vital signs stable. Nondiaphoretic, not in acute distress. Irregularly, irregular rhythm. Neuro grossly nonfocal.  No tremor. Awake and appropriate. Labs:  No results for input(s): CPK, CKMB, CKNDX, TROIQ in the last 72 hours.     No lab exists for component: CPKMB  No results found for: CHOL, CHOLX, CHLST, CHOLV, HDL, HDLP, LDL, LDLC, DLDLP, TGLX, TRIGL, TRIGP, CHHD, CHHDX  No results for input(s): INR, PTP, APTT, INREXT in the last 72 hours. Recent Labs     09/07/21  0732      K 4.0      CO2 30   BUN 24*   CREA 1.35*   *   CA 8.9   WBC 8.9   HGB 14.6   HCT 43.9        No results for input(s): AP, TBIL, TP, ALB, GLOB, GGT, AML, LPSE in the last 72 hours. No lab exists for component: SGOT, GPT, AMYP, HLPSE  No components found for: GLPOC  No results for input(s): PH, PCO2, PO2 in the last 72 hours.         Renée Frankel MD

## 2021-12-05 ENCOUNTER — APPOINTMENT (OUTPATIENT)
Dept: GENERAL RADIOLOGY | Age: 71
DRG: 871 | End: 2021-12-05
Attending: EMERGENCY MEDICINE
Payer: MEDICARE

## 2021-12-05 ENCOUNTER — HOSPITAL ENCOUNTER (INPATIENT)
Age: 71
LOS: 9 days | Discharge: NURSING, REHAB OR LONG-TERM CARE | DRG: 871 | End: 2021-12-14
Attending: EMERGENCY MEDICINE | Admitting: HOSPITALIST
Payer: MEDICARE

## 2021-12-05 DIAGNOSIS — J96.10 CHRONIC RESPIRATORY FAILURE, UNSPECIFIED WHETHER WITH HYPOXIA OR HYPERCAPNIA (HCC): ICD-10-CM

## 2021-12-05 DIAGNOSIS — J96.01 ACUTE RESPIRATORY FAILURE WITH HYPOXIA (HCC): Primary | ICD-10-CM

## 2021-12-05 DIAGNOSIS — E66.9 OBESITY (BMI 30.0-34.9): ICD-10-CM

## 2021-12-05 DIAGNOSIS — J44.1 ACUTE EXACERBATION OF CHRONIC OBSTRUCTIVE PULMONARY DISEASE (COPD) (HCC): ICD-10-CM

## 2021-12-05 DIAGNOSIS — B95.61 STAPHYLOCOCCUS AUREUS BACTEREMIA: ICD-10-CM

## 2021-12-05 DIAGNOSIS — I48.92 ATRIAL FLUTTER WITH RAPID VENTRICULAR RESPONSE (HCC): ICD-10-CM

## 2021-12-05 DIAGNOSIS — J06.9 VIRAL UPPER RESPIRATORY TRACT INFECTION: ICD-10-CM

## 2021-12-05 DIAGNOSIS — I42.9 CARDIOMYOPATHY, UNSPECIFIED TYPE (HCC): ICD-10-CM

## 2021-12-05 DIAGNOSIS — E66.9 OBESITY (BMI 30-39.9): ICD-10-CM

## 2021-12-05 DIAGNOSIS — R78.81 STAPHYLOCOCCUS AUREUS BACTEREMIA: ICD-10-CM

## 2021-12-05 DIAGNOSIS — M65.9 TENOSYNOVITIS OF RIGHT ANKLE: ICD-10-CM

## 2021-12-05 DIAGNOSIS — L03.115 CELLULITIS OF RIGHT ANKLE: ICD-10-CM

## 2021-12-05 DIAGNOSIS — I50.9 ACUTE ON CHRONIC CONGESTIVE HEART FAILURE, UNSPECIFIED HEART FAILURE TYPE (HCC): ICD-10-CM

## 2021-12-05 DIAGNOSIS — M65.9 TENOSYNOVITIS OF ANKLE: ICD-10-CM

## 2021-12-05 PROBLEM — R50.9 FEBRILE: Status: ACTIVE | Noted: 2021-12-05

## 2021-12-05 PROBLEM — R79.89 ELEVATED BRAIN NATRIURETIC PEPTIDE (BNP) LEVEL: Status: ACTIVE | Noted: 2021-12-05

## 2021-12-05 PROBLEM — J96.20 RESPIRATORY FAILURE, ACUTE-ON-CHRONIC (HCC): Status: ACTIVE | Noted: 2021-12-05

## 2021-12-05 LAB
ALBUMIN SERPL-MCNC: 3.3 G/DL (ref 3.5–5)
ALBUMIN/GLOB SERPL: 0.7 {RATIO} (ref 1.1–2.2)
ALP SERPL-CCNC: 60 U/L (ref 45–117)
ALT SERPL-CCNC: 53 U/L (ref 12–78)
ANION GAP SERPL CALC-SCNC: 7 MMOL/L (ref 5–15)
APPEARANCE UR: ABNORMAL
AST SERPL-CCNC: 29 U/L (ref 15–37)
BACTERIA URNS QL MICRO: NEGATIVE /HPF
BASE EXCESS BLD CALC-SCNC: 1.3 MMOL/L
BASOPHILS # BLD: 0 K/UL (ref 0–0.1)
BASOPHILS NFR BLD: 0 % (ref 0–1)
BILIRUB SERPL-MCNC: 0.6 MG/DL (ref 0.2–1)
BILIRUB UR QL: NEGATIVE
BNP SERPL-MCNC: 5004 PG/ML
BUN SERPL-MCNC: 18 MG/DL (ref 6–20)
BUN/CREAT SERPL: 12 (ref 12–20)
CA-I BLD-MCNC: 1.1 MMOL/L (ref 1.12–1.32)
CALCIUM SERPL-MCNC: 9.1 MG/DL (ref 8.5–10.1)
CHLORIDE BLD-SCNC: 95 MMOL/L (ref 100–108)
CHLORIDE SERPL-SCNC: 95 MMOL/L (ref 97–108)
CO2 BLD-SCNC: 28 MMOL/L (ref 19–24)
CO2 SERPL-SCNC: 28 MMOL/L (ref 21–32)
COLOR UR: ABNORMAL
COMMENT, HOLDF: NORMAL
COVID-19 RAPID TEST, COVR: NOT DETECTED
CREAT SERPL-MCNC: 1.45 MG/DL (ref 0.7–1.3)
CREAT UR-MCNC: 1.1 MG/DL (ref 0.6–1.3)
D DIMER PPP FEU-MCNC: 0.85 MG/L FEU (ref 0–0.65)
DIFFERENTIAL METHOD BLD: ABNORMAL
EOSINOPHIL # BLD: 0 K/UL (ref 0–0.4)
EOSINOPHIL NFR BLD: 0 % (ref 0–7)
EPITH CASTS URNS QL MICRO: ABNORMAL /LPF
ERYTHROCYTE [DISTWIDTH] IN BLOOD BY AUTOMATED COUNT: 14.3 % (ref 11.5–14.5)
GLOBULIN SER CALC-MCNC: 4.6 G/DL (ref 2–4)
GLUCOSE BLD STRIP.AUTO-MCNC: 122 MG/DL (ref 74–106)
GLUCOSE SERPL-MCNC: 146 MG/DL (ref 65–100)
GLUCOSE UR STRIP.AUTO-MCNC: NEGATIVE MG/DL
HCO3 BLDA-SCNC: 27 MMOL/L
HCT VFR BLD AUTO: 37.1 % (ref 36.6–50.3)
HGB BLD-MCNC: 12.2 G/DL (ref 12.1–17)
HGB UR QL STRIP: ABNORMAL
IMM GRANULOCYTES # BLD AUTO: 0.3 K/UL (ref 0–0.04)
IMM GRANULOCYTES NFR BLD AUTO: 2 % (ref 0–0.5)
KETONES UR QL STRIP.AUTO: NEGATIVE MG/DL
LACTATE BLD-SCNC: 1.16 MMOL/L (ref 0.4–2)
LACTATE BLD-SCNC: 2.67 MMOL/L (ref 0.4–2)
LEUKOCYTE ESTERASE UR QL STRIP.AUTO: NEGATIVE
LYMPHOCYTES # BLD: 0.6 K/UL (ref 0.8–3.5)
LYMPHOCYTES NFR BLD: 4 % (ref 12–49)
MCH RBC QN AUTO: 28.8 PG (ref 26–34)
MCHC RBC AUTO-ENTMCNC: 32.9 G/DL (ref 30–36.5)
MCV RBC AUTO: 87.5 FL (ref 80–99)
MONOCYTES # BLD: 1.3 K/UL (ref 0–1)
MONOCYTES NFR BLD: 8 % (ref 5–13)
NEUTS SEG # BLD: 13.9 K/UL (ref 1.8–8)
NEUTS SEG NFR BLD: 86 % (ref 32–75)
NITRITE UR QL STRIP.AUTO: NEGATIVE
NRBC # BLD: 0 K/UL (ref 0–0.01)
NRBC BLD-RTO: 0 PER 100 WBC
PCO2 BLDV: 47 MMHG (ref 41–51)
PH BLDV: 7.37 [PH] (ref 7.32–7.42)
PH UR STRIP: 6 [PH] (ref 5–8)
PLATELET # BLD AUTO: 148 K/UL (ref 150–400)
PLATELET COMMENTS,PCOM: ABNORMAL
PMV BLD AUTO: 10.6 FL (ref 8.9–12.9)
PO2 BLDV: 21 MMHG (ref 25–40)
POTASSIUM BLD-SCNC: 3.8 MMOL/L (ref 3.5–5.5)
POTASSIUM SERPL-SCNC: 4.5 MMOL/L (ref 3.5–5.1)
PROT SERPL-MCNC: 7.9 G/DL (ref 6.4–8.2)
PROT UR STRIP-MCNC: 100 MG/DL
RBC # BLD AUTO: 4.24 M/UL (ref 4.1–5.7)
RBC #/AREA URNS HPF: ABNORMAL /HPF (ref 0–5)
RBC MORPH BLD: ABNORMAL
SAMPLES BEING HELD,HOLD: NORMAL
SODIUM BLD-SCNC: 134 MMOL/L (ref 136–145)
SODIUM SERPL-SCNC: 130 MMOL/L (ref 136–145)
SOURCE, COVRS: NORMAL
SP GR UR REFRACTOMETRY: 1.01 (ref 1–1.03)
SPECIMEN SITE: ABNORMAL
TROPONIN-HIGH SENSITIVITY: 15 NG/L (ref 0–76)
UA: UC IF INDICATED,UAUC: ABNORMAL
UROBILINOGEN UR QL STRIP.AUTO: 0.2 EU/DL (ref 0.2–1)
WBC # BLD AUTO: 16.1 K/UL (ref 4.1–11.1)
WBC URNS QL MICRO: ABNORMAL /HPF (ref 0–4)

## 2021-12-05 PROCEDURE — 87186 SC STD MICRODIL/AGAR DIL: CPT

## 2021-12-05 PROCEDURE — 36415 COLL VENOUS BLD VENIPUNCTURE: CPT

## 2021-12-05 PROCEDURE — 99285 EMERGENCY DEPT VISIT HI MDM: CPT

## 2021-12-05 PROCEDURE — 81001 URINALYSIS AUTO W/SCOPE: CPT

## 2021-12-05 PROCEDURE — 87040 BLOOD CULTURE FOR BACTERIA: CPT

## 2021-12-05 PROCEDURE — 65270000029 HC RM PRIVATE

## 2021-12-05 PROCEDURE — 74011250636 HC RX REV CODE- 250/636: Performed by: EMERGENCY MEDICINE

## 2021-12-05 PROCEDURE — 87635 SARS-COV-2 COVID-19 AMP PRB: CPT

## 2021-12-05 PROCEDURE — 84484 ASSAY OF TROPONIN QUANT: CPT

## 2021-12-05 PROCEDURE — 96376 TX/PRO/DX INJ SAME DRUG ADON: CPT

## 2021-12-05 PROCEDURE — 83880 ASSAY OF NATRIURETIC PEPTIDE: CPT

## 2021-12-05 PROCEDURE — 85379 FIBRIN DEGRADATION QUANT: CPT

## 2021-12-05 PROCEDURE — 0202U NFCT DS 22 TRGT SARS-COV-2: CPT

## 2021-12-05 PROCEDURE — 93005 ELECTROCARDIOGRAM TRACING: CPT

## 2021-12-05 PROCEDURE — 87147 CULTURE TYPE IMMUNOLOGIC: CPT

## 2021-12-05 PROCEDURE — 71045 X-RAY EXAM CHEST 1 VIEW: CPT

## 2021-12-05 PROCEDURE — 96375 TX/PRO/DX INJ NEW DRUG ADDON: CPT

## 2021-12-05 PROCEDURE — 85025 COMPLETE CBC W/AUTO DIFF WBC: CPT

## 2021-12-05 PROCEDURE — 87077 CULTURE AEROBIC IDENTIFY: CPT

## 2021-12-05 PROCEDURE — 74011000258 HC RX REV CODE- 258: Performed by: EMERGENCY MEDICINE

## 2021-12-05 PROCEDURE — 73610 X-RAY EXAM OF ANKLE: CPT

## 2021-12-05 PROCEDURE — 96374 THER/PROPH/DIAG INJ IV PUSH: CPT

## 2021-12-05 PROCEDURE — 80053 COMPREHEN METABOLIC PANEL: CPT

## 2021-12-05 PROCEDURE — 74011000250 HC RX REV CODE- 250: Performed by: EMERGENCY MEDICINE

## 2021-12-05 PROCEDURE — 65660000000 HC RM CCU STEPDOWN

## 2021-12-05 PROCEDURE — 84295 ASSAY OF SERUM SODIUM: CPT

## 2021-12-05 PROCEDURE — 83605 ASSAY OF LACTIC ACID: CPT

## 2021-12-05 RX ORDER — DILTIAZEM HYDROCHLORIDE 120 MG/1
240 CAPSULE, EXTENDED RELEASE ORAL DAILY
Status: DISCONTINUED | OUTPATIENT
Start: 2021-12-06 | End: 2021-12-07

## 2021-12-05 RX ORDER — GUAIFENESIN 600 MG/1
600 TABLET, EXTENDED RELEASE ORAL EVERY 12 HOURS
Status: DISCONTINUED | OUTPATIENT
Start: 2021-12-05 | End: 2021-12-14 | Stop reason: HOSPADM

## 2021-12-05 RX ORDER — ASPIRIN 81 MG/1
81 TABLET ORAL DAILY
Status: DISCONTINUED | OUTPATIENT
Start: 2021-12-06 | End: 2021-12-14 | Stop reason: HOSPADM

## 2021-12-05 RX ORDER — PREDNISONE 20 MG/1
40 TABLET ORAL
Status: DISCONTINUED | OUTPATIENT
Start: 2021-12-06 | End: 2021-12-07

## 2021-12-05 RX ORDER — ROSUVASTATIN CALCIUM 40 MG/1
40 TABLET, COATED ORAL
Status: DISCONTINUED | OUTPATIENT
Start: 2021-12-05 | End: 2021-12-14 | Stop reason: HOSPADM

## 2021-12-05 RX ORDER — FUROSEMIDE 20 MG/1
20 TABLET ORAL DAILY
Status: DISCONTINUED | OUTPATIENT
Start: 2021-12-06 | End: 2021-12-14 | Stop reason: HOSPADM

## 2021-12-05 RX ORDER — DILTIAZEM HYDROCHLORIDE 5 MG/ML
15 INJECTION INTRAVENOUS
Status: COMPLETED | OUTPATIENT
Start: 2021-12-05 | End: 2021-12-05

## 2021-12-05 RX ORDER — SODIUM CHLORIDE 0.9 % (FLUSH) 0.9 %
5-40 SYRINGE (ML) INJECTION EVERY 8 HOURS
Status: DISCONTINUED | OUTPATIENT
Start: 2021-12-05 | End: 2021-12-14 | Stop reason: HOSPADM

## 2021-12-05 RX ORDER — SODIUM CHLORIDE 0.9 % (FLUSH) 0.9 %
5-40 SYRINGE (ML) INJECTION AS NEEDED
Status: DISCONTINUED | OUTPATIENT
Start: 2021-12-05 | End: 2021-12-14 | Stop reason: HOSPADM

## 2021-12-05 RX ORDER — FINASTERIDE 5 MG/1
5 TABLET, FILM COATED ORAL DAILY
Status: DISCONTINUED | OUTPATIENT
Start: 2021-12-06 | End: 2021-12-06

## 2021-12-05 RX ORDER — AMIODARONE HYDROCHLORIDE 200 MG/1
200 TABLET ORAL DAILY
Status: DISCONTINUED | OUTPATIENT
Start: 2021-12-06 | End: 2021-12-06

## 2021-12-05 RX ORDER — IPRATROPIUM BROMIDE AND ALBUTEROL SULFATE 2.5; .5 MG/3ML; MG/3ML
3 SOLUTION RESPIRATORY (INHALATION)
Status: DISCONTINUED | OUTPATIENT
Start: 2021-12-06 | End: 2021-12-06

## 2021-12-05 RX ORDER — ACETAMINOPHEN 325 MG/1
650 TABLET ORAL
Status: DISCONTINUED | OUTPATIENT
Start: 2021-12-05 | End: 2021-12-14 | Stop reason: HOSPADM

## 2021-12-05 RX ORDER — FUROSEMIDE 10 MG/ML
20 INJECTION INTRAMUSCULAR; INTRAVENOUS ONCE
Status: COMPLETED | OUTPATIENT
Start: 2021-12-05 | End: 2021-12-06

## 2021-12-05 RX ORDER — ONDANSETRON 4 MG/1
4 TABLET, ORALLY DISINTEGRATING ORAL
Status: DISCONTINUED | OUTPATIENT
Start: 2021-12-05 | End: 2021-12-14 | Stop reason: HOSPADM

## 2021-12-05 RX ORDER — ONDANSETRON 2 MG/ML
4 INJECTION INTRAMUSCULAR; INTRAVENOUS
Status: DISCONTINUED | OUTPATIENT
Start: 2021-12-05 | End: 2021-12-14 | Stop reason: HOSPADM

## 2021-12-05 RX ORDER — SODIUM CHLORIDE 0.9 % (FLUSH) 0.9 %
5-10 SYRINGE (ML) INJECTION AS NEEDED
Status: DISCONTINUED | OUTPATIENT
Start: 2021-12-05 | End: 2021-12-09 | Stop reason: SDUPTHER

## 2021-12-05 RX ORDER — POLYETHYLENE GLYCOL 3350 17 G/17G
17 POWDER, FOR SOLUTION ORAL DAILY PRN
Status: DISCONTINUED | OUTPATIENT
Start: 2021-12-05 | End: 2021-12-14 | Stop reason: HOSPADM

## 2021-12-05 RX ORDER — METOPROLOL SUCCINATE 25 MG/1
25 TABLET, EXTENDED RELEASE ORAL DAILY
Status: DISCONTINUED | OUTPATIENT
Start: 2021-12-06 | End: 2021-12-06

## 2021-12-05 RX ORDER — AMIODARONE HYDROCHLORIDE 400 MG/1
1 TABLET ORAL
COMMUNITY
Start: 2021-07-19 | End: 2022-02-24

## 2021-12-05 RX ORDER — TAMSULOSIN HYDROCHLORIDE 0.4 MG/1
0.4 CAPSULE ORAL DAILY
Status: DISCONTINUED | OUTPATIENT
Start: 2021-12-06 | End: 2021-12-14 | Stop reason: HOSPADM

## 2021-12-05 RX ORDER — ACETAMINOPHEN 650 MG/1
650 SUPPOSITORY RECTAL
Status: DISCONTINUED | OUTPATIENT
Start: 2021-12-05 | End: 2021-12-14 | Stop reason: HOSPADM

## 2021-12-05 RX ORDER — PANTOPRAZOLE SODIUM 40 MG/1
40 TABLET, DELAYED RELEASE ORAL DAILY
Status: DISCONTINUED | OUTPATIENT
Start: 2021-12-06 | End: 2021-12-14 | Stop reason: HOSPADM

## 2021-12-05 RX ADMIN — DILTIAZEM HYDROCHLORIDE 15 MG: 5 INJECTION INTRAVENOUS at 22:39

## 2021-12-05 RX ADMIN — SODIUM CHLORIDE 5 MG/HR: 900 INJECTION, SOLUTION INTRAVENOUS at 23:32

## 2021-12-05 RX ADMIN — SODIUM CHLORIDE 1000 ML: 9 INJECTION, SOLUTION INTRAVENOUS at 20:13

## 2021-12-05 RX ADMIN — CEFTRIAXONE 1 G: 1 INJECTION, POWDER, FOR SOLUTION INTRAMUSCULAR; INTRAVENOUS at 20:13

## 2021-12-05 NOTE — ED PROVIDER NOTES
EMERGENCY DEPARTMENT HISTORY AND PHYSICAL EXAM      Date: 12/5/2021  Patient Name: Gris Rios    History of Presenting Illness     Chief Complaint   Patient presents with    Blood infection     short of breath worsened in past few days ; has COPD usually on 2lpm; now on 5lpm; pt is hot to touch. also a fall on treadmill a few days ago and rolled right ankle ; did not hit head; increase work of breathing noted    Shortness of Breath    Ankle Injury       History Provided By: Patient    HPI: Gris Rios, 65 y.o. male with PMHx significant for atrial flutter on Eliquis, COPD, GERD, hypertension, who presents the chief complaint of worsening shortness of breath over the last 3 to 4 days. Patient typically wears nasal cannula at night, however has needed it during the day due to progressively worsening shortness of breath. He additionally notes that he rolled his ankle about 11 days ago and is still having some right ankle discomfort and is having some difficulty walking secondary to pain. States some dysuria as well. Was febrile and hypoxic prior to arrival.      PCP: Silvestre Mary MD    There are no other complaints, changes, or physical findings at this time. Current Facility-Administered Medications   Medication Dose Route Frequency Provider Last Rate Last Admin    sodium chloride (NS) flush 5-10 mL  5-10 mL IntraVENous PRN Oskar Blandon MD        sodium chloride 0.9 % bolus infusion 259 mL  259 mL IntraVENous ONCE Oskar Blandon MD         Current Outpatient Medications   Medication Sig Dispense Refill    dilTIAZem ER (TIAZAC) 240 mg capsule Take 240 mg by mouth daily. Indications: paroxysmal supraventricular tachycardia      finasteride (PROSCAR) 5 mg tablet Take 5 mg by mouth daily.  vit A/vit C/vit E/zinc/copper (PRESERVISION AREDS PO) Take 1 Caplet by mouth two (2) times a day.       ascorbic acid, vitamin C, (Vitamin C) 500 mg tablet Take 500 mg by mouth daily.  cyanocobalamin (Vitamin B-12) 1,000 mcg tablet Take 1,000 mcg by mouth daily.  ibuprofen (MOTRIN) 200 mg tablet Take 400 mg by mouth every eight (8) hours as needed for Pain.  fluticasone-umeclidinium-vilanterol (Trelegy Ellipta) 100-62.5-25 mcg inhaler Take 1 Puff by inhalation daily.  pantoprazole (Protonix) 40 mg tablet Take 40 mg by mouth daily.  metoprolol succinate (Toprol XL) 25 mg XL tablet Take 25 mg by mouth daily.  furosemide (LASIX) 20 mg tablet 1 tab po daily 30 Tab 0    apixaban (ELIQUIS) 5 mg tablet Take 1 Tab by mouth two (2) times a day. 60 Tab 0    rosuvastatin (Crestor) 40 mg tablet Take 40 mg by mouth nightly.  tamsulosin (FLOMAX) 0.4 mg capsule Take 0.4 mg by mouth daily.  aspirin delayed-release 81 mg tablet Take 81 mg by mouth daily.  melatonin 3 mg tablet Take 3 mg by mouth nightly.  albuterol (VENTOLIN HFA) 90 mcg/actuation inhaler Take 1 Puff by inhalation every four (4) hours as needed.  gemfibrozil (LOPID) 600 mg tablet Take 600 mg by mouth two (2) times a day.  cholecalciferol (VITAMIN D3) 1,000 unit cap Take 1,000 Units by mouth daily.  MULTIVIT-MIN/FA/LYCOPEN/LUTEIN (CENTRUM SILVER ULTRA MEN'S PO) Take 1 Tab by mouth daily.  Omega-3-DHA-EPA-Fish Oil 1,000 mg (120 mg-180 mg) cap Take 1 Cap by mouth daily.        Past History     Past Medical History:  Past Medical History:   Diagnosis Date    Arrhythmia     SVT; Dr. Skye Conn Fall 2018    Chronic obstructive pulmonary disease (HCC)     mild    GERD (gastroesophageal reflux disease)     Hypertension     Thyroid disease     benign thyroid growth, checked annually by Dr. Ketan Washington     Past Surgical History:  Past Surgical History:   Procedure Laterality Date    COLONOSCOPY N/A 2/5/2019    [facility] performed [facility]rett [facility] at [facility] ENDOSCOPY    COLONOSCOPY,MYAELA RODRIGUES,SNARE  2/5/2019         HX HEENT Bilateral     cataract extraction    [place] COLON CA SCRN NOT [facility] IND  2/5/2019          Family History:  Family History   Problem Relation Age of Onset    Hypertension Mother     Heart Disease Mother     Hypertension Father     Diabetes Father      Social History:  Social History     Tobacco Use    Smoking status: Former Smoker     Years: 5.00    Smokeless tobacco: Never Used   Substance Use Topics    Alcohol use: Yes     Alcohol/week: 7.0 standard drinks     Types: 7 Glasses of wine per week    Drug use: No     Allergies:  No Known Allergies  Review of Systems   Review of Systems   Constitutional: Negative for chills and fever. HENT: Negative for congestion, rhinorrhea and sore throat. Respiratory: Positive for shortness of breath. Negative for cough. Cardiovascular: Negative for chest pain. Gastrointestinal: Negative for abdominal pain, nausea and vomiting. Genitourinary: Negative for dysuria and urgency. Musculoskeletal: Positive for arthralgias. Skin: Negative for rash. Neurological: Negative for dizziness, light-headedness and headaches. All other systems reviewed and are negative. Physical Exam   Physical Exam  Vitals and nursing note reviewed. Constitutional:       General: He is not in acute distress. Appearance: He is well-developed. HENT:      Head: Normocephalic and atraumatic. Eyes:      Conjunctiva/sclera: Conjunctivae normal.      Pupils: Pupils are equal, round, and reactive to light. Cardiovascular:      Rate and Rhythm: Regular rhythm. Tachycardia present. Pulmonary:      Effort: Pulmonary effort is normal. No respiratory distress. Breath sounds: Normal breath sounds. No stridor. Comments: Conversational dyspnea  Abdominal:      General: There is no distension. Palpations: Abdomen is soft. Tenderness: There is no abdominal tenderness. Musculoskeletal:         General: Normal range of motion. Cervical back: Normal range of motion.    Skin:     General: Skin is warm and dry. Neurological:      Mental Status: He is alert and oriented to person, place, and time. Diagnostic Study Results   Labs -     Recent Results (from the past 12 hour(s))   EKG, 12 LEAD, INITIAL    Collection Time: 12/05/21  6:46 PM   Result Value Ref Range    Ventricular Rate 127 BPM    Atrial Rate 254 BPM    QRS Duration 90 ms    Q-T Interval 268 ms    QTC Calculation (Bezet) 389 ms    Calculated P Axis -76 degrees    Calculated R Axis -2 degrees    Calculated T Axis 81 degrees    Diagnosis       Atrial flutter with 2:1 AV conduction  Nonspecific ST and T wave abnormality  When compared with ECG of 03-JUL-2021 20:56,  Atrial flutter has replaced Sinus rhythm  QRS duration has decreased  ST now depressed in Inferior leads  ST now depressed in Anterior leads  Nonspecific T wave abnormality now evident in Lateral leads     CBC WITH AUTOMATED DIFF    Collection Time: 12/05/21  6:52 PM   Result Value Ref Range    WBC 16.1 (H) 4.1 - 11.1 K/uL    RBC 4.24 4.10 - 5.70 M/uL    HGB 12.2 12.1 - 17.0 g/dL    HCT 37.1 36.6 - 50.3 %    MCV 87.5 80.0 - 99.0 FL    MCH 28.8 26.0 - 34.0 PG    MCHC 32.9 30.0 - 36.5 g/dL    RDW 14.3 11.5 - 14.5 %    PLATELET 445 (L) 732 - 400 K/uL    MPV 10.6 8.9 - 12.9 FL    NRBC 0.0 0  WBC    ABSOLUTE NRBC 0.00 0.00 - 0.01 K/uL    NEUTROPHILS 86 (H) 32 - 75 %    LYMPHOCYTES 4 (L) 12 - 49 %    MONOCYTES 8 5 - 13 %    EOSINOPHILS 0 0 - 7 %    BASOPHILS 0 0 - 1 %    IMMATURE GRANULOCYTES 2 (H) 0.0 - 0.5 %    ABS. NEUTROPHILS 13.9 (H) 1.8 - 8.0 K/UL    ABS. LYMPHOCYTES 0.6 (L) 0.8 - 3.5 K/UL    ABS. MONOCYTES 1.3 (H) 0.0 - 1.0 K/UL    ABS. EOSINOPHILS 0.0 0.0 - 0.4 K/UL    ABS. BASOPHILS 0.0 0.0 - 0.1 K/UL    ABS. IMM.  GRANS. 0.3 (H) 0.00 - 0.04 K/UL    DF SMEAR SCANNED      PLATELET COMMENTS CLUMPED PLATELETS      RBC COMMENTS NORMOCYTIC, NORMOCHROMIC     METABOLIC PANEL, COMPREHENSIVE    Collection Time: 12/05/21  6:52 PM   Result Value Ref Range    Sodium 130 (L) 136 - 145 mmol/L    Potassium 4.5 3.5 - 5.1 mmol/L    Chloride 95 (L) 97 - 108 mmol/L    CO2 28 21 - 32 mmol/L    Anion gap 7 5 - 15 mmol/L    Glucose 146 (H) 65 - 100 mg/dL    BUN 18 6 - 20 MG/DL    Creatinine 1.45 (H) 0.70 - 1.30 MG/DL    BUN/Creatinine ratio 12 12 - 20      GFR est AA 58 (L) >60 ml/min/1.73m2    GFR est non-AA 48 (L) >60 ml/min/1.73m2    Calcium 9.1 8.5 - 10.1 MG/DL    Bilirubin, total 0.6 0.2 - 1.0 MG/DL    ALT (SGPT) 53 12 - 78 U/L    AST (SGOT) 29 15 - 37 U/L    Alk. phosphatase 60 45 - 117 U/L    Protein, total 7.9 6.4 - 8.2 g/dL    Albumin 3.3 (L) 3.5 - 5.0 g/dL    Globulin 4.6 (H) 2.0 - 4.0 g/dL    A-G Ratio 0.7 (L) 1.1 - 2.2     TROPONIN-HIGH SENSITIVITY    Collection Time: 12/05/21  6:52 PM   Result Value Ref Range    Troponin-High Sensitivity 15 0 - 76 ng/L   NT-PRO BNP    Collection Time: 12/05/21  6:52 PM   Result Value Ref Range    NT pro-BNP 5,004 (H) <125 PG/ML   POC LACTIC ACID    Collection Time: 12/05/21  6:59 PM   Result Value Ref Range    Lactic Acid (POC) 2.67 (HH) 0.40 - 2.00 mmol/L   SAMPLES BEING HELD    Collection Time: 12/05/21  7:17 PM   Result Value Ref Range    SAMPLES BEING HELD RED,PST,BLUE     COMMENT        Add-on orders for these samples will be processed based on acceptable specimen integrity and analyte stability, which may vary by analyte.    COVID-19 RAPID TEST    Collection Time: 12/05/21  8:12 PM   Result Value Ref Range    Specimen source Nasopharyngeal      COVID-19 rapid test Not detected NOTD     URINALYSIS W/ REFLEX CULTURE    Collection Time: 12/05/21  9:04 PM    Specimen: Urine   Result Value Ref Range    Color YELLOW/STRAW      Appearance CLOUDY (A) CLEAR      Specific gravity 1.015 1.003 - 1.030      pH (UA) 6.0 5.0 - 8.0      Protein 100 (A) NEG mg/dL    Glucose Negative NEG mg/dL    Ketone Negative NEG mg/dL    Bilirubin Negative NEG      Blood SMALL (A) NEG      Urobilinogen 0.2 0.2 - 1.0 EU/dL    Nitrites Negative NEG      Leukocyte Esterase Negative NEG      WBC 0-4 0 - 4 /hpf    RBC 0-5 0 - 5 /hpf    Epithelial cells FEW FEW /lpf    Bacteria Negative NEG /hpf    UA:UC IF INDICATED CULTURE NOT INDICATED BY UA RESULT CNI     BLOOD GAS,CHEM8,LACTIC ACID POC    Collection Time: 12/05/21 10:11 PM   Result Value Ref Range    Calcium, ionized (POC) 1.10 (L) 1.12 - 1.32 mmol/L    BICARBONATE 27 mmol/L    Base excess (POC) 1.3 mmol/L    Sample source VENOUS BLOOD      CO2, POC 28 (H) 19 - 24 MMOL/L    Sodium,  (L) 136 - 145 MMOL/L    Potassium, POC 3.8 3.5 - 5.5 MMOL/L    Chloride, POC 95 (L) 100 - 108 MMOL/L    Glucose,  (H) 74 - 106 MG/DL    Creatinine, POC 1.1 0.6 - 1.3 MG/DL    Lactic Acid (POC) 1.16 0.40 - 2.00 mmol/L    pH, venous (POC) 7.37 7.32 - 7.42      pCO2, venous (POC) 47.0 41 - 51 MMHG    pO2, venous (POC) 21 (L) 25 - 40 mmHg       Radiologic Studies -   XR CHEST PORT   Final Result   No acute findings. XR ANKLE RT MIN 3 V   Final Result   No acute abnormality. Vascular calcifications. XR ANKLE RT MIN 3 V    Result Date: 12/5/2021  No acute abnormality. Vascular calcifications. XR CHEST PORT    Result Date: 12/5/2021  No acute findings. Medical Decision Making   I am the first provider for this patient. I reviewed the vital signs, available nursing notes, past medical history, past surgical history, family history and social history. Vital Signs-Reviewed the patient's vital signs. Patient Vitals for the past 12 hrs:   Temp Pulse Resp BP SpO2   12/05/21 2239  (!) 125  (!) 153/86    12/05/21 2230  (!) 125 29 (!) 153/86 92 %   12/05/21 2215  (!) 124 29 137/82 95 %   12/05/21 2200  (!) 126 22 (!) 171/82 93 %   12/05/21 2021     95 %   12/05/21 1839 (!) 102.5 °F (39.2 °C) (!) 127 28 133/72 98 %       Pulse Oximetry Analysis - 92% on 3L    Cardiac Monitor:   Rate: 125 bpm  Rhythm: Atrial Flutter      ED EKG interpretation:  Rhythm: atrial flutter with 2-1 AV block; and regular .  Rate (approx.): 127; Axis: normal; QRS interval: normal ; ST/T wave: non-specific changes; Other findings: abnormal ekg. This EKG was interpreted by KERI Riley MD,ED Provider. Records Reviewed: Nursing Notes and Old Medical Records    Provider Notes (Medical Decision Making):   Patient presents with a chief complaint of progressively worsening shortness of breath for last several days. Is febrile and tachycardic on presentation. Requiring 3 L nasal cannula to maintain oxygen saturations. EKG performed is consistent with atrial flutter. Will check basic lab work, lactate, chest x-ray, urinalysis, blood cultures. Will send Covid swab as well. Anticipate admission. ED Course:   Initial assessment performed. The patients presenting problems have been discussed, and they are in agreement with the care plan formulated and outlined with them. I have encouraged them to ask questions as they arise throughout their visit. ED [facility] ED SEPSIS NOTE:     6:59 PM The patient now meets criteria for: Severe Sepsis    1. Fluid resuscitation with: 30 mL/kg crystalloid bolus using ideal weight because patient's BMI is 30 or greater  2. Due to concern for rapidly advancing infection and deterioration of patient's condition, antibiotics are started STAT and cultures ordered. ED Course as of 12/05/21 2257   Eleonore Lowers Dec 05, 2021   2108 IVF stopped due to elevated BNP/pt with more SOB. Pt declines additional IVF [JEREMY]      ED Course User Index  Paula Dubois MD       Patient was given a bolus of Cardizem without effect. Will start on Cardizem drip. Discussed with hospitalist for admission    Procedures:  Procedures    Critical Care: Total critical care time: 45 minutes excluding separately billed procedures. Due to high probability of imminent or life threatening deterioration, the patient required my highest level of preparedness to intervene emergently.     Vital organ system affected: cardiac, respiratory  Critical intervention: cardizem gtt      Disposition:    Admission Note:  Patient is being admitted to the hospital by Dr. Breezy Huff, Service: Hospitalist.  The results of their tests and reasons for their admission have been discussed with them and available family. They convey agreement and understanding for the need to be admitted and for their admission diagnosis. Diagnosis     Clinical Impression:   1. Acute respiratory failure with hypoxia ([address]    2. Atrial flutter with rapid ventricular response ([address]            Please note that this dictation was completed with [employer], the computer voice recognition software. Quite often unanticipated grammatical, syntax, homophones, and other interpretive errors are inadvertently transcribed by the computer software. Please disregard these errors.   Please excuse any errors that have escaped final proofreading

## 2021-12-06 ENCOUNTER — APPOINTMENT (OUTPATIENT)
Dept: ULTRASOUND IMAGING | Age: 71
DRG: 871 | End: 2021-12-06
Attending: HOSPITALIST
Payer: MEDICARE

## 2021-12-06 ENCOUNTER — APPOINTMENT (OUTPATIENT)
Dept: VASCULAR SURGERY | Age: 71
DRG: 871 | End: 2021-12-06
Attending: HOSPITALIST
Payer: MEDICARE

## 2021-12-06 ENCOUNTER — APPOINTMENT (OUTPATIENT)
Dept: CT IMAGING | Age: 71
DRG: 871 | End: 2021-12-06
Attending: HOSPITALIST
Payer: MEDICARE

## 2021-12-06 ENCOUNTER — APPOINTMENT (OUTPATIENT)
Dept: NON INVASIVE DIAGNOSTICS | Age: 71
DRG: 871 | End: 2021-12-06
Attending: HOSPITALIST
Payer: MEDICARE

## 2021-12-06 LAB
ALBUMIN SERPL-MCNC: 2.7 G/DL (ref 3.5–5)
ALBUMIN/GLOB SERPL: 0.6 {RATIO} (ref 1.1–2.2)
ALP SERPL-CCNC: 57 U/L (ref 45–117)
ALT SERPL-CCNC: 44 U/L (ref 12–78)
ANION GAP SERPL CALC-SCNC: 9 MMOL/L (ref 5–15)
AST SERPL-CCNC: 32 U/L (ref 15–37)
ATRIAL RATE: 254 BPM
B PERT DNA SPEC QL NAA+PROBE: NOT DETECTED
BASOPHILS # BLD: 0 K/UL (ref 0–0.1)
BASOPHILS NFR BLD: 0 % (ref 0–1)
BILIRUB SERPL-MCNC: 1 MG/DL (ref 0.2–1)
BORDETELLA PARAPERTUSSIS PCR, BORPAR: NOT DETECTED
BUN SERPL-MCNC: 16 MG/DL (ref 6–20)
BUN/CREAT SERPL: 13 (ref 12–20)
C PNEUM DNA SPEC QL NAA+PROBE: NOT DETECTED
CALCIUM SERPL-MCNC: 8.4 MG/DL (ref 8.5–10.1)
CALCULATED P AXIS, ECG09: -76 DEGREES
CALCULATED R AXIS, ECG10: -2 DEGREES
CALCULATED T AXIS, ECG11: 81 DEGREES
CHLORIDE SERPL-SCNC: 99 MMOL/L (ref 97–108)
CO2 SERPL-SCNC: 26 MMOL/L (ref 21–32)
CREAT SERPL-MCNC: 1.19 MG/DL (ref 0.7–1.3)
DIAGNOSIS, 93000: NORMAL
DIFFERENTIAL METHOD BLD: ABNORMAL
ECHO AO ROOT DIAM: 4.04 CM
ECHO AV AREA PEAK VELOCITY: 3.8 CM2
ECHO AV AREA/BSA PEAK VELOCITY: 1.6 CM2/M2
ECHO AV CUSP MM: 1.99 CM
ECHO AV PEAK GRADIENT: 5.27 MMHG
ECHO AV PEAK VELOCITY: 114.75 CM/S
ECHO LA AREA 4C: 21.93 CM2
ECHO LA MAJOR AXIS: 3.15 CM
ECHO LA MINOR AXIS: 1.31 CM
ECHO LA TO AORTIC ROOT RATIO: 1.04
ECHO LA TO AORTIC ROOT RATIO: 1.04
ECHO LA VOL 4C: 55.16 ML (ref 18–58)
ECHO LA VOLUME INDEX A4C: 22.98 ML/M2 (ref 16–28)
ECHO LV INTERNAL DIMENSION DIASTOLIC: 4.69 CM (ref 4.2–5.9)
ECHO LV INTERNAL DIMENSION SYSTOLIC: 3.77 CM
ECHO LV IVSD: 1.62 CM (ref 0.6–1)
ECHO LV IVSS: 2.04 CM
ECHO LV MASS 2D: 312.6 G (ref 88–224)
ECHO LV MASS INDEX 2D: 130.3 G/M2 (ref 49–115)
ECHO LV POSTERIOR WALL DIASTOLIC: 1.51 CM (ref 0.6–1)
ECHO LV POSTERIOR WALL SYSTOLIC: 2.05 CM
ECHO LVOT DIAM: 2.27 CM
ECHO LVOT PEAK GRADIENT: 4.64 MMHG
ECHO LVOT PEAK VELOCITY: 107.74 CM/S
ECHO MV AREA PHT: 4.55 CM2
ECHO MV E DECELERATION TIME (DT): 77.26 MS
ECHO MV E VELOCITY: 83.62 CM/S
ECHO MV PRESSURE HALF TIME (PHT): 48.38 MS
ECHO PV MAX VELOCITY: 112.24 CM/S
ECHO PV PEAK INSTANTANEOUS GRADIENT SYSTOLIC: 5.04 MMHG
ECHO RV INTERNAL DIMENSION: 3.77 CM
EOSINOPHIL # BLD: 0 K/UL (ref 0–0.4)
EOSINOPHIL NFR BLD: 0 % (ref 0–7)
ERYTHROCYTE [DISTWIDTH] IN BLOOD BY AUTOMATED COUNT: 14.1 % (ref 11.5–14.5)
FLUAV H1 2009 PAND RNA SPEC QL NAA+PROBE: NOT DETECTED
FLUAV H1 RNA SPEC QL NAA+PROBE: NOT DETECTED
FLUAV H3 RNA SPEC QL NAA+PROBE: NOT DETECTED
FLUAV SUBTYP SPEC NAA+PROBE: NOT DETECTED
FLUBV RNA SPEC QL NAA+PROBE: NOT DETECTED
GLOBULIN SER CALC-MCNC: 4.3 G/DL (ref 2–4)
GLUCOSE SERPL-MCNC: 114 MG/DL (ref 65–100)
HADV DNA SPEC QL NAA+PROBE: NOT DETECTED
HCOV 229E RNA SPEC QL NAA+PROBE: NOT DETECTED
HCOV HKU1 RNA SPEC QL NAA+PROBE: NOT DETECTED
HCOV NL63 RNA SPEC QL NAA+PROBE: NOT DETECTED
HCOV OC43 RNA SPEC QL NAA+PROBE: NOT DETECTED
HCT VFR BLD AUTO: 33.2 % (ref 36.6–50.3)
HGB BLD-MCNC: 11.1 G/DL (ref 12.1–17)
HMPV RNA SPEC QL NAA+PROBE: NOT DETECTED
HPIV1 RNA SPEC QL NAA+PROBE: NOT DETECTED
HPIV2 RNA SPEC QL NAA+PROBE: NOT DETECTED
HPIV3 RNA SPEC QL NAA+PROBE: NOT DETECTED
HPIV4 RNA SPEC QL NAA+PROBE: NOT DETECTED
IMM GRANULOCYTES # BLD AUTO: 0.1 K/UL (ref 0–0.04)
IMM GRANULOCYTES NFR BLD AUTO: 1 % (ref 0–0.5)
LYMPHOCYTES # BLD: 0.9 K/UL (ref 0.8–3.5)
LYMPHOCYTES NFR BLD: 6 % (ref 12–49)
M PNEUMO DNA SPEC QL NAA+PROBE: NOT DETECTED
MAGNESIUM SERPL-MCNC: 1.9 MG/DL (ref 1.6–2.4)
MCH RBC QN AUTO: 28.8 PG (ref 26–34)
MCHC RBC AUTO-ENTMCNC: 33.4 G/DL (ref 30–36.5)
MCV RBC AUTO: 86 FL (ref 80–99)
MONOCYTES # BLD: 1.4 K/UL (ref 0–1)
MONOCYTES NFR BLD: 9 % (ref 5–13)
NEUTS SEG # BLD: 13.4 K/UL (ref 1.8–8)
NEUTS SEG NFR BLD: 84 % (ref 32–75)
NRBC # BLD: 0 K/UL (ref 0–0.01)
NRBC BLD-RTO: 0 PER 100 WBC
PLATELET # BLD AUTO: 127 K/UL (ref 150–400)
PMV BLD AUTO: 10.2 FL (ref 8.9–12.9)
POTASSIUM SERPL-SCNC: 3.4 MMOL/L (ref 3.5–5.1)
PROCALCITONIN SERPL-MCNC: 0.98 NG/ML
PROT SERPL-MCNC: 7 G/DL (ref 6.4–8.2)
Q-T INTERVAL, ECG07: 268 MS
QRS DURATION, ECG06: 90 MS
QTC CALCULATION (BEZET), ECG08: 389 MS
RBC # BLD AUTO: 3.86 M/UL (ref 4.1–5.7)
RSV RNA SPEC QL NAA+PROBE: NOT DETECTED
RV+EV RNA SPEC QL NAA+PROBE: NOT DETECTED
SARS-COV-2 PCR, COVPCR: NOT DETECTED
SODIUM SERPL-SCNC: 134 MMOL/L (ref 136–145)
TROPONIN-HIGH SENSITIVITY: 20 NG/L (ref 0–76)
VENTRICULAR RATE, ECG03: 127 BPM
WBC # BLD AUTO: 15.9 K/UL (ref 4.1–11.1)

## 2021-12-06 PROCEDURE — 74011250636 HC RX REV CODE- 250/636: Performed by: HOSPITALIST

## 2021-12-06 PROCEDURE — 74011000258 HC RX REV CODE- 258: Performed by: HOSPITALIST

## 2021-12-06 PROCEDURE — 65660000001 HC RM ICU INTERMED STEPDOWN

## 2021-12-06 PROCEDURE — C8929 TTE W OR WO FOL WCON,DOPPLER: HCPCS

## 2021-12-06 PROCEDURE — 74011250637 HC RX REV CODE- 250/637: Performed by: GENERAL ACUTE CARE HOSPITAL

## 2021-12-06 PROCEDURE — 85025 COMPLETE CBC W/AUTO DIFF WBC: CPT

## 2021-12-06 PROCEDURE — 93970 EXTREMITY STUDY: CPT

## 2021-12-06 PROCEDURE — 74011250636 HC RX REV CODE- 250/636: Performed by: INTERNAL MEDICINE

## 2021-12-06 PROCEDURE — 84484 ASSAY OF TROPONIN QUANT: CPT

## 2021-12-06 PROCEDURE — 74011250636 HC RX REV CODE- 250/636: Performed by: GENERAL ACUTE CARE HOSPITAL

## 2021-12-06 PROCEDURE — 74011636637 HC RX REV CODE- 636/637: Performed by: HOSPITALIST

## 2021-12-06 PROCEDURE — 74011000250 HC RX REV CODE- 250: Performed by: HOSPITALIST

## 2021-12-06 PROCEDURE — 74011250637 HC RX REV CODE- 250/637: Performed by: HOSPITALIST

## 2021-12-06 PROCEDURE — 80053 COMPREHEN METABOLIC PANEL: CPT

## 2021-12-06 PROCEDURE — 84145 PROCALCITONIN (PCT): CPT

## 2021-12-06 PROCEDURE — 74011000258 HC RX REV CODE- 258: Performed by: INTERNAL MEDICINE

## 2021-12-06 PROCEDURE — 71250 CT THORAX DX C-: CPT

## 2021-12-06 PROCEDURE — 74011250637 HC RX REV CODE- 250/637: Performed by: INTERNAL MEDICINE

## 2021-12-06 PROCEDURE — 83735 ASSAY OF MAGNESIUM: CPT

## 2021-12-06 PROCEDURE — 36415 COLL VENOUS BLD VENIPUNCTURE: CPT

## 2021-12-06 RX ORDER — METOPROLOL TARTRATE 5 MG/5ML
5 INJECTION INTRAVENOUS ONCE
Status: COMPLETED | OUTPATIENT
Start: 2021-12-06 | End: 2021-12-06

## 2021-12-06 RX ORDER — IPRATROPIUM BROMIDE AND ALBUTEROL SULFATE 2.5; .5 MG/3ML; MG/3ML
3 SOLUTION RESPIRATORY (INHALATION)
Status: DISCONTINUED | OUTPATIENT
Start: 2021-12-06 | End: 2021-12-14 | Stop reason: HOSPADM

## 2021-12-06 RX ORDER — FINASTERIDE 5 MG/1
5 TABLET, FILM COATED ORAL
Status: DISCONTINUED | OUTPATIENT
Start: 2021-12-06 | End: 2021-12-14 | Stop reason: HOSPADM

## 2021-12-06 RX ORDER — DIGOXIN 125 MCG
0.12 TABLET ORAL DAILY
Status: DISCONTINUED | OUTPATIENT
Start: 2021-12-07 | End: 2021-12-14 | Stop reason: HOSPADM

## 2021-12-06 RX ORDER — POTASSIUM CHLORIDE 750 MG/1
40 TABLET, FILM COATED, EXTENDED RELEASE ORAL
Status: COMPLETED | OUTPATIENT
Start: 2021-12-06 | End: 2021-12-06

## 2021-12-06 RX ORDER — DIGOXIN 0.25 MG/ML
250 INJECTION INTRAMUSCULAR; INTRAVENOUS
Status: COMPLETED | OUTPATIENT
Start: 2021-12-06 | End: 2021-12-06

## 2021-12-06 RX ORDER — AMIODARONE HYDROCHLORIDE 200 MG/1
200 TABLET ORAL DAILY
Status: DISCONTINUED | OUTPATIENT
Start: 2021-12-07 | End: 2021-12-14 | Stop reason: HOSPADM

## 2021-12-06 RX ORDER — METOPROLOL TARTRATE 50 MG/1
50 TABLET ORAL EVERY 12 HOURS
Status: DISCONTINUED | OUTPATIENT
Start: 2021-12-07 | End: 2021-12-06

## 2021-12-06 RX ORDER — METOPROLOL TARTRATE 25 MG/1
25 TABLET, FILM COATED ORAL 2 TIMES DAILY
Status: DISCONTINUED | OUTPATIENT
Start: 2021-12-06 | End: 2021-12-14 | Stop reason: HOSPADM

## 2021-12-06 RX ADMIN — POTASSIUM CHLORIDE 40 MEQ: 750 TABLET, FILM COATED, EXTENDED RELEASE ORAL at 13:12

## 2021-12-06 RX ADMIN — DIGOXIN 250 MCG: 0.25 INJECTION INTRAMUSCULAR; INTRAVENOUS at 16:58

## 2021-12-06 RX ADMIN — SODIUM CHLORIDE 15 MG/HR: 900 INJECTION, SOLUTION INTRAVENOUS at 13:13

## 2021-12-06 RX ADMIN — FUROSEMIDE 20 MG: 40 TABLET ORAL at 08:14

## 2021-12-06 RX ADMIN — Medication 10 ML: at 22:21

## 2021-12-06 RX ADMIN — APIXABAN 5 MG: 5 TABLET, FILM COATED ORAL at 17:00

## 2021-12-06 RX ADMIN — Medication 10 ML: at 17:01

## 2021-12-06 RX ADMIN — SODIUM CHLORIDE 15 MG/HR: 900 INJECTION, SOLUTION INTRAVENOUS at 05:32

## 2021-12-06 RX ADMIN — SODIUM CHLORIDE 17.5 MG/HR: 900 INJECTION, SOLUTION INTRAVENOUS at 18:42

## 2021-12-06 RX ADMIN — ASPIRIN 81 MG: 81 TABLET, COATED ORAL at 08:14

## 2021-12-06 RX ADMIN — GUAIFENESIN 600 MG: 600 TABLET, EXTENDED RELEASE ORAL at 22:20

## 2021-12-06 RX ADMIN — GUAIFENESIN 600 MG: 600 TABLET, EXTENDED RELEASE ORAL at 08:15

## 2021-12-06 RX ADMIN — ACETAMINOPHEN 650 MG: 325 TABLET ORAL at 01:20

## 2021-12-06 RX ADMIN — SODIUM CHLORIDE 20 MG/HR: 900 INJECTION, SOLUTION INTRAVENOUS at 22:28

## 2021-12-06 RX ADMIN — SODIUM CHLORIDE 15 MG/HR: 900 INJECTION, SOLUTION INTRAVENOUS at 01:14

## 2021-12-06 RX ADMIN — METOPROLOL TARTRATE 5 MG: 5 INJECTION INTRAVENOUS at 01:09

## 2021-12-06 RX ADMIN — PREDNISONE 40 MG: 20 TABLET ORAL at 08:16

## 2021-12-06 RX ADMIN — TAMSULOSIN HYDROCHLORIDE 0.4 MG: 0.4 CAPSULE ORAL at 08:16

## 2021-12-06 RX ADMIN — APIXABAN 5 MG: 5 TABLET, FILM COATED ORAL at 08:16

## 2021-12-06 RX ADMIN — ROSUVASTATIN CALCIUM 40 MG: 40 TABLET, FILM COATED ORAL at 22:19

## 2021-12-06 RX ADMIN — PERFLUTREN 2 ML: 6.52 INJECTION, SUSPENSION INTRAVENOUS at 09:22

## 2021-12-06 RX ADMIN — PANTOPRAZOLE SODIUM 40 MG: 40 TABLET, DELAYED RELEASE ORAL at 08:15

## 2021-12-06 RX ADMIN — FUROSEMIDE 20 MG: 10 INJECTION, SOLUTION INTRAMUSCULAR; INTRAVENOUS at 01:10

## 2021-12-06 RX ADMIN — FINASTERIDE 5 MG: 5 TABLET, FILM COATED ORAL at 22:20

## 2021-12-06 RX ADMIN — METOPROLOL TARTRATE 25 MG: 25 TABLET, FILM COATED ORAL at 17:00

## 2021-12-06 RX ADMIN — GUAIFENESIN 600 MG: 600 TABLET, EXTENDED RELEASE ORAL at 01:10

## 2021-12-06 NOTE — CONSULTS
ORTHOPAEDIC CONSULT NOTE    Subjective:     Date of Consultation:  December 6, 2021      Andrew Fraser is a 65 y.o. male who is being seen for right ankle pain. Pt reports injury occurred  11 days ago after twisting his ankle while walking on the treadmill. He states it has been swollen and slightly warm since. Workup has revealed no acute injury of the right ankle on radiographs. Ambulates at baseline unassisted. Pt. Denies head trauma/LOC during injury. Our services were consulted for continued pain and swelling. Patient being admitted for Hypoxia/SOB/sepsis    Patient Active Problem List    Diagnosis Date Noted    Congestive heart failure ([address] 12/05/2021    Atrial flutter ([address] 12/05/2021    Febrile 12/05/2021    CHF exacerbation ([address] 12/05/2021    Elevated brain natriuretic peptide (BNP) level 12/05/2021    Respiratory failure, acute-on-chronic ([address] 12/05/2021    Atrial fibrillation ([address] 09/07/2021    Atrial flutter with rapid ventricular response ([address] 07/02/2021    Acute respiratory failure with hypoxia ([address] 03/04/2021    Hypertension 02/12/2018    Arrhythmia 02/12/2018    Obesity (BMI 30.0-34.9) 02/12/2018     Family History   Problem Relation Age of Onset    Hypertension Mother     Heart Disease Mother     Hypertension Father     Diabetes Father       Social History     Tobacco Use    Smoking status: Former Smoker     Years: 5.00    Smokeless tobacco: Never Used   Substance Use Topics    Alcohol use:  Yes     Alcohol/week: 7.0 standard drinks     Types: 7 Glasses of wine per week     Past Medical History:   Diagnosis Date    Arrhythmia     SVT; Dr. Skye Conn Fall 2018    Chronic obstructive pulmonary disease (HCC)     mild    GERD (gastroesophageal reflux disease)     Hypertension     Thyroid disease     benign thyroid growth, checked annually by Dr. Ketan Washington      Past Surgical History:   Procedure Laterality Date    COLONOSCOPY N/A 2/5/2019    COLONOSCOPY performed by Suzanne Mayen MD at [facility] ENDOSCOPY    COLONOSCOPY,MAYELA RODRIGUES,SNARE  2/5/2019         HX HEENT Bilateral     cataract extraction    [place] COLON CA SCRN NOT  [address]  2/5/2019           Prior to Admission medications    Medication Sig Start Date End Date Taking? Authorizing Provider   amiodarone (PACERONE) 400 mg tablet Take 1 Tablet by mouth. 7/19/21  Yes Provider, Historical   dilTIAZem ER (TIAZAC) 240 mg capsule Take 240 mg by mouth daily. Indications: paroxysmal supraventricular tachycardia    Provider, Historical   finasteride (PROSCAR) 5 mg tablet Take 5 mg by mouth daily. Provider, Historical   vit A/vit C/vit E/zinc/copper (PRESERVISION AREDS PO) Take 1 Caplet by mouth two (2) times a day. Provider, Historical   ascorbic acid, vitamin C, (Vitamin C) 500 mg tablet Take 500 mg by mouth daily. Provider, Historical   cyanocobalamin (Vitamin B-12) 1,000 mcg tablet Take 1,000 mcg by mouth daily. Provider, Historical   ibuprofen (MOTRIN) 200 mg tablet Take 400 mg by mouth every eight (8) hours as needed for Pain. Provider, Historical   fluticasone-umeclidinium-vilanterol (Trelegy Ellipta) 100-62.5-25 mcg inhaler Take 1 Puff by inhalation daily. Provider, Historical   pantoprazole (Protonix) 40 mg tablet Take 40 mg by mouth daily. Provider, Historical   metoprolol succinate (Toprol XL) 25 mg XL tablet Take 25 mg by mouth daily. Provider, Historical   furosemide (LASIX) 20 mg tablet 1 tab po daily 3/10/21   Janalee Goltz, MD   apixaban (ELIQUIS) 5 mg tablet Take 1 Tab by mouth two (2) times a day. 3/9/21   Janalee Goltz, MD   rosuvastatin (Crestor) 40 mg tablet Take 40 mg by mouth nightly. Other, MD Elaina   tamsulosin (FLOMAX) 0.4 mg capsule Take 0.4 mg by mouth daily. Provider, Historical   aspirin delayed-release 81 mg tablet Take 81 mg by mouth daily. Provider, Historical   melatonin 3 mg tablet Take 3 mg by mouth nightly.     Provider, Historical   albuterol (VENTOLIN HFA) 90 mcg/actuation inhaler Take 1 Puff by inhalation every four (4) hours as needed. Provider, Historical   gemfibrozil (LOPID) 600 mg tablet Take 600 mg by mouth two (2) times a day. Provider, Historical   cholecalciferol (VITAMIN D3) 1,000 unit cap Take 1,000 Units by mouth daily. Provider, Historical   MULTIVIT-MIN/FA/LYCOPEN/LUTEIN (CENTRUM SILVER ULTRA MEN'S PO) Take 1 Tab by mouth daily. Provider, Historical   Omega-3-DHA-EPA-Fish Oil 1,000 mg (120 mg-180 mg) cap Take 1 Cap by mouth daily.     Provider, Historical     Current Facility-Administered Medications   Medication Dose Route Frequency    [START ON 12/7/2021] cefTRIAXone (ROCEPHIN) 1 g in 0.9% sodium chloride (MBP/ADV) 50 mL MBP  1 g IntraVENous Q24H    finasteride (PROSCAR) tablet 5 mg  5 mg Oral QHS    sodium chloride (NS) flush 5-10 mL  5-10 mL IntraVENous PRN    dilTIAZem (CARDIZEM) 100 mg in 0.9% sodium chloride (MBP/ADV) 100 mL infusion  0-15 mg/hr IntraVENous TITRATE    arformoterol 15 mcg/budesonide 0.25 mg neb solution   Nebulization BID RT    albuterol-ipratropium (DUO-NEB) 2.5 MG-0.5 MG/3 ML  3 mL Nebulization QID RT    predniSONE (DELTASONE) tablet 40 mg  40 mg Oral DAILY WITH BREAKFAST    apixaban (ELIQUIS) tablet 5 mg  5 mg Oral BID    aspirin delayed-release tablet 81 mg  81 mg Oral DAILY    [Held by provider] dilTIAZem ER (TIAZAC) capsule 240 mg  240 mg Oral DAILY    furosemide (LASIX) tablet 20 mg  20 mg Oral DAILY    [Held by provider] metoprolol succinate (TOPROL-XL) XL tablet 25 mg  25 mg Oral DAILY    pantoprazole (PROTONIX) tablet 40 mg  40 mg Oral DAILY    rosuvastatin (CRESTOR) tablet 40 mg  40 mg Oral QHS    tamsulosin (FLOMAX) capsule 0.4 mg  0.4 mg Oral DAILY    guaiFENesin ER (MUCINEX) tablet 600 mg  600 mg Oral Q12H    sodium chloride (NS) flush 5-40 mL  5-40 mL IntraVENous Q8H    sodium chloride (NS) flush 5-40 mL  5-40 mL IntraVENous PRN    acetaminophen (TYLENOL) tablet 650 mg  650 mg Oral Q6H PRN    Or    acetaminophen (TYLENOL) suppository 650 mg  650 mg Rectal Q6H PRN    polyethylene glycol (MIRALAX) packet 17 g  17 g Oral DAILY PRN    ondansetron (ZOFRAN ODT) tablet 4 mg  4 mg Oral Q8H PRN    Or    ondansetron (ZOFRAN) injection 4 mg  4 mg IntraVENous Q6H PRN     Current Outpatient Medications   Medication Sig    amiodarone (PACERONE) 400 mg tablet Take 1 Tablet by mouth.  dilTIAZem ER (TIAZAC) 240 mg capsule Take 240 mg by mouth daily. Indications: paroxysmal supraventricular tachycardia    finasteride (PROSCAR) 5 mg tablet Take 5 mg by mouth daily.  vit A/vit C/vit E/zinc/copper (PRESERVISION AREDS PO) Take 1 Caplet by mouth two (2) times a day.  ascorbic acid, vitamin C, (Vitamin C) 500 mg tablet Take 500 mg by mouth daily.  cyanocobalamin (Vitamin B-12) 1,000 mcg tablet Take 1,000 mcg by mouth daily.  ibuprofen (MOTRIN) 200 mg tablet Take 400 mg by mouth every eight (8) hours as needed for Pain.  fluticasone-umeclidinium-vilanterol (Trelegy Ellipta) 100-62.5-25 mcg inhaler Take 1 Puff by inhalation daily.  pantoprazole (Protonix) 40 mg tablet Take 40 mg by mouth daily.  metoprolol succinate (Toprol XL) 25 mg XL tablet Take 25 mg by mouth daily.  furosemide (LASIX) 20 mg tablet 1 tab po daily    apixaban (ELIQUIS) 5 mg tablet Take 1 Tab by mouth two (2) times a day.  rosuvastatin (Crestor) 40 mg tablet Take 40 mg by mouth nightly.  tamsulosin (FLOMAX) 0.4 mg capsule Take 0.4 mg by mouth daily.  aspirin delayed-release 81 mg tablet Take 81 mg by mouth daily.  melatonin 3 mg tablet Take 3 mg by mouth nightly.  albuterol (VENTOLIN HFA) 90 mcg/actuation inhaler Take 1 Puff by inhalation every four (4) hours as needed.  gemfibrozil (LOPID) 600 mg tablet Take 600 mg by mouth two (2) times a day.  cholecalciferol (VITAMIN D3) 1,000 unit cap Take 1,000 Units by mouth daily.     MULTIVIT-MIN/FA/LYCOPEN/LUTEIN (CENTRUM SILVER ULTRA MEN'S PO) Take 1 Tab by mouth daily.  Omega-3-DHA-EPA-Fish Oil 1,000 mg (120 mg-180 mg) cap Take 1 Cap by mouth daily. No Known Allergies     Review of Systems:  A comprehensive review of systems was negative except for that written in the HPI. Mental Status: no dementia    Objective:     Patient Vitals for the past 8 hrs:   BP Temp Pulse Resp SpO2 Height Weight   21 0927      5' 11\" (1.803 m) 122.5 kg (270 lb 1 oz)   21 0746 128/64 98.9 °F (37.2 °C) (!) 125 24 94 %     21 0609 116/65 98.6 °F (37 °C) (!) 124 23 96 %       Temp (24hrs), Av °F (37.8 °C), Min:98.6 °F (37 °C), Max:102.5 °F (39.2 °C)      Gen: Well-developed,  in no acute distress; resting in bed   HEENT: Pink conjunctivae, hearing intact to voice, moist mucous membranes   Neck: Supple  Resp: No respiratory distress   Card:  palpable distal pulse-equal bilaterally, brisk cap refill all distal digits   Abd: non-distended  Musc: Inspection of the right ankle reveals mild swelling medially. Palpation illicits pain over the deltoid ligaments. Strength 5/5 intact. Minimal pain with passive ROM. Active ROM intact with slight limitation to full dorsiflexion. Skin: No skin breakdown noted. Skin minimal warmth compared to left. Neuro: Cranial nerves are grossly intact, no focal motor weakness, follows commands appropriately   Psych: Good insight, oriented to person, place and time, alert    Imaging Review: EXAM: XR ANKLE RT MIN 3 V     INDICATION: Trauma.     COMPARISON: None.     FINDINGS: Three views of the right ankle demonstrate no fracture or disruption  of the ankle mortise. There is no other acute osseous or articular abnormality. There are vascular calcifications.     IMPRESSION  No acute abnormality. Vascular calcifications.     Labs:   Recent Results (from the past 24 hour(s))   CULTURE, BLOOD, PAIRED    Collection Time: 21  6:45 PM    Specimen: Blood   Result Value Ref Range Special Requests: NO SPECIAL REQUESTS      Culture result: (A)       GRAM POSITIVE COCCI IN CLUSTERS GROWING IN ALL 4 BOTTLES DRAWN (SITES = LAC 1859 AND 2012)    Culture result:       (NOTE) GPC IN CLUSTERS GROWING IN 4 OF 4 BOTTLES CALLED TO BRO MUELLER,AT 1227 ON 12/6/21. RF   EKG, 12 LEAD, INITIAL    Collection Time: 12/05/21  6:46 PM   Result Value Ref Range    Ventricular Rate 127 BPM    Atrial Rate 254 BPM    QRS Duration 90 ms    Q-T Interval 268 ms    QTC Calculation (Bezet) 389 ms    Calculated P Axis -76 degrees    Calculated R Axis -2 degrees    Calculated T Axis 81 degrees    Diagnosis       Atrial flutter with 2:1 AV conduction  Nonspecific ST and T wave abnormality  When compared with ECG of 03-JUL-2021 20:56,  Atrial flutter has replaced Sinus rhythm  ST now depressed in Inferior leads  Nonspecific T wave abnormality now evident in Lateral leads  Confirmed by Aldo Vyas P.NINA ([id number]) on 12/6/2021 9:47:53 AM     CBC WITH AUTOMATED DIFF    Collection Time: 12/05/21  6:52 PM   Result Value Ref Range    WBC 16.1 (H) 4.1 - 11.1 K/uL    RBC 4.24 4.10 - 5.70 M/uL    HGB 12.2 12.1 - 17.0 g/dL    HCT 37.1 36.6 - 50.3 %    MCV 87.5 80.0 - 99.0 FL    MCH 28.8 26.0 - 34.0 PG    MCHC 32.9 30.0 - 36.5 g/dL    RDW 14.3 11.5 - 14.5 %    PLATELET 518 (L) 469 - 400 K/uL    MPV 10.6 8.9 - 12.9 FL    NRBC 0.0 0  WBC    ABSOLUTE NRBC 0.00 0.00 - 0.01 K/uL    NEUTROPHILS 86 (H) 32 - 75 %    LYMPHOCYTES 4 (L) 12 - 49 %    MONOCYTES 8 5 - 13 %    EOSINOPHILS 0 0 - 7 %    BASOPHILS 0 0 - 1 %    IMMATURE GRANULOCYTES 2 (H) 0.0 - 0.5 %    ABS. NEUTROPHILS 13.9 (H) 1.8 - 8.0 K/UL    ABS. LYMPHOCYTES 0.6 (L) 0.8 - 3.5 K/UL    ABS. MONOCYTES 1.3 (H) 0.0 - 1.0 K/UL    ABS. EOSINOPHILS 0.0 0.0 - 0.4 K/UL    ABS. BASOPHILS 0.0 0.0 - 0.1 K/UL    ABS. IMM.  GRANS. 0.3 (H) 0.00 - 0.04 K/UL    DF SMEAR SCANNED      PLATELET COMMENTS CLUMPED PLATELETS      RBC COMMENTS NORMOCYTIC, NORMOCHROMIC     METABOLIC PANEL, COMPREHENSIVE Collection Time: 12/05/21  6:52 PM   Result Value Ref Range    Sodium 130 (L) 136 - 145 mmol/L    Potassium 4.5 3.5 - 5.1 mmol/L    Chloride 95 (L) 97 - 108 mmol/L    CO2 28 21 - 32 mmol/L    Anion gap 7 5 - 15 mmol/L    Glucose 146 (H) 65 - 100 mg/dL    BUN 18 6 - 20 MG/DL    Creatinine 1.45 (H) 0.70 - 1.30 MG/DL    BUN/Creatinine ratio 12 12 - 20      GFR est AA 58 (L) >60 ml/min/1.73m2    GFR est non-AA 48 (L) >60 ml/min/1.73m2    Calcium 9.1 8.5 - 10.1 MG/DL    Bilirubin, total 0.6 0.2 - 1.0 MG/DL    ALT (SGPT) 53 12 - 78 U/L    AST (SGOT) 29 15 - 37 U/L    Alk. phosphatase 60 45 - 117 U/L    Protein, total 7.9 6.4 - 8.2 g/dL    Albumin 3.3 (L) 3.5 - 5.0 g/dL    Globulin 4.6 (H) 2.0 - 4.0 g/dL    A-G Ratio 0.7 (L) 1.1 - 2.2     TROPONIN-HIGH SENSITIVITY    Collection Time: 12/05/21  6:52 PM   Result Value Ref Range    Troponin-High Sensitivity 15 0 - 76 ng/L   NT-PRO BNP    Collection Time: 12/05/21  6:52 PM   Result Value Ref Range    NT pro-BNP 5,004 (H) <125 PG/ML   POC LACTIC ACID    Collection Time: 12/05/21  6:59 PM   Result Value Ref Range    Lactic Acid (POC) 2.67 (HH) 0.40 - 2.00 mmol/L   SAMPLES BEING HELD    Collection Time: 12/05/21  7:17 PM   Result Value Ref Range    SAMPLES BEING HELD RED,PST,BLUE     COMMENT        Add-on orders for these samples will be processed based on acceptable specimen integrity and analyte stability, which may vary by analyte.    COVID-19 RAPID TEST    Collection Time: 12/05/21  8:12 PM   Result Value Ref Range    Specimen source Nasopharyngeal      COVID-19 rapid test Not detected NOTD     URINALYSIS W/ REFLEX CULTURE    Collection Time: 12/05/21  9:04 PM    Specimen: Urine   Result Value Ref Range    Color YELLOW/STRAW      Appearance CLOUDY (A) CLEAR      Specific gravity 1.015 1.003 - 1.030      pH (UA) 6.0 5.0 - 8.0      Protein 100 (A) NEG mg/dL    Glucose Negative NEG mg/dL    Ketone Negative NEG mg/dL    Bilirubin Negative NEG      Blood SMALL (A) NEG Urobilinogen 0.2 0.2 - 1.0 EU/dL    Nitrites Negative NEG      Leukocyte Esterase Negative NEG      WBC 0-4 0 - 4 /hpf    RBC 0-5 0 - 5 /hpf    Epithelial cells FEW FEW /lpf    Bacteria Negative NEG /hpf    UA:UC IF INDICATED CULTURE NOT INDICATED BY UA RESULT CNI     BLOOD GAS,CHEM8,LACTIC ACID POC    Collection Time: 12/05/21 10:11 PM   Result Value Ref Range    Calcium, ionized (POC) 1.10 (L) 1.12 - 1.32 mmol/L    BICARBONATE 27 mmol/L    Base excess (POC) 1.3 mmol/L    Sample source VENOUS BLOOD      CO2, POC 28 (H) 19 - 24 MMOL/L    Sodium,  (L) 136 - 145 MMOL/L    Potassium, POC 3.8 3.5 - 5.5 MMOL/L    Chloride, POC 95 (L) 100 - 108 MMOL/L    Glucose,  (H) 74 - 106 MG/DL    Creatinine, POC 1.1 0.6 - 1.3 MG/DL    Lactic Acid (POC) 1.16 0.40 - 2.00 mmol/L    pH, venous (POC) 7.37 7.32 - 7.42      pCO2, venous (POC) 47.0 41 - 51 MMHG    pO2, venous (POC) 21 (L) 25 - 40 mmHg   RESPIRATORY VIRUS PANEL W/COVID-19, PCR    Collection Time: 12/05/21 10:45 PM    Specimen: Nasopharyngeal   Result Value Ref Range    Adenovirus Not detected NOTD      Coronavirus 229E Not detected NOTD      Coronavirus HKU1 Not detected NOTD      Coronavirus CVNL63 Not detected NOTD      Coronavirus OC43 Not detected NOTD      SARS-CoV-2, PCR Not detected NOTD      Metapneumovirus Not detected NOTD      Rhinovirus and Enterovirus Not detected NOTD      Influenza A Not detected NOTD      Influenza A, subtype H1 Not detected NOTD      Influenza A, subtype H3 Not detected NOTD      INFLUENZA A H1N1 PCR Not detected NOTD      Influenza B Not detected NOTD      Parainfluenza 1 Not detected NOTD      Parainfluenza 2 Not detected NOTD      Parainfluenza 3 Not detected NOTD      Parainfluenza virus 4 Not detected NOTD      RSV by PCR Not detected NOTD      B. parapertussis, PCR Not detected NOTD      Bordetella pertussis - PCR Not detected NOTD      Chlamydophila pneumoniae DNA, QL, PCR Not detected NOTD      Mycoplasma pneumoniae DNA, QL, PCR Not detected NOTD     D DIMER    Collection Time: 12/05/21 10:45 PM   Result Value Ref Range    D-dimer 0.85 (H) 0.00 - 0.65 mg/L FEU   TROPONIN-HIGH SENSITIVITY    Collection Time: 12/06/21  1:08 AM   Result Value Ref Range    Troponin-High Sensitivity 20 0 - 76 ng/L   METABOLIC PANEL, COMPREHENSIVE    Collection Time: 12/06/21  2:29 AM   Result Value Ref Range    Sodium 134 (L) 136 - 145 mmol/L    Potassium 3.4 (L) 3.5 - 5.1 mmol/L    Chloride 99 97 - 108 mmol/L    CO2 26 21 - 32 mmol/L    Anion gap 9 5 - 15 mmol/L    Glucose 114 (H) 65 - 100 mg/dL    BUN 16 6 - 20 MG/DL    Creatinine 1.19 0.70 - 1.30 MG/DL    BUN/Creatinine ratio 13 12 - 20      GFR est AA >60 >60 ml/min/1.73m2    GFR est non-AA >60 >60 ml/min/1.73m2    Calcium 8.4 (L) 8.5 - 10.1 MG/DL    Bilirubin, total 1.0 0.2 - 1.0 MG/DL    ALT (SGPT) 44 12 - 78 U/L    AST (SGOT) 32 15 - 37 U/L    Alk. phosphatase 57 45 - 117 U/L    Protein, total 7.0 6.4 - 8.2 g/dL    Albumin 2.7 (L) 3.5 - 5.0 g/dL    Globulin 4.3 (H) 2.0 - 4.0 g/dL    A-G Ratio 0.6 (L) 1.1 - 2.2     MAGNESIUM    Collection Time: 12/06/21  2:29 AM   Result Value Ref Range    Magnesium 1.9 1.6 - 2.4 mg/dL   CBC WITH AUTOMATED DIFF    Collection Time: 12/06/21  2:29 AM   Result Value Ref Range    WBC 15.9 (H) 4.1 - 11.1 K/uL    RBC 3.86 (L) 4.10 - 5.70 M/uL    HGB 11.1 (L) 12.1 - 17.0 g/dL    HCT 33.2 (L) 36.6 - 50.3 %    MCV 86.0 80.0 - 99.0 FL    MCH 28.8 26.0 - 34.0 PG    MCHC 33.4 30.0 - 36.5 g/dL    RDW 14.1 11.5 - 14.5 %    PLATELET 444 (L) 489 - 400 K/uL    MPV 10.2 8.9 - 12.9 FL    NRBC 0.0 0  WBC    ABSOLUTE NRBC 0.00 0.00 - 0.01 K/uL    NEUTROPHILS 84 (H) 32 - 75 %    LYMPHOCYTES 6 (L) 12 - 49 %    MONOCYTES 9 5 - 13 %    EOSINOPHILS 0 0 - 7 %    BASOPHILS 0 0 - 1 %    IMMATURE GRANULOCYTES 1 (H) 0.0 - 0.5 %    ABS. NEUTROPHILS 13.4 (H) 1.8 - 8.0 K/UL    ABS. LYMPHOCYTES 0.9 0.8 - 3.5 K/UL    ABS. MONOCYTES 1.4 (H) 0.0 - 1.0 K/UL    ABS.  EOSINOPHILS 0.0 0.0 - 0.4 K/UL    ABS. BASOPHILS 0.0 0.0 - 0.1 K/UL    ABS. IMM.  GRANS. 0.1 (H) 0.00 - 0.04 K/UL    DF AUTOMATED     PROCALCITONIN    Collection Time: 12/06/21  2:29 AM   Result Value Ref Range    Procalcitonin 0.98 ng/mL   ECHO ADULT COMPLETE    Collection Time: 12/06/21  9:17 AM   Result Value Ref Range    IVSd 1.62 (A) 0.6 - 1.0 cm    IVSs 2.04 cm    LVIDd 4.69 4.2 - 5.9 cm    LVIDs 3.77 cm    LVOT d 2.27 cm    LVPWd 1.51 (A) 0.6 - 1.0 cm    LVPWs 2.05 cm    LVOT Peak Gradient 4.64 mmHg    LVOT Peak Velocity 107.74 cm/s    RVIDd 3.77 cm    Left Atrium Major Axis 3.15 cm    LA Area 4C 21.93 cm2    LA Vol 4C 55.16 18 - 58 mL    Left Atrium to Aortic Root Ratio 1.04     Left Atrium to Aortic Root Ratio 1.04     AV Cusp 1.99 cm    Aortic Valve Area by Continuity of Peak Velocity 3.80 cm2    AoV PG 5.27 mmHg    Aortic Valve Systolic Peak Velocity 744.96 cm/s    Mitral Valve E Wave Deceleration Time 77.26 ms    MV E Hasmukh 83.62 cm/s    Mitral Valve Pressure Half-time 48.38 ms    MVA (PHT) 4.55 cm2    Pulmonic Valve Systolic Peak Instantaneous Gradient 5.04 mmHg    Pulmonic Valve Max Velocity 112.24 cm/s    Ao Root D 4.04 cm    LV Mass .6 88 - 224 g    LV Mass AL Index 130.3 49 - 115 g/m2    Left Atrium Minor Axis 1.31 cm    LA Vol Index 22.98 16 - 28 ml/m2    LISA/BSA Pk Hasmukh 1.6 cm2/m2         Impression:     Patient Active Problem List    Diagnosis Date Noted    Congestive heart failure (HCC) 12/05/2021    Atrial flutter ([facility] 12/05/2021    Febrile 12/05/2021    CHF exacerbation (HCC) 12/05/2021    Elevated brain natriuretic peptide (BNP) level 12/05/2021    Respiratory failure, acute-on-chronic (HCC) 12/05/2021    Atrial fibrillation (HCC) 09/07/2021    Atrial flutter with rapid ventricular response ([address] 07/02/2021    [facility] failure with hypoxia [facility] 03/04/2021    Hypertension 02/12/2018    Arrhythmia 02/12/2018    Obesity (BMI 30.0-34.9) 02/12/2018     Active Problems:    Atrial flutter ([facility] (12/5/2021)      Febrile (12/5/2021)      CHF exacerbation ([facility] (12/5/2021)      Elevated brain natriuretic peptide (BNP) level (12/5/2021)      Respiratory failure, acute-on-chronic ([facility] (12/5/2021)        Plan:   Right ankle sprain S/P injury 11 days ago on treadmill    -  Admitted for Hypoxia/SOB/Sepsis  - Do NOT suspect this to be source of sepsis with minimal warmth and swelling on exam with passive ROM intact. - Has not attempted any treatment at home and has been walking on it which could contribute to these findings following his injury  - ASO for support when ambulating  - Ice and elevation for now  -WBAT RLE  - We will follow to make sure symptoms improve     -If improving, he can follow up outpatient with Dr Esequiel Giordano for this injury. Dr. Renuka Pinon aware and agrees with plan as above.         JOVANY Waddell  [employer]

## 2021-12-06 NOTE — PROGRESS NOTES
Hospitalist Progress Note    NAME: Danilo Galdamez   :  1950   MRN:  [id number]       Assessment / Plan:  Sepsis  Source unclear  Continue with empiric abx  Chest CT negative for infection  UA negative for infection    Acute on chronic respiratory failure - improved  COPD exacerbation  Pt saturating well on RA  Continue inhalers and PO Prednisone    Aflutter, rate uncontrolled  Cardiology evals on going  Echo  Cardioversion planned     Ankle pain s/p fall  Ortho evals on going, they believe the ankle is not the source of his infection    Hypertension  Dyslipidemia   BPH  --continue proscar, flomax     GERD  --continue PPI    30.0 - 39.9 Obese / Body mass index is 37.67 kg/m². Estimated discharge date: December 10  Barriers:    Code status: Full  Prophylaxis: Eliquis  Recommended Disposition: TBD     Subjective:     Chief Complaint / Reason for Physician Visit  Feeling better. Discussed with RN events overnight. Review of Systems:  Symptom Y/N Comments  Symptom Y/N Comments   Fever/Chills    Chest Pain     Poor Appetite    Edema     Cough    Abdominal Pain     Sputum    Joint Pain     SOB/BOWDEN    Pruritis/Rash     Nausea/vomit    Tolerating PT/OT     Diarrhea    Tolerating Diet     Constipation    Other       Could NOT obtain due to:      Objective:     VITALS:   Last 24hrs VS reviewed since prior progress note.  Most recent are:  Patient Vitals for the past 24 hrs:   Temp Pulse Resp BP SpO2   21 1309  (!) 127 27 (!) 127/99 93 %   21 1239  (!) 127 21 133/69 93 %   21 1209  (!) 129 20 111/65 91 %   21 1024  (!) 128 26 116/63 92 %   21 0909  (!) 125 22 125/70 93 %   21 0746 98.9 °F (37.2 °C) (!) 125 24 128/64 94 %   21 0609 98.6 °F (37 °C) (!) 124 23 116/65 96 %   21 0211  (!) 123 21 137/65 93 %   21 0141  (!) 117 25 (!) 143/75 94 %   21 0111  (!) 127 29 132/74 93 %   21 0110  (!) 127  (!) 146/82    21 0109  (!) 127  (!) 146/82    12/05/21 2347  (!) 127 26 (!) 145/72 (!) 89 %   12/05/21 2332  (!) 126 24 139/75 93 %   12/05/21 2317  (!) 127 28 (!) 149/72 91 %   12/05/21 2239  (!) 125  (!) 153/86    12/05/21 2230  (!) 125 29 (!) 153/86 92 %   12/05/21 2215  (!) 124 29 137/82 95 %   12/05/21 2200  (!) 126 22 (!) 171/82 93 %   12/05/21 2021     95 %   12/05/21 1839 (!) 102.5 °F (39.2 °C) (!) 127 28 133/72 98 %       Intake/Output Summary (Last 24 hours) at 12/6/2021 1533  Last data filed at 12/6/2021 8028  Gross per 24 hour   Intake 1400 ml   Output 600 ml   Net 800 ml        I had a face to face encounter and independently examined this patient on 12/6/2021, as outlined below:  PHYSICAL EXAM:  General: Alert, cooperative, NAD  EENT:  EOMI. Anicteric sclerae. MMM  Resp:  CTA bilaterally, no wheezing or rales. No accessory muscle use  CV:  Irregularly irregular  GI:  Soft, Non distended, Non tender. +Bowel sounds  Neurologic:  Alert and oriented X 3, normal speech,   Psych:   Good insight. Not anxious nor agitated  Skin:  No rashes. No jaundice    Reviewed most current lab test results and cultures  YES  Reviewed most current radiology test results   YES  Review and summation of old records today    NO  Reviewed patient's current orders and MAR    YES  PMH/SH reviewed - no change compared to H&P  ________________________________________________________________________  Care Plan discussed with:    Comments   Patient x    Family      RN x    Care Manager     Consultant                        Multidiciplinary team rounds were held today with , nursing, pharmacist and clinical coordinator. Patient's plan of care was discussed; medications were reviewed and discharge planning was addressed.      ________________________________________________________________________  Total NON critical care TIME:  35   Minutes    Total CRITICAL CARE TIME Spent:   Minutes non procedure based      Comments   >50% of visit spent in counseling and coordination of care     ________________________________________________________________________  Yadira Bridges MD     Procedures: see electronic medical records for all procedures/Xrays and details which were not copied into this note but were reviewed prior to creation of Plan. LABS:  I reviewed today's most current labs and imaging studies.   Pertinent labs include:  Recent Labs     12/06/21 0229 12/05/21  1852   WBC 15.9* 16.1*   HGB 11.1* 12.2   HCT 33.2* 37.1   * 148*     Recent Labs     12/06/21 0229 12/05/21  1852   * 130*   K 3.4* 4.5   CL 99 95*   CO2 26 28   * 146*   BUN 16 18   CREA 1.19 1.45*   CA 8.4* 9.1   MG 1.9  --    ALB 2.7* 3.3*   TBILI 1.0 0.6   ALT 44 53       Signed: Yadira Bridges MD

## 2021-12-06 NOTE — ED NOTES
1997: Bedside shift change report given to M. Raeford Castleman, RN (oncoming nurse) by Patrick Barr RN (offgoing nurse). Report included the following information SBAR, Kardex, ED Summary, Intake/Output, MAR and Recent Results. Patient repositioned in bed for comfort. SR x 2 . Call bell within reach. [employer] updated. Patient wearing 3L NC.    0830: ECHO at bedside    0903: Pulmonology at bedside. 0950: Pt given meal tray. Repositioned in bed for comfort. 1010: Cardiology at bedside. 1411: Patient's RLE extremity elevated, ice applied. 1512: Patient is being transferred to [address], Room # 2207. Report given to Sean Lugo RN on [facility] for routine progression of care. Report consisted of the following information SBAR, Kardex, ED Summary, Intake/Output, MAR and Recent Results. Patient transferred to receiving unit by: BRO Banda (RN or tech name). Outstanding consults needed: No     Next labs due: No     The following personal items will be sent with the patient during transfer to the floor:     All valuables:    Cardiac monitoring ordered: Yes    The following CURRENT information was reported to the receiving RN:    Code status: Full Code at time of transfer    Last set of vital signs:  Vital Signs  Level of Consciousness: Alert (0) (12/06/21 0746)  Temp: 98.9 °F (37.2 °C) (12/06/21 0746)  Temp Source: Oral (12/06/21 0746)  Pulse (Heart Rate): (!) 127 (12/06/21 1309)  Heart Rate Source: Monitor (12/06/21 0609)  Cardiac Rhythm: Sinus Tachy (12/06/21 0930)  Resp Rate: 27 (12/06/21 1309)  BP: (!) 127/99 (12/06/21 1309)  MAP (Monitor): 104 (12/06/21 1309)  MAP (Calculated): 108 (12/06/21 1309)  BP 1 Location: Right arm (12/06/21 0609)  BP 1 Method: Automatic (12/06/21 0609)  BP Patient Position: At rest (12/06/21 0609)  MEWS Score: 4 (12/06/21 0746)         Oxygen Therapy  O2 Sat (%): 93 % (12/06/21 1309)  Pulse via Oximetry: 127 beats per minute (12/06/21 1309)  O2 Device: Nasal cannula (12/06/21 0746)  O2 Flow Rate (L/min): 3 l/min (12/06/21 0746)      Last pain assessment:  Pain 1  Pain Scale 1: Numeric (0 - 10)  Pain Intensity 1: 5      Wounds: No     Urinary catheter: voiding  Is there a dean order: No     LDAs:       Peripheral IV 12/05/21 Left Antecubital (Active)         Opportunity for questions and clarification was provided.     Mary Handy

## 2021-12-06 NOTE — CONSULTS
Pulmonary, Critical Care, and Sleep Medicine    Initial Patient Consult    Name: Steve Ghotra MRN: [id number]   : 1950 Hospital: [place] [address]   Date: 2021        IMPRESSION:   · Acute on chronic respiratory failure  · COPD exacerbation  · Pulmonary edema  · Aflutter      RECOMMENDATIONS:   · O2 to keep sats > 90%  · Pt on NIV/Trilogy at home at night  · Jet nebs  · Steroid taper  · Empiric abx  · Diurese   · Needs HR control   · Will f/u with Dr Carlos A Lock as out pt next week  · Will follow prn     Subjective: This patient has been seen and evaluated at the request of Dr. Josephine Murry for respiratory failure. Patient is a 65 y.o. male hx of copd, on trilogy at home presented with worsening sob  Placed on O2  Diuresed, started on empiric abx, jet nebs, steroids  HR controlled with diltiazem  Pt today in no distress      Past Medical History:   Diagnosis Date    Arrhythmia     SVT; Dr. Desir Res 2018    Chronic obstructive pulmonary disease (HCC)     mild    GERD (gastroesophageal reflux disease)     Hypertension     Thyroid disease     benign thyroid growth, checked annually by Dr. Jodee Dozier      Past Surgical History:   Procedure Laterality Date    COLONOSCOPY N/A 2019    COLONOSCOPY performed by Jordan Ryan MD at [facility] ENDOSCOPY    COLONOSCOPY,MAYELA RODRIGUES,PRINCE  2019         HX HEENT Bilateral     cataract extraction    SD COLON CA SCRN NOT  [address]  2019           Prior to Admission medications    Medication Sig Start Date End Date Taking? Authorizing Provider   amiodarone (PACERONE) 400 mg tablet Take 1 Tablet by mouth. 21  Yes Provider, Historical   dilTIAZem ER (TIAZAC) 240 mg capsule Take 240 mg by mouth daily. Indications: paroxysmal supraventricular tachycardia    Provider, Historical   finasteride (PROSCAR) 5 mg tablet Take 5 mg by mouth daily.     Provider, Historical   vit A/vit C/vit E/zinc/copper (PRESERVISION AREDS PO) Take 1 Caplet by mouth two (2) times a day. Provider, Historical   ascorbic acid, vitamin C, (Vitamin C) 500 mg tablet Take 500 mg by mouth daily. Provider, Historical   cyanocobalamin (Vitamin B-12) 1,000 mcg tablet Take 1,000 mcg by mouth daily. Provider, Historical   ibuprofen (MOTRIN) 200 mg tablet Take 400 mg by mouth every eight (8) hours as needed for Pain. Provider, Historical   fluticasone-umeclidinium-vilanterol (Trelegy Ellipta) 100-62.5-25 mcg inhaler Take 1 Puff by inhalation daily. Provider, Historical   pantoprazole (Protonix) 40 mg tablet Take 40 mg by mouth daily. Provider, Historical   metoprolol succinate (Toprol XL) 25 mg XL tablet Take 25 mg by mouth daily. Provider, Historical   furosemide (LASIX) 20 mg tablet 1 tab po daily 3/10/21   Braeden Chase MD   apixaban (ELIQUIS) 5 mg tablet Take 1 Tab by mouth two (2) times a day. 3/9/21   Braeden Chase MD   rosuvastatin (Crestor) 40 mg tablet Take 40 mg by mouth nightly. Other, MD Elaina   tamsulosin (FLOMAX) 0.4 mg capsule Take 0.4 mg by mouth daily. Provider, Historical   aspirin delayed-release 81 mg tablet Take 81 mg by mouth daily. Provider, Historical   melatonin 3 mg tablet Take 3 mg by mouth nightly. Provider, Historical   albuterol (VENTOLIN HFA) 90 mcg/actuation inhaler Take 1 Puff by inhalation every four (4) hours as needed. Provider, Historical   gemfibrozil (LOPID) 600 mg tablet Take 600 mg by mouth two (2) times a day. Provider, Historical   cholecalciferol (VITAMIN D3) 1,000 unit cap Take 1,000 Units by mouth daily. Provider, Historical   MULTIVIT-MIN/FA/LYCOPEN/LUTEIN (CENTRUM SILVER ULTRA MEN'S PO) Take 1 Tab by mouth daily. Provider, Historical   Omega-3-DHA-EPA-Fish Oil 1,000 mg (120 mg-180 mg) cap Take 1 Cap by mouth daily.     Provider, Historical     No Known Allergies   Social History     Tobacco Use    Smoking status: Former Smoker     Years: 5.00    Smokeless tobacco: Never Used   Substance Use Topics    Alcohol use:  Yes     Alcohol/week: 7.0 standard drinks     Types: 7 Glasses of wine per week      Family History   Problem Relation Age of Onset    Hypertension Mother     Heart Disease Mother     Hypertension Father     Diabetes Father         Current Facility-Administered Medications   Medication Dose Route Frequency    [START ON 12/7/2021] cefTRIAXone (ROCEPHIN) 1 g in 0.9% sodium chloride (MBP/ADV) 50 mL MBP  1 g IntraVENous Q24H    potassium chloride SR (KLOR-CON 10) tablet 40 mEq  40 mEq Oral NOW    dilTIAZem (CARDIZEM) 100 mg in 0.9% sodium chloride (MBP/ADV) 100 mL infusion  0-15 mg/hr IntraVENous TITRATE    arformoterol 15 mcg/budesonide 0.25 mg neb solution   Nebulization BID RT    albuterol-ipratropium (DUO-NEB) 2.5 MG-0.5 MG/3 ML  3 mL Nebulization QID RT    predniSONE (DELTASONE) tablet 40 mg  40 mg Oral DAILY WITH BREAKFAST    [Held by provider] amiodarone (CORDARONE) tablet 200 mg  200 mg Oral DAILY    apixaban (ELIQUIS) tablet 5 mg  5 mg Oral BID    aspirin delayed-release tablet 81 mg  81 mg Oral DAILY    [Held by provider] dilTIAZem ER (TIAZAC) capsule 240 mg  240 mg Oral DAILY    finasteride (PROSCAR) tablet 5 mg  5 mg Oral DAILY    furosemide (LASIX) tablet 20 mg  20 mg Oral DAILY    [Held by provider] metoprolol succinate (TOPROL-XL) XL tablet 25 mg  25 mg Oral DAILY    pantoprazole (PROTONIX) tablet 40 mg  40 mg Oral DAILY    rosuvastatin (CRESTOR) tablet 40 mg  40 mg Oral QHS    tamsulosin (FLOMAX) capsule 0.4 mg  0.4 mg Oral DAILY    guaiFENesin ER (MUCINEX) tablet 600 mg  600 mg Oral Q12H    sodium chloride (NS) flush 5-40 mL  5-40 mL IntraVENous Q8H       Review of Systems:  A comprehensive review of systems was negative except for: Respiratory: positive for dyspnea on exertion  Cardiovascular: positive for dyspnea, palpitations, irregular heart beats    Objective:   Vital Signs:    Visit Vitals  /64 Pulse (!) 125   Temp 98.9 °F (37.2 °C)   Resp 24   Ht 5' 11\" (1.803 m)   Wt 122.5 kg (270 lb)   SpO2 94%   BMI 37.66 kg/m²       O2 Device: Nasal cannula   O2 Flow Rate (L/min): 3 l/min   Temp (24hrs), Av °F (37.8 °C), Min:98.6 °F (37 °C), Max:102.5 °F (39.2 °C)       Intake/Output:   Last shift:       07 - 1900  In: 1400 [P.O.:1400]  Out: 600 [Urine:600]  Last 3 shifts: No intake/output data recorded. Intake/Output Summary (Last 24 hours) at 2021 09  Last data filed at 2021 0060  Gross per 24 hour   Intake 1400 ml   Output 600 ml   Net 800 ml      Physical Exam:   General:  Alert, cooperative, no distress, appears stated age. Head:  Normocephalic, without obvious abnormality, atraumatic. Eyes:  Conjunctivae/corneas clear. PERRL, EOMs intact. Nose: Nares normal. Septum midline. Mucosa normal. No drainage or sinus tenderness. Throat: Lips, mucosa, and tongue normal. Teeth and gums normal.   Neck: Supple, symmetrical, trachea midline, no adenopathy, thyroid: no enlargment/tenderness/nodules, no carotid bruit and no JVD. Back:   Symmetric, no curvature. ROM normal.   Lungs:   Clear to auscultation bilaterally. No WHEEZING   Chest wall:  No tenderness or deformity. Heart:  Regular rate and rhythm, S1, S2 normal, no murmur, click, rub or gallop. Abdomen:   Soft, non-tender. Bowel sounds normal. No masses,  No organomegaly. Extremities: Extremities normal, atraumatic, no cyanosis or edema. Pulses: 2+ and symmetric all extremities.    Skin: Skin color, texture, turgor normal. No rashes or lesions   Lymph nodes: Cervical, supraclavicular, and axillary nodes normal.   Neurologic: Grossly nonfocal     Data review:     Recent Results (from the past 24 hour(s))   CULTURE, BLOOD, PAIRED    Collection Time: 21  6:45 PM    Specimen: Blood   Result Value Ref Range    Special Requests: NO SPECIAL REQUESTS      Culture result:        THREE OF FOUR BOTTLES HAVE BEEN FLAGGED POSITIVE BY INSTRUMENT. BOTTLES HAVE BEEN SENT TO [facility] TO ASSESS FOR POSSIBLE GROWTH. Culture result:        FOUR OF FOUR BOTTLES HAVE BEEN FLAGGED POSITIVE BY INSTRUMENT. BOTTLES HAVE BEEN SENT TO [facility] TO ASSESS FOR POSSIBLE GROWTH. EKG, 12 LEAD, INITIAL    Collection Time: 12/05/21  6:46 PM   Result Value Ref Range    Ventricular Rate 127 BPM    Atrial Rate 254 BPM    QRS Duration 90 ms    Q-T Interval 268 ms    QTC Calculation (Bezet) 389 ms    Calculated P Axis -76 degrees    Calculated R Axis -2 degrees    Calculated T Axis 81 degrees    Diagnosis       Atrial flutter with 2:1 AV conduction  Nonspecific ST and T wave abnormality  When compared with ECG of 03-JUL-2021 20:56,  Atrial flutter has replaced Sinus rhythm  QRS duration has decreased  ST now depressed in Inferior leads  ST now depressed in Anterior leads  Nonspecific T wave abnormality now evident in Lateral leads     CBC WITH AUTOMATED DIFF    Collection Time: 12/05/21  6:52 PM   Result Value Ref Range    WBC 16.1 (H) 4.1 - 11.1 K/uL    RBC 4.24 4.10 - 5.70 M/uL    HGB 12.2 12.1 - 17.0 g/dL    HCT 37.1 36.6 - 50.3 %    MCV 87.5 80.0 - 99.0 FL    MCH 28.8 26.0 - 34.0 PG    MCHC 32.9 30.0 - 36.5 g/dL    RDW 14.3 11.5 - 14.5 %    PLATELET 927 (L) 711 - 400 K/uL    MPV 10.6 8.9 - 12.9 FL    NRBC 0.0 0  WBC    ABSOLUTE NRBC 0.00 0.00 - 0.01 K/uL    NEUTROPHILS 86 (H) 32 - 75 %    LYMPHOCYTES 4 (L) 12 - 49 %    MONOCYTES 8 5 - 13 %    EOSINOPHILS 0 0 - 7 %    BASOPHILS 0 0 - 1 %    IMMATURE GRANULOCYTES 2 (H) 0.0 - 0.5 %    ABS. NEUTROPHILS 13.9 (H) 1.8 - 8.0 K/UL    ABS. LYMPHOCYTES 0.6 (L) 0.8 - 3.5 K/UL    ABS. MONOCYTES 1.3 (H) 0.0 - 1.0 K/UL    ABS. EOSINOPHILS 0.0 0.0 - 0.4 K/UL    ABS. BASOPHILS 0.0 0.0 - 0.1 K/UL    ABS. IMM.  GRANS. 0.3 (H) 0.00 - 0.04 K/UL    DF SMEAR SCANNED      PLATELET COMMENTS CLUMPED PLATELETS      RBC COMMENTS NORMOCYTIC, NORMOCHROMIC     METABOLIC PANEL, COMPREHENSIVE    Collection Time: 12/05/21  6:52 PM   Result Value Ref Range    Sodium 130 (L) 136 - 145 mmol/L    Potassium 4.5 3.5 - 5.1 mmol/L    Chloride 95 (L) 97 - 108 mmol/L    CO2 28 21 - 32 mmol/L    Anion gap 7 5 - 15 mmol/L    Glucose 146 (H) 65 - 100 mg/dL    BUN 18 6 - 20 MG/DL    Creatinine 1.45 (H) 0.70 - 1.30 MG/DL    BUN/Creatinine ratio 12 12 - 20      GFR est AA 58 (L) >60 ml/min/1.73m2    GFR est non-AA 48 (L) >60 ml/min/1.73m2    Calcium 9.1 8.5 - 10.1 MG/DL    Bilirubin, total 0.6 0.2 - 1.0 MG/DL    ALT (SGPT) 53 12 - 78 U/L    AST (SGOT) 29 15 - 37 U/L    Alk. phosphatase 60 45 - 117 U/L    Protein, total 7.9 6.4 - 8.2 g/dL    Albumin 3.3 (L) 3.5 - 5.0 g/dL    Globulin 4.6 (H) 2.0 - 4.0 g/dL    A-G Ratio 0.7 (L) 1.1 - 2.2     TROPONIN-HIGH SENSITIVITY    Collection Time: 12/05/21  6:52 PM   Result Value Ref Range    Troponin-High Sensitivity 15 0 - 76 ng/L   NT-PRO BNP    Collection Time: 12/05/21  6:52 PM   Result Value Ref Range    NT pro-BNP 5,004 (H) <125 PG/ML   POC LACTIC ACID    Collection Time: 12/05/21  6:59 PM   Result Value Ref Range    Lactic Acid (POC) 2.67 (HH) 0.40 - 2.00 mmol/L   SAMPLES BEING HELD    Collection Time: 12/05/21  7:17 PM   Result Value Ref Range    SAMPLES BEING HELD RED,PST,BLUE     COMMENT        Add-on orders for these samples will be processed based on acceptable specimen integrity and analyte stability, which may vary by analyte.    COVID-19 RAPID TEST    Collection Time: 12/05/21  8:12 PM   Result Value Ref Range    Specimen source Nasopharyngeal      COVID-19 rapid test Not detected NOTD     URINALYSIS W/ REFLEX CULTURE    Collection Time: 12/05/21  9:04 PM    Specimen: Urine   Result Value Ref Range    Color YELLOW/STRAW      Appearance CLOUDY (A) CLEAR      Specific gravity 1.015 1.003 - 1.030      pH (UA) 6.0 5.0 - 8.0      Protein 100 (A) NEG mg/dL    Glucose Negative NEG mg/dL    Ketone Negative NEG mg/dL    Bilirubin Negative NEG      Blood SMALL (A) NEG      Urobilinogen 0.2 0.2 - 1.0 EU/dL    Nitrites Negative NEG      Leukocyte Esterase Negative NEG      WBC 0-4 0 - 4 /hpf    RBC 0-5 0 - 5 /hpf    Epithelial cells FEW FEW /lpf    Bacteria Negative NEG /hpf    UA:UC IF INDICATED CULTURE NOT INDICATED BY UA RESULT CNI     BLOOD GAS,CHEM8,LACTIC ACID POC    Collection Time: 12/05/21 10:11 PM   Result Value Ref Range    Calcium, ionized (POC) 1.10 (L) 1.12 - 1.32 mmol/L    BICARBONATE 27 mmol/L    Base excess (POC) 1.3 mmol/L    Sample source VENOUS BLOOD      CO2, POC 28 (H) 19 - 24 MMOL/L    Sodium,  (L) 136 - 145 MMOL/L    Potassium, POC 3.8 3.5 - 5.5 MMOL/L    Chloride, POC 95 (L) 100 - 108 MMOL/L    Glucose,  (H) 74 - 106 MG/DL    Creatinine, POC 1.1 0.6 - 1.3 MG/DL    Lactic Acid (POC) 1.16 0.40 - 2.00 mmol/L    pH, venous (POC) 7.37 7.32 - 7.42      pCO2, venous (POC) 47.0 41 - 51 MMHG    pO2, venous (POC) 21 (L) 25 - 40 mmHg   RESPIRATORY VIRUS PANEL W/COVID-19, PCR    Collection Time: 12/05/21 10:45 PM    Specimen: Nasopharyngeal   Result Value Ref Range    Adenovirus Not detected NOTD      Coronavirus 229E Not detected NOTD      Coronavirus HKU1 Not detected NOTD      Coronavirus CVNL63 Not detected NOTD      Coronavirus OC43 Not detected NOTD      SARS-CoV-2, PCR Not detected NOTD      Metapneumovirus Not detected NOTD      Rhinovirus and Enterovirus Not detected NOTD      Influenza A Not detected NOTD      Influenza A, subtype H1 Not detected NOTD      Influenza A, subtype H3 Not detected NOTD      INFLUENZA A H1N1 PCR Not detected NOTD      Influenza B Not detected NOTD      Parainfluenza 1 Not detected NOTD      Parainfluenza 2 Not detected NOTD      Parainfluenza 3 Not detected NOTD      Parainfluenza virus 4 Not detected NOTD      RSV by PCR Not detected NOTD      B. parapertussis, PCR Not detected NOTD      Bordetella pertussis - PCR Not detected NOTD      Chlamydophila pneumoniae DNA, QL, PCR Not detected NOTD      Mycoplasma pneumoniae DNA, QL, PCR Not detected NOTD     D DIMER    Collection Time: 12/05/21 10:45 PM   Result Value Ref Range    D-dimer 0.85 (H) 0.00 - 0.65 mg/L FEU   ECHO ADULT COMPLETE    Collection Time: 12/05/21 11:48 PM   Result Value Ref Range    IVSd 1.62 (A) 0.6 - 1.0 cm    IVSs 2.04 cm    LVIDd 4.69 4.2 - 5.9 cm    LVIDs 3.77 cm    LVOT d 2.27 cm    LVPWd 1.51 (A) 0.6 - 1.0 cm    LVPWs 2.05 cm    LVOT Peak Gradient 4.64 mmHg    LVOT Peak Velocity 107.74 cm/s    RVIDd 3.77 cm    Left Atrium Major Axis 3.15 cm    LA Area 4C 21.93 cm2    LA Vol 4C 55.16 18 - 58 mL    Left Atrium to Aortic Root Ratio 1.04     Left Atrium to Aortic Root Ratio 1.04     AV Cusp 1.99 cm    Aortic Valve Area by Continuity of Peak Velocity 3.80 cm2    AoV PG 5.27 mmHg    Aortic Valve Systolic Peak Velocity 126.25 cm/s    Mitral Valve E Wave Deceleration Time 77.26 ms    MV E Hasmukh 83.62 cm/s    Mitral Valve Pressure Half-time 48.38 ms    MVA (PHT) 4.55 cm2    Pulmonic Valve Systolic Peak Instantaneous Gradient 5.04 mmHg    Pulmonic Valve Max Velocity 112.24 cm/s    Ao Root D 4.04 cm   TROPONIN-HIGH SENSITIVITY    Collection Time: 12/06/21  1:08 AM   Result Value Ref Range    Troponin-High Sensitivity 20 0 - 76 ng/L   METABOLIC PANEL, COMPREHENSIVE    Collection Time: 12/06/21  2:29 AM   Result Value Ref Range    Sodium 134 (L) 136 - 145 mmol/L    Potassium 3.4 (L) 3.5 - 5.1 mmol/L    Chloride 99 97 - 108 mmol/L    CO2 26 21 - 32 mmol/L    Anion gap 9 5 - 15 mmol/L    Glucose 114 (H) 65 - 100 mg/dL    BUN 16 6 - 20 MG/DL    Creatinine 1.19 0.70 - 1.30 MG/DL    BUN/Creatinine ratio 13 12 - 20      GFR est AA >60 >60 ml/min/1.73m2    GFR est non-AA >60 >60 ml/min/1.73m2    Calcium 8.4 (L) 8.5 - 10.1 MG/DL    Bilirubin, total 1.0 0.2 - 1.0 MG/DL    ALT (SGPT) 44 12 - 78 U/L    AST (SGOT) 32 15 - 37 U/L    Alk.  phosphatase 57 45 - 117 U/L    Protein, total 7.0 6.4 - 8.2 g/dL    Albumin 2.7 (L) 3.5 - 5.0 g/dL    Globulin 4.3 (H) 2.0 - 4.0 g/dL    A-G Ratio 0.6 (L) 1.1 - 2.2 MAGNESIUM    Collection Time: 12/06/21  2:29 AM   Result Value Ref Range    Magnesium 1.9 1.6 - 2.4 mg/dL   CBC WITH AUTOMATED DIFF    Collection Time: 12/06/21  2:29 AM   Result Value Ref Range    WBC 15.9 (H) 4.1 - 11.1 K/uL    RBC 3.86 (L) 4.10 - 5.70 M/uL    HGB 11.1 (L) 12.1 - 17.0 g/dL    HCT 33.2 (L) 36.6 - 50.3 %    MCV 86.0 80.0 - 99.0 FL    MCH 28.8 26.0 - 34.0 PG    MCHC 33.4 30.0 - 36.5 g/dL    RDW 14.1 11.5 - 14.5 %    PLATELET 715 (L) 976 - 400 K/uL    MPV 10.2 8.9 - 12.9 FL    NRBC 0.0 0  WBC    ABSOLUTE NRBC 0.00 0.00 - 0.01 K/uL    NEUTROPHILS 84 (H) 32 - 75 %    LYMPHOCYTES 6 (L) 12 - 49 %    MONOCYTES 9 5 - 13 %    EOSINOPHILS 0 0 - 7 %    BASOPHILS 0 0 - 1 %    IMMATURE GRANULOCYTES 1 (H) 0.0 - 0.5 %    ABS. NEUTROPHILS 13.4 (H) 1.8 - 8.0 K/UL    ABS. LYMPHOCYTES 0.9 0.8 - 3.5 K/UL    ABS. MONOCYTES 1.4 (H) 0.0 - 1.0 K/UL    ABS. EOSINOPHILS 0.0 0.0 - 0.4 K/UL    ABS. BASOPHILS 0.0 0.0 - 0.1 K/UL    ABS. IMM.  GRANS. 0.1 (H) 0.00 - 0.04 K/UL    DF AUTOMATED     PROCALCITONIN    Collection Time: 12/06/21  2:29 AM   Result Value Ref Range    Procalcitonin 0.98 ng/mL       Imaging:  I have personally reviewed the patients radiographs and have reviewed the reports:  CXR and chest ct reviewed        Renetta Garcia MD

## 2021-12-06 NOTE — PROGRESS NOTES
1550: TRANSFER - IN REPORT:    Verbal report received from BRO Banda(name) on [facility]  being received from ED(unit) for routine progression of care      Report consisted of patients Situation, Background, Assessment and   Recommendations(SBAR). Information from the following report(s) SBAR, Kardex and ED Summary was reviewed with the receiving nurse. Opportunity for questions and clarification was provided. Assessment completed upon patients arrival to unit and care assumed. 1610: Pt arrived to floor tachycardic in the 130's, appears to be in sinus tachy, currently maxed out on Diltiazem drip. MD Stephon with cardiology paged. 1638: MD Stephon at bedside. Plans for cardioversion tomorrow. 1658: MD Stephon ordered one time 250 mcg IV digoxin, medication given. Will adjust Diltiazem order but would like to keep it at 15mg/hr right now to give digoxin and metoprolol time to work. Can titrate drip if HR does not slow by 1830.

## 2021-12-06 NOTE — CONSULTS
EP/ ARRHYTHMIA CONSULT    Patient ID:  Patient: Natalie Lowery  MRN: [id number]  Age: 65 y.o.  : 1950    Date of  Admission: 2021  6:52 PM   PCP:  Valencia Peres MD   Usual cardiologist:  Frances Wong MD    Assessment: 1. Recurrent atrial flutter, unspecified. 2. Cardiomyopathy, likely nonischemic and may be tachycardia-mediated. 3. Acute systolic congestive heart failure. 4. COPD exacerbation. 5. Hypertensive heart disease with heart failure. 6. Full code. Plan:     1. Was going to stop amiodarone, but I'll keep it going at least through his cardioversion and to try to get him to the next step. 2. Continue rate control with diltiazem infusion which will change to max 20 mg/hr. 3. Add digoxin 250 mcg IV x 1 today, then 125 mcg po daily tomorrow. 4. Start metoprolol IR 25 mg po BID with hold parameter for BP and HR. 5. Agree with treatment for COPD. 6. Continue anticoagulation. 7. Continue statin. Will aim to rate control him and attempt cardioversion tomorrow. No KIM needed. NPO after MN. All questions answered for him and his wife. For the future, options include atrial flutter ablation (may be RA or LA target) versus pacemaker-AV node ablation. I'd hope to avoid the latter, this is a last resort. [x]       High complexity decision making was performed in this patient at high risk for decompensation with multiple organ involvement. Natalie Lowery is a 65 y.o. male with a history of atrial fibrillation, s/p Afib ablation with PVI and also substrate work in the left (LA roof line) and right (CTI line) atria. He had recurrent atrial flutter and metoprolol was started atop his diltiazem when seen by Dr. Leesa Duke. He continued to be active, but has noticed over the last week progressively worsening BOWDEN and then SOB at rest.  Came to the ER and found to be in atrial flutter with 2:1 AV conduction with HR in the 120's.   He was given diuretic, oxygen. He denies chest pain. His ER troponins were low. No syncope, dizziness, or palpitations. An echo shows his EF is 25-30%. His EF was 40-45% by KIM in 9/2021. Echo EF 55-60% in 3/2021. Past Medical History:   Diagnosis Date    Arrhythmia     SVT; Dr. Estrella Benavides Fall 2018    Chronic obstructive pulmonary disease (HCC)     mild    GERD (gastroesophageal reflux disease)     Hypertension     Thyroid disease     benign thyroid growth, checked annually by Dr. Eve Hoffman        Past Surgical History:   Procedure Laterality Date    COLONOSCOPY N/A 2/5/2019    COLONOSCOPY performed [facility] Aby Wang MD at [facility]COPY    COLONOSCOPY,REMV LESN,SNARE  2/5/2019         HX HEENT Bilateral     cataract extraction    [place] COLON CA SCRN NOT  [address] IND  2/5/2019            Social History     Tobacco Use    Smoking status: Former Smoker     Years: 5.00    Smokeless tobacco: Never Used   Substance Use Topics    Alcohol use:  Yes     Alcohol/week: 7.0 standard drinks     Types: 7 Glasses of wine per week        Family History   Problem Relation Age of Onset    Hypertension Mother     Heart Disease Mother     Hypertension Father     Diabetes Father         No Known Allergies       Current Facility-Administered Medications   Medication Dose Route Frequency    [START ON 12/7/2021] cefTRIAXone (ROCEPHIN) 1 g in 0.9% sodium chloride (MBP/ADV) 50 mL MBP  1 g IntraVENous Q24H    finasteride (PROSCAR) tablet 5 mg  5 mg Oral QHS    albuterol-ipratropium (DUO-NEB) 2.5 MG-0.5 MG/3 ML  3 mL Nebulization Q4H PRN    metoprolol tartrate (LOPRESSOR) tablet 25 mg  25 mg Oral BID    digoxin (LANOXIN) injection 250 mcg  250 mcg IntraVENous NOW    [START ON 12/7/2021] digoxin (LANOXIN) tablet 0.125 mg  0.125 mg Oral DAILY    sodium chloride (NS) flush 5-10 mL  5-10 mL IntraVENous PRN    dilTIAZem (CARDIZEM) 100 mg in 0.9% sodium chloride (MBP/ADV) 100 mL infusion  0-15 mg/hr IntraVENous TITRATE    arformoterol 15 mcg/budesonide 0.25 mg neb solution   Nebulization BID RT    predniSONE (DELTASONE) tablet 40 mg  40 mg Oral DAILY WITH BREAKFAST    apixaban (ELIQUIS) tablet 5 mg  5 mg Oral BID    aspirin delayed-release tablet 81 mg  81 mg Oral DAILY    [Held by provider] dilTIAZem ER (TIAZAC) capsule 240 mg  240 mg Oral DAILY    furosemide (LASIX) tablet 20 mg  20 mg Oral DAILY    [Held by provider] metoprolol succinate (TOPROL-XL) XL tablet 25 mg  25 mg Oral DAILY    pantoprazole (PROTONIX) tablet 40 mg  40 mg Oral DAILY    rosuvastatin (CRESTOR) tablet 40 mg  40 mg Oral QHS    tamsulosin (FLOMAX) capsule 0.4 mg  0.4 mg Oral DAILY    guaiFENesin ER (MUCINEX) tablet 600 mg  600 mg Oral Q12H    sodium chloride (NS) flush 5-40 mL  5-40 mL IntraVENous Q8H    sodium chloride (NS) flush 5-40 mL  5-40 mL IntraVENous PRN    acetaminophen (TYLENOL) tablet 650 mg  650 mg Oral Q6H PRN    Or    acetaminophen (TYLENOL) suppository 650 mg  650 mg Rectal Q6H PRN    polyethylene glycol (MIRALAX) packet 17 g  17 g Oral DAILY PRN    ondansetron (ZOFRAN ODT) tablet 4 mg  4 mg Oral Q8H PRN    Or    ondansetron (ZOFRAN) injection 4 mg  4 mg IntraVENous Q6H PRN       Review of Symptoms:    General: negative for fever, chills, sweats, weakness, weight loss   Eyes: negative for blurred vision, eye pain, loss of vision, diplopia   Ear Nose and Throat: negative for rhinorrhea, pharyngitis, otalgia, tinnitus, speech or swallowing difficulties   Respiratory: +SOB, cough, sputum production, wheezing, BOWDEN, negative for pleuritic pain   Cardiology: +orthopnea, PND, negative for chest pain, palpitations, edema, syncope   Gastrointestinal: +flatulence, negative for abdominal pain, N/V, dysphagia, change in bowel habits, bleeding   Genitourinary: negative for frequency, urgency, dysuria, gross hematuria, incontinence   Muskuloskeletal : negative for arthralgia, myalgia   Hematology: negative for easy bruising, bleeding, lymphadenopathy   Dermatological: negative for rash, ulceration, mole change, new lesion   Endocrine: negative for hot flashes or polydipsia   Neurological: negative for headache, dizziness, confusion, focal weakness, paresthesia, memory loss, gait disturbance   Psychological: negative for anxiety, depression, agitation       Objective:      Physical Exam:  Temp (24hrs), Av.9 °F (37.7 °C), Min:98.6 °F (37 °C), Max:102.5 °F (39.2 °C)    Patient Vitals for the past 8 hrs:   Pulse   21 1550 (!) 129   21 1309 (!) 127   21 1239 (!) 127   21 1209 (!) 129   21 1024 (!) 128   21 0909 (!) 125    Patient Vitals for the past 8 hrs:   Resp   21 1550 25   21 1309 27   21 1239 21   21 1209 20   21 1024 26   21 0909 22    Patient Vitals for the past 8 hrs:   BP   21 1550 113/76   21 1309 (!) 127/99   21 1239 133/69   21 1209 111/65   21 1024 116/63   21 0909 125/70          Intake/Output Summary (Last 24 hours) at 2021 1648  Last data filed at 2021 1550  Gross per 24 hour   Intake 1636.67 ml   Output 600 ml   Net 1036.67 ml       Nondiaphoretic, not in acute distress. Supple, no palpable thyromegaly. No scleral icterus, mucous membranes moist, conjuctivae pink, no xanthelasma.  +mild accessory muscle use, clear to auscultation bilaterally anteriorly, symmetric air movement. Regular rate and rhythm, no murmur, pericardial rub, knock, or gallop. No JVD or peripheral edema. Palpable radial pulses bilaterally. Abdomen obese, soft, nontender, nondistended. Extremities without cyanosis or clubbing. Muscle tone and bulk normal for age. Skin warm and dry. No rashes or ulcers. Neuro grossly nonfocal.  No tremor. Awake and appropriate. CARDIOGRAPHICS and STUDIES, I reviewed:    Telemetry:  Atrial flutter. ECG in ER:  Atrial flutter with 2:1 AV conduction.     Echo here:    Left Ventricle Normal cavity size. Mildly increased wall thickness. Wall motion: normal. The estimated EF is 25 - 30%. Visually measured ejection fraction. Moderate-to-severely and globally reduced systolic function. Unable to assess diastolic function. There is inconclusive left ventricular diastolic function. Left Atrium Normal cavity size. Right Ventricle Not well visualized. Normal cavity size and global systolic function. Right Atrium Normal cavity size. Interatrial Septum Interatrial septum not assessed   Aortic Valve Aortic valve not well visualized. No stenosis and no regurgitation. Mitral Valve No stenosis. Mitral valve non-specific thickening. Mild mitral annular calcification. Trace regurgitation. Tricuspid Valve Normal valve structure and no stenosis. Unable to assess tricuspid valve regurgitation. Pulmonic Valve Pulmonic valve not well visualized, but normal doppler findings. No stenosis and no regurgitation. Aorta The aorta was not well visualized. Normal aortic root. Pulmonary Artery Pulmonary hypertension not suggested by Doppler findings. Pericardium Normal pericardium and no evidence of pericardial effusion     CXR:  No edema per the radiologist.       Labs:  No results for input(s): CPK, CKMB, CKNDX, TROIQ in the last 72 hours. No lab exists for component: CPKMB  No results found for: CHOL, CHOLX, CHLST, CHOLV, HDL, HDLP, LDL, LDLC, DLDLP, TGLX, TRIGL, TRIGP, CHHD, CHHDX  No results for input(s): INR, PTP, APTT, INREXT in the last 72 hours.    Recent Labs     12/06/21 0229 12/05/21 1852   * 130*   K 3.4* 4.5   CL 99 95*   CO2 26 28   BUN 16 18   CREA 1.19 1.45*   * 146*   CA 8.4* 9.1   ALB 2.7* 3.3*   WBC 15.9* 16.1*   HGB 11.1* 12.2   HCT 33.2* 37.1   * 148*     Recent Labs     12/06/21 0229 12/05/21 1852   AP 57 60   TP 7.0 7.9   ALB 2.7* 3.3*   GLOB 4.3* 4.6*     No components found for: GLPOC  No results for input(s): PH, PCO2, PO2 in the last 72 hours.         Viridiana Culp MD  12/6/2021

## 2021-12-06 NOTE — PROGRESS NOTES
EP/Arrhythmia consult seen, full note to follow. Currently in atrial flutter with 2:1 AV conduction. I think the amiodarone may be ineffective and don't want to escalate the dose. Will stop it. Try to optimize his rate control. Not a great candidate for the other antiarrhythmic drugs at this time. Will likely cardiovert him this week.     Patient Vitals for the past 24 hrs:   Temp Pulse Resp BP SpO2   12/06/21 0746 98.9 °F (37.2 °C) (!) 125 24 128/64 94 %   12/06/21 0609 98.6 °F (37 °C) (!) 124 23 116/65 96 %   12/06/21 0211  (!) 123 21 137/65 93 %   12/06/21 0141  (!) 117 25 (!) 143/75 94 %   12/06/21 0111  (!) 127 29 132/74 93 %   12/06/21 0110  (!) 127  (!) 146/82    12/06/21 0109  (!) 127  (!) 146/82    12/05/21 2347  (!) 127 26 (!) 145/72 (!) 89 %   12/05/21 2332  (!) 126 24 139/75 93 %   12/05/21 2317  (!) 127 28 (!) 149/72 91 %   12/05/21 2239  (!) 125  (!) 153/86    12/05/21 2230  (!) 125 29 (!) 153/86 92 %   12/05/21 2215  (!) 124 29 137/82 95 %   12/05/21 2200  (!) 126 22 (!) 171/82 93 %   12/05/21 2021     95 %   12/05/21 1839 (!) 102.5 °F (39.2 °C) (!) 127 28 133/72 98 %

## 2021-12-06 NOTE — H&P
Advance Care Planning Note      NAME: Gris Rios   :     MRN:  [id number]     Date/Time:  2021 11:53 PM    Active Diagnoses:  Hospital Problems  Date Reviewed: 2021          Codes Class Noted POA    Atrial flutter ([facility] ICD-10-CM: I48.92  ICD-9-CM: 427.32  2021 Unknown        Febrile ICD-10-CM: R50.9  ICD-9-CM: 780.60  2021 Unknown        CHF exacerbation ([facility] ICD-10-CM: I50.9  ICD-9-CM: 428.0  2021 Unknown        Elevated brain natriuretic peptide (BNP) level ICD-10-CM: R79.89  ICD-9-CM: 790.99  2021 Unknown        Respiratory failure, acute-on-chronic ([facility] ICD-10-CM: J96.20  ICD-9-CM: 518.84  2021 Unknown              These active diagnoses are of sufficient risk that focused discussion on advance care planning is indicated in order to allow the patient to thoughtfully consider personal goals of care, and if situations arise that prevent the ability to personally give input, to ensure appropriate representation of their personal desires for different levels and aggressiveness of care. Discussion:   Code status addressed and wants to be a Full Code. Patient wants central line and vasopressors if needed. Patient would also want a feeding tube, if needed, for nutritional support. Patient  would like to assign   [facility]   [phone]         as the surrogate decision maker. Persons present and participating in discussion: Belen Knowles MD,      Time Spent:   Total time spent face-to-face in education and discussion:  16  minutes.          Belen Schafer MD   Hospitalist

## 2021-12-06 NOTE — PROGRESS NOTES
12/06/21 1550   Vitals   Temp 99.7 °F (37.6 °C)   Temp Source Oral   Pulse (Heart Rate) (!) 129   Heart Rate Source Monitor   Resp Rate 25   O2 Sat (%) 92 %   Level of Consciousness Alert (0)   /76   MAP (Calculated) 88   BP 1 Location Right arm   BP 1 Method Automatic   BP Patient Position At rest   MEWS Score 4   MEWS 4 r/t HR, pt at highest rate on current diltiazem order, cardiology paged.

## 2021-12-06 NOTE — H&P
Hospitalist Admission Note    NAME: Jennifer Soto   :  1950   MRN:  [id number]     Date/Time:  2021 11:02 PM    Patient PCP: Bala Kaplan MD  _____________________________________________________________________  Given the patient's current clinical presentation, I have a high level of concern for decompensation if discharged from the emergency department. Complex decision making was performed, which includes reviewing the patient's available past medical records, laboratory results, and x-ray films. My assessment of this patient's clinical condition and my plan of care is as follows. Assessment / Plan:  Hypoxia/shortness of breath likely both COPD and heart failure exacerbation  Currently patient requires 5 L nasal cannula saturating 98%  Sepsis unclear source. Fever T-max 102.5, leukocytosis 16.1, tachycardia lactic acidosis 2.67  Rapid A flutter. Patient has history of Persistent atrial fibrillation on Eliquis  COPD on O2 HS. Per patient he uses 6 L oxygen and BiPAP at night and does not use any daytime  Dysuria to rule out UTI. Urinalysis pending  CXR chest x-ray no acute cardiopulmonary pathology  Elevated proBNP 5000  EKG a flutter heart rate 127  Rapid Covid negative  Acute kidney injury creatinine 1.48  Hyponatremia sodium 130  Thrombocytopenia platelet 435      Admit to hospitalist service, will start on breathing treatment and systemic steroid and IV antibiotic for possible COPD exacerbation, will hold IV fluid administration as patient has elevated proBNP we will continue home Lasix 20 daily. We will continue oxygen supplementation and CPAP support at night, will get CT chest without contrast to further evaluate lung parenchyma and also get 2D echocardiogram.  Continue telemetry serial cardiac enzyme and pulmonary and cardiology consulted for input.   Regarding right ankle trauma x-rays negative but will consult orthopedic for input as patient still has swelling and pain, will also check respiratory panel influenza a and B              Ankle pain s/p fall  XR ANKLE RT MIN 3 V   No acute abnormality. Vascular calcifications. .  Continue pain control      Diastolic congestive heart failure  -Continue home meds  -Card. consulted         Hypertension  Dyslipidemia   BPH  --continue proscar, flomax     GERD  --continue PPI     Obesity  --will be placed on reduced calorie diet in hospital  Body mass index is 37.66 kg/m²    KADIE, untreated  --need to f/u with his sleep doctor to get cpap. Code Status: full   Surrogate Decision Maker: Wife Fely Mcfarland     DVT Prophylaxis: Eliquis     Baseline: From home independent            Subjective:   CHIEF COMPLAINT: Shortness of breath, ankle injury  short of breath worsened in past few days ; has COPD usually on 2lpm; now on 5lpm; pt is hot to touch. also a fall on treadmill a few days ago and rolled right ankle ; did not hit head; increase work of breathing noted      HISTORY OF PRESENT ILLNESS:     Gurjit Benitez is a 65 y.o.   male who presents with above chief complaint. Lucina Leyva, 65 y.o. male with PMHx significant for atrial flutter on Eliquis, COPD on 6 L nasal cannula at baseline at night on CPAP, GERD, hypertension, who presents the chief complaint of worsening shortness of breath over the last 3 to 4 days. Patient typically wears nasal cannula at night, however has needed it during the day due to progressively worsening shortness of breath. He additionally notes that he rolled his ankle about 11 days ago and is still having some right ankle discomfort and is having some difficulty walking secondary to pain. States some dysuria as well. Was febrile and hypoxic prior to arrival.        PCP: Altagracia Delacruz MD     There are no other complaints, changes, or physical findings at this time. We were asked to admit for work up and evaluation of the above problems.        Vital Signs-Reviewed the patient's vital signs. Patient Vitals for the past 12 hrs:    Temp Pulse Resp BP SpO2   12/05/21 2239  (!) 125  (!) 153/86    12/05/21 2230  (!) 125 29 (!) 153/86 92 %   12/05/21 2215  (!) 124 29 137/82 95 %   12/05/21 2200  (!) 126 22 (!) 171/82 93 %   12/05/21 2021     95 %   12/05/21 1839 (!) 102.5 °F (39.2 °C) (!) 127 28 133/72 98 %         ED EKG interpretation:  Rhythm: atrial flutter with 2-1 AV block; and regular . Rate (approx.): 127; Axis: normal; QRS interval: normal ; ST/T wave: non-specific changes; Other findings: abnormal ekg. This EKG was interpreted by KERI Muñiz MD,ED Provider. Past Medical History:   Diagnosis Date    Arrhythmia     SVT; Dr. Fauzia Gomez Fall 2018    Chronic obstructive pulmonary disease (HCC)     mild    GERD (gastroesophageal reflux disease)     Hypertension     Thyroid disease     benign thyroid growth, checked annually by Dr. Gabi Cao        Past Surgical History:   Procedure Laterality Date    COLONOSCOPY N/A 2/5/2019    COLONOSCOPY performed [facility]ino [facility] at [facility]COPY    COLONOSCOPY,PRINCE LEACH  2/5/2019         HX HEENT Bilateral     cataract extraction    NV COLON CA SCRN NOT  [address] IND  2/5/2019            Social History     Tobacco Use    Smoking status: Former Smoker     Years: 5.00    Smokeless tobacco: Never Used   Substance Use Topics    Alcohol use: Yes     Alcohol/week: 7.0 standard drinks     Types: 7 Glasses of wine per week        Family History   Problem Relation Age of Onset    Hypertension Mother     Heart Disease Mother     Hypertension Father     Diabetes Father      No Known Allergies     Prior to Admission medications    Medication Sig Start Date End Date Taking? Authorizing Provider   dilTIAZem ER [facility]) 240 mg capsule Take 240 mg by mouth daily.  Indications: paroxysmal supraventricular tachycardia    Provider, Historical   finasteride (PROSCAR) 5 mg tablet Take 5 mg by mouth daily. Provider, Historical   vit A/vit C/vit E/zinc/copper (PRESERVISION AREDS PO) Take 1 Caplet by mouth two (2) times a day. Provider, Historical   ascorbic acid, vitamin C, (Vitamin C) 500 mg tablet Take 500 mg by mouth daily. Provider, Historical   cyanocobalamin (Vitamin B-12) 1,000 mcg tablet Take 1,000 mcg by mouth daily. Provider, Historical   ibuprofen (MOTRIN) 200 mg tablet Take 400 mg by mouth every eight (8) hours as needed for Pain. Provider, Historical   fluticasone-umeclidinium-vilanterol (Trelegy Ellipta) 100-62.5-25 mcg inhaler Take 1 Puff by inhalation daily. Provider, Historical   pantoprazole (Protonix) 40 mg tablet Take 40 mg by mouth daily. Provider, Historical   metoprolol succinate (Toprol XL) 25 mg XL tablet Take 25 mg by mouth daily. Provider, Historical   furosemide (LASIX) 20 mg tablet 1 tab po daily 3/10/21   Glenn Sauer MD   apixaban (ELIQUIS) 5 mg tablet Take 1 Tab by mouth two (2) times a day. 3/9/21   Glenn Sauer MD   rosuvastatin (Crestor) 40 mg tablet Take 40 mg by mouth nightly. Other, MD Elaina   tamsulosin (FLOMAX) 0.4 mg capsule Take 0.4 mg by mouth daily. Provider, Historical   aspirin delayed-release 81 mg tablet Take 81 mg by mouth daily. Provider, Historical   melatonin 3 mg tablet Take 3 mg by mouth nightly. Provider, Historical   albuterol (VENTOLIN HFA) 90 mcg/actuation inhaler Take 1 Puff by inhalation every four (4) hours as needed. Provider, Historical   gemfibrozil (LOPID) 600 mg tablet Take 600 mg by mouth two (2) times a day. Provider, Historical   cholecalciferol (VITAMIN D3) 1,000 unit cap Take 1,000 Units by mouth daily. Provider, Historical   MULTIVIT-MIN/FA/LYCOPEN/LUTEIN (CENTRUM SILVER ULTRA MEN'S PO) Take 1 Tab by mouth daily. Provider, Historical   Omega-3-DHA-EPA-Fish Oil 1,000 mg (120 mg-180 mg) cap Take 1 Cap by mouth daily.     Provider, Historical       REVIEW OF SYSTEMS:     I am not able to complete the review of systems because: The patient is intubated and sedated    The patient has altered mental status due to his acute medical problems    The patient has baseline aphasia from prior stroke(s)    The patient has baseline dementia and is not reliable historian    The patient is in acute medical distress and unable to provide information           Total of 12 systems reviewed as follows:       POSITIVE= underlined text  Negative = text not underlined  General:  fever, chills, sweats, generalized weakness, weight loss/gain,      loss of appetite   Eyes:    blurred vision, eye pain, loss of vision, double vision  ENT:    rhinorrhea, pharyngitis   Respiratory:   cough, sputum production, SOB, BOWDEN, wheezing, pleuritic pain   Cardiology:   chest pain, palpitations, orthopnea, PND, edema, syncope   Gastrointestinal:  abdominal pain , N/V, diarrhea, dysphagia, constipation, bleeding   Genitourinary:  frequency, urgency, dysuria, hematuria, incontinence   Muskuloskeletal :  arthralgia, myalgia, back pain  Hematology:  easy bruising, nose or gum bleeding, lymphadenopathy   Dermatological: rash, ulceration, pruritis, color change / jaundice  Endocrine:   hot flashes or polydipsia   Neurological:  headache, dizziness, confusion, focal weakness, paresthesia,     Speech difficulties, memory loss, gait difficulty  Psychological: Feelings of anxiety, depression, agitation    Objective:   VITALS:    Visit Vitals  BP (!) 153/86   Pulse (!) 125   Temp (!) 102.5 °F (39.2 °C)   Resp 29   Ht 5' 11\" (1.803 m)   Wt 122.5 kg (270 lb)   SpO2 92%   BMI 37.66 kg/m²       PHYSICAL EXAM:    General:    Alert, cooperative, no distress, appears stated age.      HEENT: Atraumatic, anicteric sclerae, pink conjunctivae     No oral ulcers, mucosa moist, throat clear, dentition fair  Neck:  Supple, symmetrical,  thyroid: non tender  Lungs:   Bilaterally no wheezing and crackles  Chest wall:  No tenderness No Accessory muscle use. Heart:   Tachycardia and irregular rhythm no edema  Abdomen:   Soft, non-tender. Not distended. Bowel sounds normal  Extremities: No cyanosis. No clubbing,      Skin turgor normal, Capillary refill normal, Radial dial pulse 2+  Skin:     Not pale. Not Jaundiced  No rashes   Psych:  Good insight. Not depressed. Not anxious or agitated. Neurologic: EOMs intact. No facial asymmetry. No aphasia or slurred speech. Symmetrical strength, Sensation grossly intact. Alert and oriented X 4.     _______________________________________________________________________  Care Plan discussed with:    Comments   Patient x    Family      RN x    Care Manager                    Consultant:  x    _______________________________________________________________________  Expected  Disposition:   Home with Family x   HH/PT/OT/RN    SNF/LTC    ANOOP    ________________________________________________________________________  TOTAL TIME:   72  Minutes    Critical Care Provided     Minutes non procedure based      Comments    x Reviewed previous records   >50% of visit spent in counseling and coordination of care x Discussion with patient and/or family and questions answered       Given the patient's current clinical presentation, I have a high level of concern for decompensation if discharged from the ED. Complex decision making was performed which includes reviewing the patient's available past medical records, laboratory results, and Xray films. I have also directly communicated my plan and discussed this case with the involved ED physician.     ____________________________________________________________________  Will MD Amarilis    Procedures: see electronic medical records for all procedures/Xrays and details which were not copied into this note but were reviewed prior to creation of Plan.     LAB DATA REVIEWED:    Recent Results (from the past 24 hour(s))   EKG, 12 LEAD, INITIAL    Collection Time: 12/05/21 6:46 PM   Result Value Ref Range    Ventricular Rate 127 BPM    Atrial Rate 254 BPM    QRS Duration 90 ms    Q-T Interval 268 ms    QTC Calculation (Bezet) 389 ms    Calculated P Axis -76 degrees    Calculated R Axis -2 degrees    Calculated T Axis 81 degrees    Diagnosis       Atrial flutter with 2:1 AV conduction  Nonspecific ST and T wave abnormality  When compared with ECG of 03-JUL-2021 20:56,  Atrial flutter has replaced Sinus rhythm  QRS duration has decreased  ST now depressed in Inferior leads  ST now depressed in Anterior leads  Nonspecific T wave abnormality now evident in Lateral leads     CBC WITH AUTOMATED DIFF    Collection Time: 12/05/21  6:52 PM   Result Value Ref Range    WBC 16.1 (H) 4.1 - 11.1 K/uL    RBC 4.24 4.10 - 5.70 M/uL    HGB 12.2 12.1 - 17.0 g/dL    HCT 37.1 36.6 - 50.3 %    MCV 87.5 80.0 - 99.0 FL    MCH 28.8 26.0 - 34.0 PG    MCHC 32.9 30.0 - 36.5 g/dL    RDW 14.3 11.5 - 14.5 %    PLATELET 335 (L) 458 - 400 K/uL    MPV 10.6 8.9 - 12.9 FL    NRBC 0.0 0  WBC    ABSOLUTE NRBC 0.00 0.00 - 0.01 K/uL    NEUTROPHILS 86 (H) 32 - 75 %    LYMPHOCYTES 4 (L) 12 - 49 %    MONOCYTES 8 5 - 13 %    EOSINOPHILS 0 0 - 7 %    BASOPHILS 0 0 - 1 %    IMMATURE GRANULOCYTES 2 (H) 0.0 - 0.5 %    ABS. NEUTROPHILS 13.9 (H) 1.8 - 8.0 K/UL    ABS. LYMPHOCYTES 0.6 (L) 0.8 - 3.5 K/UL    ABS. MONOCYTES 1.3 (H) 0.0 - 1.0 K/UL    ABS. EOSINOPHILS 0.0 0.0 - 0.4 K/UL    ABS. BASOPHILS 0.0 0.0 - 0.1 K/UL    ABS. IMM.  GRANS. 0.3 (H) 0.00 - 0.04 K/UL    DF SMEAR SCANNED      PLATELET COMMENTS CLUMPED PLATELETS      RBC COMMENTS NORMOCYTIC, NORMOCHROMIC     METABOLIC PANEL, COMPREHENSIVE    Collection Time: 12/05/21  6:52 PM   Result Value Ref Range    Sodium 130 (L) 136 - 145 mmol/L    Potassium 4.5 3.5 - 5.1 mmol/L    Chloride 95 (L) 97 - 108 mmol/L    CO2 28 21 - 32 mmol/L    Anion gap 7 5 - 15 mmol/L    Glucose 146 (H) 65 - 100 mg/dL    BUN 18 6 - 20 MG/DL    Creatinine 1.45 (H) 0.70 - 1.30 MG/DL BUN/Creatinine ratio 12 12 - 20      GFR est AA 58 (L) >60 ml/min/1.73m2    GFR est non-AA 48 (L) >60 ml/min/1.73m2    Calcium 9.1 8.5 - 10.1 MG/DL    Bilirubin, total 0.6 0.2 - 1.0 MG/DL    ALT (SGPT) 53 12 - 78 U/L    AST (SGOT) 29 15 - 37 U/L    Alk. phosphatase 60 45 - 117 U/L    Protein, total 7.9 6.4 - 8.2 g/dL    Albumin 3.3 (L) 3.5 - 5.0 g/dL    Globulin 4.6 (H) 2.0 - 4.0 g/dL    A-G Ratio 0.7 (L) 1.1 - 2.2     TROPONIN-HIGH SENSITIVITY    Collection Time: 12/05/21  6:52 PM   Result Value Ref Range    Troponin-High Sensitivity 15 0 - 76 ng/L   NT-PRO BNP    Collection Time: 12/05/21  6:52 PM   Result Value Ref Range    NT pro-BNP 5,004 (H) <125 PG/ML   POC LACTIC ACID    Collection Time: 12/05/21  6:59 PM   Result Value Ref Range    Lactic Acid (POC) 2.67 (HH) 0.40 - 2.00 mmol/L   SAMPLES BEING HELD    Collection Time: 12/05/21  7:17 PM   Result Value Ref Range    SAMPLES BEING HELD RED,PST,BLUE     COMMENT        Add-on orders for these samples will be processed based on acceptable specimen integrity and analyte stability, which may vary by analyte.    COVID-19 RAPID TEST    Collection Time: 12/05/21  8:12 PM   Result Value Ref Range    Specimen source Nasopharyngeal      COVID-19 rapid test Not detected NOTD     URINALYSIS W/ REFLEX CULTURE    Collection Time: 12/05/21  9:04 PM    Specimen: Urine   Result Value Ref Range    Color YELLOW/STRAW      Appearance CLOUDY (A) CLEAR      Specific gravity 1.015 1.003 - 1.030      pH (UA) 6.0 5.0 - 8.0      Protein 100 (A) NEG mg/dL    Glucose Negative NEG mg/dL    Ketone Negative NEG mg/dL    Bilirubin Negative NEG      Blood SMALL (A) NEG      Urobilinogen 0.2 0.2 - 1.0 EU/dL    Nitrites Negative NEG      Leukocyte Esterase Negative NEG      WBC 0-4 0 - 4 /hpf    RBC 0-5 0 - 5 /hpf    Epithelial cells FEW FEW /lpf    Bacteria Negative NEG /hpf    UA:UC IF INDICATED CULTURE NOT INDICATED BY UA RESULT CNI     BLOOD GAS,CHEM8,LACTIC ACID POC    Collection Time: 12/05/21 10:11 PM   Result Value Ref Range    Calcium, ionized (POC) 1.10 (L) 1.12 - 1.32 mmol/L    BICARBONATE 27 mmol/L    Base excess (POC) 1.3 mmol/L    Sample source VENOUS BLOOD      CO2, POC 28 (H) 19 - 24 MMOL/L    Sodium,  (L) 136 - 145 MMOL/L    Potassium, POC 3.8 3.5 - 5.5 MMOL/L    Chloride, POC 95 (L) 100 - 108 MMOL/L    Glucose,  (H) 74 - 106 MG/DL    Creatinine, POC 1.1 0.6 - 1.3 MG/DL    Lactic Acid (POC) 1.16 0.40 - 2.00 mmol/L    pH, venous (POC) 7.37 7.32 - 7.42      pCO2, venous (POC) 47.0 41 - 51 MMHG    pO2, venous (POC) 21 (L) 25 - 40 mmHg

## 2021-12-06 NOTE — PROGRESS NOTES
Problem: Pain  Goal: *Control of Pain  Outcome: Progressing Towards Goal  Goal: *PALLIATIVE CARE:  Alleviation of Pain  Outcome: Progressing Towards Goal     Problem: Patient Education: Go to Patient Education Activity  Goal: Patient/Family Education  Outcome: Progressing Towards Goal     Problem: Falls - Risk of  Goal: *Absence of Falls  Description: Document [facility] Fall Risk and appropriate interventions in the flowsheet.   Outcome: Progressing Towards Goal  Note: Fall Risk Interventions:                                Problem: Patient Education: Go to Patient Education Activity  Goal: Patient/Family Education  Outcome: Progressing Towards Goal     Problem: Arrhythmia Pathway (Adult)  Goal: *Absence of arrhythmia  Outcome: Progressing Towards Goal     Problem: Patient Education: Go to Patient Education Activity  Goal: Patient/Family Education  Outcome: Progressing Towards Goal

## 2021-12-07 ENCOUNTER — APPOINTMENT (OUTPATIENT)
Dept: NON INVASIVE DIAGNOSTICS | Age: 71
DRG: 871 | End: 2021-12-07
Attending: INTERNAL MEDICINE
Payer: MEDICARE

## 2021-12-07 LAB
ATRIAL RATE: 103 BPM
CALCULATED P AXIS, ECG09: 28 DEGREES
CALCULATED R AXIS, ECG10: 21 DEGREES
CALCULATED T AXIS, ECG11: 60 DEGREES
DIAGNOSIS, 93000: NORMAL
P-R INTERVAL, ECG05: 188 MS
Q-T INTERVAL, ECG07: 346 MS
QRS DURATION, ECG06: 104 MS
QTC CALCULATION (BEZET), ECG08: 453 MS
VENTRICULAR RATE, ECG03: 103 BPM

## 2021-12-07 PROCEDURE — 5A2204Z RESTORATION OF CARDIAC RHYTHM, SINGLE: ICD-10-PCS | Performed by: INTERNAL MEDICINE

## 2021-12-07 PROCEDURE — 74011250637 HC RX REV CODE- 250/637: Performed by: HOSPITALIST

## 2021-12-07 PROCEDURE — 77030018729 HC ELECTRD DEFIB PAD CARD -B

## 2021-12-07 PROCEDURE — 93005 ELECTROCARDIOGRAM TRACING: CPT

## 2021-12-07 PROCEDURE — 74011250637 HC RX REV CODE- 250/637: Performed by: INTERNAL MEDICINE

## 2021-12-07 PROCEDURE — 77010033678 HC OXYGEN DAILY

## 2021-12-07 PROCEDURE — 74011636637 HC RX REV CODE- 636/637: Performed by: INTERNAL MEDICINE

## 2021-12-07 PROCEDURE — 74011250636 HC RX REV CODE- 250/636: Performed by: INTERNAL MEDICINE

## 2021-12-07 PROCEDURE — 65660000001 HC RM ICU INTERMED STEPDOWN

## 2021-12-07 PROCEDURE — 92960 CARDIOVERSION ELECTRIC EXT: CPT

## 2021-12-07 PROCEDURE — 74011000258 HC RX REV CODE- 258: Performed by: INTERNAL MEDICINE

## 2021-12-07 PROCEDURE — 74011636637 HC RX REV CODE- 636/637: Performed by: HOSPITALIST

## 2021-12-07 PROCEDURE — 99152 MOD SED SAME PHYS/QHP 5/>YRS: CPT

## 2021-12-07 RX ORDER — MIDAZOLAM HYDROCHLORIDE 1 MG/ML
.5-2 INJECTION, SOLUTION INTRAMUSCULAR; INTRAVENOUS
Status: DISCONTINUED | OUTPATIENT
Start: 2021-12-07 | End: 2021-12-07

## 2021-12-07 RX ORDER — DILTIAZEM HYDROCHLORIDE 240 MG/1
240 CAPSULE, COATED, EXTENDED RELEASE ORAL DAILY
Status: DISCONTINUED | OUTPATIENT
Start: 2021-12-07 | End: 2021-12-14 | Stop reason: HOSPADM

## 2021-12-07 RX ORDER — FENTANYL CITRATE 50 UG/ML
12.5-5 INJECTION, SOLUTION INTRAMUSCULAR; INTRAVENOUS
Status: DISCONTINUED | OUTPATIENT
Start: 2021-12-07 | End: 2021-12-07

## 2021-12-07 RX ORDER — INFANT FORMULA WITH IRON
POWDER (GRAM) ORAL
Status: DISCONTINUED | OUTPATIENT
Start: 2021-12-07 | End: 2021-12-14 | Stop reason: HOSPADM

## 2021-12-07 RX ADMIN — METOPROLOL TARTRATE 25 MG: 25 TABLET, FILM COATED ORAL at 17:09

## 2021-12-07 RX ADMIN — APIXABAN 5 MG: 5 TABLET, FILM COATED ORAL at 17:09

## 2021-12-07 RX ADMIN — SODIUM CHLORIDE 20 MG/HR: 900 INJECTION, SOLUTION INTRAVENOUS at 07:58

## 2021-12-07 RX ADMIN — MIDAZOLAM 2 MG: 1 INJECTION INTRAMUSCULAR; INTRAVENOUS at 09:13

## 2021-12-07 RX ADMIN — Medication 10 ML: at 13:16

## 2021-12-07 RX ADMIN — FENTANYL CITRATE 50 MCG: 0.05 INJECTION, SOLUTION INTRAMUSCULAR; INTRAVENOUS at 09:13

## 2021-12-07 RX ADMIN — DILTIAZEM HYDROCHLORIDE 240 MG: 240 CAPSULE, COATED, EXTENDED RELEASE ORAL at 10:17

## 2021-12-07 RX ADMIN — ROSUVASTATIN CALCIUM 40 MG: 40 TABLET, FILM COATED ORAL at 21:17

## 2021-12-07 RX ADMIN — MIDAZOLAM 1 MG: 1 INJECTION INTRAMUSCULAR; INTRAVENOUS at 09:18

## 2021-12-07 RX ADMIN — Medication 10 ML: at 06:25

## 2021-12-07 RX ADMIN — Medication 10 ML: at 21:09

## 2021-12-07 RX ADMIN — CEFTRIAXONE 1 G: 1 INJECTION, POWDER, FOR SOLUTION INTRAMUSCULAR; INTRAVENOUS at 21:11

## 2021-12-07 RX ADMIN — GUAIFENESIN 600 MG: 600 TABLET, EXTENDED RELEASE ORAL at 21:08

## 2021-12-07 RX ADMIN — PANTOPRAZOLE SODIUM 40 MG: 40 TABLET, DELAYED RELEASE ORAL at 10:17

## 2021-12-07 RX ADMIN — PREDNISONE 30 MG: 20 TABLET ORAL at 13:16

## 2021-12-07 RX ADMIN — DIGOXIN 0.12 MG: 125 TABLET ORAL at 10:17

## 2021-12-07 RX ADMIN — SODIUM CHLORIDE 20 MG/HR: 900 INJECTION, SOLUTION INTRAVENOUS at 02:55

## 2021-12-07 RX ADMIN — APIXABAN 5 MG: 5 TABLET, FILM COATED ORAL at 10:17

## 2021-12-07 RX ADMIN — GUAIFENESIN 600 MG: 600 TABLET, EXTENDED RELEASE ORAL at 10:17

## 2021-12-07 RX ADMIN — TAMSULOSIN HYDROCHLORIDE 0.4 MG: 0.4 CAPSULE ORAL at 10:17

## 2021-12-07 RX ADMIN — ASPIRIN 81 MG: 81 TABLET, COATED ORAL at 10:17

## 2021-12-07 RX ADMIN — METOPROLOL TARTRATE 25 MG: 25 TABLET, FILM COATED ORAL at 10:17

## 2021-12-07 RX ADMIN — AMIODARONE HYDROCHLORIDE 200 MG: 200 TABLET ORAL at 10:17

## 2021-12-07 RX ADMIN — FINASTERIDE 5 MG: 5 TABLET, FILM COATED ORAL at 21:17

## 2021-12-07 RX ADMIN — FUROSEMIDE 20 MG: 40 TABLET ORAL at 10:17

## 2021-12-07 NOTE — PROGRESS NOTES
Hospitalist Progress Note    NAME: Natalie Lowery   :  1950   MRN:  [id number]       Assessment / Plan:  Sepsis  Acute on chronic respiratory failure due to  COPD exacerbation, due to URI  Chest CT negative for infection  UA negative for infection  Suspect URI. He feels his nasal congestion is better with antibiotics  Continue with empiric abx  Continue 3LNC. He reports that he uses anywhere from 3-6 L at home as needed  Continue inhalers and PO Prednisone (taper)  DC planning for tomorrow if clinically improved, remains in sinus rhythm and leukocytosis better    Aflutter, rate uncontrolled  Cardiomyopathy  Echo EF 25-30%. No AS, trace MR  Continue Eliquis  Continue amiodarone, Cardizem and metoprolol  S/P DCCV , successful    Ankle pain s/p fall  Ortho evals on going, they believe the ankle is not the source of his infection    Hypertension  Dyslipidemia  BPH  -continue proscar, flomax     GERD  -continue PPI    30.0 - 39.9 Obese / Body mass index is 37.67 kg/m². Estimated discharge date: December 10  Barriers:    Code status: Full  Prophylaxis: Eliquis  Recommended Disposition: TBD     Subjective:     Chief Complaint / Reason for Physician Visit  Feeling better. Discussed with RN events overnight. Review of Systems:  Symptom Y/N Comments  Symptom Y/N Comments   Fever/Chills    Chest Pain     Poor Appetite    Edema     Cough    Abdominal Pain     Sputum    Joint Pain     SOB/BOWDEN    Pruritis/Rash     Nausea/vomit    Tolerating PT/OT     Diarrhea    Tolerating Diet     Constipation    Other       Could NOT obtain due to:      Objective:     VITALS:   Last 24hrs VS reviewed since prior progress note.  Most recent are:  Patient Vitals for the past 24 hrs:   Temp Pulse Resp BP SpO2   21 1109 97.6 °F (36.4 °C) (!) 104 23 137/77 90 %   21 1014 98.9 °F (37.2 °C) (!) 105 20 126/73 91 %   21 1000  (!) 101 19 121/62 91 %   21 0955  (!) 104 25 119/73 91 % 12/07/21 0950  (!) 103 22 122/65 93 %   12/07/21 0940  (!) 103 24 128/69 93 %   12/07/21 0935  (!) 104 19 135/65 93 %   12/07/21 0930  (!) 105 20 129/68 93 %   12/07/21 0925  (!) 104 23 130/73 93 %   12/07/21 0920  (!) 103 18 (!) 142/69 90 %   12/07/21 0915  (!) 129 19 128/79 90 %   12/07/21 0910  (!) 130 24 129/75 92 %   12/07/21 0905  (!) 131 22 124/70 94 %   12/07/21 0711 97.4 °F (36.3 °C) (!) 129 23 132/81 93 %   12/07/21 0255 98.1 °F (36.7 °C) (!) 125 20 131/89 91 %   12/06/21 2224 99 °F (37.2 °C) (!) 127 20 135/67 92 %   12/06/21 2036  (!) 130  113/75    12/06/21 1959 98 °F (36.7 °C) (!) 130 23 105/73 92 %   12/06/21 1837  (!) 130  126/77    12/06/21 1550 99.7 °F (37.6 °C) (!) 129 25 113/76 92 %   12/06/21 1309  (!) 127 27 (!) 127/99 93 %   12/06/21 1239  (!) 127 21 133/69 93 %       Intake/Output Summary (Last 24 hours) at 12/7/2021 1238  Last data filed at 12/7/2021 1014  Gross per 24 hour   Intake 988.04 ml   Output 1350 ml   Net -361.96 ml        I had a face to face encounter and independently examined this patient on 12/7/2021, as outlined below:  PHYSICAL EXAM:  General: Alert, cooperative, NAD  EENT:  EOMI. Anicteric sclerae. MMM  Resp:  CTA bilaterally, no wheezing or rales. No accessory muscle use  CV:  Irregularly irregular  GI:  Soft, Non distended, Non tender. +Bowel sounds  Neurologic:  Alert and oriented X 3, normal speech,   Psych:   Good insight. Not anxious nor agitated  Skin:  No rashes.   No jaundice    Reviewed most current lab test results and cultures  YES  Reviewed most current radiology test results   YES  Review and summation of old records today    NO  Reviewed patient's current orders and MAR    YES  PMH/SH reviewed - no change compared to H&P  ________________________________________________________________________  Care Plan discussed with:    Comments   Patient x    Family      RN x    Care Manager     Consultant                        Multidiciplinary team rounds were held today with , nursing, pharmacist and clinical coordinator. Patient's plan of care was discussed; medications were reviewed and discharge planning was addressed. ________________________________________________________________________  Total NON critical care TIME:  35   Minutes    Total CRITICAL CARE TIME Spent:   Minutes non procedure based      Comments   >50% of visit spent in counseling and coordination of care     ________________________________________________________________________  Velma Gowers, MD     Procedures: see electronic medical records for all procedures/Xrays and details which were not copied into this note but were reviewed prior to creation of Plan. LABS:  I reviewed today's most current labs and imaging studies.   Pertinent labs include:  Recent Labs     12/06/21 0229 12/05/21  1852   WBC 15.9* 16.1*   HGB 11.1* 12.2   HCT 33.2* 37.1   * 148*     Recent Labs     12/06/21 0229 12/05/21  1852   * 130*   K 3.4* 4.5   CL 99 95*   CO2 26 28   * 146*   BUN 16 18   CREA 1.19 1.45*   CA 8.4* 9.1   MG 1.9  --    ALB 2.7* 3.3*   TBILI 1.0 0.6   ALT 44 53       Signed: Velma Gowers, MD

## 2021-12-07 NOTE — PROGRESS NOTES
TRANSFER - OUT REPORT:    Verbal report given to [facility] (name) on Jil Needs being transferred to [facility](unit) for routine post - op       Report consisted of patient's Situation, Background, Assessment and   Recommendations(SBAR). Information from the following report(s) SBAR, Kardex, Procedure Summary, Intake/Output, MAR, Cardiac Rhythm Sinus tach, Pre Procedure Checklist and Procedure Verification was reviewed with the receiving nurse. Opportunity for questions and clarification was provided.       Patient transported with:   O2 @ 3 liters

## 2021-12-07 NOTE — PROGRESS NOTES
Transition of Care Plan:    RUR: 14%  Disposition: Home vs Home with Home Health   Follow up appointments: PCP; Cardiology  DME needed: None  Transportation at Discharge: Pt's wife Chava Silva will transport at d/c.   [address] or means to access home:  Pt has access       IM Medicare Letter:2nd IM Letter needed  Is patient a BCPI-A Bundle: N/A         If yes, was Bundle Letter given?:  N/A  Is patient a  and connected with the [place]? N/A  If yes, was [employer] transfer form completed and [facility] notified? N/A  Caregiver Contact: Chava Silva- wife [phone]  Discharge Caregiver contacted prior to discharge? CM will notify caregiver at d/c.     Reason for Admission:   Respiratory failure -acute on chronic; CHF, febrile, atrial flutter. RUR Score:  14 Low risk for readmission                 PCP: First and Last name:   Valencia Peres MD     Name of Practice:    Are you a current patient: Yes/No: Yes   Approximate date of last visit: December 2021   Can you participate in a virtual visit if needed: No    Do you (patient/family) have any concerns for transition/discharge? None               Plan for utilizing home health:   Pt has not had home health in the past. Pt is receptive to home health if needed. Current Advanced Directive/Advance Care Plan:  Full Code; Pt does not have an ACP on file. CM addressed with pt about AMD. Pt stated that his wife would be his primary decision maker. Advance Care Planning     General Advance Care Planning (ACP) Conversation      Date of Conversation: 12/7/2021  Conducted with: Patient with Decision Making Capacity    Healthcare Decision Maker:     Primary Decision Maker: Gideon Wood - Spouse - [phone]  Click here to complete [address] including selection of the Healthcare Decision Maker Relationship (ie \"Primary\")      Today we documented Decision Maker(s) consistent with Legal Next of Kin hierarchy.     Content/Action Overview:   DECLINED ACP conversation - will revisit periodically   Reviewed DNR/DNI and patient elects Full Code (Attempt Resuscitation)  Length of Voluntary ACP Conversation in minutes:            CM met with pt at bedside to discuss d/c plan. Pt was alert and oriented. CM verified pt's demographics, insurance and PCP. Pt is a 66 y/o  male admitted to [facility] on 12/5/2021 for  Respiratory failure -acute on chronic; CHF, febrile, atrial flutter. Pt's PCP is Dr. Hubert Hilario. Pt sees PCP three times a year. Pt uses [facility] mail-in and [facility] in [place] for Rx. Pt resides in a two level home with 4 ARSENIO. Pt is independent with ADL's and IDL's. Pt does drive. Pt has home oxygen, Bipap machine and trilogy. Pt is on 6lnc of home oxygen supplied by [facility]. No HH, SNF or IPR in the past. Pt is FULL code status. Pt does not have an ACP on file. Pt's wife Maryjane Vargas will transport at d/c.     CM addressed with pt about home health. Pt stated that he would like to wait before deciding on home health. Care Management Interventions  PCP Verified by CM: Yes (Pt's PCP is Dr. Hubert Hilario)  Mode of Transport at Discharge: Other (see comment) (Pt's wife will transport at d/c. )  Transition of Care Consult (CM Consult): Discharge Planning (Home vs Home with [facility]. )  Discharge Durable Medical Equipment: No (Pt has home oxygen, Bipap and Trilogy)  Physical Therapy Consult: No  Occupational Therapy Consult: No  Speech Therapy Consult: No  Support Systems: Spouse/Significant Other (Pt resides in a two level home with 4 ARSENIO. )  Confirm Follow Up Transport: Self (Pt does drive)  [address] Provided?: No  Discharge Location  Discharge Placement:  (Home vs Home with Follow-up appointments)    CM will continue to follow patient for discharge planning needs and arrange for services as deemed necessary.     Blayne Merino [address] [place] [address], [facility]  [phone]

## 2021-12-07 NOTE — PROGRESS NOTES
Pt sedated with 3mg Versed and 50mcg Fentanyl for Cardioversion, given 1 synchronized shock(s) at 360 Joules, Aflutter converted to Sinus tach.  (monitored sedation from 0912 to 0923)

## 2021-12-07 NOTE — PROGRESS NOTES
Patient arrived to Non-Invasive Cardiology Lab for In Patient [facility] Procedure. Staff introduced to patient. Patient identifiers verified with Name and Date of Birth. Procedure verified with patient. Consent forms reviewed and signed by patient or authorized representative and verified. Allergies verified. Patient informed of procedure and plan of care. Questions answered with review. Patient on cardiac monitor, non-invasive blood pressure, SPO2 monitor. On RA. Patient is A&Ox3. Patient reports no complaints. Patient on stretcher, in low position, with side rails up. Patient instructed to call for assistance as needed.

## 2021-12-07 NOTE — PROGRESS NOTES
Problem: Pain  Goal: *Control of Pain  Outcome: Progressing Towards Goal  Goal: *PALLIATIVE CARE:  Alleviation of Pain  Outcome: Progressing Towards Goal     Problem: Patient Education: Go to Patient Education Activity  Goal: Patient/Family Education  Outcome: Progressing Towards Goal     Problem: Falls - Risk of  Goal: *Absence of Falls  Description: Document Lizy Guzmán Fall Risk and appropriate interventions in the flowsheet.   Outcome: Progressing Towards Goal  Note: Fall Risk Interventions:  Mobility Interventions: Assess mobility with egress test, Bed/chair exit alarm, Patient to call before getting OOB, Strengthening exercises (ROM-active/passive)         Medication Interventions: Assess postural VS orthostatic hypotension, Bed/chair exit alarm, Patient to call before getting OOB, Teach patient to arise slowly, Evaluate medications/consider consulting pharmacy    Elimination Interventions: Bed/chair exit alarm, Call light in reach, Patient to call for help with toileting needs, Toileting schedule/hourly rounds    History of Falls Interventions: Bed/chair exit alarm, Room close to nurse's station         Problem: Patient Education: Go to Patient Education Activity  Goal: Patient/Family Education  Outcome: Progressing Towards Goal     Problem: Arrhythmia Pathway (Adult)  Goal: *Absence of arrhythmia  Outcome: Progressing Towards Goal     Problem: Patient Education: Go to Patient Education Activity  Goal: Patient/Family Education  Outcome: Progressing Towards Goal

## 2021-12-07 NOTE — PROGRESS NOTES
12/06/21 1959   Vital Signs   Temp 98 °F (36.7 °C)   Temp Source Oral   Pulse (Heart Rate) (!) 130   Heart Rate Source Monitor   Resp Rate 23   O2 Sat (%) 92 %   Level of Consciousness Alert (0)   /73   MAP (Calculated) 84   BP 1 Method Automatic   BP 1 Location Right upper arm   BP Patient Position At rest; Sitting   MEWS Score 5   Oxygen Therapy   O2 Device Nasal cannula   Patient is on cardizem gtt.  Will continue to monitor

## 2021-12-07 NOTE — PROGRESS NOTES
0700 Bedside shift change report given to [facility], RN (oncoming nurse) by Tamara Rojas RN (offgoing nurse). Report included the following information SBAR, Kardex, Intake/Output, MAR, Recent Results and Cardiac Rhythm A Flutter. 0725 Pt resting quietly in bed at this time. Pt remains in A Flutter with HR sustaining in 120's. Pt on dilt gtt infusing 20 mg/hr. Plan for cardioversion this AM.     0851 Pt off the floor for cardioversion. 1015 Pt back in room after procedure. Pt currently in sinus tach in low 100's. Diltiazem gtt stopped. Will call kitchen regarding diet. 1130 Pt repositioned into recliner. VSS. HR remains in low 100's. RLE elevated and ice placed on ankle. 1345 Pt requesting A&D cream for spots where pads were during cardioversion. Message sent to Dr. Dudley Raygoza.     1720 Pt sitting up on side of the bed, eating dinner. Pt with no complaints. Will continue to monitor. 1900 End of Shift Note    Bedside shift change report given to Parish Messer RN (oncoming nurse) by Rashad Babin (offgoing nurse). Report included the following information SBAR, Kardex, Intake/Output, MAR, Recent Results and Cardiac Rhythm Sinus Tach    Shift worked:  [id number]     Shift summary and any significant changes:     Successful cardioversion today-pt now in Sinus Tach with HR in low 100's, pt remains on 3-4L O2, pt fitted for ankle boot today     Concerns for physician to address:  Pt VERY weak, consult to PT/OT and recommendations for D/C planning purposes     Zone phone for oncoming shift:           Activity:  Activity Level:  Up with Assistance  Number times ambulated in hallways past shift: 0  Number of times OOB to chair past shift: 1    Cardiac:   Cardiac Monitoring: Yes      Cardiac Rhythm: Sinus Tachy    Access:   Current line(s): PIV     Genitourinary:   Urinary status: voiding    Respiratory:   O2 Device: Nasal cannula  Chronic home O2 use?: YES  Incentive spirometer at bedside: YES     GI:  Last Bowel Movement Date: 12/05/21  Current diet:  ADULT DIET Regular; Low Sodium (2 gm); 1500 ml  Passing flatus: YES  Tolerating current diet: YES       Pain Management:   Patient states pain is manageable on current regimen: YES    Skin:  Benedict Score: 19  Interventions: increase time out of bed, PT/OT consult and internal/external urinary devices    Patient Safety:  Fall Score:  Total Score: 4  Interventions: bed/chair alarm, assistive device (walker, cane, etc), gripper socks, pt to call before getting OOB and stay with me (per policy)  High Fall Risk: Yes    Length of Stay:  Expected LOS: 4d 19h  Actual LOS: [address]

## 2021-12-07 NOTE — PROGRESS NOTES
Problem: Falls - Risk of  Goal: *Absence of Falls  Description: Document Kellie Davidson Fall Risk and appropriate interventions in the flowsheet.   Outcome: Progressing Towards Goal  Note: Fall Risk Interventions:  Mobility Interventions: Bed/chair exit alarm, Communicate number of staff needed for ambulation/transfer         Medication Interventions: Bed/chair exit alarm, Evaluate medications/consider consulting pharmacy, Patient to call before getting OOB, Teach patient to arise slowly    Elimination Interventions: Bed/chair exit alarm, Call light in reach, Elevated toilet seat, Patient to call for help with toileting needs, Stay With Me (per policy), Toilet paper/wipes in reach, Toileting schedule/hourly rounds, Urinal in reach    History of Falls Interventions: Bed/chair exit alarm, Consult care management for discharge planning, Utilize gait belt for transfer/ambulation, Assess for delayed presentation/identification of injury for 48 hrs (comment for end date)         Problem: Patient Education: Go to Patient Education Activity  Goal: Patient/Family Education  Outcome: Progressing Towards Goal     Problem: Arrhythmia Pathway (Adult)  Goal: *Absence of arrhythmia  Outcome: Progressing Towards Goal     Problem: Patient Education: Go to Patient Education Activity  Goal: Patient/Family Education  Outcome: Progressing Towards Goal

## 2021-12-07 NOTE — PROGRESS NOTES
12/06/21 2224   Vital Signs   Temp 99 °F (37.2 °C)   Temp Source Oral   Pulse (Heart Rate) (!) 127   Heart Rate Source Monitor   Cardiac Rhythm Atrial Flutter   Resp Rate 20   O2 Sat (%) 92 %   Level of Consciousness Alert (0)   /67   MAP (Calculated) 90   MEWS Score 3   Pain 1   Pain Scale 1 Numeric (0 - 10)   Pain Intensity 1 0   Oxygen Therapy   O2 Device Nasal cannula   O2 Flow Rate (L/min) 3 l/min   Made Dr. Kee Griffin- cardiology, aware of continued increased heart rate. Will continue to monitor.

## 2021-12-08 ENCOUNTER — APPOINTMENT (OUTPATIENT)
Dept: MRI IMAGING | Age: 71
DRG: 871 | End: 2021-12-08
Attending: PHYSICIAN ASSISTANT
Payer: MEDICARE

## 2021-12-08 LAB
ALBUMIN SERPL-MCNC: 2.4 G/DL (ref 3.5–5)
ALBUMIN/GLOB SERPL: 0.5 {RATIO} (ref 1.1–2.2)
ALP SERPL-CCNC: 96 U/L (ref 45–117)
ALT SERPL-CCNC: 95 U/L (ref 12–78)
ANION GAP SERPL CALC-SCNC: 5 MMOL/L (ref 5–15)
AST SERPL-CCNC: 65 U/L (ref 15–37)
BACTERIA SPEC CULT: ABNORMAL
BACTERIA SPEC CULT: ABNORMAL
BASOPHILS # BLD: 0 K/UL (ref 0–0.1)
BASOPHILS NFR BLD: 0 % (ref 0–1)
BILIRUB SERPL-MCNC: 0.4 MG/DL (ref 0.2–1)
BUN SERPL-MCNC: 28 MG/DL (ref 6–20)
BUN/CREAT SERPL: 24 (ref 12–20)
CALCIUM SERPL-MCNC: 9.1 MG/DL (ref 8.5–10.1)
CHLORIDE SERPL-SCNC: 100 MMOL/L (ref 97–108)
CO2 SERPL-SCNC: 28 MMOL/L (ref 21–32)
CREAT SERPL-MCNC: 1.15 MG/DL (ref 0.7–1.3)
DIFFERENTIAL METHOD BLD: ABNORMAL
EOSINOPHIL # BLD: 0 K/UL (ref 0–0.4)
EOSINOPHIL NFR BLD: 0 % (ref 0–7)
ERYTHROCYTE [DISTWIDTH] IN BLOOD BY AUTOMATED COUNT: 14.5 % (ref 11.5–14.5)
GLOBULIN SER CALC-MCNC: 5 G/DL (ref 2–4)
GLUCOSE SERPL-MCNC: 122 MG/DL (ref 65–100)
HCT VFR BLD AUTO: 33.7 % (ref 36.6–50.3)
HGB BLD-MCNC: 10.7 G/DL (ref 12.1–17)
IMM GRANULOCYTES # BLD AUTO: 0 K/UL (ref 0–0.04)
IMM GRANULOCYTES NFR BLD AUTO: 0 % (ref 0–0.5)
LYMPHOCYTES # BLD: 1.2 K/UL (ref 0.8–3.5)
LYMPHOCYTES NFR BLD: 6 % (ref 12–49)
MAGNESIUM SERPL-MCNC: 2.5 MG/DL (ref 1.6–2.4)
MCH RBC QN AUTO: 28.5 PG (ref 26–34)
MCHC RBC AUTO-ENTMCNC: 31.8 G/DL (ref 30–36.5)
MCV RBC AUTO: 89.6 FL (ref 80–99)
MONOCYTES # BLD: 1 K/UL (ref 0–1)
MONOCYTES NFR BLD: 5 % (ref 5–13)
MYELOCYTES NFR BLD MANUAL: 1 %
NEUTS BAND NFR BLD MANUAL: 1 %
NEUTS SEG # BLD: 17.3 K/UL (ref 1.8–8)
NEUTS SEG NFR BLD: 87 % (ref 32–75)
NRBC # BLD: 0 K/UL (ref 0–0.01)
NRBC BLD-RTO: 0 PER 100 WBC
PLATELET # BLD AUTO: 202 K/UL (ref 150–400)
PMV BLD AUTO: 10.1 FL (ref 8.9–12.9)
POTASSIUM SERPL-SCNC: 4.5 MMOL/L (ref 3.5–5.1)
PROT SERPL-MCNC: 7.4 G/DL (ref 6.4–8.2)
RBC # BLD AUTO: 3.76 M/UL (ref 4.1–5.7)
RBC MORPH BLD: ABNORMAL
SERVICE CMNT-IMP: ABNORMAL
SODIUM SERPL-SCNC: 133 MMOL/L (ref 136–145)
WBC # BLD AUTO: 19.7 K/UL (ref 4.1–11.1)

## 2021-12-08 PROCEDURE — 80053 COMPREHEN METABOLIC PANEL: CPT

## 2021-12-08 PROCEDURE — 83735 ASSAY OF MAGNESIUM: CPT

## 2021-12-08 PROCEDURE — 74011250637 HC RX REV CODE- 250/637: Performed by: INTERNAL MEDICINE

## 2021-12-08 PROCEDURE — 74011000250 HC RX REV CODE- 250: Performed by: STUDENT IN AN ORGANIZED HEALTH CARE EDUCATION/TRAINING PROGRAM

## 2021-12-08 PROCEDURE — 87147 CULTURE TYPE IMMUNOLOGIC: CPT

## 2021-12-08 PROCEDURE — 36415 COLL VENOUS BLD VENIPUNCTURE: CPT

## 2021-12-08 PROCEDURE — 85025 COMPLETE CBC W/AUTO DIFF WBC: CPT

## 2021-12-08 PROCEDURE — 74011636637 HC RX REV CODE- 636/637: Performed by: INTERNAL MEDICINE

## 2021-12-08 PROCEDURE — 74011000258 HC RX REV CODE- 258: Performed by: STUDENT IN AN ORGANIZED HEALTH CARE EDUCATION/TRAINING PROGRAM

## 2021-12-08 PROCEDURE — 99223 1ST HOSP IP/OBS HIGH 75: CPT | Performed by: INTERNAL MEDICINE

## 2021-12-08 PROCEDURE — 65660000001 HC RM ICU INTERMED STEPDOWN

## 2021-12-08 PROCEDURE — 77010033678 HC OXYGEN DAILY

## 2021-12-08 PROCEDURE — 73723 MRI JOINT LWR EXTR W/O&W/DYE: CPT

## 2021-12-08 PROCEDURE — 87040 BLOOD CULTURE FOR BACTERIA: CPT

## 2021-12-08 PROCEDURE — A9576 INJ PROHANCE MULTIPACK: HCPCS | Performed by: STUDENT IN AN ORGANIZED HEALTH CARE EDUCATION/TRAINING PROGRAM

## 2021-12-08 PROCEDURE — 74011250636 HC RX REV CODE- 250/636: Performed by: STUDENT IN AN ORGANIZED HEALTH CARE EDUCATION/TRAINING PROGRAM

## 2021-12-08 RX ADMIN — GUAIFENESIN 600 MG: 600 TABLET, EXTENDED RELEASE ORAL at 21:51

## 2021-12-08 RX ADMIN — APIXABAN 5 MG: 5 TABLET, FILM COATED ORAL at 09:09

## 2021-12-08 RX ADMIN — APIXABAN 5 MG: 5 TABLET, FILM COATED ORAL at 18:18

## 2021-12-08 RX ADMIN — GUAIFENESIN 600 MG: 600 TABLET, EXTENDED RELEASE ORAL at 09:09

## 2021-12-08 RX ADMIN — PANTOPRAZOLE SODIUM 40 MG: 40 TABLET, DELAYED RELEASE ORAL at 09:09

## 2021-12-08 RX ADMIN — NAFCILLIN SODIUM 2 G: 2 INJECTION, POWDER, LYOPHILIZED, FOR SOLUTION INTRAMUSCULAR; INTRAVENOUS at 14:54

## 2021-12-08 RX ADMIN — NAFCILLIN SODIUM 2 G: 2 INJECTION, POWDER, LYOPHILIZED, FOR SOLUTION INTRAMUSCULAR; INTRAVENOUS at 18:18

## 2021-12-08 RX ADMIN — ASPIRIN 81 MG: 81 TABLET, COATED ORAL at 09:09

## 2021-12-08 RX ADMIN — METOPROLOL TARTRATE 25 MG: 25 TABLET, FILM COATED ORAL at 09:09

## 2021-12-08 RX ADMIN — Medication 10 ML: at 05:03

## 2021-12-08 RX ADMIN — GADOTERIDOL 20 ML: 279.3 INJECTION, SOLUTION INTRAVENOUS at 15:00

## 2021-12-08 RX ADMIN — FUROSEMIDE 20 MG: 40 TABLET ORAL at 09:09

## 2021-12-08 RX ADMIN — Medication 10 ML: at 14:53

## 2021-12-08 RX ADMIN — ROSUVASTATIN CALCIUM 40 MG: 40 TABLET, FILM COATED ORAL at 21:51

## 2021-12-08 RX ADMIN — NAFCILLIN SODIUM 2 G: 2 INJECTION, POWDER, LYOPHILIZED, FOR SOLUTION INTRAMUSCULAR; INTRAVENOUS at 11:37

## 2021-12-08 RX ADMIN — METOPROLOL TARTRATE 25 MG: 25 TABLET, FILM COATED ORAL at 18:18

## 2021-12-08 RX ADMIN — TAMSULOSIN HYDROCHLORIDE 0.4 MG: 0.4 CAPSULE ORAL at 09:09

## 2021-12-08 RX ADMIN — DILTIAZEM HYDROCHLORIDE 240 MG: 240 CAPSULE, COATED, EXTENDED RELEASE ORAL at 09:09

## 2021-12-08 RX ADMIN — AMIODARONE HYDROCHLORIDE 200 MG: 200 TABLET ORAL at 09:09

## 2021-12-08 RX ADMIN — PREDNISONE 30 MG: 20 TABLET ORAL at 09:09

## 2021-12-08 RX ADMIN — FINASTERIDE 5 MG: 5 TABLET, FILM COATED ORAL at 21:51

## 2021-12-08 RX ADMIN — DIGOXIN 0.12 MG: 125 TABLET ORAL at 09:09

## 2021-12-08 NOTE — PROGRESS NOTES
Problem: Pain  Goal: *Control of Pain  Outcome: Progressing Towards Goal     Problem: Falls - Risk of  Goal: *Absence of Falls  Description: Document Jah Fall Risk and appropriate interventions in the flowsheet.   Outcome: Progressing Towards Goal  Note: Fall Risk Interventions:  Mobility Interventions: Bed/chair exit alarm, Patient to call before getting OOB, PT Consult for mobility concerns    Mentation Interventions: Bed/chair exit alarm, More frequent rounding    Medication Interventions: Bed/chair exit alarm, Patient to call before getting OOB, Teach patient to arise slowly    Elimination Interventions: Bed/chair exit alarm, Call light in reach, Patient to call for help with toileting needs, Toileting schedule/hourly rounds    History of Falls Interventions: Bed/chair exit alarm, Door open when patient unattended         Problem: Arrhythmia Pathway (Adult)  Goal: *Absence of arrhythmia  Outcome: Progressing Towards Goal     Problem: Patient Education: Go to Patient Education Activity  Goal: Patient/Family Education  Outcome: Progressing Towards Goal

## 2021-12-08 NOTE — PROGRESS NOTES
EP/ ARRHYTHMIA Progress Note    Patient ID:  Patient: Andrew Fraser  MRN: [id number]  Age: 65 y.o.  : 1950    Date of  Admission: 2021  6:52 PM   PCP:  Mateo Harrison MD   Usual cardiologist:  Annetta Cabral MD    Assessment: 1. Recurrent atrial flutter, unspecified. S/p cardioversion to sinus on .  2. Cardiomyopathy, likely nonischemic and may be tachycardia-mediated. 3. Acute systolic congestive heart failure. Better. 4. COPD exacerbation? 5. GPC bacteremia. Blood cultures from  are 4/4 positive for pan-sensitive Staph aureus. 6. Hypertensive heart disease with heart failure. 7. Full code. Plan:     1. Was going to stop amiodarone, but I'll keep it going post-cardioversion and to try to get him to the next step. 2. Continue diltiazem  mg daily. 3. Continue digoxin 125 mcg po daily. 4. Continue metoprolol IR 25 mg po BID with hold parameter for BP and HR. 5. Agree with treatment for COPD and also for GPC bacteremia. 6. Continue anticoagulation. 7. Continue statin. Given the Staph aureus bacteremia, would like to evaluate him by KIM. I discussed the potential benefits, risks, and alternatives with him and he agrees to proceed. Would hold Eliquis the morning of the procedure which will be on Friday 10 am.      [x]       High complexity decision making was performed in this patient at high risk for decompensation with multiple organ involvement. Andrew Fraser is a 65 y.o. male with a history of atrial fibrillation, s/p Afib ablation with PVI and also substrate work in the left (LA roof line) and right (CTI line) atria. He had recurrent atrial flutter and metoprolol was started atop his diltiazem when seen by Dr. Skye Conn.   He continued to be active, but has noticed over the last week progressively worsening BOWDEN and then SOB at rest.  Came to the ER and found to be in atrial flutter with 2:1 AV conduction with HR in the 120's. He was given diuretic, oxygen. An echo shows his EF is 25-30%. His EF was 40-45% by KIM in 9/2021. Echo EF 55-60% in 3/2021. TODAY:  He was cardioverted yesterday. 12/5 BCX positive for staph. Had a temperature spike last night. He denies syncope, dizziness, palpitations, worsening dyspnea.       No Known Allergies       Current Facility-Administered Medications   Medication Dose Route Frequency    nafcillin (NALLPEN) 2 g in 0.9% sodium chloride (MBP/ADV) 100 mL MBP  2 g IntraVENous Q4H    dilTIAZem ER (CARDIZEM CD) capsule 240 mg  240 mg Oral DAILY    predniSONE (DELTASONE) tablet 30 mg  30 mg Oral DAILY WITH BREAKFAST    vits A and D-white pet-lanolin (A&D) ointment   Topical Q2H PRN    finasteride (PROSCAR) tablet 5 mg  5 mg Oral QHS    albuterol-ipratropium (DUO-NEB) 2.5 MG-0.5 MG/3 ML  3 mL Nebulization Q4H PRN    metoprolol tartrate (LOPRESSOR) tablet 25 mg  25 mg Oral BID    digoxin (LANOXIN) tablet 0.125 mg  0.125 mg Oral DAILY    amiodarone (CORDARONE) tablet 200 mg  200 mg Oral DAILY    sodium chloride (NS) flush 5-10 mL  5-10 mL IntraVENous PRN    apixaban (ELIQUIS) tablet 5 mg  5 mg Oral BID    aspirin delayed-release tablet 81 mg  81 mg Oral DAILY    furosemide (LASIX) tablet 20 mg  20 mg Oral DAILY    pantoprazole (PROTONIX) tablet 40 mg  40 mg Oral DAILY    rosuvastatin (CRESTOR) tablet 40 mg  40 mg Oral QHS    tamsulosin (FLOMAX) capsule 0.4 mg  0.4 mg Oral DAILY    guaiFENesin ER (MUCINEX) tablet 600 mg  600 mg Oral Q12H    sodium chloride (NS) flush 5-40 mL  5-40 mL IntraVENous Q8H    sodium chloride (NS) flush 5-40 mL  5-40 mL IntraVENous PRN    acetaminophen (TYLENOL) tablet 650 mg  650 mg Oral Q6H PRN    Or    acetaminophen (TYLENOL) suppository 650 mg  650 mg Rectal Q6H PRN    polyethylene glycol (MIRALAX) packet 17 g  17 g Oral DAILY PRN    ondansetron (ZOFRAN ODT) tablet 4 mg  4 mg Oral Q8H PRN    Or    ondansetron (ZOFRAN) injection 4 mg  4 mg IntraVENous Q6H PRN       Review of Symptoms:    Respiratory: +SOB, cough, sputum production, wheezing, BOWDEN, negative for pleuritic pain, hemopthysis   Cardiology: negative for chest pain, palpitations, edema, syncope   Gastrointestinal: +flatulence, negative for abdominal pain, N/V, dysphagia, change in bowel habits, bleeding   Genitourinary: negative for dysuria, gross hematuria       Objective:      Physical Exam:  Temp (24hrs), Av.9 °F (37.2 °C), Min:97.8 °F (36.6 °C), Max:100.7 °F (38.2 °C)    Patient Vitals for the past 8 hrs:   Pulse   21 1506 83   21 1107 86    Patient Vitals for the past 8 hrs:   Resp   21 1506 16   21 1107 23    Patient Vitals for the past 8 hrs:   BP   21 1506 126/64   21 1107 (!) 144/76          Intake/Output Summary (Last 24 hours) at 2021 1542  Last data filed at 2021 1506  Gross per 24 hour   Intake 800 ml   Output 1600 ml   Net -800 ml       Nondiaphoretic, not in acute distress. No scleral icterus, mucous membranes moist, conjuctivae pink, no xanthelasma. Unlabored, clear to auscultation bilaterally anteriorly, symmetric air movement. Regular rhythm, no murmur, pericardial rub, knock, or gallop. No JVD or peripheral edema. Palpable radial pulses bilaterally. Abdomen obese, soft, nontender, nondistended. Extremities without cyanosis or clubbing. Muscle tone and bulk normal for age. Skin warm and dry. No rashes or ulcers. Neuro grossly nonfocal.  No tremor. Awake and appropriate. CARDIOGRAPHICS and STUDIES, I reviewed:    Telemetry:  Sinus rhythm. ECG in ER:  Atrial flutter with 2:1 AV conduction. Echo here:    Left Ventricle Normal cavity size. Mildly increased wall thickness. Wall motion: normal. The estimated EF is 25 - 30%. Visually measured ejection fraction. Moderate-to-severely and globally reduced systolic function. Unable to assess diastolic function.  There is inconclusive left ventricular diastolic function. Left Atrium Normal cavity size. Right Ventricle Not well visualized. Normal cavity size and global systolic function. Right Atrium Normal cavity size. Interatrial Septum Interatrial septum not assessed   Aortic Valve Aortic valve not well visualized. No stenosis and no regurgitation. Mitral Valve No stenosis. Mitral valve non-specific thickening. Mild mitral annular calcification. Trace regurgitation. Tricuspid Valve Normal valve structure and no stenosis. Unable to assess tricuspid valve regurgitation. Pulmonic Valve Pulmonic valve not well visualized, but normal doppler findings. No stenosis and no regurgitation. Aorta The aorta was not well visualized. Normal aortic root. Pulmonary Artery Pulmonary hypertension not suggested by Doppler findings. Pericardium Normal pericardium and no evidence of pericardial effusion     CXR:  No edema per the radiologist.       Labs:  No results for input(s): CPK, CKMB, CKNDX, TROIQ in the last 72 hours. No lab exists for component: CPKMB  No results found for: CHOL, CHOLX, CHLST, CHOLV, HDL, HDLP, LDL, LDLC, DLDLP, TGLX, TRIGL, TRIGP, CHHD, CHHDX  No results for input(s): INR, PTP, APTT, INREXT, INREXT in the last 72 hours. Recent Labs     12/08/21 0223 12/06/21 0229 12/05/21  1852   * 134* 130*   K 4.5 3.4* 4.5    99 95*   CO2 28 26 28   BUN 28* 16 18   CREA 1.15 1.19 1.45*   * 114* 146*   CA 9.1 8.4* 9.1   ALB 2.4* 2.7* 3.3*   WBC 19.7* 15.9* 16.1*   HGB 10.7* 11.1* 12.2   HCT 33.7* 33.2* 37.1    127* 148*     Recent Labs     12/08/21 0223 12/06/21 0229 12/05/21  1852   AP 96 57 60   TP 7.4 7.0 7.9   ALB 2.4* 2.7* 3.3*   GLOB 5.0* 4.3* 4.6*     No components found for: GLPOC  No results for input(s): PH, PCO2, PO2 in the last 72 hours.         Mauro Gallo MD  12/8/2021

## 2021-12-08 NOTE — PROGRESS NOTES
EP/ ARRHYTHMIA Progress Note    Patient ID:  Patient: Theresa Calixto  MRN: [id number]  Age: 65 y.o.  : 1950    Date of  Admission: 2021  6:52 PM   PCP:  Ryland Mcmahon MD   Usual cardiologist:  Lemuel Jimenez MD    Assessment: 1. Recurrent atrial flutter, unspecified. 2. Cardiomyopathy, likely nonischemic and may be tachycardia-mediated. 3. Acute systolic congestive heart failure. 4. COPD exacerbation. 5. Hypertensive heart disease with heart failure. 6. Full code. Plan:     1. Was going to stop amiodarone, but I'll keep it going at least through his cardioversion and to try to get him to the next step. 2. Change diltiazem to oral.  3. Add digoxin 250 mcg IV x 1 today, then 125 mcg po daily tomorrow. 4. Continue metoprolol IR 25 mg po BID with hold parameter for BP and HR. 5. Agree with treatment for COPD. 6. Continue anticoagulation. 7. Continue statin. For the future, options include atrial flutter ablation (may be RA or LA target) versus pacemaker-AV node ablation. I'd hope to avoid the latter, this is a last resort. [x]       High complexity decision making was performed in this patient at high risk for decompensation with multiple organ involvement. Theresa Calixto is a 65 y.o. male with a history of atrial fibrillation, s/p Afib ablation with PVI and also substrate work in the left (LA roof line) and right (CTI line) atria. He had recurrent atrial flutter and metoprolol was started atop his diltiazem when seen by Dr. Daylin Rock. He continued to be active, but has noticed over the last week progressively worsening BOWDEN and then SOB at rest.  Came to the ER and found to be in atrial flutter with 2:1 AV conduction with HR in the 120's. He was given diuretic, oxygen. An echo shows his EF is 25-30%. His EF was 40-45% by KIM in 2021. Echo EF 55-60% in 3/2021. TODAY:  He was cardioverted today. No complaints.       No Known Allergies       Current Facility-Administered Medications   Medication Dose Route Frequency    dilTIAZem ER (CARDIZEM CD) capsule 240 mg  240 mg Oral DAILY    predniSONE (DELTASONE) tablet 30 mg  30 mg Oral DAILY WITH BREAKFAST    vits A and D-white pet-lanolin (A&D) ointment   Topical Q2H PRN    cefTRIAXone (ROCEPHIN) 1 g in 0.9% sodium chloride (MBP/ADV) 50 mL MBP  1 g IntraVENous Q24H    finasteride (PROSCAR) tablet 5 mg  5 mg Oral QHS    albuterol-ipratropium (DUO-NEB) 2.5 MG-0.5 MG/3 ML  3 mL Nebulization Q4H PRN    metoprolol tartrate (LOPRESSOR) tablet 25 mg  25 mg Oral BID    digoxin (LANOXIN) tablet 0.125 mg  0.125 mg Oral DAILY    amiodarone (CORDARONE) tablet 200 mg  200 mg Oral DAILY    sodium chloride (NS) flush 5-10 mL  5-10 mL IntraVENous PRN    apixaban (ELIQUIS) tablet 5 mg  5 mg Oral BID    aspirin delayed-release tablet 81 mg  81 mg Oral DAILY    furosemide (LASIX) tablet 20 mg  20 mg Oral DAILY    pantoprazole (PROTONIX) tablet 40 mg  40 mg Oral DAILY    rosuvastatin (CRESTOR) tablet 40 mg  40 mg Oral QHS    tamsulosin (FLOMAX) capsule 0.4 mg  0.4 mg Oral DAILY    guaiFENesin ER (MUCINEX) tablet 600 mg  600 mg Oral Q12H    sodium chloride (NS) flush 5-40 mL  5-40 mL IntraVENous Q8H    sodium chloride (NS) flush 5-40 mL  5-40 mL IntraVENous PRN    acetaminophen (TYLENOL) tablet 650 mg  650 mg Oral Q6H PRN    Or    acetaminophen (TYLENOL) suppository 650 mg  650 mg Rectal Q6H PRN    polyethylene glycol (MIRALAX) packet 17 g  17 g Oral DAILY PRN    ondansetron (ZOFRAN ODT) tablet 4 mg  4 mg Oral Q8H PRN    Or    ondansetron (ZOFRAN) injection 4 mg  4 mg IntraVENous Q6H PRN       Review of Symptoms:    Respiratory: +SOB, cough, sputum production, wheezing, BOWDEN, negative for pleuritic pain   Cardiology: +orthopnea, PND, negative for chest pain, palpitations, edema, syncope   Gastrointestinal: +flatulence, negative for abdominal pain, N/V, dysphagia, change in bowel habits, bleeding   Genitourinary: negative for frequency, urgency, dysuria, gross hematuria, incontinence        Objective:      Physical Exam:  Temp (24hrs), Av.2 °F (36.8 °C), Min:97.4 °F (36.3 °C), Max:99 °F (37.2 °C)    Patient Vitals for the past 8 hrs:   Pulse   21 1700 (!) 102   21 1510 (!) 105   21 1507 (!) 105    Patient Vitals for the past 8 hrs:   Resp   21 1700 25   21 1510 23   21 1507 22    Patient Vitals for the past 8 hrs:   BP   21 1700 (!) 140/78   21 1510 (!) 123/58   21 1507 (!) 123/58          Intake/Output Summary (Last 24 hours) at 2021 1921  Last data filed at 2021 1838  Gross per 24 hour   Intake 991.37 ml   Output 1050 ml   Net -58.63 ml       Nondiaphoretic, not in acute distress. No scleral icterus, mucous membranes moist, conjuctivae pink, no xanthelasma.  +mild accessory muscle use, clear to auscultation bilaterally anteriorly, symmetric air movement. Regular rhythm, no murmur, pericardial rub, knock, or gallop. No JVD or peripheral edema. Palpable radial pulses bilaterally. Abdomen obese, soft, nontender, nondistended. Extremities without cyanosis or clubbing. Muscle tone and bulk normal for age. Skin warm and dry. No rashes or ulcers. Neuro grossly nonfocal.  No tremor. Awake and appropriate. CARDIOGRAPHICS and STUDIES, I reviewed:    Telemetry:  Atrial flutter initially, sinus after cardioversion ~100 bpm.    ECG in ER:  Atrial flutter with 2:1 AV conduction. Echo here:    Left Ventricle Normal cavity size. Mildly increased wall thickness. Wall motion: normal. The estimated EF is 25 - 30%. Visually measured ejection fraction. Moderate-to-severely and globally reduced systolic function. Unable to assess diastolic function. There is inconclusive left ventricular diastolic function. Left Atrium Normal cavity size. Right Ventricle Not well visualized. Normal cavity size and global systolic function.    Right Atrium Normal cavity size. Interatrial Septum Interatrial septum not assessed   Aortic Valve Aortic valve not well visualized. No stenosis and no regurgitation. Mitral Valve No stenosis. Mitral valve non-specific thickening. Mild mitral annular calcification. Trace regurgitation. Tricuspid Valve Normal valve structure and no stenosis. Unable to assess tricuspid valve regurgitation. Pulmonic Valve Pulmonic valve not well visualized, but normal doppler findings. No stenosis and no regurgitation. Aorta The aorta was not well visualized. Normal aortic root. Pulmonary Artery Pulmonary hypertension not suggested by Doppler findings. Pericardium Normal pericardium and no evidence of pericardial effusion     CXR:  No edema per the radiologist.       Labs:  No results for input(s): CPK, CKMB, CKNDX, TROIQ in the last 72 hours. No lab exists for component: CPKMB  No results found for: CHOL, CHOLX, CHLST, CHOLV, HDL, HDLP, LDL, LDLC, DLDLP, TGLX, TRIGL, TRIGP, CHHD, CHHDX  No results for input(s): INR, PTP, APTT, INREXT, INREXT in the last 72 hours. Recent Labs     12/06/21 0229 12/05/21 1852   * 130*   K 3.4* 4.5   CL 99 95*   CO2 26 28   BUN 16 18   CREA 1.19 1.45*   * 146*   CA 8.4* 9.1   ALB 2.7* 3.3*   WBC 15.9* 16.1*   HGB 11.1* 12.2   HCT 33.2* 37.1   * 148*     Recent Labs     12/06/21 0229 12/05/21 1852   AP 57 60   TP 7.0 7.9   ALB 2.7* 3.3*   GLOB 4.3* 4.6*     No components found for: GLPOC  No results for input(s): PH, PCO2, PO2 in the last 72 hours.         Erin Espinoza MD  12/7/2021

## 2021-12-08 NOTE — PROGRESS NOTES
Problem: Pain  Goal: *Control of Pain  Outcome: Progressing Towards Goal  Goal: *PALLIATIVE CARE:  Alleviation of Pain  Outcome: Progressing Towards Goal     Problem: Patient Education: Go to Patient Education Activity  Goal: Patient/Family Education  Outcome: Progressing Towards Goal     Problem: Falls - Risk of  Goal: *Absence of Falls  Description: Document Clydene Bone Fall Risk and appropriate interventions in the flowsheet.   Outcome: Progressing Towards Goal  Note: Fall Risk Interventions:  Mobility Interventions: Assess mobility with egress test, Bed/chair exit alarm, Patient to call before getting OOB         Medication Interventions: Bed/chair exit alarm, Evaluate medications/consider consulting pharmacy, Patient to call before getting OOB    Elimination Interventions: Bed/chair exit alarm, Call light in reach, Patient to call for help with toileting needs    History of Falls Interventions: Door open when patient unattended, Evaluate medications/consider consulting pharmacy, Room close to nurse's station         Problem: Patient Education: Go to Patient Education Activity  Goal: Patient/Family Education  Outcome: Progressing Towards Goal     Problem: Arrhythmia Pathway (Adult)  Goal: *Absence of arrhythmia  Outcome: Progressing Towards Goal     Problem: Patient Education: Go to Patient Education Activity  Goal: Patient/Family Education  Outcome: Progressing Towards Goal

## 2021-12-08 NOTE — PROGRESS NOTES
Physical Therapy    Order acknowledged, chart reviewed. Pt unavailable for PT evaluation due to leaving floor for MRI. Will con't to follow.     Chris Bruno, PT, MPT

## 2021-12-08 NOTE — PROGRESS NOTES
Hospitalist Progress Note    NAME: Violette Khan   :  1950   MRN:  [id number]       Assessment / Plan:  Sepsis  MSSA bacteremia    Fever/ leukocytosis  Blood culture from  positive for MSSA   Started on Nafcillin, ceftriaxone stopped. ID consulted  Repeat Blood culture until x 2 negative  UA /CT chest negative  Ortho following for Right ankle pain, xray negative, getting MRI today  TTE showed no vegetation    Acute on chronic respiratory failure due to  COPD exacerbation    Chest CT negative for infection  Continue 3LNC. He reports that he uses anywhere from 3-6 L at home as needed  Continue inhalers and PO Prednisone (taper)    Aflutter, rate uncontrolled  Cardiomyopathy  Echo EF 25-30%. No AS, trace MR  Continue Eliquis  Continue amiodarone, Cardizem and metoprolol  S/P DCCV , successful    Ankle pain s/p fall  Ortho evals on going  MRI pending.     Hypertension  Dyslipidemia  BPH  -continue proscar, flomax     GERD  -continue PPI     30.0 - 39.9 Obese / Body mass index is 37.67 kg/m².     Estimated discharge date: December 10  Barriers: Fever/Work up for MSSA bacteremia     Code status: Full  Prophylaxis: Eliquis  Recommended Disposition: TBD     Subjective:     Chief Complaint / Reason for Physician Visit  Follow up for Sepsis/MSSA bacteremia  He remains on 3 liters oxygen. Has no active complains. Review of Systems:  Symptom Y/N Comments  Symptom Y/N Comments   Fever/Chills n   Chest Pain n    Poor Appetite n   Edema n    Cough    Abdominal Pain     Sputum    Joint Pain     SOB/BOWDEN    Pruritis/Rash     Nausea/vomit    Tolerating PT/OT     Diarrhea    Tolerating Diet     Constipation    Other       Could NOT obtain due to:      Objective:     VITALS:   Last 24hrs VS reviewed since prior progress note.  Most recent are:  Patient Vitals for the past 24 hrs:   Temp Pulse Resp BP SpO2   21 1107 97.8 °F (36.6 °C) 86 23 (!) 144/76 94 %   21 0700 97.9 °F (36.6 °C) 88 22 (!) 152/81 92 %   12/08/21 0301 97.9 °F (36.6 °C) 88 25 136/81 (!) 87 %   12/07/21 2300 99.4 °F (37.4 °C) 91 19 (!) 149/70 91 %   12/07/21 1942 (!) 100.7 °F (38.2 °C) 96 24 (!) 155/82 93 %   12/07/21 1700 (!) 100.7 °F (38.2 °C) (!) 102 25 (!) 140/78 93 %   12/07/21 1510 98 °F (36.7 °C) (!) 105 23 (!) 123/58 90 %   12/07/21 1507 98.3 °F (36.8 °C) (!) 105 22 (!) 123/58 (!) 89 %       Intake/Output Summary (Last 24 hours) at 12/8/2021 1333  Last data filed at 12/8/2021 1137  Gross per 24 hour   Intake 340 ml   Output 1600 ml   Net -1260 ml        I had a face to face encounter and independently examined this patient on 12/8/2021, as outlined below:  PHYSICAL EXAM:  General: WD, WN. Alert, cooperative, no acute distress    EENT:  EOMI. Anicteric sclerae. MMM  Resp:  CTA bilaterally, no wheezing or rales. No accessory muscle use  CV:  Regular  rhythm,  No edema  GI:  Soft, Non distended, Non tender. +Bowel sounds  Neurologic:  Alert and oriented X 3, normal speech,   Psych:   Good insight. Not anxious nor agitated  Skin:  No rashes. No jaundice    Reviewed most current lab test results and cultures  YES  Reviewed most current radiology test results   YES  Review and summation of old records today    NO  Reviewed patient's current orders and MAR    YES  PMH/SH reviewed - no change compared to H&P  ________________________________________________________________________  Care Plan discussed with:    Comments   Patient y    Family      RN y    Care Manager     Consultant                        Multidiciplinary team rounds were held today with , nursing, pharmacist and clinical coordinator. Patient's plan of care was discussed; medications were reviewed and discharge planning was addressed.      ________________________________________________________________________  Total NON critical care TIME: 37  Minutes    Total CRITICAL CARE TIME Spent:   Minutes non procedure based      Comments   >50% of visit spent in counseling and coordination of care     ________________________________________________________________________  Marky Allred MD     Procedures: see electronic medical records for all procedures/Xrays and details which were not copied into this note but were reviewed prior to creation of Plan. LABS:  I reviewed today's most current labs and imaging studies.   Pertinent labs include:  Recent Labs     12/08/21 0223 12/06/21 0229 12/05/21  1852   WBC 19.7* 15.9* 16.1*   HGB 10.7* 11.1* 12.2   HCT 33.7* 33.2* 37.1    127* 148*     Recent Labs     12/08/21 0223 12/06/21 0229 12/05/21  1852   * 134* 130*   K 4.5 3.4* 4.5    99 95*   CO2 28 26 28   * 114* 146*   BUN 28* 16 18   CREA 1.15 1.19 1.45*   CA 9.1 8.4* 9.1   MG 2.5* 1.9  --    ALB 2.4* 2.7* 3.3*   TBILI 0.4 1.0 0.6   ALT 95* 44 53       Signed: Marky Allred MD

## 2021-12-08 NOTE — PROGRESS NOTES
Bedside and Verbal shift change report received from [facility]. Report included the following information SBAR, Kardex, ED Summary, OR Summary, Procedure Summary, Intake/Output, MAR, Accordion and Recent Results.

## 2021-12-08 NOTE — PROGRESS NOTES
1100 Bedside shift change report given to Richardreese, [address] (oncoming nurse) by Rick Farrar RN (offgoing nurse). Report included the following information SBAR, Kardex, Intake/Output, MAR, Recent Results and Cardiac Rhythm NSR.     1125 Blood cultures drawn and sent to lab for processing. 1315 Pt off the floor for MRI. 1415 Pt back in room after MRI. Pt tolerated well. Repositioned back into bed. VSS. Will continue to monitor. Imani  81. Dr. Shady Peña at bedside. Plan for KIM on Friday morning. Informed consent signed and placed on pt's chart. 1900 End of Shift Note    Bedside shift change report given to Bryan Melgar RN (oncoming nurse) by Ridge Yin (offgoing nurse). Report included the following information SBAR, Kardex, Intake/Output, MAR, Recent Results and Cardiac Rhythm NSR    Shift worked:  [id number]     Shift summary and any significant changes:     Repeat blood cultures drawn, MRI R. Ankle performed, ID consulted, plan for KIM with Dr. Shady Peña on Friday     Concerns for physician to address:  N/A     Zone phone for oncoming shift:           Activity:  Activity Level: Up with Assistance  Number times ambulated in hallways past shift: 0  Number of times OOB to chair past shift: 2    Cardiac:   Cardiac Monitoring: Yes      Cardiac Rhythm: Sinus Rhythm    Access:   Current line(s): PIV     Genitourinary:   Urinary status: voiding and external catheter    Respiratory:   O2 Device: Nasal cannula  Chronic home O2 use?: NO  Incentive spirometer at bedside: YES     GI:  Last Bowel Movement Date: 12/08/21  Current diet:  ADULT DIET Regular; Low Sodium (2 gm); 1500 ml  DIET NPO  Passing flatus: YES  Tolerating current diet: YES       Pain Management:   Patient states pain is manageable on current regimen: YES    Skin:  Benedict Score: 20  Interventions: increase time out of bed, PT/OT consult and internal/external urinary devices    Patient Safety:  Fall Score:  Total Score: 4  Interventions: bed/chair alarm, assistive device (walker, cane, etc), gripper socks and pt to call before getting OOB  High Fall Risk: Yes    Length of Stay:  Expected LOS: 4d 19h  Actual LOS: [address] [address] [place] [address]

## 2021-12-08 NOTE — PROGRESS NOTES
Follow up ankle pain. No boot yet. Pain with WB .  Pain improved , swelling minimal    Patient Vitals for the past 24 hrs:   Temp Pulse Resp BP SpO2   12/07/21 1942 (!) 100.7 °F (38.2 °C) 96 24 (!) 155/82 93 %   12/07/21 1700 (!) 100.7 °F (38.2 °C) (!) 102 25 (!) 140/78 93 %   12/07/21 1510 98 °F (36.7 °C) (!) 105 23 (!) 123/58 90 %   12/07/21 1507 98.3 °F (36.8 °C) (!) 105 22 (!) 123/58 (!) 89 %   12/07/21 1109 97.6 °F (36.4 °C) (!) 104 23 137/77 90 %   12/07/21 1014 98.9 °F (37.2 °C) (!) 105 20 126/73 91 %   12/07/21 1000  (!) 101 19 121/62 91 %   12/07/21 0955  (!) 104 25 119/73 91 %   12/07/21 0950  (!) 103 22 122/65 93 %   12/07/21 0940  (!) 103 24 128/69 93 %   12/07/21 0935  (!) 104 19 135/65 93 %   12/07/21 0930  (!) 105 20 129/68 93 %   12/07/21 0925  (!) 104 23 130/73 93 %   12/07/21 0920  (!) 103 18 (!) 142/69 90 %   12/07/21 0915  (!) 129 19 128/79 90 %   12/07/21 0910  (!) 130 24 129/75 92 %   12/07/21 0905  (!) 131 22 124/70 94 %   12/07/21 0711 97.4 °F (36.3 °C) (!) 129 23 132/81 93 %   12/07/21 0255 98.1 °F (36.7 °C) (!) 125 20 131/89 91 %   12/06/21 2224 99 °F (37.2 °C) (!) 127 20 135/67 92 %     Ankle ttp lateral  No swelling  Stable    Cam boot placed  wbat with boot  Follow up 2 weeks OP for imaging  Sooner if pain and swelling worsen

## 2021-12-08 NOTE — PROGRESS NOTES
Occupational Therapy  OT consult received, chart reviewed. Patient was off the floor for testing and unavailable for OT at this time. Will defer for now but continue to follow.

## 2021-12-08 NOTE — PROGRESS NOTES
Pain still improving but wbc almost 20k  MRI today    Patient Vitals for the past 24 hrs:   Temp Pulse Resp BP SpO2   12/08/21 1600  87 21 128/69 91 %   12/08/21 1506 97.8 °F (36.6 °C) 83 16 126/64 93 %   12/08/21 1107 97.8 °F (36.6 °C) 86 23 (!) 144/76 94 %   12/08/21 0700 97.9 °F (36.6 °C) 88 22 (!) 152/81 92 %   12/08/21 0301 97.9 °F (36.6 °C) 88 25 136/81 (!) 87 %   12/07/21 2300 99.4 °F (37.4 °C) 91 19 (!) 149/70 91 %   12/07/21 1942 (!) 100.7 °F (38.2 °C) 96 24 (!) 155/82 93 %     MRI reviewed with DR Daniel Mcintyre   minimal ankle joint fluid  forefoot edema. Pt still c/o sinus pain and congestion  Will aspirate in the morning if not improving or infection   Source determined     Consider sinus study.

## 2021-12-09 LAB
ANION GAP SERPL CALC-SCNC: 6 MMOL/L (ref 5–15)
APPEARANCE SNV: ABNORMAL
BODY FLD TYPE: NORMAL
BUN SERPL-MCNC: 33 MG/DL (ref 6–20)
BUN/CREAT SERPL: 26 (ref 12–20)
CALCIUM SERPL-MCNC: 9.1 MG/DL (ref 8.5–10.1)
CHLORIDE SERPL-SCNC: 100 MMOL/L (ref 97–108)
CO2 SERPL-SCNC: 30 MMOL/L (ref 21–32)
COLOR SNV: ABNORMAL
CREAT SERPL-MCNC: 1.29 MG/DL (ref 0.7–1.3)
CRYSTALS FLD MICRO: NORMAL
ERYTHROCYTE [DISTWIDTH] IN BLOOD BY AUTOMATED COUNT: 14.4 % (ref 11.5–14.5)
GLUCOSE SERPL-MCNC: 101 MG/DL (ref 65–100)
HCT VFR BLD AUTO: 34.8 % (ref 36.6–50.3)
HGB BLD-MCNC: 10.8 G/DL (ref 12.1–17)
LYMPHOCYTES NFR SNV MANUAL: 5 % (ref 0–74)
MCH RBC QN AUTO: 28.7 PG (ref 26–34)
MCHC RBC AUTO-ENTMCNC: 31 G/DL (ref 30–36.5)
MCV RBC AUTO: 92.6 FL (ref 80–99)
NEUTROPHILS NFR SNV MANUAL: 95 % (ref 0–24)
NRBC # BLD: 0 K/UL (ref 0–0.01)
NRBC BLD-RTO: 0 PER 100 WBC
PLATELET # BLD AUTO: 259 K/UL (ref 150–400)
PMV BLD AUTO: 9.9 FL (ref 8.9–12.9)
POTASSIUM SERPL-SCNC: 4.2 MMOL/L (ref 3.5–5.1)
RBC # BLD AUTO: 3.76 M/UL (ref 4.1–5.7)
RBC # SNV: >100 /CU MM
SODIUM SERPL-SCNC: 136 MMOL/L (ref 136–145)
SPECIMEN SOURCE FLD: ABNORMAL
WBC # BLD AUTO: 18.7 K/UL (ref 4.1–11.1)
WBC # SNV: ABNORMAL /CU MM (ref 0–150)

## 2021-12-09 PROCEDURE — 65660000001 HC RM ICU INTERMED STEPDOWN

## 2021-12-09 PROCEDURE — 74011250637 HC RX REV CODE- 250/637: Performed by: INTERNAL MEDICINE

## 2021-12-09 PROCEDURE — 89050 BODY FLUID CELL COUNT: CPT

## 2021-12-09 PROCEDURE — 5A09457 ASSISTANCE WITH RESPIRATORY VENTILATION, 24-96 CONSECUTIVE HOURS, CONTINUOUS POSITIVE AIRWAY PRESSURE: ICD-10-PCS | Performed by: STUDENT IN AN ORGANIZED HEALTH CARE EDUCATION/TRAINING PROGRAM

## 2021-12-09 PROCEDURE — 97162 PT EVAL MOD COMPLEX 30 MIN: CPT

## 2021-12-09 PROCEDURE — 74011000250 HC RX REV CODE- 250: Performed by: STUDENT IN AN ORGANIZED HEALTH CARE EDUCATION/TRAINING PROGRAM

## 2021-12-09 PROCEDURE — 0S9F3ZZ DRAINAGE OF RIGHT ANKLE JOINT, PERCUTANEOUS APPROACH: ICD-10-PCS | Performed by: STUDENT IN AN ORGANIZED HEALTH CARE EDUCATION/TRAINING PROGRAM

## 2021-12-09 PROCEDURE — 99233 SBSQ HOSP IP/OBS HIGH 50: CPT | Performed by: INTERNAL MEDICINE

## 2021-12-09 PROCEDURE — 80048 BASIC METABOLIC PNL TOTAL CA: CPT

## 2021-12-09 PROCEDURE — 87205 SMEAR GRAM STAIN: CPT

## 2021-12-09 PROCEDURE — 87040 BLOOD CULTURE FOR BACTERIA: CPT

## 2021-12-09 PROCEDURE — 97165 OT EVAL LOW COMPLEX 30 MIN: CPT | Performed by: OCCUPATIONAL THERAPIST

## 2021-12-09 PROCEDURE — 36415 COLL VENOUS BLD VENIPUNCTURE: CPT

## 2021-12-09 PROCEDURE — 74011000258 HC RX REV CODE- 258: Performed by: STUDENT IN AN ORGANIZED HEALTH CARE EDUCATION/TRAINING PROGRAM

## 2021-12-09 PROCEDURE — 74011636637 HC RX REV CODE- 636/637: Performed by: INTERNAL MEDICINE

## 2021-12-09 PROCEDURE — 77010033678 HC OXYGEN DAILY

## 2021-12-09 PROCEDURE — 85027 COMPLETE CBC AUTOMATED: CPT

## 2021-12-09 PROCEDURE — 97116 GAIT TRAINING THERAPY: CPT

## 2021-12-09 PROCEDURE — 2709999900 HC NON-CHARGEABLE SUPPLY

## 2021-12-09 PROCEDURE — 97535 SELF CARE MNGMENT TRAINING: CPT | Performed by: OCCUPATIONAL THERAPIST

## 2021-12-09 PROCEDURE — 97530 THERAPEUTIC ACTIVITIES: CPT | Performed by: OCCUPATIONAL THERAPIST

## 2021-12-09 PROCEDURE — 89060 EXAM SYNOVIAL FLUID CRYSTALS: CPT

## 2021-12-09 RX ADMIN — DIGOXIN 0.12 MG: 125 TABLET ORAL at 08:55

## 2021-12-09 RX ADMIN — NAFCILLIN SODIUM 2 G: 2 INJECTION, POWDER, LYOPHILIZED, FOR SOLUTION INTRAMUSCULAR; INTRAVENOUS at 01:04

## 2021-12-09 RX ADMIN — NAFCILLIN SODIUM 2 G: 2 INJECTION, POWDER, LYOPHILIZED, FOR SOLUTION INTRAMUSCULAR; INTRAVENOUS at 07:40

## 2021-12-09 RX ADMIN — PREDNISONE 30 MG: 20 TABLET ORAL at 08:55

## 2021-12-09 RX ADMIN — GUAIFENESIN 600 MG: 600 TABLET, EXTENDED RELEASE ORAL at 08:55

## 2021-12-09 RX ADMIN — APIXABAN 5 MG: 5 TABLET, FILM COATED ORAL at 08:56

## 2021-12-09 RX ADMIN — NAFCILLIN SODIUM 2 G: 2 INJECTION, POWDER, LYOPHILIZED, FOR SOLUTION INTRAMUSCULAR; INTRAVENOUS at 22:28

## 2021-12-09 RX ADMIN — FUROSEMIDE 20 MG: 40 TABLET ORAL at 08:56

## 2021-12-09 RX ADMIN — NAFCILLIN SODIUM 2 G: 2 INJECTION, POWDER, LYOPHILIZED, FOR SOLUTION INTRAMUSCULAR; INTRAVENOUS at 11:40

## 2021-12-09 RX ADMIN — METOPROLOL TARTRATE 25 MG: 25 TABLET, FILM COATED ORAL at 08:56

## 2021-12-09 RX ADMIN — NAFCILLIN SODIUM 2 G: 2 INJECTION, POWDER, LYOPHILIZED, FOR SOLUTION INTRAMUSCULAR; INTRAVENOUS at 15:27

## 2021-12-09 RX ADMIN — Medication 10 ML: at 07:40

## 2021-12-09 RX ADMIN — METOPROLOL TARTRATE 25 MG: 25 TABLET, FILM COATED ORAL at 18:10

## 2021-12-09 RX ADMIN — NAFCILLIN SODIUM 2 G: 2 INJECTION, POWDER, LYOPHILIZED, FOR SOLUTION INTRAMUSCULAR; INTRAVENOUS at 03:06

## 2021-12-09 RX ADMIN — ROSUVASTATIN CALCIUM 40 MG: 40 TABLET, FILM COATED ORAL at 22:28

## 2021-12-09 RX ADMIN — NAFCILLIN SODIUM 2 G: 2 INJECTION, POWDER, LYOPHILIZED, FOR SOLUTION INTRAMUSCULAR; INTRAVENOUS at 18:09

## 2021-12-09 RX ADMIN — PANTOPRAZOLE SODIUM 40 MG: 40 TABLET, DELAYED RELEASE ORAL at 08:55

## 2021-12-09 RX ADMIN — GUAIFENESIN 600 MG: 600 TABLET, EXTENDED RELEASE ORAL at 22:27

## 2021-12-09 RX ADMIN — AMIODARONE HYDROCHLORIDE 200 MG: 200 TABLET ORAL at 08:56

## 2021-12-09 RX ADMIN — FINASTERIDE 5 MG: 5 TABLET, FILM COATED ORAL at 22:28

## 2021-12-09 RX ADMIN — Medication 10 ML: at 01:05

## 2021-12-09 RX ADMIN — TAMSULOSIN HYDROCHLORIDE 0.4 MG: 0.4 CAPSULE ORAL at 08:56

## 2021-12-09 RX ADMIN — Medication 10 ML: at 15:30

## 2021-12-09 RX ADMIN — APIXABAN 5 MG: 5 TABLET, FILM COATED ORAL at 18:10

## 2021-12-09 RX ADMIN — ASPIRIN 81 MG: 81 TABLET, COATED ORAL at 08:55

## 2021-12-09 RX ADMIN — DILTIAZEM HYDROCHLORIDE 240 MG: 240 CAPSULE, COATED, EXTENDED RELEASE ORAL at 08:56

## 2021-12-09 NOTE — PROGRESS NOTES
Problem: Mobility Impaired (Adult and Pediatric)  Goal: *Acute Goals and Plan of Care (Insert Text)  Description: FUNCTIONAL STATUS PRIOR TO ADMISSION: Patient was independent and active without use of DME. Pt was driving, working in the home via office upstairs. Pt utilized BIPAP at night on 6L O2.      HOME SUPPORT PRIOR TO ADMISSION: The patient lived with spouse but did not require assist.    Physical Therapy Goals  Initiated 12/9/2021  1. Patient will move from supine to sit and sit to supine , scoot up and down, and roll side to side in bed with modified independence within 7 day(s). 2.  Patient will transfer from bed to chair and chair to bed with modified independence using the least restrictive device within 7 day(s). 3.  Patient will perform sit to stand with modified independence within 7 day(s). 4.  Patient will ambulate with minimal assistance/contact guard assist for 100 feet with the least restrictive device within 7 day(s). 5.  Patient will ascend/descend flight of stairs with handrail(s) with minimal assistance/contact guard assist within 7 day(s). Outcome: Progressing Towards Goal     PHYSICAL THERAPY EVALUATION  Patient: Hiral Garvin (77 y.o. male)  Date: 12/9/2021  Primary Diagnosis: Respiratory failure, acute-on-chronic (HCC) [J96.20]  CHF exacerbation ([facility] [I50.9]  Febrile [R50.9]  [facility] [I48.92]  Elevated brain natriuretic peptide (BNP) level [R79.89]        Precautions:   Fall, WBAT, Other (comment) (R CAM boot)      ASSESSMENT  Nurse clears pt for mobility. Based on the objective data described below, the patient presents with decreased ability to perform transfers/gait at this time secondary to increased pain R foot limiting his WB abilities, making him laterally shift to the left during gait, causing unsafe positioning and LOB. He requires x 2 assist for safety at this time and consistent cuing for technique, positioning to mid-line. Depending on discharge date, pt likely to need SNF unless pain is greatly reduced and pt is able to demo. Safer functional mobility efforts. .. he will also need to be able to accomplish stairs to enter/exit home. Pt is also on 5L NC at this time with mobility efforts, which is a change in status as he was only using at night with BIPAP prior to admission. Continue to follow. Patient Vitals for the past 4 hrs:   Pulse Resp BP SpO2   12/09/21 1057    93 %   12/09/21 1035 95   90 %   12/09/21 1029 95 18 138/70 93 %   12/09/21 1015 93 18 138/70 93 %         Current Level of Function Impacting Discharge (mobility/balance): transfers CGA ; gait mod./max. assist x 2 with CAM boot R foot and RW use    Functional Outcome Measure: The patient scored Total Score: 9/28 on the Tinetti outcome measure which is indicative of high fall risk. Other factors to consider for discharge: pt continues with pain, limited WB onto R foot impacting mobility safety and abilities at this time ; 5L O2 needs     Patient will benefit from skilled therapy intervention to address the above noted impairments. PLAN :  Recommendations and Planned Interventions: bed mobility training, transfer training, gait training, therapeutic exercises, edema management/control, patient and family training/education, and therapeutic activities      Frequency/Duration: Patient will be followed by physical therapy:  4 times a week to address goals. Recommendation for discharge: (in order for the patient to meet his/her long term goals)  Therapy up to 5 days/week in SNF setting    This discharge recommendation:  A follow-up discussion with the attending provider and/or case management is planned    IF patient discharges home will need the following DME: to be determined (TBD) (currently RW use)         SUBJECTIVE:   Patient stated I would be doing fine if it wasn't for the pain in this foot when I get up.     OBJECTIVE DATA SUMMARY:   HISTORY: Past Medical History:   Diagnosis Date    Arrhythmia     SVT; Dr. Gonzalez January Fall 2018    Chronic obstructive pulmonary disease (HCC)     mild    GERD (gastroesophageal reflux disease)     Hypertension     Thyroid disease     benign thyroid growth, checked annually by Dr. Manpreet Metcalf     Past Surgical History:   Procedure Laterality Date    COLONOSCOPY N/A 2/5/2019    COLONOSCOPY performed by Toshia Romero MD at [address]  2/5/2019         HX HEENT Bilateral     cataract extraction    AL COLON CA SCRN NOT HI [facility] IND  2/5/2019            Personal factors and/or comorbidities impacting plan of care:     Home Situation  Home Environment: Private residence  # Steps to Enter: 4  Rails to Enter: Yes  Wheelchair Ramp: No  One/Two Story Residence: Two story (office on 2nd floor)  # of Interior Steps: 13  Living Alone: No  Support Systems: Spouse/Significant Other  Patient Expects to be Discharged to Cor[de-identified]ration  Current DME Used/Available at Home: Other (comment), Oxygen, portable (seat in shower ; BIPAP at night w/ 6L NC per pt)    EXAMINATION/PRESENTATION/DECISION MAKING:   Critical Behavior:  Neurologic State: Alert, Appropriate for age  Orientation Level: Appropriate for age, Oriented X4  Cognition: Follows commands  Safety/Judgement: Decreased insight into deficits, Decreased awareness of need for assistance, Decreased awareness of need for safety  Hearing:   Auditory  Auditory Impairment: None  Skin:  Intact (R foot not observed due to CAM boot donned)  Edema: R foot  Range Of Motion:  AROM: Generally decreased, functional           PROM: Generally decreased, functional           Strength:    Strength: Generally decreased, functional                    Tone & Sensation:   Tone: Normal              Sensation: Intact               Coordination:  Coordination: Within functional limits       Functional Mobility:  Bed Mobility:      Pt seated in bedside chair on arrival        Transfers:  Sit to Stand: Contact guard assistance  Stand to Sit: Contact guard assistance                       Balance:   Sitting: Intact  Standing: Impaired; With support  Standing - Static: Constant support; Fair  Standing - Dynamic : Constant support; Poor  Ambulation/Gait Training:  Distance (ft): 20 Feet (ft)  Assistive Device: Gait belt; Walker, rolling  Ambulation - Level of Assistance: Moderate assistance; Maximum assistance; Additional time; Assist x2     Gait Description (WDL): Exceptions to WDL  Gait Abnormalities: Antalgic; Decreased step clearance; Step to gait (mod./max. lateral shift to the left)  Right Side Weight Bearing: As tolerated (w/ CAM boot)  Left Side Weight Bearing: Full  Base of Support: Widened  Stance: Right decreased; Weight shift  Speed/Sheri: Fluctuations  Step Length: Left shortened; Right shortened        Interventions: Safety awareness training; Tactile cues; Verbal cues         Therapeutic Exercises:   Reviewed BLE therapeutic exercise to include hip flex., LAQ's, and ankle DF/PF    Functional Measure:  Tinetti test:    Sitting Balance: 1  Arises: 1  Attempts to Rise: 2  Immediate Standing Balance: 0  Standing Balance: 0  Nudged: 1  Eyes Closed: 0  Turn 360 Degrees - Continuous/Discontinuous: 0  Turn 360 Degrees - Steady/Unsteady: 0  Sitting Down: 1  Balance Score: 6 Balance total score  Indication of Gait: 0  R Step Length/Height: 0  L Step Length/Height: 0  R Foot Clearance: 1  L Foot Clearance: 1  Step Symmetry: 0  Step Continuity: 0  Path: 1  Trunk: 0  Walking Time: 0  Gait Score: 3 Gait total score  Total Score: 9/28 Overall total score         Tinetti Tool Score Risk of Falls  <19 = High Fall Risk  19-24 = Moderate Fall Risk  25-28 = Low Fall Risk  Tinetti ME. Performance-Oriented Assessment of Mobility Problems in Elderly Patients. Ramos 66; [id number].  (Scoring Description: PT Bulletin Feb. 10, 1993)    Older adults: Parvin Walters et al, 2009; n = [address] [facility] elderly evaluated with ABC, KATHARINE, ADL, and IADL)  · Mean KATHARINE score for males aged 69-68 years = 26.21(3.40)  · Mean KATHARINE score for females age 69-68 years = 25.16(4.30)  · Mean KATHARINE score for males over 80 years = 23.29(6.02)  · Mean KATHARINE score for females over 80 years = 17.20(8.32)        Physical Therapy Evaluation Charge Determination   History Examination Presentation Decision-Making   MEDIUM  Complexity : 1-2 comorbidities / personal factors will impact the outcome/ POC  MEDIUM Complexity : 3 Standardized tests and measures addressing body structure, function, activity limitation and / or participation in recreation  MEDIUM Complexity : Evolving with changing characteristics  Other outcome measures Tinetti  MEDIUM      Based on the above components, the patient evaluation is determined to be of the following complexity level: MEDIUM    Pain Rating:  C/o R foot pain with WB/gait rated 5/10    Activity Tolerance:   Fair and requires O2, limited by pain R foot      After treatment patient left in no apparent distress:   Sitting in chair, Call bell within reach, and nurse notified     COMMUNICATION/EDUCATION:   The patients plan of care was discussed with: Occupational therapist and Registered nurse. Fall prevention education was provided and the patient/caregiver indicated understanding., Patient/family have participated as able in goal setting and plan of care. , and Patient/family agree to work toward stated goals and plan of care.     Thank you for this referral.  Nathanael Menjivar, PT, DPT   Time Calculation: 36 mins

## 2021-12-09 NOTE — PROGRESS NOTES
Problem: Pain  Goal: *Control of Pain  Outcome: Progressing Towards Goal     Problem: Falls - Risk of  Goal: *Absence of Falls  Description: Document Jah Fall Risk and appropriate interventions in the flowsheet.   Outcome: Progressing Towards Goal  Note: Fall Risk Interventions:  Mobility Interventions: Bed/chair exit alarm, Patient to call before getting OOB, PT Consult for mobility concerns    Mentation Interventions: Adequate sleep, hydration, pain control, Bed/chair exit alarm, More frequent rounding    Medication Interventions: Assess postural VS orthostatic hypotension, Evaluate medications/consider consulting pharmacy, Teach patient to arise slowly, Patient to call before getting OOB    Elimination Interventions: Bed/chair exit alarm, Call light in reach, Patient to call for help with toileting needs, Toileting schedule/hourly rounds    History of Falls Interventions: Bed/chair exit alarm         Problem: Patient Education: Go to Patient Education Activity  Goal: Patient/Family Education  Outcome: Progressing Towards Goal

## 2021-12-09 NOTE — PROGRESS NOTES
Ortho:     Patient resting comfortably in chair with CAM boot right ankle. Patient states his pain has improved daily in his ankle since initiating treatment  Swelling not significantly improved. Mild warmth and redness similar to initial consult.  PROM intact with minimal pain of right ankle    Source of sepsis still unclear    Right ankle arthrocentesis performed after verbal consent ( see procedure note for detail)  - Pending labs results on aspirate    JOVANY De Leon

## 2021-12-09 NOTE — PROCEDURES
After verbal consent was obtained, right ankle arthrocentesis was performed. The right ankle was prepped with betadine and alcohol at the anterior medial site. A 21 gauge needle on a 10 cc syringe was used to aspirate the joint. 6 cc of bloody fluid were obtained from the joint. The needle was withdrawn. The contents of the syringe were transferred to sterile containers. A sterile bandage was applied to the site. Patient tolerated the procedure well.     JOVANY Cornell

## 2021-12-09 NOTE — PROGRESS NOTES
Problem: Self Care Deficits Care Plan (Adult)  Goal: *Acute Goals and Plan of Care (Insert Text)  Description: FUNCTIONAL STATUS PRIOR TO ADMISSION: Patient was independent and active without use of DME.     HOME SUPPORT: The patient lived with wife but did not require assist.    Occupational Therapy Goals  Initiated 12/9/2021  1. Patient will perform grooming in standing with supervision/set-up within 7 day(s). 2.  Patient will perform bathing with minimal assistance/contact guard assist within 7 day(s). 3.  Patient will perform lower body dressing with minimal assistance/contact guard assist within 7 day(s). 4.  Patient will perform toilet transfers with contact guard assist within 7 day(s). 5.  Patient will perform all aspects of toileting with contact guard assist within 7 day(s). 6.  Patient will participate in upper extremity therapeutic exercise/activities with supervision/set-up for 8 minutes within 7 day(s). 7.  Patient will perform standing adls for at least 8 minutes within 7 days. Outcome: Not Met   OCCUPATIONAL THERAPY EVALUATION  Patient: Mark Garnett (70 y.o. male)  Date: 12/9/2021  Primary Diagnosis: Respiratory failure, acute-on-chronic (HCC) [J96.20]  CHF exacerbation (HCC) [I50.9]  Febrile [R50.9]  Atrial flutter (HCC) [I48.92]  Elevated brain natriuretic peptide (BNP) level [R79.89]        Precautions:   Fall, WBAT, Other (comment) (R CAM boot)    ASSESSMENT  Based on the objective data described below, the patient presents with severe pain in RLE and wearing a CAM boot. He is resistant to WB due to the pain and is very unsteady and unsafe due to this. Pt demonstrated decreased safety awareness and rushing using the RW, bumping into the end of the bed and having strong lean to the left. Pt requires assistance X2 for adl mobility using the RW and able to tolerate a short distance in room this date.   Pt will need assistance with adls performing at bed level sponge bathing and seated at this time. Pt is functioning below his reported independent baseline and will benefit from acute OT services. At discharge will need 24 hour assistance. If he were to discharge this date, pt would need SNF rehab. .    Current Level of Function Impacting Discharge (ADLs/self-care): up to max A for adls; assist X2 for short distance adl mobility using RW    Functional Outcome Measure: The patient scored Total: 50/100 on the Barthel Index outcome measure which is indicative of being \"partially dependent\" in basic self-care. Other factors to consider for discharge: lives with his wife. Can stay on level one at home--his office is on level 2. Pt reports that he did not have a fall PTA     Patient will benefit from skilled therapy intervention to address the above noted impairments. PLAN :  Recommendations and Planned Interventions: self care training, functional mobility training, therapeutic exercise, balance training, therapeutic activities, endurance activities, patient education, home safety training, and family training/education    Frequency/Duration: Patient will be followed by occupational therapy 3 times a week to address goals.     Recommendation for discharge: (in order for the patient to meet his/her long term goals)  Therapy up to 5 days/week in SNF setting vs. Home with 24 hour assistance and HH therapy    This discharge recommendation:  Has not yet been discussed the attending provider and/or case management    IF patient discharges home will need the following DME: bedside commode, shower chair, walker: rolling, and wheelchair       SUBJECTIVE:   Patient reported that he'd be fine if he didn't have so much pain in his R foot    OBJECTIVE DATA SUMMARY:   HISTORY:   Past Medical History:   Diagnosis Date    Arrhythmia     SVT; Dr. Taylor Patel Fall 2018    Chronic obstructive pulmonary disease ([facility]     mild    GERD (gastroesophageal reflux disease)     Hypertension  Thyroid disease     benign thyroid growth, checked annually by Dr. Yumiko Baltazar     Past Surgical History:   Procedure Laterality Date    COLONOSCOPY N/A 2/5/2019    COLONOSCOPY performed by Didier Alves MD at [facility]COPY    COLONOSCOPY,MAYELA RODRIGUES,SNARE  2/5/2019         HX HEENT Bilateral     cataract extraction    [place] COLON CA SCRN NOT  [address] IND  2/5/2019            Expanded or extensive additional review of patient history:     Home Situation  Home Environment: Private residence  # Steps to Enter: 4  Rails to Enter: Yes  Wheelchair Ramp: No  One/Two Story Residence: Two story (office on 2nd floor)  # of Interior Steps: 13  Living Alone: No  Support Systems: Spouse/Significant Other  Patient Expects to be Discharged toF Cor[de-identified]ration  Current DME Used/Available at Home: Other (comment), Oxygen, portable (seat in shower ; BIPAP at night w/ 6L NC per pt)    Hand dominance: Right    EXAMINATION OF PERFORMANCE DEFICITS:  Cognitive/Behavioral Status:  Neurologic State: Alert  Orientation Level: Appropriate for age  Cognition: Follows commands        Safety/Judgement: Decreased insight into deficits; Decreased awareness of need for assistance; Decreased awareness of need for safety    Skin: generally intact    Edema: none observed. Pt wearing boot on R ankle    Hearing: Auditory  Auditory Impairment: None    Vision/Perceptual:       Not formally tested. Pt reports that he wears reading glasses only                              Range of Motion:  BUEs:  AROM: Generally decreased, functional  PROM: Generally decreased, functional      Reports L shoulder is sometimes painful at baseline                Strength:  BUEs:   Strength: Generally decreased, functional                Coordination:  Coordination: Within functional limits  Fine Motor Skills-Upper: Left Intact; Right Intact    Gross Motor Skills-Upper: Left Intact;  Right Intact (c/o discomfort in L shoulder)    Tone & Sensation:    Tone: Normal  Sensation: Intact                      Balance:  Sitting: Intact  Standing: Impaired; With support  Standing - Static: Constant support; Fair  Standing - Dynamic : Constant support; Poor    Functional Mobility and Transfers for ADLs:  Bed Mobility:       Transfers:  Sit to Stand: Contact guard assistance  Stand to Sit: Contact guard assistance  Toilet Transfer :  (unable to tolerate distance to bathroom)    ADL Assessment:  Feeding: Independent    Oral Facial Hygiene/Grooming: Stand-by assistance; Setup (seated)    Bathing: Moderate assistance; Maximum assistance    Upper Body Dressing: Minimum assistance    Lower Body Dressing: Maximum assistance    Toileting: Minimum assistance                ADL Intervention and task modifications:     Pt educated on safe use of RW during adl/mobility. Cognitive Retraining  Safety/Judgement: Decreased insight into deficits; Decreased awareness of need for assistance; Decreased awareness of need for safety      Functional Measure:    Barthel Index:  Bathin  Bladder: 10  Bowels: 10  Groomin  Dressin  Feeding: 10  Mobility: 0  Stairs: 0  Toilet Use: 5  Transfer (Bed to Chair and Back): 5  Total: 50/100      The Barthel ADL Index: Guidelines  1. The index should be used as a record of what a patient does, not as a record of what a patient could do. 2. The main aim is to establish degree of independence from any help, physical or verbal, however minor and for whatever reason. 3. The need for supervision renders the patient not independent. 4. A patient's performance should be established using the best available evidence. Asking the patient, friends/relatives and nurses are the usual sources, but direct observation and common sense are also important. However direct testing is not needed. 5. Usually the patient's performance over the preceding 24-48 hours is important, but occasionally longer periods will be relevant.   6. Middle categories imply that the patient supplies over 50 per cent of the effort. 7. Use of aids to be independent is allowed. Score Interpretation (from [address] [facility])    Independent   60-79 Minimally independent   40-59 Partially dependent   20-39 Very dependent   <20 Totally dependent     -Luis Garner., Barthel, D.W. (1965). Functional evaluation: the Barthel Index. [address] ([address] (1997). The Barthel activities of daily living index: self-reporting versus actual performance in the old (> or = 75 years). Journal of [address] [place] [address] 45(7), [facility], JERIKAF, Amaris Ruiz., Klaus Madera. (1999). Measuring the change in disability after inpatient rehabilitation; comparison of the responsiveness of the Barthel Index and Functional [place] Measure. Journal of Neurology, Neurosurgery, and Psychiatry, 66(4), 235-235. Piotr Zhang, N.J.A, TAMMY Shi, & Marjan Patterson MSalvadorA. (2004) Assessment of post-stroke quality of life in cost-effectiveness studies: The usefulness of the Barthel Index and the EuroQoL-5D. Quality of Life Research, 15, 728-70     Occupational Therapy Evaluation Charge Determination   History Examination Decision-Making   MEDIUM Complexity : Expanded review of history including physical, cognitive and psychosocial  history  MEDIUM Complexity : 3-5 performance deficits relating to physical, cognitive , or psychosocial skils that result in activity limitations and / or participation restrictions MEDIUM Complexity : Patient may present with comorbidities that affect occupational performnce.  Miniml to moderate modification of tasks or assistance (eg, physical or verbal ) with assesment(s) is necessary to enable patient to complete evaluation       Based on the above components, the patient evaluation is determined to be of the following complexity level: MEDIUM  Pain Rating:  Did not rate pain    Activity Tolerance: Poor--due to pain    After treatment patient left in no apparent distress:    Sitting in chair, Call bell within reach, and Bed / chair alarm activated    COMMUNICATION/EDUCATION:   The patients plan of care was discussed with: Physical therapist and Registered nurse. Patient/family have participated as able in goal setting and plan of care. This patients plan of care is appropriate for delegation to [facility].     Thank you for this referral.  Thiago Romero, OTR/L    35 minutes

## 2021-12-09 NOTE — PROGRESS NOTES
Hospitalist Progress Note    NAME: Rosita Boston   :  1950   MRN:  [id number]       Assessment / Plan:  Sepsis  MSSA bacteremia  Right ankle cellulitis/ Tenosynovitis    Fever/ leukocytosis  Blood culture from  positive for MSSA,  2/4 bottle positive  C/w nafcillin  ID consulted  Repeat Blood culture until x 2 negative  UA /CT chest negative  Ortho following for ankle cellulitis/ Tenosynovitis, possible aspiration/debridement today  TTE showed no vegetation, KIM in AM    Acute on chronic respiratory failure due to  COPD exacerbation    Chest CT negative for infection  Continue 3LNC. He reports that he uses anywhere from 3-6 L at home as needed  Continue inhalers and PO Prednisone (taper)    Aflutter, rate uncontrolled  Cardiomyopathy  Echo EF 25-30%. No AS, trace MR  Continue Eliquis  Continue amiodarone, Cardizem and metoprolol  S/P DCCV , successful     Hypertension  Dyslipidemia  BPH  -continue proscar, flomax     GERD  -continue PPI     30.0 - 39.9 Obese / Body mass index is 37.67 kg/m².     Estimated discharge date:   Barriers: Fever/Work up for MSSA bacteremia     Code status: Full  Prophylaxis: Eliquis  Recommended Disposition: TBD     Subjective:     Chief Complaint / Reason for Physician Visit  Follow up for Sepsis/MSSA bacteremia  He has no active complains    Review of Systems:  Symptom Y/N Comments  Symptom Y/N Comments   Fever/Chills n   Chest Pain n    Poor Appetite n   Edema n    Cough    Abdominal Pain     Sputum    Joint Pain     SOB/BOWDEN    Pruritis/Rash     Nausea/vomit    Tolerating PT/OT     Diarrhea    Tolerating Diet     Constipation    Other       Could NOT obtain due to:      Objective:     VITALS:   Last 24hrs VS reviewed since prior progress note.  Most recent are:  Patient Vitals for the past 24 hrs:   Temp Pulse Resp BP SpO2   21 1057     93 %   21 1035  95   90 %   21 1029  95 18 138/70 93 %   21 1015  93 18 138/70 93 %   12/09/21 0723 98.2 °F (36.8 °C) 99 18 (!) 163/83 92 %   12/09/21 0313 97.8 °F (36.6 °C) 82 16 (!) 143/74 94 %   12/08/21 2300 98.4 °F (36.9 °C) 75 15 135/72 95 %   12/08/21 1956 98 °F (36.7 °C) 98 22 134/67 91 %   12/08/21 1800  92 17 109/89 93 %   12/08/21 1600  87 21 128/69 91 %   12/08/21 1506 97.8 °F (36.6 °C) 83 16 126/64 93 %       Intake/Output Summary (Last 24 hours) at 12/9/2021 1354  Last data filed at 12/9/2021 0420  Gross per 24 hour   Intake 1060 ml   Output 2700 ml   Net -1640 ml        I had a face to face encounter and independently examined this patient on 12/9/2021, as outlined below:  PHYSICAL EXAM:  General: WD, WN. Alert, cooperative, no acute distress    EENT:  EOMI. Anicteric sclerae. MMM  Resp:  CTA bilaterally, no wheezing or rales. No accessory muscle use  CV:  Regular  rhythm,  No edema  GI:  Soft, Non distended, Non tender. +Bowel sounds  Neurologic:  Alert and oriented X 3, normal speech,   Psych:   Good insight. Not anxious nor agitated  Skin:  No rashes. No jaundice    Reviewed most current lab test results and cultures  YES  Reviewed most current radiology test results   YES  Review and summation of old records today    NO  Reviewed patient's current orders and MAR    YES  PMH/SH reviewed - no change compared to H&P  ________________________________________________________________________  Care Plan discussed with:    Comments   Patient y    Family      RN y    Care Manager     Consultant                        Multidiciplinary team rounds were held today with , nursing, pharmacist and clinical coordinator. Patient's plan of care was discussed; medications were reviewed and discharge planning was addressed.      ________________________________________________________________________  Total NON critical care TIME: 37  Minutes    Total CRITICAL CARE TIME Spent:   Minutes non procedure based      Comments   >50% of visit spent in counseling and coordination of care     ________________________________________________________________________  Aruna Rhodes MD     Procedures: see electronic medical records for all procedures/Xrays and details which were not copied into this note but were reviewed prior to creation of Plan. LABS:  I reviewed today's most current labs and imaging studies.   Pertinent labs include:  Recent Labs     12/09/21 0306 12/08/21 0223   WBC 18.7* 19.7*   HGB 10.8* 10.7*   HCT 34.8* 33.7*    202     Recent Labs     12/09/21 0306 12/08/21 0223    133*   K 4.2 4.5    100   CO2 30 28   * 122*   BUN 33* 28*   CREA 1.29 1.15   CA 9.1 9.1   MG  --  2.5*   ALB  --  2.4*   TBILI  --  0.4   ALT  --  95*       Signed: Aruna Rhodes MD

## 2021-12-09 NOTE — PROGRESS NOTES
Bedside and Verbal shift change report received from [facility]. Report included the following information SBAR, Kardex and Recent Results.

## 2021-12-09 NOTE — PROGRESS NOTES
EP/ ARRHYTHMIA Progress Note    Patient ID:  Patient: Gris Rios  MRN: [id number]  Age: 65 y.o.  : 1950    Date of  Admission: 2021  6:52 PM   PCP:  Silvestre Mary MD   Usual cardiologist:  Madina Todd MD    Assessment: 1. Recurrent atrial flutter, unspecified. S/p cardioversion to sinus on .  2. Cardiomyopathy, likely nonischemic and may be tachycardia-mediated. 3. Acute systolic congestive heart failure. Better. 4. COPD exacerbation? 5. GPC bacteremia. Blood cultures from  are 4/4 positive for pan-sensitive Staph aureus. 6. Hypertensive heart disease with heart failure. 7. Full code. Plan:     1. Was going to stop amiodarone, but I'll keep it going post-cardioversion and to try to get him to the next step. 2. Continue diltiazem  mg daily. 3. Continue digoxin 125 mcg po daily. 4. Continue metoprolol IR 25 mg po BID with hold parameter for BP and HR. 5. Agree with treatment for COPD and also for GPC bacteremia. 6. Continue anticoagulation. 7. Continue statin. Given the Staph aureus bacteremia, would like to evaluate him by KIM. I discussed the potential benefits, risks, and alternatives with him and he agrees to proceed. Would hold Eliquis the morning of the procedure which will be on Friday 1/10 at 10 am.  One of my colleagues will do the KIM as I will be in clinic. [x]       High complexity decision making was performed in this patient at high risk for decompensation with multiple organ involvement. Gris Rios is a 65 y.o. male with a history of atrial fibrillation, s/p Afib ablation with PVI and also substrate work in the left (LA roof line) and right (CTI line) atria. He had recurrent atrial flutter and metoprolol was started atop his diltiazem when seen by Dr. Yris Ramon.   He continued to be active, but has noticed over the last week progressively worsening BOWDEN and then SOB at rest.  Came to the ER and found to be in atrial flutter with 2:1 AV conduction with HR in the 120's. He was given diuretic, oxygen. An echo shows his EF is 25-30%. His EF was 40-45% by KIM in 9/2021. Echo EF 55-60% in 3/2021. TODAY:  He was cardioverted yesterday. 12/5 BCX positive for staph. Had a temperature spike last night. He denies syncope, dizziness, palpitations, worsening dyspnea.       No Known Allergies       Current Facility-Administered Medications   Medication Dose Route Frequency    nafcillin (NALLPEN) 2 g in 0.9% sodium chloride (MBP/ADV) 100 mL MBP  2 g IntraVENous Q4H    dilTIAZem ER (CARDIZEM CD) capsule 240 mg  240 mg Oral DAILY    predniSONE (DELTASONE) tablet 30 mg  30 mg Oral DAILY WITH BREAKFAST    vits A and D-white pet-lanolin (A&D) ointment   Topical Q2H PRN    finasteride (PROSCAR) tablet 5 mg  5 mg Oral QHS    albuterol-ipratropium (DUO-NEB) 2.5 MG-0.5 MG/3 ML  3 mL Nebulization Q4H PRN    metoprolol tartrate (LOPRESSOR) tablet 25 mg  25 mg Oral BID    digoxin (LANOXIN) tablet 0.125 mg  0.125 mg Oral DAILY    amiodarone (CORDARONE) tablet 200 mg  200 mg Oral DAILY    apixaban (ELIQUIS) tablet 5 mg  5 mg Oral BID    aspirin delayed-release tablet 81 mg  81 mg Oral DAILY    furosemide (LASIX) tablet 20 mg  20 mg Oral DAILY    pantoprazole (PROTONIX) tablet 40 mg  40 mg Oral DAILY    rosuvastatin (CRESTOR) tablet 40 mg  40 mg Oral QHS    tamsulosin (FLOMAX) capsule 0.4 mg  0.4 mg Oral DAILY    guaiFENesin ER (MUCINEX) tablet 600 mg  600 mg Oral Q12H    sodium chloride (NS) flush 5-40 mL  5-40 mL IntraVENous Q8H    sodium chloride (NS) flush 5-40 mL  5-40 mL IntraVENous PRN    acetaminophen (TYLENOL) tablet 650 mg  650 mg Oral Q6H PRN    Or    acetaminophen (TYLENOL) suppository 650 mg  650 mg Rectal Q6H PRN    polyethylene glycol (MIRALAX) packet 17 g  17 g Oral DAILY PRN    ondansetron (ZOFRAN ODT) tablet 4 mg  4 mg Oral Q8H PRN    Or    ondansetron (ZOFRAN) injection 4 mg  4 mg IntraVENous Q6H PRN       Review of Symptoms:    Respiratory: +SOB, cough, sputum production, wheezing, BOWDEN, negative for pleuritic pain, hemopthysis   Cardiology: negative for chest pain, palpitations, edema, syncope   Gastrointestinal: +flatulence, negative for abdominal pain, N/V, dysphagia, change in bowel habits, bleeding   Genitourinary: negative for dysuria, gross hematuria       Objective:      Physical Exam:  Temp (24hrs), Av °F (36.7 °C), Min:97.8 °F (36.6 °C), Max:98.4 °F (36.9 °C)    Patient Vitals for the past 8 hrs:   Pulse   21 1035 95   21 1029 95   21 1015 93   21 0723 99    Patient Vitals for the past 8 hrs:   Resp   21 1029 18   21 1015 18   21 0723 18    Patient Vitals for the past 8 hrs:   BP   21 1029 138/70   21 1015 138/70   21 0723 (!) 163/83          Intake/Output Summary (Last 24 hours) at 2021 1310  Last data filed at 2021 0420  Gross per 24 hour   Intake 1060 ml   Output 2700 ml   Net -1640 ml       Nondiaphoretic, not in acute distress. No scleral icterus, mucous membranes moist, conjuctivae pink, no xanthelasma. Unlabored, clear to auscultation bilaterally anteriorly, symmetric air movement. Regular rhythm, no murmur, pericardial rub, knock, or gallop. No JVD or peripheral edema. Palpable radial pulses bilaterally. Abdomen obese, soft, nontender, nondistended. Extremities without cyanosis or clubbing. Muscle tone and bulk normal for age. Skin warm and dry. No rashes or ulcers. Neuro grossly nonfocal.  No tremor. Awake and appropriate. CARDIOGRAPHICS and STUDIES, I reviewed:    Telemetry:  Sinus rhythm. ECG in ER:  Atrial flutter with 2:1 AV conduction. Echo here:    Left Ventricle Normal cavity size. Mildly increased wall thickness. Wall motion: normal. The estimated EF is 25 - 30%. Visually measured ejection fraction. Moderate-to-severely and globally reduced systolic function.  Unable to assess diastolic function. There is inconclusive left ventricular diastolic function. Left Atrium Normal cavity size. Right Ventricle Not well visualized. Normal cavity size and global systolic function. Right Atrium Normal cavity size. Interatrial Septum Interatrial septum not assessed   Aortic Valve Aortic valve not well visualized. No stenosis and no regurgitation. Mitral Valve No stenosis. Mitral valve non-specific thickening. Mild mitral annular calcification. Trace regurgitation. Tricuspid Valve Normal valve structure and no stenosis. Unable to assess tricuspid valve regurgitation. Pulmonic Valve Pulmonic valve not well visualized, but normal doppler findings. No stenosis and no regurgitation. Aorta The aorta was not well visualized. Normal aortic root. Pulmonary Artery Pulmonary hypertension not suggested by Doppler findings. Pericardium Normal pericardium and no evidence of pericardial effusion     CXR:  No edema per the radiologist.       Labs:  No results for input(s): CPK, CKMB, CKNDX, TROIQ in the last 72 hours. No lab exists for component: CPKMB  No results found for: CHOL, CHOLX, CHLST, CHOLV, HDL, HDLP, LDL, LDLC, DLDLP, TGLX, TRIGL, TRIGP, CHHD, CHHDX  No results for input(s): INR, PTP, APTT, INREXT, INREXT in the last 72 hours. Recent Labs     12/09/21  0306 12/08/21 0223    133*   K 4.2 4.5    100   CO2 30 28   BUN 33* 28*   CREA 1.29 1.15   * 122*   CA 9.1 9.1   ALB  --  2.4*   WBC 18.7* 19.7*   HGB 10.8* 10.7*   HCT 34.8* 33.7*    202     Recent Labs     12/08/21 0223   AP 96   TP 7.4   ALB 2.4*   GLOB 5.0*     No components found for: GLPOC  No results for input(s): PH, PCO2, PO2 in the last 72 hours.         Jackson Ornelas MD  12/9/2021

## 2021-12-09 NOTE — CONSULTS
Infectious Disease Consult    Date of Consultation:  12/8/21  Date of Admission: 12/5/2021   Referring Physician:  Katelynn Gutierrez    Subjective:     Patient is a 65 y.o. male who is being seen for - MSSA bacteremia with no obvious source    IMPRESSION:         -MSSA bacteremia  Blood cultures-12/5 + for MSSA 4/4  Repeat cultures-12/8 + for probable MSSA 1/4  TTE-12/52-EC-srownfsllds thickening, plan is for KIM     -Cellulitis, flexor tenosynovitis of right ankle  No evidence of  Acute OM on MRI. S/p aspiration by Ortho.     -Acute on chronic respiratory failure  93- 95% on 2L O2. CXR-negative for infiltrate     -URI and sinus drainage  Negative respiratory panel , Covid PCR     COPD exacerbation  On prednisone p.o.     -Recurrent atrial flutter  On amiodarone     -Cardiomyopathy EF 25-30     -Obesity BMI 37.4           PLAN:         -Continue nafcillin IV, if further increase in creatinine change to cefazolin  -Repeat blood cultures until negative cultures obtained, KIM  -Plans for ankle per Ortho  -D/w pt, D/w Dr Maria Teresa Sherman is seen for MSSA bacteremia. Eric Handley is a  65 y.o. male with PMHx significant for atrial flutter on Eliquis, COPD, GERD, hypertension, who presented with chief complaint of worsening shortness of breath over  3 to 4 days. Patient typically wears nasal cannula at night, however has needed it during the day due to progressively worsening shortness of breath. He additionally notes that he sprained his right ankle about 11 days ago. Patient works out on a treadmill at home. He believes he might of sprained it. He is is still having some right ankle discomfort and is having some difficulty walking secondary to pain. Patient has been seen by orthopedics. MRI done 12/8 report as follows  FINDINGS: Study limited by motion.     Bone Marrow: No fracture, dislocation, or marrow replacing process.  Os trigonum.     Joint fluid: Trace tibiotalar, subtalar, and calcaneocuboid joint effusion with  synovitis     Tendons: No definite tendon tear. Moderate flexor tenosynovitis. .      Muscles: Patchy edema signal and postcontrast enhancement involving the abductor  hallucis muscle      Lateral ligaments: Grossly intact.     Deltoid and spring ligaments: Grossly intact     Sinus tarsi: Patchy edema signal on ganglion formation within     Plantar fascia: Within normal limits.      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 No focal fluid collection to suggest abscess.     IMPRESSION  1. Study limited by motion. 2.  Diffuse circumferential skin thickening and subcutaneous soft tissue  edema/swelling, correlate for cellulitis. No evidence of soft tissue abscess nor  acute osteomyelitis. 3.  Patchy edema signal and postcontrast enhancement involving the abductor  hallucis muscle, correlate for infectious or inflammatory myositis. 4.  Nonspecific moderate flexor tenosynovitis. Blood cultures-12/5 + for MSSA 4/4  Repeat cultures-12/8 + for probable MSSA 1/4  TTE-12/59-XC-akqfkoekkre thickening, plan is for KIM  Patient seen today. States she feels sick, runny nose as well  Right lower leg and ankle swelling noted, healed scars on lower leg and dorsum of ankle suggestive of previous small open wounds. Right ankle mildly warm, erythematous and swollen. Patient denies fever chills today. Patient Active Problem List   Diagnosis Code    Hypertension I10    Arrhythmia I49.9    Obesity (BMI 30.0-34. 9) E66.9    Acute respiratory failure with hypoxia ([facility]) J96.01    Atrial flutter with rapid ventricular response (HCC) I48.92    Atrial fibrillation (HCC) I48.91    Congestive heart failure (HCC) I50.9    Atrial flutter (HCC) I48.92    Febrile R50.9    CHF exacerbation ([facility]) I50.9    Elevated brain natriuretic peptide (BNP) level R79.89    Respiratory failure, acute-on-chronic (HCC) J96.20     Past Medical History:   Diagnosis Date    Arrhythmia     SVT; Dr. Alpa Acosta Fall 2018    Chronic obstructive pulmonary disease (HCC)     mild    GERD (gastroesophageal reflux disease)     Hypertension     Thyroid disease     benign thyroid growth, checked annually by Dr. Netta Cedeño      Family History   Problem Relation Age of Onset    Hypertension Mother     Heart Disease Mother     Hypertension Father     Diabetes Father       Social History     Tobacco Use    Smoking status: Former Smoker     Years: 5.00    Smokeless tobacco: Never Used   Substance Use Topics    Alcohol use: Yes     Alcohol/week: 7.0 standard drinks     Types: 7 Glasses of wine per week     Past Surgical History:   Procedure Laterality Date    COLONOSCOPY N/A 2/5/2019    COLONOSCOPY performed by Ladonna Anthony MD at [facility]    COLONOSCOPY,MAYELA RODRIGUES,SNARE  2/5/2019         HX HEENT Bilateral     cataract extraction    RI COLON CA SCRN NOT  [address]  2/5/2019           Prior to Admission medications    Medication Sig Start Date End Date Taking? Authorizing Provider   amiodarone (PACERONE) 400 mg tablet Take 1 Tablet by mouth. 7/19/21  Yes Provider, Historical   dilTIAZem ER (TIAZAC) 240 mg capsule Take 240 mg by mouth daily. Indications: paroxysmal supraventricular tachycardia   Yes Provider, Historical   finasteride (PROSCAR) 5 mg tablet Take 5 mg by mouth daily. Yes Provider, Historical   vit A/vit C/vit E/zinc/copper (PRESERVISION AREDS PO) Take 1 Caplet by mouth two (2) times a day. Yes Provider, Historical   ascorbic acid, vitamin C, (Vitamin C) 500 mg tablet Take 500 mg by mouth daily. Yes Provider, Historical   cyanocobalamin (Vitamin B-12) 1,000 mcg tablet Take 1,000 mcg by mouth daily.    Yes Provider, Historical   ibuprofen (MOTRIN) 200 mg tablet Take 400 mg by mouth every eight (8) hours as needed for Pain. Yes Provider, Historical   fluticasone-umeclidinium-vilanterol (Trelegy Ellipta) 100-62.5-25 mcg inhaler Take 1 Puff by inhalation daily. Yes Provider, Historical   pantoprazole (Protonix) 40 mg tablet Take 40 mg by mouth daily. Yes Provider, Historical   metoprolol succinate (Toprol XL) 25 mg XL tablet Take 25 mg by mouth daily. Yes Provider, Historical   furosemide (LASIX) 20 mg tablet 1 tab po daily 3/10/21  Yes Charity Worley MD   apixaban (ELIQUIS) 5 mg tablet Take 1 Tab by mouth two (2) times a day. 3/9/21  Yes Hao Aguilar MD   rosuvastatin (Crestor) 40 mg tablet Take 40 mg by mouth nightly. Yes Other, MD Elaina   tamsulosin (FLOMAX) 0.4 mg capsule Take 0.4 mg by mouth daily. Yes Provider, Historical   aspirin delayed-release 81 mg tablet Take 81 mg by mouth daily. Yes Provider, Historical   melatonin 3 mg tablet Take 3 mg by mouth nightly. Yes Provider, Historical   gemfibrozil (LOPID) 600 mg tablet Take 600 mg by mouth two (2) times a day. Yes Provider, Historical   cholecalciferol (VITAMIN D3) 1,000 unit cap Take 1,000 Units by mouth daily. Yes Provider, Historical   MULTIVIT-MIN/FA/LYCOPEN/LUTEIN (CENTRUM SILVER ULTRA MEN'S PO) Take 1 Tab by mouth daily. Yes Provider, Historical   Omega-3-DHA-EPA-Fish Oil 1,000 mg (120 mg-180 mg) cap Take 1 Cap by mouth daily. Yes Provider, Historical   albuterol (VENTOLIN HFA) 90 mcg/actuation inhaler Take 1 Puff by inhalation every four (4) hours as needed. Provider, Historical     No Known Allergies     Review of Systems:  A comprehensive review of systems was negative except for that written in the History of Present Illness. 14 point review of systems obtained . All other systems negative    Objective:   Blood pressure 136/79, pulse 70, temperature 98 °F (36.7 °C), resp. rate 20, height 5' 11\" (1.803 m), weight 263 lb 7.2 oz (119.5 kg), SpO2 93 %.   Temp (24hrs), Av.3 °F (36.8 °C), Min:98 °F (36.7 °C), Max:99 °F (37.2 °C)    Current Facility-Administered Medications   Medication Dose Route Frequency    artificial saliva (MOUTH KOTE) 1 Spray  1 Spray Oral PRN    fluticasone-umeclidinium-vilanterol (TRELEGY ELLIPTA) inhaler 1 Puff (Patient Supplied)  1 Puff Inhalation DAILY    traMADoL (ULTRAM) tablet 50 mg  50 mg Oral Q6H PRN    losartan (COZAAR) tablet 50 mg  50 mg Oral DAILY    ceFAZolin (ANCEF) 2 g in sterile water (preservative free) 20 mL IV syringe  2 g IntraVENous Q8H    L.acidophilus-paracasei-S.thermophil-bifidobacter (RISAQUAD) 8 billion cell capsule  1 Capsule Oral DAILY    predniSONE (DELTASONE) tablet 20 mg  20 mg Oral DAILY WITH BREAKFAST    dilTIAZem ER (CARDIZEM CD) capsule 240 mg  240 mg Oral DAILY    vits A and D-white pet-lanolin (A&D) ointment   Topical Q2H PRN    finasteride (PROSCAR) tablet 5 mg  5 mg Oral QHS    albuterol-ipratropium (DUO-NEB) 2.5 MG-0.5 MG/3 ML  3 mL Nebulization Q4H PRN    metoprolol tartrate (LOPRESSOR) tablet 25 mg  25 mg Oral BID    digoxin (LANOXIN) tablet 0.125 mg  0.125 mg Oral DAILY    amiodarone (CORDARONE) tablet 200 mg  200 mg Oral DAILY    apixaban (ELIQUIS) tablet 5 mg  5 mg Oral BID    aspirin delayed-release tablet 81 mg  81 mg Oral DAILY    furosemide (LASIX) tablet 20 mg  20 mg Oral DAILY    pantoprazole (PROTONIX) tablet 40 mg  40 mg Oral DAILY    rosuvastatin (CRESTOR) tablet 40 mg  40 mg Oral QHS    tamsulosin (FLOMAX) capsule 0.4 mg  0.4 mg Oral DAILY    guaiFENesin ER (MUCINEX) tablet 600 mg  600 mg Oral Q12H    sodium chloride (NS) flush 5-40 mL  5-40 mL IntraVENous Q8H    sodium chloride (NS) flush 5-40 mL  5-40 mL IntraVENous PRN    acetaminophen (TYLENOL) tablet 650 mg  650 mg Oral Q6H PRN    Or    acetaminophen (TYLENOL) suppository 650 mg  650 mg Rectal Q6H PRN    polyethylene glycol (MIRALAX) packet 17 g  17 g Oral DAILY PRN    ondansetron (ZOFRAN ODT) tablet 4 mg  4 mg Oral Q8H PRN    Or    ondansetron (ZOFRAN) injection 4 mg  4 mg IntraVENous Q6H PRN        Exam:    General:  Awake, cooperative,   Eyes:  Sclera anicteric. Pupils equally round and reactive to light. Mouth/Throat: Mucous membranes normal, oral pharynx clear   Neck: Supple   Lungs:    Reduced auscultation bilaterally, good effort   CV:  Regular rate and rhythm,no murmur, click, rub or gallop   Abdomen:   Soft, non-tender. bowel sounds normal. non-distended   Extremities: No  edema   Skin: Skin color, texture, turgor normal. no acute rash or lesions   Lymph nodes: Cervical and supraclavicular normal   Musculoskeletal: Swelling, erythema, warmth noted on right ankle, healed scar noted dorsum of ankle. . Small scabs on right lower leg noted   Lines/Devices:  Intact, no erythema, drainage or tenderness   Psych: Alert and oriented, normal mood affect .        Data Reviewed:   CBC:   Recent Labs     12/12/21 0033   WBC 17.2*   RBC 3.89*   HGB 11.2*   HCT 36.8        CMP:   Recent Labs     12/12/21 0033   *      K 3.9   CL 96*   CO2 36*   BUN 19   CREA 1.19   CA 8.9   AGAP 5   BUCR 16       Lab Results   Component Value Date/Time    Culture result: NO GROWTH 2 DAYS 12/10/2021 03:41 AM    Culture result: Culture performed on Unspun Fluid 12/09/2021 02:22 PM    Culture result: NO GROWTH THUS FAR 12/09/2021 02:22 PM    Culture result: NO GROWTH 3 DAYS 12/09/2021 03:06 AM    Culture result: (A) 12/08/2021 11:25 AM     STAPHYLOCOCCUS AUREUS GROWING IN 1 OF 2 BOTTLES DRAWN SITE = R WRIST  PLEASE REFER TO [id number] FOR SENSITIVITIES    Culture result: REMAINING BOTTLE(S) HAS/HAVE NO GROWTH SO FAR 12/08/2021 11:25 AM    Culture result:  12/08/2021 11:25 AM     (NOTE) PRELIMARY REPORT OF GRAM POS COCCI IN [employer]ERS CALLED TO AND READ BACK BY Sanchez Yousif RN 12.9.21 AT 4536   CAB          XR Results (most recent)reviewed:  Results from [place] encounter on 12/05/21    XR ANKLE RT MIN 3 V    Narrative  EXAM: XR ANKLE RT MIN 3 V    INDICATION: Trauma. COMPARISON: None. FINDINGS: Three views of the right ankle demonstrate no fracture or disruption  of the ankle mortise. There is no other acute osseous or articular abnormality. There are vascular calcifications. Impression  No acute abnormality. Vascular calcifications. ICD-10-CM ICD-9-CM    1. Acute respiratory failure with hypoxia ([facility])  J96.01 518.81    2. Atrial flutter with rapid ventricular response ([facility])  I48.92 427.32                I have discussed the diagnosis with the patient and the intended plan as seen in the above orders. I have discussed medication side effects and warnings with the patient as well.     Reviewed test results at length with patient    Signed By: Holli Lujan MD FACP

## 2021-12-10 ENCOUNTER — APPOINTMENT (OUTPATIENT)
Dept: NON INVASIVE DIAGNOSTICS | Age: 71
DRG: 871 | End: 2021-12-10
Attending: INTERNAL MEDICINE
Payer: MEDICARE

## 2021-12-10 ENCOUNTER — TELEPHONE (OUTPATIENT)
Dept: INFECTIOUS DISEASES | Age: 71
End: 2021-12-10

## 2021-12-10 LAB — DIGOXIN SERPL-MCNC: 0.6 NG/ML (ref 0.9–2)

## 2021-12-10 PROCEDURE — 74011000258 HC RX REV CODE- 258: Performed by: STUDENT IN AN ORGANIZED HEALTH CARE EDUCATION/TRAINING PROGRAM

## 2021-12-10 PROCEDURE — 87040 BLOOD CULTURE FOR BACTERIA: CPT

## 2021-12-10 PROCEDURE — 74011250637 HC RX REV CODE- 250/637: Performed by: INTERNAL MEDICINE

## 2021-12-10 PROCEDURE — 93312 ECHO TRANSESOPHAGEAL: CPT

## 2021-12-10 PROCEDURE — 65660000001 HC RM ICU INTERMED STEPDOWN

## 2021-12-10 PROCEDURE — 74011000250 HC RX REV CODE- 250: Performed by: INTERNAL MEDICINE

## 2021-12-10 PROCEDURE — 80162 ASSAY OF DIGOXIN TOTAL: CPT

## 2021-12-10 PROCEDURE — 74011636637 HC RX REV CODE- 636/637: Performed by: INTERNAL MEDICINE

## 2021-12-10 PROCEDURE — 74011000250 HC RX REV CODE- 250: Performed by: STUDENT IN AN ORGANIZED HEALTH CARE EDUCATION/TRAINING PROGRAM

## 2021-12-10 PROCEDURE — 36415 COLL VENOUS BLD VENIPUNCTURE: CPT

## 2021-12-10 PROCEDURE — 77010033678 HC OXYGEN DAILY

## 2021-12-10 PROCEDURE — 74011250636 HC RX REV CODE- 250/636: Performed by: INTERNAL MEDICINE

## 2021-12-10 PROCEDURE — 99233 SBSQ HOSP IP/OBS HIGH 50: CPT | Performed by: INTERNAL MEDICINE

## 2021-12-10 PROCEDURE — 99152 MOD SED SAME PHYS/QHP 5/>YRS: CPT

## 2021-12-10 RX ORDER — MIDAZOLAM HYDROCHLORIDE 1 MG/ML
.5-2 INJECTION, SOLUTION INTRAMUSCULAR; INTRAVENOUS
Status: DISCONTINUED | OUTPATIENT
Start: 2021-12-10 | End: 2021-12-10

## 2021-12-10 RX ORDER — FENTANYL CITRATE 50 UG/ML
12.5-5 INJECTION, SOLUTION INTRAMUSCULAR; INTRAVENOUS
Status: DISCONTINUED | OUTPATIENT
Start: 2021-12-10 | End: 2021-12-10

## 2021-12-10 RX ORDER — LIDOCAINE HYDROCHLORIDE 20 MG/ML
15 SOLUTION OROPHARYNGEAL AS NEEDED
Status: DISCONTINUED | OUTPATIENT
Start: 2021-12-10 | End: 2021-12-10

## 2021-12-10 RX ORDER — PREDNISONE 20 MG/1
20 TABLET ORAL
Status: COMPLETED | OUTPATIENT
Start: 2021-12-11 | End: 2021-12-13

## 2021-12-10 RX ADMIN — Medication 10 ML: at 02:55

## 2021-12-10 RX ADMIN — DILTIAZEM HYDROCHLORIDE 240 MG: 240 CAPSULE, COATED, EXTENDED RELEASE ORAL at 08:34

## 2021-12-10 RX ADMIN — FENTANYL CITRATE 25 MCG: 0.05 INJECTION, SOLUTION INTRAMUSCULAR; INTRAVENOUS at 10:03

## 2021-12-10 RX ADMIN — BENZOCAINE, BUTAMBEN, AND TETRACAINE HYDROCHLORIDE 1 SPRAY: .028; .004; .004 AEROSOL, SPRAY TOPICAL at 10:00

## 2021-12-10 RX ADMIN — DIGOXIN 0.12 MG: 125 TABLET ORAL at 08:34

## 2021-12-10 RX ADMIN — METOPROLOL TARTRATE 25 MG: 25 TABLET, FILM COATED ORAL at 08:35

## 2021-12-10 RX ADMIN — FINASTERIDE 5 MG: 5 TABLET, FILM COATED ORAL at 21:10

## 2021-12-10 RX ADMIN — CEFAZOLIN 2 G: 1 INJECTION, POWDER, FOR SOLUTION INTRAMUSCULAR; INTRAVENOUS at 08:49

## 2021-12-10 RX ADMIN — GUAIFENESIN 600 MG: 600 TABLET, EXTENDED RELEASE ORAL at 12:49

## 2021-12-10 RX ADMIN — Medication 10 ML: at 21:12

## 2021-12-10 RX ADMIN — LIDOCAINE HYDROCHLORIDE 15 ML: 20 SOLUTION ORAL at 10:00

## 2021-12-10 RX ADMIN — CEFAZOLIN 2 G: 1 INJECTION, POWDER, FOR SOLUTION INTRAMUSCULAR; INTRAVENOUS at 17:23

## 2021-12-10 RX ADMIN — AMIODARONE HYDROCHLORIDE 200 MG: 200 TABLET ORAL at 08:34

## 2021-12-10 RX ADMIN — Medication 10 ML: at 17:26

## 2021-12-10 RX ADMIN — METOPROLOL TARTRATE 25 MG: 25 TABLET, FILM COATED ORAL at 17:24

## 2021-12-10 RX ADMIN — GUAIFENESIN 600 MG: 600 TABLET, EXTENDED RELEASE ORAL at 21:10

## 2021-12-10 RX ADMIN — ASPIRIN 81 MG: 81 TABLET, COATED ORAL at 08:34

## 2021-12-10 RX ADMIN — ROSUVASTATIN CALCIUM 40 MG: 40 TABLET, FILM COATED ORAL at 21:12

## 2021-12-10 RX ADMIN — MIDAZOLAM HYDROCHLORIDE 2 MG: 1 INJECTION, SOLUTION INTRAMUSCULAR; INTRAVENOUS at 10:04

## 2021-12-10 RX ADMIN — APIXABAN 5 MG: 5 TABLET, FILM COATED ORAL at 17:24

## 2021-12-10 RX ADMIN — FUROSEMIDE 20 MG: 40 TABLET ORAL at 08:34

## 2021-12-10 RX ADMIN — TAMSULOSIN HYDROCHLORIDE 0.4 MG: 0.4 CAPSULE ORAL at 12:49

## 2021-12-10 RX ADMIN — PREDNISONE 30 MG: 20 TABLET ORAL at 08:34

## 2021-12-10 RX ADMIN — NAFCILLIN SODIUM 2 G: 2 INJECTION, POWDER, LYOPHILIZED, FOR SOLUTION INTRAMUSCULAR; INTRAVENOUS at 03:34

## 2021-12-10 NOTE — PROGRESS NOTES
Physical Therapy:  Medical chart reviewed. Pt is currently off of the floor for KIM. Will defer and continue to follow.

## 2021-12-10 NOTE — PROGRESS NOTES
Infectious Disease Progress Note    IMPRESSION:       -MSSA bacteremia  Blood cultures-12/5 + for MSSA 4/4  Repeat cultures-12/8 + for probable MSSA 1/2  Negative cultures-12/9, 12/10  TTE-12/57-ZZ-dkvcpwojcaw thickening,  KIM -no vegetation    -Cellulitis, flexor tenosynovitis of right ankle  No evidence of  Acute OM on MRI. Ankle erythematous, swollen, mild warm to touch  S/p aspiration by Ortho.-Fluid WBC 18,360 PMN-95% , RBC>100, culture pending  Patient has scars suggestive of healed pustules on ankle, scabs on lower leg. Patient advised to use a skin moisturizer, avoid scratching skin. D/w Ortho-currently no concerns for joint involvement.      -Acute on chronic respiratory failure  93- 95% on 2L O2. CXR-negative for infiltrate    -URI and sinus drainage  Negative respiratory panel , Covid PCR    COPD exacerbation  On prednisone p.o.    -Recurrent atrial flutter  On amiodarone    -Cardiomyopathy EF 25-30    -Obesity BMI 37.4       PLAN:        -Patient changed  to Cefazolin due to increasing creatinine  -Repeat blood cultures until negative cultures obtained  -Await fluid culture from aspiration of ankle  -Plan of care D/w pt, all questions answered.  -Please contact ID on call with questions, concerns over the weekend. Antibiotic orders discharge   -Cefazolin 2 g IV every 8 hourly end date 1/6/22  -Remove PICC at end of therapy  -Weekly CBC, CMP and ESR every other week fax results to [phone], call with critical labs at [phone]  -Encourage adequate daily fluid intake, daily probiotic/yogurt  -ID clinic follow-up-1/20/22 at 2:30 PM.        Possible adverse effects of long term antibiotics are inclusive of but not limited to following  BM suppression, neutropenia , cytopenias , aplastic anemia hemorrhage liver & renal dysfunction/ liver , renal failure  , GI dysfunction- N, V  Diarrhea,C.difficile disease, rash , allergy , anaphylaxis. toxic epidermal necrolysis  Neuro toxicity , seizure disorder  Side effects tend to be more pronounced in the elderly               Subjective:     Patient seen today. Denies complaints  Right ankle-swelling, erythema noted. Review of Systems:  A comprehensive review of systems was negative except for that written in the History of Present Illness. 14 point ROS obtained . All other systems negative . Objective:     Blood pressure (!) 162/72, pulse 95, temperature 97.7 °F (36.5 °C), resp. rate 18, height 5' 11\" (1.803 m), weight 268 lb 8.3 oz (121.8 kg), SpO2 90 %. Temp (24hrs), Av °F (36.7 °C), Min:97.7 °F (36.5 °C), Max:98.6 °F (37 °C)      Patient Vitals for the past 24 hrs:   Temp Pulse Resp BP SpO2   12/10/21 1104 97.7 °F (36.5 °C) 95 18 (!) 162/72 90 %   12/10/21 1045  93 19 136/72 92 %   12/10/21 1040  94 18 (!) 150/81 94 %   12/10/21 1035  93 17 (!) 142/76 93 %   12/10/21 1030  94 19 (!) 141/80 92 %   12/10/21 1025  93 22 126/89 94 %   12/10/21 1020  94 17 (!) 190/100 95 %   12/10/21 1015  92 25 (!) 162/89 94 %   12/10/21 1010  97 26 (!) 178/91 92 %   12/10/21 1005  94 20 (!) 162/132 98 %   12/10/21 1000  93 19 (!) 178/86 99 %   12/10/21 0955  93 17 (!) 179/88 95 %   12/10/21 0714 97.9 °F (36.6 °C) 98 24 (!) 152/85 92 %   12/10/21 0340 97.8 °F (36.6 °C) 90 23 (!) 157/83 92 %   21 2354     95 %   21 2338  96 19  (!) 88 %   21 2258    (!) 159/92    21 2253 98 °F (36.7 °C) 88 18 (!) 170/88 95 %   21 1949 98.6 °F (37 °C) 89 21 (!) 142/74 97 %   21 1455 97.8 °F (36.6 °C) 79 16 135/69 94 %         Lines:  Peripheral IV:       Physical Exam:   General:  Awake, cooperative,    Eyes:  Sclera anicteric. Pupils equally round and reactive to light. Mouth/Throat: Mucous membranes normal, oral pharynx clear   Neck: Supple   Lungs:   Reduced auscultation bases   CV:  Regular rate and rhythm,no murmur, click, rub or gallop   Abdomen:   Soft, non-tender.  bowel sounds normal. non-distended   Extremities: No cyanosis or edema   Skin: Skin color, texture, turgor normal. no acute rash or lesions   Lymph nodes: Cervical and supraclavicular normal   Musculoskeletal: Swelling, erythema , warmth  + R/ankle   Lines/Devices:  Intact, no erythema, drainage or tenderness   Psych: Awake and oriented, normal mood affect       Data Review:ed   CBC:   Recent Labs     12/09/21  0306 12/08/21 0223   WBC 18.7* 19.7*   RBC 3.76* 3.76*   HGB 10.8* 10.7*   HCT 34.8* 33.7*    202   GRANS  --  87*   LYMPH  --  6*   EOS  --  0     CMP:   Recent Labs     12/09/21 0306 12/08/21 0223   * 122*    133*   K 4.2 4.5    100   CO2 30 28   BUN 33* 28*   CREA 1.29 1.15   CA 9.1 9.1   AGAP 6 5   BUCR 26* 24*   AP  --  96   TP  --  7.4   ALB  --  2.4*   GLOB  --  5.0*   AGRAT  --  0.5*       Studies reviewed:      Lab Results   Component Value Date/Time    Culture result: NO GROWTH AFTER 3 HOURS 12/10/2021 03:41 AM    Culture result: PENDING 12/09/2021 02:22 PM    Culture result: NO GROWTH 1 DAY 12/09/2021 03:06 AM    Culture result: (A) 12/08/2021 11:25 AM     STAPHYLOCOCCUS AUREUS GROWING IN 1 OF 2 BOTTLES DRAWN SITE = R WRIST  PLEASE REFER TO [id number] FOR SENSITIVITIES    Culture result: REMAINING BOTTLE(S) HAS/HAVE NO GROWTH SO FAR 12/08/2021 11:25 AM    Culture result:  12/08/2021 11:25 AM     (NOTE) PRELIMARY REPORT OF GRAM POS COCCI IN CLUSTERS CALLED TO AND READ BACK BY BRO LUNA 12.9.21 AT 1207   CAB        XR Results (most recent):  Results from Hospital Encounter encounter on 12/05/21    XR ANKLE RT MIN 3 V    Narrative  EXAM: XR ANKLE RT MIN 3 V    INDICATION: Trauma. COMPARISON: None. FINDINGS: Three views of the right ankle demonstrate no fracture or disruption  of the ankle mortise. There is no other acute osseous or articular abnormality. There are vascular calcifications. Impression  No acute abnormality. Vascular calcifications.       Patient Active Problem List   Diagnosis Code    Hypertension I10    Arrhythmia I49.9    Obesity (BMI 30.0-34. 9) E66.9    Acute respiratory failure with hypoxia (HCC) J96.01    Atrial flutter with rapid ventricular response (HCC) I48.92    Atrial fibrillation (HCC) I48.91    Congestive heart failure (HCC) I50.9    Atrial flutter (HCC) I48.92    Febrile R50.9    CHF exacerbation (HCC) I50.9    Elevated brain natriuretic peptide (BNP) level R79.89    Respiratory failure, acute-on-chronic (HCC) J96.20         ICD-10-CM ICD-9-CM    1. Acute respiratory failure with hypoxia (HCC)  J96.01 518.81    2. Atrial flutter with rapid ventricular response (HCC)  I48.92 427.32        I have discussed the diagnosis with the patient and the intended plan as seen in the above orders. I have discussed medication side effects and warnings with the patient as well.     Reviewed test results at length with patient    Anti-infectives:     Nafcillin IV-12/8  S/pCeftriaxone 12/5, 12/728  S/p lobito Cabrera MD FACP

## 2021-12-10 NOTE — PROGRESS NOTES
TRANSFER - OUT REPORT:    Verbal report given to Lisa Briseno (name) on Michelet Finney being transferred to [facility] (unit) for routine post - op       Report consisted of patient's Situation, Background, Assessment and   Recommendations(SBAR). Information from the following report(s) SBAR, Kardex, Procedure Summary, Intake/Output, Recent Results, Cardiac Rhythm NSR, Pre Procedure Checklist and Procedure Verification was reviewed with the receiving nurse. Opportunity for questions and clarification was provided.       Patient transported with:   O2 @ 3 liters

## 2021-12-10 NOTE — PROGRESS NOTES
Occupational Therapy:    Chart reviewed in prep for skilled OT. Pt is SUSAN for KIM. Will continue to follow.      [facility], OTR/L

## 2021-12-10 NOTE — PROGRESS NOTES
Infectious Disease Progress Note    IMPRESSION:       -MSSA bacteremia  Blood cultures- + for MSSA 4/4  Repeat cultures- + for probable MSSA 1/  TTE-4-XN-mingrleovjb thickening, plan is for KIM    -Cellulitis, flexor tenosynovitis of right ankle  No evidence of  Acute OM on MRI. S/p aspiration by Ortho.    -Acute on chronic respiratory failure  93- 95% on 2L O2. CXR-negative for infiltrate    -URI and sinus drainage  Negative respiratory panel , Covid PCR    COPD exacerbation  On prednisone p.o.    -Recurrent atrial flutter  On amiodarone    -Cardiomyopathy EF 25-30    -Obesity BMI 37.4       PLAN:        -Continue nafcillin IV, if further increase in creatinine change to cefazolin  -Repeat blood cultures until negative cultures obtained  -Await fluid culture from aspiration of ankle  -Plan is for KIM  -D/w pt         Subjective:     Patient seen today. Denies complaints  Right ankle-swelling, erythema noted  Review of Systems:  A comprehensive review of systems was negative except for that written in the History of Present Illness. 14 point ROS obtained . All other systems negative . Objective:     Blood pressure (!) 152/85, pulse 98, temperature 97.9 °F (36.6 °C), resp. rate 24, height 5' 11\" (1.803 m), weight 268 lb 8.3 oz (121.8 kg), SpO2 92 %.   Temp (24hrs), Av °F (36.7 °C), Min:97.8 °F (36.6 °C), Max:98.6 °F (37 °C)      Patient Vitals for the past 24 hrs:   Temp Pulse Resp BP SpO2   12/10/21 0714 97.9 °F (36.6 °C) 98 24 (!) 152/85 92 %   12/10/21 0340 97.8 °F (36.6 °C) 90 23 (!) 157/83 92 %   21 2354     95 %   21 2338  96 19  (!) 88 %   21 2258    (!) 159/92    21 2253 98 °F (36.7 °C) 88 18 (!) 170/88 95 %   21 1949 98.6 °F (37 °C) 89 21 (!) 142/74 97 %   21 1455 97.8 °F (36.6 °C) 79 16 135/69 94 %   21 1057     93 %   21 1035  95   90 %   21 1029  95 18 138/70 93 %   21 1015  93 18 138/70 93 % Lines:  Peripheral IV:       Physical Exam:   General:  Awake, cooperative,    Eyes:  Sclera anicteric. Pupils equally round and reactive to light. Mouth/Throat: Mucous membranes normal, oral pharynx clear   Neck: Supple   Lungs:   Reduced auscultation bases   CV:  Regular rate and rhythm,no murmur, click, rub or gallop   Abdomen:   Soft, non-tender. bowel sounds normal. non-distended   Extremities: No cyanosis or edema   Skin: Skin color, texture, turgor normal. no acute rash or lesions   Lymph nodes: Cervical and supraclavicular normal   Musculoskeletal: Swelling, erythema , warmth  + R/ankle   Lines/Devices:  Intact, no erythema, drainage or tenderness   Psych: Awake and oriented, normal mood affect       Data Review:ed   CBC:   Recent Labs     12/09/21  0306 12/08/21 0223   WBC 18.7* 19.7*   RBC 3.76* 3.76*   HGB 10.8* 10.7*   HCT 34.8* 33.7*    202   GRANS  --  87*   LYMPH  --  6*   EOS  --  0     CMP:   Recent Labs     12/09/21  0306 12/08/21 0223   * 122*    133*   K 4.2 4.5    100   CO2 30 28   BUN 33* 28*   CREA 1.29 1.15   CA 9.1 9.1   AGAP 6 5   BUCR 26* 24*   AP  --  96   TP  --  7.4   ALB  --  2.4*   GLOB  --  5.0*   AGRAT  --  0.5*       Studies reviewed:      Lab Results   Component Value Date/Time    Culture result: NO GROWTH AFTER 3 HOURS 12/10/2021 03:41 AM    Culture result: PENDING 12/09/2021 02:22 PM    Culture result: NO GROWTH 1 DAY 12/09/2021 03:06 AM    Culture result: (A) 12/08/2021 11:25 AM     PROBABLE STAPHYLOCOCCUS AUREUS GROWING IN 1 OF 2 BOTTLES DRAWN SITE = R WRIST.      Culture result: REMAINING BOTTLE(S) HAS/HAVE NO GROWTH SO FAR 12/08/2021 11:25 AM    Culture result:  12/08/2021 11:25 AM     (NOTE) PRELIMARY REPORT OF GRAM POS COCCI IN [facility] CALLED TO AND READ BACK BY Sanchez Yousif RN 12.9.21 AT 2513   CAB        XR Results (most recent):  Results from [place] encounter on 12/05/21    XR ANKLE RT MIN 3 V    Narrative  EXAM: XR ANKLE RT MIN 3 V    INDICATION: Trauma. COMPARISON: None. FINDINGS: Three views of the right ankle demonstrate no fracture or disruption  of the ankle mortise. There is no other acute osseous or articular abnormality. There are vascular calcifications. Impression  No acute abnormality. Vascular calcifications. Patient Active Problem List   Diagnosis Code    Hypertension I10    Arrhythmia I49.9    Obesity (BMI 30.0-34. 9) E66.9    Acute respiratory failure with hypoxia (HCC) J96.01    Atrial flutter with rapid ventricular response (HCC) I48.92    Atrial fibrillation (HCC) I48.91    Congestive heart failure (HCC) I50.9    Atrial flutter (HCC) I48.92    Febrile R50.9    CHF exacerbation (HCC) I50.9    Elevated brain natriuretic peptide (BNP) level R79.89    Respiratory failure, acute-on-chronic (HCC) J96.20         ICD-10-CM ICD-9-CM    1. Acute respiratory failure with hypoxia (HCC)  J96.01 518.81    2. Atrial flutter with rapid ventricular response (HCC)  I48.92 427.32        I have discussed the diagnosis with the patient and the intended plan as seen in the above orders. I have discussed medication side effects and warnings with the patient as well.     Reviewed test results at length with patient    Anti-infectives:     Nafcillin IV-12/8  S/pCeftriaxone 12/5, 12/728  S/p vancomycin Kandice Dakin MD FACP

## 2021-12-10 NOTE — PROGRESS NOTES
Ortho:   Patient resting comfortably in bed. Slight improvement in swelling from yesterday in the right ankle   PROM/Active ROM intact and minimally painful    Aspiration- Cell count 18.3k; (-) Crystals, No organisms identified on cultures thus far. Based on these lab values and physical exam, the ankle does not appear to be the source of infection. Patient may continue to use CAM boot when ambulating. RICE when in bed. F/U outpatient within 2 weeks with Dr Padmini Zarate for the ankle. Contact our service if further care is needed from our standpoint.      JOVANY Navarrete

## 2021-12-10 NOTE — PROGRESS NOTES
Transition of Care Plan:     RUR: 16%  Disposition:  Home with Home Health[facility] - (IV antibiotics)  Follow up appointments: PCP; Cardiology  DME needed: Mike Kahn  Transportation at Discharge: Pt's wife Lenard Trinh will transport at d/c.   Marito Sharmalas or means to access home:  Pt has access       IM Medicare Letter:2nd IM Letter needed  Is patient a BCPI-A Bundle: N/A                    If yes, was Bundle Letter given?:  N/A  Is patient a  and connected with the [place]? N/A  If yes, was [place] transfer form completed and [facility] notified? N/A  Caregiver Contact: Lenard Trinh- wife [phone]  Discharge Caregiver contacted prior to discharge? CM will notify caregiver at d/c.     5:09pm-[facility] accepted referral. AVS updated. 5:00pm- CM delivered rolling walker to pt. Pt signed all necessary paperwork. 3:35pm- CM met with pt at bedside to discuss d/c plan. CM discussed with pt about SNF. Pt declined. Pt stated that he would like to go home health and a rolling walker. CM reviewed with pt the [place] list. Pt selected [address] [facility] document signed by pt and placed in pt's bedside. [facility] [employer] and [employer]. CM sent referral to [facility] for rolling walker via [employer]. CM sent order for IV antibiotic to [facility] via [employer]. CM will continue to follow patient for discharge planning needs and arrange for services as deemed necessary.     Blayne Espinoza [address]  [phone]

## 2021-12-10 NOTE — TELEPHONE ENCOUNTER
Please add patient to clinic schedule as follows. Patient notification not required.   -ID in person clinic follow-up-1/20/22 at 2:30 PM.

## 2021-12-10 NOTE — PROGRESS NOTES
EP/ ARRHYTHMIA Progress Note    Patient ID:  Patient: Hiral Garvin  MRN: [id number]  Age: 65 y.o.  : 1950    Date of  Admission: 2021  6:52 PM   PCP:  Myles Stanton MD   Usual cardiologist:  Raina Olivares MD    Assessment: 1. Recurrent atrial flutter, unspecified. S/p cardioversion to sinus on . He's had an Afib RF ablation in 2021.  2. Cardiomyopathy, likely nonischemic and may be tachycardia-mediated. 3. Acute systolic congestive heart failure. Better. 4. COPD exacerbation? 5. GPC bacteremia. Blood cultures from  and  positive for pan-sensitive Staph aureus.  and 12/10 no growth so far. 6. Hypertensive heart disease with heart failure. 7. R ankle pain s/p joint aspiration . Analysis pending. 8. Full code. Plan:     1. Was going to stop amiodarone, but I'll keep it going post-cardioversion and to try to get him to the next step. 2. Continue diltiazem  mg daily. 3. Continue digoxin 125 mcg po daily. May end up stopping this, will see. 4. Continue metoprolol IR 25 mg po BID with hold parameter for BP and HR. 5. Agree with treatment for COPD and also for GPC bacteremia. 6. Continue anticoagulation. 7. Continue statin. Given the Staph aureus bacteremia, would like to evaluate him by KIM. I discussed the potential benefits, risks, and alternatives with him and he agrees to proceed. Would hold Eliquis the morning of the procedure which will be on Friday 1/10 at 10 am.  Dr. Deysi Presley will do this as I will be in clinic. [x]       High complexity decision making was performed in this patient    Hiral Garvin is a 65 y.o. male with a history of atrial fibrillation, s/p Afib ablation with PVI and also substrate work in the left (LA roof line) and right (CTI line) atria. He had recurrent atrial flutter and metoprolol was started atop his diltiazem when seen by  [facility].   He continued to be active, but has noticed over the last week progressively worsening BOWDEN and then SOB at rest.  Came to the ER and found to be in atrial flutter with 2:1 AV conduction with HR in the 120's. He was given diuretic, oxygen. An echo shows his EF is 25-30%. His EF was 40-45% by KIM in 9/2021. Echo EF 55-60% in 3/2021. TODAY:  He denies syncope, dizziness, palpitations, worsening dyspnea.   Anticipates KIM later this AM.      No Known Allergies       Current Facility-Administered Medications   Medication Dose Route Frequency    ceFAZolin (ANCEF) 2 g in sterile water (preservative free) 20 mL IV syringe  2 g IntraVENous Q6H    dilTIAZem ER (CARDIZEM CD) capsule 240 mg  240 mg Oral DAILY    predniSONE (DELTASONE) tablet 30 mg  30 mg Oral DAILY WITH BREAKFAST    vits A and D-white pet-lanolin (A&D) ointment   Topical Q2H PRN    finasteride (PROSCAR) tablet 5 mg  5 mg Oral QHS    albuterol-ipratropium (DUO-NEB) 2.5 MG-0.5 MG/3 ML  3 mL Nebulization Q4H PRN    metoprolol tartrate (LOPRESSOR) tablet 25 mg  25 mg Oral BID    digoxin (LANOXIN) tablet 0.125 mg  0.125 mg Oral DAILY    amiodarone (CORDARONE) tablet 200 mg  200 mg Oral DAILY    apixaban (ELIQUIS) tablet 5 mg  5 mg Oral BID    aspirin delayed-release tablet 81 mg  81 mg Oral DAILY    furosemide (LASIX) tablet 20 mg  20 mg Oral DAILY    pantoprazole (PROTONIX) tablet 40 mg  40 mg Oral DAILY    rosuvastatin (CRESTOR) tablet 40 mg  40 mg Oral QHS    tamsulosin (FLOMAX) capsule 0.4 mg  0.4 mg Oral DAILY    guaiFENesin ER (MUCINEX) tablet 600 mg  600 mg Oral Q12H    sodium chloride (NS) flush 5-40 mL  5-40 mL IntraVENous Q8H    sodium chloride (NS) flush 5-40 mL  5-40 mL IntraVENous PRN    acetaminophen (TYLENOL) tablet 650 mg  650 mg Oral Q6H PRN    Or    acetaminophen (TYLENOL) suppository 650 mg  650 mg Rectal Q6H PRN    polyethylene glycol (MIRALAX) packet 17 g  17 g Oral DAILY PRN    ondansetron (ZOFRAN ODT) tablet 4 mg  4 mg Oral Q8H PRN    Or    ondansetron (ZOFRAN) injection 4 mg  4 mg IntraVENous Q6H PRN       Review of Symptoms:    Respiratory: +SOB, cough, sputum production, wheezing, BOWDEN, negative for pleuritic pain, hemopthysis   Cardiology: negative for chest pain, palpitations, edema, syncope   Gastrointestinal: +flatulence, negative for abdominal pain, N/V, dysphagia, change in bowel habits, bleeding   Genitourinary: negative for dysuria, gross hematuria       Objective:      Physical Exam:  Temp (24hrs), Av °F (36.7 °C), Min:97.8 °F (36.6 °C), Max:98.6 °F (37 °C)    Patient Vitals for the past 8 hrs:   Pulse   12/10/21 0714 98   12/10/21 0340 90    Patient Vitals for the past 8 hrs:   Resp   12/10/21 0714 24   12/10/21 0340 23    Patient Vitals for the past 8 hrs:   BP   12/10/21 0714 (!) 152/85   12/10/21 0340 (!) 157/83          Intake/Output Summary (Last 24 hours) at 12/10/2021 0847  Last data filed at 12/10/2021 4387  Gross per 24 hour   Intake    Output 1300 ml   Net -1300 ml       Nondiaphoretic, not in acute distress. Unlabored, clear to auscultation bilaterally anteriorly, symmetric air movement. Regular rhythm, no murmur, pericardial rub, knock, or gallop. No JVD or peripheral edema. Palpable radial pulses bilaterally. Abdomen obese, soft, nontender, nondistended. Extremities without cyanosis or clubbing. Muscle tone and bulk normal for age. R ankle wrapped. Skin warm and dry. No petechiae, purpura, rashes, or ulcers. Neuro grossly nonfocal.  No tremor. Awake and appropriate. CARDIOGRAPHICS and STUDIES, I reviewed:    Telemetry:  Sinus rhythm 90's. ECG in ER:  Atrial flutter with 2:1 AV conduction. Echo here:    Left Ventricle Normal cavity size. Mildly increased wall thickness. Wall motion: normal. The estimated EF is 25 - 30%. Visually measured ejection fraction. Moderate-to-severely and globally reduced systolic function. Unable to assess diastolic function.  There is inconclusive left ventricular diastolic function. Left Atrium Normal cavity size. Right Ventricle Not well visualized. Normal cavity size and global systolic function. Right Atrium Normal cavity size. Interatrial Septum Interatrial septum not assessed   Aortic Valve Aortic valve not well visualized. No stenosis and no regurgitation. Mitral Valve No stenosis. Mitral valve non-specific thickening. Mild mitral annular calcification. Trace regurgitation. Tricuspid Valve Normal valve structure and no stenosis. Unable to assess tricuspid valve regurgitation. Pulmonic Valve Pulmonic valve not well visualized, but normal doppler findings. No stenosis and no regurgitation. Aorta The aorta was not well visualized. Normal aortic root. Pulmonary Artery Pulmonary hypertension not suggested by Doppler findings. Pericardium Normal pericardium and no evidence of pericardial effusion     CXR:  No edema per the radiologist.       Labs:  No results for input(s): CPK, CKMB, CKNDX, TROIQ in the last 72 hours. No lab exists for component: CPKMB  No results found for: CHOL, CHOLX, CHLST, CHOLV, HDL, HDLP, LDL, LDLC, DLDLP, TGLX, TRIGL, TRIGP, CHHD, CHHDX  No results for input(s): INR, PTP, APTT, INREXT, INREXT in the last 72 hours. Recent Labs     12/09/21  0306 12/08/21 0223    133*   K 4.2 4.5    100   CO2 30 28   BUN 33* 28*   CREA 1.29 1.15   * 122*   CA 9.1 9.1   ALB  --  2.4*   WBC 18.7* 19.7*   HGB 10.8* 10.7*   HCT 34.8* 33.7*    202     Recent Labs     12/08/21 0223   AP 96   TP 7.4   ALB 2.4*   GLOB 5.0*     No components found for: GLPOC  No results for input(s): PH, PCO2, PO2 in the last 72 hours.         Fabien Brito MD  12/10/2021

## 2021-12-10 NOTE — PROGRESS NOTES
Patient arrived to Non-Invasive Cardiology Lab for In Patient KIM Procedure. Staff introduced to patient. Patient identifiers verified with Name and Date of Birth. Procedure verified with patient. Consent forms reviewed and signed by patient or authorized representative and verified. Allergies verified. Patient informed of procedure and plan of care. Questions answered with review. Patient on cardiac monitor, non-invasive blood pressure, SPO2 monitor. On RA. Patient is A&Ox3. Patient reports no complaints. Patient on stretcher, in low position, with side rails up. Patient instructed to call for assistance as needed. Family in pt room.  Wife Lisa Miguel

## 2021-12-10 NOTE — PROGRESS NOTES
Pharmacy - Digoxin Monitoring and Dosing Recommendations    Digoxin Indication: Atrial Flutter    Digoxin Dose: 125 mcg daily    Significant Interacting Medications: none    Labs:  Recent Labs     12/09/21  0306 12/08/21  0223   CREA 1.29 1.15   K 4.2 4.5     Creatinine Clearance (mL/min):   estimated creatinine clearance is 68.7 mL/min (based on SCr of 1.29 mg/dL). Heart rate (bpm): Impression/Plan:   Digoxin 250 mcg IV x1 12/6  Digoxin level of 0.6 is therapeutic.  Continue 125 mcg PO daily     Thank you,  TY Aranda

## 2021-12-10 NOTE — PROGRESS NOTES
Hospitalist Progress Note    NAME: Jennifer Soto   :  1950   MRN:  [id number]       Assessment / Plan:  Sepsis  MSSA bacteremia  Right ankle cellulitis/ Tenosynovitis    Fever/ leukocytosis  Blood culture from  positive for MSSA,  2/4 bottle positive,  and 10 prelim negative  C/w nafcillin  ID following  UA /CT chest negative  Ortho following for ankle cellulitis/ Tenosynovitis, arthrocentesis done, showed no organisms, however was on abx for 2 days already  Will need iv abx for 4 weeks    Acute on chronic respiratory failure due to  COPD exacerbation    Chest CT negative for infection  Continue 3LNC. He reports that he uses anywhere from 3-6 L at home as needed  Continue inhalers and PO Prednisone (taper)    Aflutter, rate uncontrolled  Cardiomyopathy  Echo EF 25-30%. No AS, trace MR  Continue Eliquis  Continue amiodarone, Cardizem and metoprolol  S/P DCCV , successful     Hypertension  Dyslipidemia  BPH  -continue proscar, flomax     GERD  -continue PPI     30.0 - 39.9 Obese / Body mass index is 37.67 kg/m².     Estimated discharge date:   Barriers: Fever/Work up for MSSA bacteremia     Code status: Full  Prophylaxis: Eliquis  Recommended Disposition: TBD     Subjective:     Chief Complaint / Reason for Physician Visit  Follow up for Sepsis/MSSA bacteremia  He has no active complains      Review of Systems:  Symptom Y/N Comments  Symptom Y/N Comments   Fever/Chills n   Chest Pain n    Poor Appetite n   Edema n    Cough    Abdominal Pain     Sputum    Joint Pain     SOB/BOWDEN    Pruritis/Rash     Nausea/vomit    Tolerating PT/OT     Diarrhea    Tolerating Diet     Constipation    Other       Could NOT obtain due to:      Objective:     VITALS:   Last 24hrs VS reviewed since prior progress note.  Most recent are:  Patient Vitals for the past 24 hrs:   Temp Pulse Resp BP SpO2   12/10/21 1104 97.7 °F (36.5 °C) 95 18 (!) 162/72 90 %   12/10/21 1045  93 19 136/72 92 % 12/10/21 1040  94 18 (!) 150/81 94 %   12/10/21 1035  93 17 (!) 142/76 93 %   12/10/21 1030  94 19 (!) 141/80 92 %   12/10/21 1025  93 22 126/89 94 %   12/10/21 1020  94 17 (!) 190/100 95 %   12/10/21 1015  92 25 (!) 162/89 94 %   12/10/21 1010  97 26 (!) 178/91 92 %   12/10/21 1005  94 20 (!) 162/132 98 %   12/10/21 1000  93 19 (!) 178/86 99 %   12/10/21 0955  93 17 (!) 179/88 95 %   12/10/21 0714 97.9 °F (36.6 °C) 98 24 (!) 152/85 92 %   12/10/21 0340 97.8 °F (36.6 °C) 90 23 (!) 157/83 92 %   12/09/21 2354     95 %   12/09/21 2338  96 19  (!) 88 %   12/09/21 2258    (!) 159/92    12/09/21 2253 98 °F (36.7 °C) 88 18 (!) 170/88 95 %   12/09/21 1949 98.6 °F (37 °C) 89 21 (!) 142/74 97 %   12/09/21 1455 97.8 °F (36.6 °C) 79 16 135/69 94 %       Intake/Output Summary (Last 24 hours) at 12/10/2021 1311  Last data filed at 12/10/2021 1253  Gross per 24 hour   Intake 50 ml   Output 2225 ml   Net -2175 ml        I had a face to face encounter and independently examined this patient on 12/10/2021, as outlined below:  PHYSICAL EXAM:  General: WD, WN. Alert, cooperative, no acute distress    EENT:  EOMI. Anicteric sclerae. MMM  Resp:  CTA bilaterally, no wheezing or rales. No accessory muscle use  CV:  Regular  rhythm,  No edema  GI:  Soft, Non distended, Non tender. +Bowel sounds  Neurologic:  Alert and oriented X 3, normal speech,   Psych:   Good insight. Not anxious nor agitated  Skin:  No rashes.   No jaundice    Reviewed most current lab test results and cultures  YES  Reviewed most current radiology test results   YES  Review and summation of old records today    NO  Reviewed patient's current orders and MAR    YES  PMH/ reviewed - no change compared to H&P  ________________________________________________________________________  Care Plan discussed with:    Comments   Patient y    Family      RN y    Care Manager     Consultant                        Multidiciplinary team rounds were held today with , nursing, pharmacist and clinical coordinator. Patient's plan of care was discussed; medications were reviewed and discharge planning was addressed. ________________________________________________________________________  Total NON critical care TIME: 37  Minutes    Total CRITICAL CARE TIME Spent:   Minutes non procedure based      Comments   >50% of visit spent in counseling and coordination of care     ________________________________________________________________________  Don Sotelo MD     Procedures: see electronic medical records for all procedures/Xrays and details which were not copied into this note but were reviewed prior to creation of Plan. LABS:  I reviewed today's most current labs and imaging studies.   Pertinent labs include:  Recent Labs     12/09/21 0306 12/08/21 0223   WBC 18.7* 19.7*   HGB 10.8* 10.7*   HCT 34.8* 33.7*    202     Recent Labs     12/09/21 0306 12/08/21 0223    133*   K 4.2 4.5    100   CO2 30 28   * 122*   BUN 33* 28*   CREA 1.29 1.15   CA 9.1 9.1   MG  --  2.5*   ALB  --  2.4*   TBILI  --  0.4   ALT  --  95*       Signed: Don Sotelo MD

## 2021-12-11 LAB — ERYTHROCYTE [SEDIMENTATION RATE] IN BLOOD: 86 MM/HR (ref 0–20)

## 2021-12-11 PROCEDURE — 65660000001 HC RM ICU INTERMED STEPDOWN

## 2021-12-11 PROCEDURE — 36415 COLL VENOUS BLD VENIPUNCTURE: CPT

## 2021-12-11 PROCEDURE — 74011250637 HC RX REV CODE- 250/637: Performed by: INTERNAL MEDICINE

## 2021-12-11 PROCEDURE — 77010033678 HC OXYGEN DAILY

## 2021-12-11 PROCEDURE — 74011250637 HC RX REV CODE- 250/637: Performed by: STUDENT IN AN ORGANIZED HEALTH CARE EDUCATION/TRAINING PROGRAM

## 2021-12-11 PROCEDURE — 74011636637 HC RX REV CODE- 636/637: Performed by: STUDENT IN AN ORGANIZED HEALTH CARE EDUCATION/TRAINING PROGRAM

## 2021-12-11 PROCEDURE — 74011250636 HC RX REV CODE- 250/636: Performed by: INTERNAL MEDICINE

## 2021-12-11 PROCEDURE — 85652 RBC SED RATE AUTOMATED: CPT

## 2021-12-11 PROCEDURE — 74011000250 HC RX REV CODE- 250: Performed by: INTERNAL MEDICINE

## 2021-12-11 RX ORDER — TRAMADOL HYDROCHLORIDE 50 MG/1
50 TABLET ORAL
Status: DISCONTINUED | OUTPATIENT
Start: 2021-12-11 | End: 2021-12-14 | Stop reason: HOSPADM

## 2021-12-11 RX ORDER — LOSARTAN POTASSIUM 50 MG/1
50 TABLET ORAL DAILY
Status: DISCONTINUED | OUTPATIENT
Start: 2021-12-12 | End: 2021-12-14 | Stop reason: HOSPADM

## 2021-12-11 RX ADMIN — DIGOXIN 0.12 MG: 125 TABLET ORAL at 09:07

## 2021-12-11 RX ADMIN — DILTIAZEM HYDROCHLORIDE 240 MG: 240 CAPSULE, COATED, EXTENDED RELEASE ORAL at 09:07

## 2021-12-11 RX ADMIN — PREDNISONE 20 MG: 20 TABLET ORAL at 09:07

## 2021-12-11 RX ADMIN — AMIODARONE HYDROCHLORIDE 200 MG: 200 TABLET ORAL at 09:07

## 2021-12-11 RX ADMIN — PANTOPRAZOLE SODIUM 40 MG: 40 TABLET, DELAYED RELEASE ORAL at 09:07

## 2021-12-11 RX ADMIN — Medication 1 CAPSULE: at 09:07

## 2021-12-11 RX ADMIN — ASPIRIN 81 MG: 81 TABLET, COATED ORAL at 09:07

## 2021-12-11 RX ADMIN — CEFAZOLIN 2 G: 1 INJECTION, POWDER, FOR SOLUTION INTRAMUSCULAR; INTRAVENOUS at 02:52

## 2021-12-11 RX ADMIN — APIXABAN 5 MG: 5 TABLET, FILM COATED ORAL at 09:07

## 2021-12-11 RX ADMIN — TAMSULOSIN HYDROCHLORIDE 0.4 MG: 0.4 CAPSULE ORAL at 09:07

## 2021-12-11 RX ADMIN — ROSUVASTATIN CALCIUM 40 MG: 40 TABLET, FILM COATED ORAL at 21:09

## 2021-12-11 RX ADMIN — METOPROLOL TARTRATE 25 MG: 25 TABLET, FILM COATED ORAL at 17:32

## 2021-12-11 RX ADMIN — CEFAZOLIN 2 G: 1 INJECTION, POWDER, FOR SOLUTION INTRAMUSCULAR; INTRAVENOUS at 09:06

## 2021-12-11 RX ADMIN — Medication 10 ML: at 05:32

## 2021-12-11 RX ADMIN — GUAIFENESIN 600 MG: 600 TABLET, EXTENDED RELEASE ORAL at 09:07

## 2021-12-11 RX ADMIN — METOPROLOL TARTRATE 25 MG: 25 TABLET, FILM COATED ORAL at 09:07

## 2021-12-11 RX ADMIN — TRAMADOL HYDROCHLORIDE 50 MG: 50 TABLET, COATED ORAL at 11:53

## 2021-12-11 RX ADMIN — Medication 10 ML: at 21:09

## 2021-12-11 RX ADMIN — FINASTERIDE 5 MG: 5 TABLET, FILM COATED ORAL at 21:09

## 2021-12-11 RX ADMIN — APIXABAN 5 MG: 5 TABLET, FILM COATED ORAL at 21:09

## 2021-12-11 RX ADMIN — GUAIFENESIN 600 MG: 600 TABLET, EXTENDED RELEASE ORAL at 21:09

## 2021-12-11 RX ADMIN — CEFAZOLIN 2 G: 1 INJECTION, POWDER, FOR SOLUTION INTRAMUSCULAR; INTRAVENOUS at 17:32

## 2021-12-11 RX ADMIN — FUROSEMIDE 20 MG: 40 TABLET ORAL at 09:07

## 2021-12-11 NOTE — PROGRESS NOTES
Problem: Pain  Goal: *Control of Pain  Outcome: Progressing Towards Goal  Goal: *PALLIATIVE CARE:  Alleviation of Pain  Outcome: Progressing Towards Goal     Problem: Patient Education: Go to Patient Education Activity  Goal: Patient/Family Education  Outcome: Progressing Towards Goal     Problem: Falls - Risk of  Goal: *Absence of Falls  Description: Document Adriánmervin Pacheco Fall Risk and appropriate interventions in the flowsheet. Outcome: Progressing Towards Goal  Note: Fall Risk Interventions:  Mobility Interventions: Assess mobility with egress test, Bed/chair exit alarm, Patient to call before getting OOB, PT Consult for mobility concerns, Strengthening exercises (ROM-active/passive), Utilize walker, cane, or other assistive device    Mentation Interventions: Adequate sleep, hydration, pain control, Bed/chair exit alarm, Room close to nurse's station    Medication Interventions: Assess postural VS orthostatic hypotension, Bed/chair exit alarm, Evaluate medications/consider consulting pharmacy, Patient to call before getting OOB    Elimination Interventions: Bed/chair exit alarm, Call light in reach, Patient to call for help with toileting needs, Toileting schedule/hourly rounds    History of Falls Interventions: Bed/chair exit alarm, Door open when patient unattended, Evaluate medications/consider consulting pharmacy         Problem: Patient Education: Go to Patient Education Activity  Goal: Patient/Family Education  Outcome: Progressing Towards Goal     Problem: Arrhythmia Pathway (Adult)  Goal: *Absence of arrhythmia  Outcome: Progressing Towards Goal     Problem: Patient Education: Go to Patient Education Activity  Goal: Patient/Family Education  Outcome: Progressing Towards Goal     Problem: Pressure Injury - Risk of  Goal: *Prevention of pressure injury  Description: Document Benedict Scale and appropriate interventions in the flowsheet.   Outcome: Progressing Towards Goal  Note: Pressure Injury Interventions:  Sensory Interventions: Assess changes in LOC, Float heels, Maintain/enhance activity level, Minimize linen layers, Turn and reposition approx.  every two hours (pillows and wedges if needed)    Moisture Interventions: Check for incontinence Q2 hours and as needed, Maintain skin hydration (lotion/cream), Internal/External urinary devices    Activity Interventions: Increase time out of bed, Pressure redistribution bed/mattress(bed type), PT/OT evaluation    Mobility Interventions: Pressure redistribution bed/mattress (bed type), PT/OT evaluation    Nutrition Interventions: Document food/fluid/supplement intake    Friction and Shear Interventions: Lift sheet                Problem: Patient Education: Go to Patient Education Activity  Goal: Patient/Family Education  Outcome: Progressing Towards Goal     Problem: Patient Education: Go to Patient Education Activity  Goal: Patient/Family Education  Outcome: Progressing Towards Goal     Problem: Patient Education: Go to Patient Education Activity  Goal: Patient/Family Education  Outcome: Progressing Towards Goal

## 2021-12-11 NOTE — PROGRESS NOTES
Problem: Pain  Goal: *Control of Pain  Outcome: Progressing Towards Goal  Goal: *PALLIATIVE CARE:  Alleviation of Pain  Outcome: Progressing Towards Goal     Problem: Falls - Risk of  Goal: *Absence of Falls  Description: Document Jah Fall Risk and appropriate interventions in the flowsheet. Outcome: Progressing Towards Goal  Note: Fall Risk Interventions:  Mobility Interventions: Bed/chair exit alarm    Mentation Interventions: Bed/chair exit alarm, Door open when patient unattended, Family/sitter at bedside, Reorient patient, Update white board    Medication Interventions: Bed/chair exit alarm, Patient to call before getting OOB, Teach patient to arise slowly    Elimination Interventions: Bed/chair exit alarm, Call light in reach, Patient to call for help with toileting needs, Urinal in reach    History of Falls Interventions: Bed/chair exit alarm, Door open when patient unattended, Room close to nurse's station         Problem: Arrhythmia Pathway (Adult)  Goal: *Absence of arrhythmia  Outcome: Progressing Towards Goal     Problem: Patient Education: Go to Patient Education Activity  Goal: Patient/Family Education  Outcome: Progressing Towards Goal     Problem: Pressure Injury - Risk of  Goal: *Prevention of pressure injury  Description: Document Benedict Scale and appropriate interventions in the flowsheet.   Outcome: Progressing Towards Goal  Note: Pressure Injury Interventions:  Sensory Interventions: Assess changes in LOC, Float heels, Keep linens dry and wrinkle-free, Minimize linen layers    Moisture Interventions: Absorbent underpads, Assess need for specialty bed, Minimize layers    Activity Interventions: Assess need for specialty bed, Increase time out of bed, PT/OT evaluation    Mobility Interventions: Float heels, PT/OT evaluation    Nutrition Interventions: Document food/fluid/supplement intake    Friction and Shear Interventions: Apply protective barrier, creams and emollients, HOB 30 degrees or less, Minimize layers                Problem: Patient Education: Go to Patient Education Activity  Goal: Patient/Family Education  Outcome: Progressing Towards Goal     Problem: Patient Education: Go to Patient Education Activity  Goal: Patient/Family Education  Outcome: Progressing Towards Goal

## 2021-12-11 NOTE — PROGRESS NOTES
Bedside and Verbal shift change report received from [facility] [place]. Report included the following information SBAR, Kardex and Recent Results.

## 2021-12-11 NOTE — PROGRESS NOTES
Hospitalist Progress Note    NAME: Kelly Lucero   :  1950   MRN:  [id number]       Assessment / Plan:  Sepsis  MSSA bacteremia  Right ankle cellulitis/ Tenosynovitis    Fever/ leukocytosis  Blood culture from  positive for MSSA,  2/4 bottle positive,  and 10 prelim negative  Nafcillin switched to Cefazolin by ID  ID following  UA /CT chest negative  Ortho following for ankle cellulitis/ Tenosynovitis, arthrocentesis done, showed no organisms, however was on abx for 2 days already  KIM negative  Will need iv abx for 4 weeks on discharge  Will get PICC once blood culture x 2 negative  PT/OT recommended SNF, he is not sure yet if he wants home health vs SNF    Acute on chronic respiratory failure due to  COPD exacerbation    Chest CT negative for infection  Continue 3LNC. He reports that he uses anywhere from 3-6 L at home as needed  Continue inhalers and PO Prednisone (taper)    Aflutter, rate uncontrolled  Cardiomyopathy  Echo EF 25-30%.   No AS, trace MR  Continue Eliquis  Continue amiodarone, Cardizem and metoprolol  S/P DCCV , successful     Hypertension  Dyslipidemia  BPH  -continue proscar, flomax     GERD  -continue PPI     30.0 - 39.9 Obese / Body mass index is 37.67 kg/m²  Estimated discharge date:   Barriers: Fever/Work up for MSSA bacteremia     Code status: Full  Prophylaxis: Eliquis  Recommended Disposition: TBD     Subjective:     Chief Complaint / Reason for Physician Visit  Follow up for Sepsis/MSSA bacteremia  He mentions having right ankle pain today      Review of Systems:  Symptom Y/N Comments  Symptom Y/N Comments   Fever/Chills n   Chest Pain n    Poor Appetite n   Edema n    Cough    Abdominal Pain     Sputum    Joint Pain     SOB/BOWDEN    Pruritis/Rash     Nausea/vomit    Tolerating PT/OT     Diarrhea    Tolerating Diet     Constipation    Other       Could NOT obtain due to:      Objective:     VITALS:   Last 24hrs VS reviewed since prior progress note.  Most recent are:  Patient Vitals for the past 24 hrs:   Temp Pulse Resp BP SpO2   12/11/21 1052 97.9 °F (36.6 °C) 91 22 (!) 159/85 93 %   12/11/21 1051  90 13  95 %   12/11/21 1050  92 19  94 %   12/11/21 1049  92 22     12/11/21 1048  89 15  95 %   12/11/21 1047  91 20  95 %   12/11/21 1046  89 17  95 %   12/11/21 1040  90 20  95 %   12/11/21 1039  90 19  95 %   12/11/21 1038  91 19  96 %   12/11/21 1037  93 24  96 %   12/11/21 1036  92 16  92 %   12/11/21 1035  93 21  95 %   12/11/21 1034  91 21  96 %   12/11/21 1033  94 14  97 %   12/11/21 1032  94 20  97 %   12/11/21 1031  92 18  96 %   12/11/21 1030  92 20  94 %   12/11/21 1029  92 19  97 %   12/11/21 1028  93 20  96 %   12/11/21 1027  91 19     12/11/21 1026  93 20  96 %   12/11/21 1025  97 19  96 %   12/11/21 1024  95 18  97 %   12/11/21 1023  92 20  94 %   12/11/21 1022  95 21  95 %   12/11/21 1021  94 18  96 %   12/11/21 1020  96 21  96 %   12/11/21 1019  97 20  96 %   12/11/21 1018  (!) 102 27  96 %   12/11/21 1017  100 17  94 %   12/11/21 1016  (!) 103 20  100 %   12/11/21 1015  100 17  99 %   12/11/21 1014  100 24     12/11/21 1013  97 24     12/11/21 1012  91 18     12/11/21 1011  92 16     12/11/21 1010  96 18     12/11/21 1009  93 19  96 %   12/11/21 1008  91 17     12/11/21 1007  92 15     12/11/21 1006  96 19  94 %   12/11/21 1005  93 16     12/11/21 1004  94 16  100 %   12/11/21 1003  93 18     12/11/21 1002  95 16  97 %   12/11/21 1001  96 21  97 %   12/11/21 1000  95 22  94 %   12/11/21 0959  96 20     12/11/21 0958  97 18     12/11/21 0957  99 21  (!) 84 %   12/11/21 0956  97 17     12/11/21 0955  97 17     12/11/21 0954  99 18  93 %   12/11/21 0953  99 19  95 %   12/11/21 0952  100 19  97 %   12/11/21 0951  100 22     12/11/21 0950  100 20     12/11/21 0949  (!) 101 20     12/11/21 0948  98 19   12/11/21 0947  99 21  94 %   12/11/21 0946  99 17  97 %   12/11/21 0945  (!) 103 20  97 %   12/11/21 0944  96 19     12/11/21 0943  99 20     12/11/21 0942  (!) 101 24  95 %   12/11/21 0941  100 17     12/11/21 0923  (!) 103 20  96 %   12/11/21 0922  (!) 105 14  94 %   12/11/21 0921  (!) 104 21  97 %   12/11/21 0920  (!) 104 17  92 %   12/11/21 0919  (!) 103 21  96 %   12/11/21 0918  (!) 103 16  96 %   12/11/21 0917  99 21  97 %   12/11/21 0916  (!) 102 21  98 %   12/11/21 0915  (!) 104 25  97 %   12/11/21 0914  (!) 101 21  100 %   12/11/21 0913  100 23  95 %   12/11/21 0912  100 16  94 %   12/11/21 0911  100 22  95 %   12/11/21 0910  100 16  92 %   12/11/21 0909  98 19  91 %   12/11/21 0908  98 20  98 %   12/11/21 0907  99 18  99 %   12/11/21 0906  97 18  99 %   12/11/21 0905  97 21  96 %   12/11/21 0904  98 23  97 %   12/11/21 0903  97 19  97 %   12/11/21 0902  97 17  95 %   12/11/21 0901  100 20  96 %   12/11/21 0900  100 19  93 %   12/11/21 0859  (!) 102 18  (!) 88 %   12/11/21 0858  100 20  94 %   12/11/21 0857  99 15  96 %   12/11/21 0856  96 19  96 %   12/11/21 0855  97 18  97 %   12/11/21 0854  97 19  97 %   12/11/21 0853  99 20  96 %   12/11/21 0852  98 18  97 %   12/11/21 0851  100 24  91 %   12/11/21 0850  99 20  92 %   12/11/21 0849  99 20  (!) 82 %   12/11/21 0848  (!) 102 21  94 %   12/11/21 0847  99 22  94 %   12/11/21 0846  (!) 102 20  95 %   12/11/21 0845  (!) 102 24  94 %   12/11/21 0844  100 25  95 %   12/11/21 0843  96 18  94 %   12/11/21 0842  98 19  95 %   12/11/21 0841  99 18  95 %   12/11/21 0840  100 19  93 %   12/11/21 0839  99 19  93 %   12/11/21 0838  100 23  96 %   12/11/21 0837  100 17  95 %   12/11/21 0836  99 19  95 %   12/11/21 0835  99 18  96 %   12/11/21 0834  100 20  93 %   12/11/21 0833  99 17  95 %   12/11/21 0832  100 22  94 %   12/11/21 0831  99 19  94 % 12/11/21 0830  98 19  94 %   12/11/21 0829  97 18  94 %   12/11/21 0828  100 18  93 %   12/11/21 0827  97 18  93 %   12/11/21 0826  (!) 101 21  92 %   12/11/21 0825  (!) 101 21  91 %   12/11/21 0824  (!) 102 20  94 %   12/11/21 0817  99 19  91 %   12/11/21 0816  98 19  93 %   12/11/21 0815  100 21  92 %   12/11/21 0814  (!) 101 20  92 %   12/11/21 0813  (!) 101 23  91 %   12/11/21 0812  (!) 103 25  (!) 89 %   12/11/21 0811  (!) 103 19  93 %   12/11/21 0810  (!) 104 25  90 %   12/11/21 0809  (!) 104 21  93 %   12/11/21 0808  (!) 104 22  91 %   12/11/21 0807  (!) 101 22  90 %   12/11/21 0806  (!) 104 19  (!) 87 %   12/11/21 0805  (!) 104 22  (!) 89 %   12/11/21 0804  (!) 102 24  (!) 80 %   12/11/21 0803  (!) 102 26  (!) 79 %   12/11/21 0802  98 23  (!) 78 %   12/11/21 0801  96 18  92 %   12/11/21 0800  98 21  91 %   12/11/21 0759  99 18  92 %   12/11/21 0758  99 20  92 %   12/11/21 0757  97 20  91 %   12/11/21 0756  96 22  90 %   12/11/21 0755  98 19  91 %   12/11/21 0754  94 15 (!) 143/84 92 %   12/11/21 0753  98 18  91 %   12/11/21 0752  100 19  93 %   12/11/21 0751  100 18  93 %   12/11/21 0750  (!) 102 23  92 %   12/11/21 0749 97.3 °F (36.3 °C) 99 22 (!) 143/84 92 %   12/11/21 0748  (!) 101 22  92 %   12/11/21 0747  100 19 136/83 92 %   12/11/21 0746  (!) 101 18  91 %   12/11/21 0745  (!) 105 20  (!) 88 %   12/11/21 0741  (!) 102 21  92 %   12/11/21 0740  100 23  91 %   12/11/21 0739  99 18  92 %   12/11/21 0738  (!) 101 24  92 %   12/11/21 0737  (!) 101 14  93 %   12/11/21 0736  (!) 101 18  92 %   12/11/21 0735  (!) 101 20  94 %   12/11/21 0734  99 21  93 %   12/11/21 0733  99 23  91 %   12/11/21 0732  100 20  92 %   12/11/21 0731  (!) 101 19  94 %   12/11/21 0730  100 23  92 %   12/11/21 0729  100 24  93 %   12/11/21 0728  100 16  94 %   12/11/21 0727  (!) 101 26  91 %   12/11/21 0726  100 24  94 % 12/11/21 0725  100 14  93 %   12/11/21 0724  99 18  91 %   12/11/21 0723  100 25  94 %   12/11/21 0722  100 24  94 %   12/11/21 0721  (!) 102 24  92 %   12/11/21 0720  (!) 102 22     12/11/21 0719  (!) 102 26     12/11/21 0718  (!) 102 21     12/11/21 0658  95 19  95 %   12/11/21 0657  95 22  93 %   12/11/21 0656  89 17  95 %   12/11/21 0655  88 14  96 %   12/11/21 0654  90 17  97 %   12/11/21 0653  90 18  96 %   12/11/21 0652  90 16  97 %   12/11/21 0651  94 21  95 %   12/11/21 0650  94 18  96 %   12/11/21 0649  89 17  97 %   12/11/21 0648  92 20  94 %   12/11/21 0647  89 18  97 %   12/11/21 0646  91 17  97 %   12/11/21 0645  91 17  97 %   12/11/21 0644  90 14  97 %   12/11/21 0643  92 13  97 %   12/11/21 0642  89 17  96 %   12/11/21 0641  89 15  97 %   12/11/21 0640  93 13  97 %   12/11/21 0639  92 19  94 %   12/11/21 0638  86 20  96 %   12/11/21 0637  89 17  95 %   12/11/21 0636  82 15  94 %   12/11/21 0635  90 21  95 %   12/11/21 0634  89 18  96 %   12/11/21 0633  92 19  92 %   12/11/21 0632  88 15  96 %   12/11/21 0631  88 14  96 %   12/11/21 0630  90 17  97 %   12/11/21 0629  89 17  97 %   12/11/21 0628  91 16  92 %   12/11/21 0627  89 17  93 %   12/11/21 0626  91 15  96 %   12/11/21 0625  93 19  94 %   12/11/21 0624  95 22  90 %   12/11/21 0623  91 19  91 %   12/11/21 0622  90 15  93 %   12/11/21 0621  93 19  93 %   12/11/21 0620  94 16  92 %   12/11/21 0619  91 16  94 %   12/11/21 0618  96 22  95 %   12/11/21 0617  91 16  96 %   12/11/21 0616  88 15  93 %   12/11/21 0615  88 16  94 %   12/11/21 0614  90 14  95 %   12/11/21 0613  93 20  95 %   12/11/21 0612  94 17  96 %   12/11/21 0611  96 18  97 %   12/11/21 0610  99 22  97 %   12/11/21 0609  98 23  97 %   12/11/21 0608  100 20  97 %   12/11/21 0607  96 18  97 %   12/11/21 0606  98 22  96 %   12/11/21 0605  90 17  97 %   12/11/21 0604  91 17  96 %   12/11/21 0603  90 16  98 %   12/11/21 0602  95 17  95 %   12/11/21 0601  89 16  98 %   12/11/21 0600  88 17  96 %   12/11/21 0559  90 17  97 %   12/11/21 0553  87 16  96 %   12/11/21 0552  88 17  94 %   12/11/21 0551  89 14  96 %   12/11/21 0550  92 16  97 %   12/11/21 0549  90 18  97 %   12/11/21 0548  82 17  97 %   12/11/21 0547  90 16  97 %   12/11/21 0546  89 16  97 %   12/11/21 0545  88 14  95 %   12/11/21 0544  88 16  93 %   12/11/21 0543  89 15  96 %   12/11/21 0542  88 16  95 %   12/11/21 0541  90 16  96 %   12/11/21 0540  90 18  96 %   12/11/21 0539  88 18  95 %   12/11/21 0538  89 20  95 %   12/11/21 0537  88 21  95 %   12/11/21 0536  91 18  95 %   12/11/21 0535  88 16  95 %   12/11/21 0534  88 16  94 %   12/11/21 0533  89 16  94 %   12/11/21 0532  85 17  94 %   12/11/21 0531  93 20  95 %   12/11/21 0530  96 22  96 %   12/11/21 0529  98 18  97 %   12/11/21 0528  96 22  96 %   12/11/21 0527  94 19  96 %   12/11/21 0526  88 22  96 %   12/11/21 0525  89 9  96 %   12/11/21 0524  89 15  96 %   12/11/21 0523  90 18  94 %   12/11/21 0522  90 16  95 %   12/11/21 0521  91 17  95 %   12/11/21 0520  90 15  95 %   12/11/21 0519  94 17  96 %   12/11/21 0518  95 17  93 %   12/11/21 0517  89 16  94 %   12/11/21 0516  89 16  94 %   12/11/21 0515  90 16  96 %   12/11/21 0514  90 18  95 %   12/11/21 0513  94 21  93 %   12/11/21 0512  88 17  93 %   12/11/21 0511  89 18  95 %   12/11/21 0510  93 16  91 %   12/11/21 0509  89 24  91 %   12/11/21 0508  91 18  91 %   12/11/21 0507  95 22  (!) 85 %   12/11/21 0506  92 14  (!) 85 %   12/11/21 0505  91 17  92 %   12/11/21 0504  95 22  90 %   12/11/21 0503  92 18  95 %   12/11/21 0502  90 18  94 %   12/11/21 0501  89 16  96 %   12/11/21 0500  89 16  95 %   12/11/21 0459  92 16  95 %   12/11/21 0458  90 16  94 %   12/11/21 0457  89 17  96 % 12/11/21 0456  93 18  96 %   12/11/21 0455  90 17  95 %   12/11/21 0454  92 19  95 %   12/11/21 0453  94 24  94 %   12/11/21 0452  90 17  92 %   12/11/21 0451  88 16  95 %   12/11/21 0450  89 21  93 %   12/11/21 0449  87 14  95 %   12/11/21 0448  88 15  94 %   12/11/21 0447  91 19  93 %   12/11/21 0446  85 18  94 %   12/11/21 0445  92 18  94 %   12/11/21 0444  92 18  92 %   12/11/21 0443  87 15  95 %   12/11/21 0442  86 17  94 %   12/11/21 0441  89 16  96 %   12/11/21 0440  89 14  95 %   12/11/21 0439  90 18  95 %   12/11/21 0438  86 15  95 %   12/11/21 0437  93 17  94 %   12/11/21 0436  88 16  95 %   12/11/21 0435  86 23  94 %   12/11/21 0434  82 19  92 %   12/11/21 0433  94 20  96 %   12/11/21 0432  93 23  95 %   12/11/21 0431  95 19  95 %   12/11/21 0430  98 21  96 %   12/11/21 0429  96 19  97 %   12/11/21 0428  94 22  93 %   12/11/21 0427  86 17  93 %   12/11/21 0426  87 17  94 %   12/11/21 0425  92 20  94 %   12/11/21 0424  87 19  94 %   12/11/21 0423  91 19  95 %   12/11/21 0422  91 17  95 %   12/11/21 0421  91 15  94 %   12/11/21 0420  91 15  95 %   12/11/21 0419  90 17  95 %   12/11/21 0418  92 23  95 %   12/11/21 0417  92 15  95 %   12/11/21 0416  92 20  94 %   12/11/21 0415  94 17  92 %   12/11/21 0414  91 16  94 %   12/11/21 0413  91 17  94 %   12/11/21 0412  90 19  93 %   12/11/21 0411  93 22  94 %   12/11/21 0410  91 17  93 %   12/11/21 0409  92 17  93 %   12/11/21 0408  94 18  92 %   12/11/21 0407  95 17  91 %   12/11/21 0406  98 23  (!) 89 %   12/11/21 0405  97 27  90 %   12/11/21 0404  93 27  91 %   12/11/21 0403  94 24  (!) 82 %   12/11/21 0402  92 16  94 %   12/11/21 0401  94 23  94 %   12/11/21 0400  93 20  96 %   12/11/21 0359  89 17  93 %   12/11/21 0358  88 14  94 %   12/11/21 0357  93 18  92 %   12/11/21 0356  89 17  94 %   12/11/21 0355  88 18  94 %   12/11/21 0354  93 17  94 %   12/11/21 0353  92 19  92 %   12/11/21 0352  89 17  92 %   12/11/21 0351  88 16  92 %   12/11/21 0350  93 17  94 %   12/11/21 0349  91 19  94 %   12/11/21 0348  90 15  92 %   12/11/21 0347  91 18  93 %   12/11/21 0346  89 11  93 %   12/11/21 0345  87 21  93 %   12/11/21 0344  86 13  92 %   12/11/21 0343  86 19  93 %   12/11/21 0342  91 17  93 %   12/11/21 0341  93 19  93 %   12/11/21 0340  93 18  92 %   12/11/21 0339  92 21  92 %   12/11/21 0338  95 16  93 %   12/11/21 0337  93 22  94 %   12/11/21 0336  90 23  91 %   12/11/21 0335  92 17  94 %   12/11/21 0334  88 17  94 %   12/11/21 0333  89 15  93 %   12/11/21 0332  90 17  94 %   12/11/21 0331  89 16  92 %   12/11/21 0330  89 16  94 %   12/11/21 0329  89 16  93 %   12/11/21 0328  88 18  94 %   12/11/21 0327  92 15  92 %   12/11/21 0326  85 17  93 %   12/11/21 0325  92 17  93 %   12/11/21 0324  91 16  91 %   12/11/21 0323  88 14  94 %   12/11/21 0322  89 19  92 %   12/11/21 0321  89 15  93 %   12/11/21 0320  90 15  94 %   12/11/21 0319  89 16  93 %   12/11/21 0318  91 16  93 %   12/11/21 0317  93 21  90 %   12/11/21 0316  91 15  93 %   12/11/21 0315  93 18  93 %   12/11/21 0314  95 23  93 %   12/11/21 0313  96 20  93 %   12/11/21 0312  97 20  94 %   12/11/21 0311  97 20  94 %   12/11/21 0310  93 23  94 %   12/11/21 0309  95 24  91 %   12/11/21 0308  92 23 (!) 159/93 93 %   12/11/21 0307  94 20  91 %   12/11/21 0306  93 19  92 %   12/11/21 0305  94 20  92 %   12/11/21 0304  92 19  93 %   12/11/21 0303  92 23  93 %   12/11/21 0302  94 15  93 %   12/11/21 0301  94 22  94 %   12/11/21 0300 98.1 °F (36.7 °C) 93 22 (!) 159/93 94 %   12/10/21 2323 98.5 °F (36.9 °C) 92 21 (!) 164/88 92 %   12/10/21 1929 98.5 °F (36.9 °C) 97 18 107/72 93 %   12/10/21 1440 97.7 °F (36.5 °C) 91 18 128/70 93 %       Intake/Output Summary (Last 24 hours) at 12/11/2021 1149  Last data filed at 12/11/2021 1052  Gross per 24 hour   Intake 1460 ml   Output 1700 ml   Net -240 ml        I had a face to face encounter and independently examined this patient on 12/11/2021, as outlined below:  PHYSICAL EXAM:  General: WD, WN. Alert, cooperative, no acute distress    EENT:  EOMI. Anicteric sclerae. MMM  Resp:  CTA bilaterally, no wheezing or rales. No accessory muscle use  CV:  Regular  rhythm,  No edema  GI:  Soft, Non distended, Non tender. +Bowel sounds  Neurologic:  Alert and oriented X 3, normal speech,   Psych:   Good insight. Not anxious nor agitated  Skin:  No rashes. No jaundice    Reviewed most current lab test results and cultures  YES  Reviewed most current radiology test results   YES  Review and summation of old records today    NO  Reviewed patient's current orders and MAR    YES  PMH/SH reviewed - no change compared to H&P  ________________________________________________________________________  Care Plan discussed with:    Comments   Patient y    Family      RN y    Care Manager     Consultant                        Multidiciplinary team rounds were held today with , nursing, pharmacist and clinical coordinator. Patient's plan of care was discussed; medications were reviewed and discharge planning was addressed. ________________________________________________________________________  Total NON critical care TIME: 37  Minutes    Total CRITICAL CARE TIME Spent:   Minutes non procedure based      Comments   >50% of visit spent in counseling and coordination of care     ________________________________________________________________________  Eleuterio Paulino MD     Procedures: see electronic medical records for all procedures/Xrays and details which were not copied into this note but were reviewed prior to creation of Plan. LABS:  I reviewed today's most current labs and imaging studies.   Pertinent labs include:  Recent Labs     12/09/21  0306   WBC 18.7* HGB 10.8*   HCT 34.8*        Recent Labs     12/09/21  0306      K 4.2      CO2 30   *   BUN 33*   CREA 1.29   CA 9.1       Signed: Shagufta Caal MD

## 2021-12-11 NOTE — PROGRESS NOTES
Cardiology Progress Note      12/11/2021 2:33 PM    Admit Date: 12/5/2021          Subjective:  No chest pain or increased SOB. No other c/o          Visit Vitals  BP (!) 159/85 (BP 1 Location: Right upper arm, BP Patient Position: At rest)   Pulse 91   Temp 97.9 °F (36.6 °C)   Resp 22   Ht 5' 11\" (1.803 m)   Wt 120.8 kg (266 lb 5.1 oz)   SpO2 93%   BMI 37.14 kg/m²     12/09 1901 - 12/11 0700  In: 950 [P.O.:950]  Out: 2475 [Urine:2475]        Objective:      Physical Exam:  VS as above  Chest CTA  Card RRR no gallop     Data Review:   Labs:      ESR 86    Telemetry: SR R 80       Assessment:     1. Recurrent atrial flutter, unspecified. S/p cardioversion to sinus on 12/7. He's had an Afib RF ablation in 9/2021.  2. Cardiomyopathy, likely nonischemic and may be tachycardia-mediated. EF 25-30%  3. Acute systolic congestive heart failure. Better. 4. COPD exacerbation? 5. GPC bacteremia. Blood cultures from 12/5 and 12/8 positive for pan-sensitive Staph aureus. 12/9 and 12/10 no growth so far. 6. Hypertensive heart disease with heart failure. 7. R ankle pain s/p joint aspiration 12/9.  tenosynovitis     Plan: Cont amiodarone and Eliquis.  Add losartan for cardiomyopathy

## 2021-12-11 NOTE — PROGRESS NOTES
End of Shift Note    Bedside shift change report given to Ashwini Cobian RN (oncoming nurse) by Lul Castaneda RN (offgoing nurse). Report included the following information SBAR    Shift worked:  days     Shift summary and any significant changes:     Patient went for KIM today - all looks good. Plan to discharge tomorrow. Concerns for physician to address:       Zone phone for oncoming shift:          Activity:  Activity Level: Up with Assistance  Number times ambulated in hallways past shift: 0  Number of times OOB to chair past shift: 1    Cardiac:   Cardiac Monitoring: Yes      Cardiac Rhythm: Sinus Rhythm    Access:   Current line(s): PIV     Genitourinary:   Urinary status: voiding    Respiratory:   O2 Device: Nasal cannula  Chronic home O2 use?: YES  Incentive spirometer at bedside: YES     GI:  Last Bowel Movement Date: 12/10/21  Current diet:  ADULT DIET Regular  Passing flatus: YES  Tolerating current diet: YES       Pain Management:   Patient states pain is manageable on current regimen: YES    Skin:  Benedict Score: 18  Interventions: increase time out of bed    Patient Safety:  Fall Score:  Total Score: 4  Interventions: pt to call before getting OOB  High Fall Risk: Yes    Length of Stay:  Expected LOS: 4d 19h  Actual LOS: 1415 Mone Moreno RN

## 2021-12-11 NOTE — PROGRESS NOTES
0700 Bedside shift change report given to BRO Tabor (oncoming nurse) by Keila Prabhakar RN (offgoing nurse). Report included the following information SBAR, Kardex, Intake/Output, MAR, Recent Results and Cardiac Rhythm NSR/Sinus Tach. 0915 Pt resting quietly in bed at this time. VSS. AM meds given. Pt repositioned back into bed after breakfast and right ankle elevated. [facility] continue [facility]. Pt will need PICC line prior to D/C. Plan to possible place PICC line today. 1153 PRN tramadol given for ankle pain. 1500 Pt resting quietly in bed at this time. VSS. Will continue to monitor. 1900 End of Shift Note    Bedside shift change report given to BRO Eli (oncoming nurse) by Andrea Candelario (offgoing nurse). Report included the following information SBAR, Kardex, Intake/Output, MAR, Recent Results and Cardiac Rhythm NSR    Shift worked:  8046-5127     Shift summary and any significant changes:     No significant changes     Concerns for physician to address:  PICC line placement, plan for possible D/C Monday     Zone phone for oncoming shift:           Activity:  Activity Level: Up with Assistance  Number times ambulated in hallways past shift: 0  Number of times OOB to chair past shift: 3    Cardiac:   Cardiac Monitoring: Yes      Cardiac Rhythm: Sinus Rhythm    Access:   Current line(s): PIV     Genitourinary:   Urinary status: voiding and external catheter    Respiratory:   O2 Device: Nasal cannula  Chronic home O2 use?: YES  Incentive spirometer at bedside: YES     GI:  Last Bowel Movement Date: 12/11/21  Current diet:  ADULT DIET Regular  Passing flatus: YES  Tolerating current diet: YES       Pain Management:   Patient states pain is manageable on current regimen: YES    Skin:  Benedict Score: 18  Interventions: increase time out of bed and PT/OT consult    Patient Safety:  Fall Score:  Total Score: 4  Interventions: bed/chair alarm, assistive device (walker, cane, etc), gripper socks and pt to call before getting OOB  High Fall Risk: Yes    Length of Stay:  Expected LOS: 4d 19h  Actual LOS: [place]

## 2021-12-12 LAB
ANION GAP SERPL CALC-SCNC: 5 MMOL/L (ref 5–15)
BUN SERPL-MCNC: 19 MG/DL (ref 6–20)
BUN/CREAT SERPL: 16 (ref 12–20)
CALCIUM SERPL-MCNC: 8.9 MG/DL (ref 8.5–10.1)
CHLORIDE SERPL-SCNC: 96 MMOL/L (ref 97–108)
CO2 SERPL-SCNC: 36 MMOL/L (ref 21–32)
CREAT SERPL-MCNC: 1.19 MG/DL (ref 0.7–1.3)
ERYTHROCYTE [DISTWIDTH] IN BLOOD BY AUTOMATED COUNT: 14.2 % (ref 11.5–14.5)
GLUCOSE SERPL-MCNC: 107 MG/DL (ref 65–100)
HCT VFR BLD AUTO: 36.8 % (ref 36.6–50.3)
HGB BLD-MCNC: 11.2 G/DL (ref 12.1–17)
MCH RBC QN AUTO: 28.8 PG (ref 26–34)
MCHC RBC AUTO-ENTMCNC: 30.4 G/DL (ref 30–36.5)
MCV RBC AUTO: 94.6 FL (ref 80–99)
NRBC # BLD: 0 K/UL (ref 0–0.01)
NRBC BLD-RTO: 0 PER 100 WBC
PLATELET # BLD AUTO: 327 K/UL (ref 150–400)
PMV BLD AUTO: 9.2 FL (ref 8.9–12.9)
POTASSIUM SERPL-SCNC: 3.9 MMOL/L (ref 3.5–5.1)
RBC # BLD AUTO: 3.89 M/UL (ref 4.1–5.7)
SODIUM SERPL-SCNC: 137 MMOL/L (ref 136–145)
WBC # BLD AUTO: 17.2 K/UL (ref 4.1–11.1)

## 2021-12-12 PROCEDURE — 74011250636 HC RX REV CODE- 250/636: Performed by: INTERNAL MEDICINE

## 2021-12-12 PROCEDURE — 74011250637 HC RX REV CODE- 250/637: Performed by: NURSE PRACTITIONER

## 2021-12-12 PROCEDURE — 74011250637 HC RX REV CODE- 250/637: Performed by: INTERNAL MEDICINE

## 2021-12-12 PROCEDURE — 65660000001 HC RM ICU INTERMED STEPDOWN

## 2021-12-12 PROCEDURE — 85027 COMPLETE CBC AUTOMATED: CPT

## 2021-12-12 PROCEDURE — 80048 BASIC METABOLIC PNL TOTAL CA: CPT

## 2021-12-12 PROCEDURE — 74011636637 HC RX REV CODE- 636/637: Performed by: STUDENT IN AN ORGANIZED HEALTH CARE EDUCATION/TRAINING PROGRAM

## 2021-12-12 PROCEDURE — 77010033678 HC OXYGEN DAILY

## 2021-12-12 PROCEDURE — 74011250637 HC RX REV CODE- 250/637: Performed by: STUDENT IN AN ORGANIZED HEALTH CARE EDUCATION/TRAINING PROGRAM

## 2021-12-12 PROCEDURE — 36415 COLL VENOUS BLD VENIPUNCTURE: CPT

## 2021-12-12 PROCEDURE — 74011000250 HC RX REV CODE- 250: Performed by: INTERNAL MEDICINE

## 2021-12-12 PROCEDURE — 36573 INSJ PICC RS&I 5 YR+: CPT | Performed by: STUDENT IN AN ORGANIZED HEALTH CARE EDUCATION/TRAINING PROGRAM

## 2021-12-12 RX ADMIN — GUAIFENESIN 600 MG: 600 TABLET, EXTENDED RELEASE ORAL at 21:23

## 2021-12-12 RX ADMIN — ROSUVASTATIN CALCIUM 40 MG: 40 TABLET, FILM COATED ORAL at 21:24

## 2021-12-12 RX ADMIN — APIXABAN 5 MG: 5 TABLET, FILM COATED ORAL at 21:24

## 2021-12-12 RX ADMIN — APIXABAN 5 MG: 5 TABLET, FILM COATED ORAL at 08:29

## 2021-12-12 RX ADMIN — CEFAZOLIN 2 G: 1 INJECTION, POWDER, FOR SOLUTION INTRAMUSCULAR; INTRAVENOUS at 00:16

## 2021-12-12 RX ADMIN — GUAIFENESIN 600 MG: 600 TABLET, EXTENDED RELEASE ORAL at 08:29

## 2021-12-12 RX ADMIN — Medication 1 CAPSULE: at 08:29

## 2021-12-12 RX ADMIN — Medication 10 ML: at 21:24

## 2021-12-12 RX ADMIN — CEFAZOLIN 2 G: 1 INJECTION, POWDER, FOR SOLUTION INTRAMUSCULAR; INTRAVENOUS at 17:03

## 2021-12-12 RX ADMIN — METOPROLOL TARTRATE 25 MG: 25 TABLET, FILM COATED ORAL at 17:03

## 2021-12-12 RX ADMIN — CEFAZOLIN 2 G: 1 INJECTION, POWDER, FOR SOLUTION INTRAMUSCULAR; INTRAVENOUS at 08:30

## 2021-12-12 RX ADMIN — TAMSULOSIN HYDROCHLORIDE 0.4 MG: 0.4 CAPSULE ORAL at 08:29

## 2021-12-12 RX ADMIN — ASPIRIN 81 MG: 81 TABLET, COATED ORAL at 08:30

## 2021-12-12 RX ADMIN — PANTOPRAZOLE SODIUM 40 MG: 40 TABLET, DELAYED RELEASE ORAL at 08:29

## 2021-12-12 RX ADMIN — FUROSEMIDE 20 MG: 40 TABLET ORAL at 08:29

## 2021-12-12 RX ADMIN — Medication 10 ML: at 17:05

## 2021-12-12 RX ADMIN — DILTIAZEM HYDROCHLORIDE 240 MG: 240 CAPSULE, COATED, EXTENDED RELEASE ORAL at 08:29

## 2021-12-12 RX ADMIN — Medication 10 ML: at 06:09

## 2021-12-12 RX ADMIN — Medication 1 SPRAY: at 21:23

## 2021-12-12 RX ADMIN — TRAMADOL HYDROCHLORIDE 50 MG: 50 TABLET, COATED ORAL at 06:19

## 2021-12-12 RX ADMIN — LOSARTAN POTASSIUM 50 MG: 50 TABLET, FILM COATED ORAL at 08:29

## 2021-12-12 RX ADMIN — AMIODARONE HYDROCHLORIDE 200 MG: 200 TABLET ORAL at 08:29

## 2021-12-12 RX ADMIN — PREDNISONE 20 MG: 20 TABLET ORAL at 08:29

## 2021-12-12 RX ADMIN — METOPROLOL TARTRATE 25 MG: 25 TABLET, FILM COATED ORAL at 08:29

## 2021-12-12 RX ADMIN — DIGOXIN 0.12 MG: 125 TABLET ORAL at 08:29

## 2021-12-12 RX ADMIN — FINASTERIDE 5 MG: 5 TABLET, FILM COATED ORAL at 21:24

## 2021-12-12 NOTE — PROGRESS NOTES
Cardiology Progress Note      12/12/2021 3:54 PM    Admit Date: 12/5/2021          Subjective: Foot feels a little better. No other new c/o          Visit Vitals  /71 (BP 1 Location: Right upper arm, BP Patient Position: At rest)   Pulse 72   Temp 99 °F (37.2 °C)   Resp 22   Ht 5' 11\" (1.803 m)   Wt 119.5 kg (263 lb 7.2 oz)   SpO2 93%   BMI 36.74 kg/m²     12/10 1901 - 12/12 0700  In: 1940 [P.O.:1860; I.V.:80]  Out: 5200 [Urine:5200]        Objective:      Physical Exam:  VS as above  Chest CTA  Card RRR no changes     Data Review:   Labs:    BUN 19  Creat 1.2  Hgb 11.2    Telemetry: SR R 78       Assessment:     1. Recurrent atrial flutter, unspecified.  S/p cardioversion to sinus on 12/7.  He's had an Afib RF ablation in 9/2021.  2. Cardiomyopathy, likely nonischemic and may be tachycardia-mediated. EF 25-30%  3. Acute systolic congestive heart failure.  Better. 4. COPD exacerbation? 5. GPC bacteremia.  Blood cultures from 12/5 and 12/8 positive for pan-sensitive Staph aureus.  12/9 and 12/10 no growth so far. No vegetations on KIM 12/10   6. Hypertensive heart disease with heart failure. 7. R ankle pain s/p joint aspiration 12/9.  tenosynovitis     Plan: Cont current Rx.  Tolerating cardiac meds

## 2021-12-12 NOTE — PROGRESS NOTES
Hospitalist Progress Note    NAME: Theresa Calixto   :  1950   MRN:  [id number]       Assessment / Plan:  Sepsis  MSSA bacteremia  Right ankle cellulitis/ Tenosynovitis    Fever/ leukocytosis  Blood culture from  positive for MSSA,  2/ bottle positive,  and 12/10 negative, wiill get a PICC line  Nafcillin switched to Cefazolin by ID  ID following  UA /CT chest negative  Ortho following for ankle cellulitis/ Tenosynovitis, arthrocentesis done, showed no organisms, however was on abx for 2 days already  KIM negative  Will need iv abx for 4 weeks on discharge  PT/OT recommending SNF, was refusing SNF, however now wants to go to SNF    Acute on chronic respiratory failure due to  COPD exacerbation  Chest CT negative for infection  Continue 3LNC. He reports that he uses anywhere from 3-6 L at home as needed  Continue inhalers and PO Prednisone (taper)    Aflutter, rate uncontrolled  Cardiomyopathy  Echo EF 25-30%. No AS, trace MR  Continue Eliquis  Continue amiodarone, Cardizem and metoprolol  S/P DCCV , successful     Hypertension  Dyslipidemia  BPH  -continue proscar, flomax     GERD  -continue PPI     30.0 - 39.9 Obese / Body mass index is 37.67 kg/m²  Estimated discharge date:   Barriers: Fever/Work up for MSSA bacteremia     Code status: Full  Prophylaxis: Eliquis  Recommended Disposition: SNF     Subjective:     Chief Complaint / Reason for Physician Visit  Follow up for Sepsis/MSSA bacteremia  He mentions ankle pain is controlled with Tramadol      Review of Systems:  Symptom Y/N Comments  Symptom Y/N Comments   Fever/Chills n   Chest Pain n    Poor Appetite n   Edema n    Cough    Abdominal Pain     Sputum    Joint Pain     SOB/BOWDEN    Pruritis/Rash     Nausea/vomit    Tolerating PT/OT     Diarrhea    Tolerating Diet     Constipation    Other       Could NOT obtain due to:      Objective:     VITALS:   Last 24hrs VS reviewed since prior progress note.  Most recent are:  Patient Vitals for the past 24 hrs:   Temp Pulse Resp BP SpO2   12/12/21 0710 98 °F (36.7 °C) 82 17 (!) 146/75 94 %   12/12/21 0409 98.1 °F (36.7 °C) 81 20 (!) 168/91 92 %   12/11/21 2339 98.4 °F (36.9 °C) 77 18 (!) 172/81 94 %   12/11/21 2206     93 %   12/11/21 1954 98.7 °F (37.1 °C) 100 22 (!) 158/76    12/11/21 1512 98.6 °F (37 °C) 87 25 (!) 157/87 91 %   12/11/21 1052 97.9 °F (36.6 °C) 91 22 (!) 159/85 93 %   12/11/21 1051  90 13  95 %   12/11/21 1050  92 19  94 %   12/11/21 1049  92 22     12/11/21 1048  89 15  95 %   12/11/21 1047  91 20  95 %       Intake/Output Summary (Last 24 hours) at 12/12/2021 1046  Last data filed at 12/12/2021 1293  Gross per 24 hour   Intake 800 ml   Output 4200 ml   Net -3400 ml        I had a face to face encounter and independently examined this patient on 12/12/2021, as outlined below:  PHYSICAL EXAM:  General: WD, WN. Alert, cooperative, no acute distress    EENT:  EOMI. Anicteric sclerae. MMM  Resp:  CTA bilaterally, no wheezing or rales. No accessory muscle use  CV:  Regular  rhythm,  No edema  GI:  Soft, Non distended, Non tender. +Bowel sounds  Neurologic:  Alert and oriented X 3, normal speech,   Psych:   Good insight. Not anxious nor agitated  Skin:  No rashes. No jaundice    Reviewed most current lab test results and cultures  YES  Reviewed most current radiology test results   YES  Review and summation of old records today    NO  Reviewed patient's current orders and MAR    YES  PMH/SH reviewed - no change compared to H&P  ________________________________________________________________________  Care Plan discussed with:    Comments   Patient y    Family      RN y    Care Manager     Consultant                        Multidiciplinary team rounds were held today with , nursing, pharmacist and clinical coordinator. Patient's plan of care was discussed; medications were reviewed and discharge planning was addressed. ________________________________________________________________________  Total NON critical care TIME: 37  Minutes    Total CRITICAL CARE TIME Spent:   Minutes non procedure based      Comments   >50% of visit spent in counseling and coordination of care     ________________________________________________________________________  Waverly Canavan, MD     Procedures: see electronic medical records for all procedures/Xrays and details which were not copied into this note but were reviewed prior to creation of Plan. LABS:  I reviewed today's most current labs and imaging studies.   Pertinent labs include:  Recent Labs     12/12/21 0033   WBC 17.2*   HGB 11.2*   HCT 36.8        Recent Labs     12/12/21 0033      K 3.9   CL 96*   CO2 36*   *   BUN 19   CREA 1.19   CA 8.9       Signed: Waverly Canavan, MD

## 2021-12-12 NOTE — PROGRESS NOTES
0700 Bedside shift change report given to Gracia Escalante, [facility] (oncoming nurse) by Breanna Floyd RN (offgoing nurse). Report included the following information SBAR, Kardex, Intake/Output, MAR, Recent Results and Virginia Saavedra at bedside. Plan for PICC line placement tomorrow. 1850 End of Shift Note    Bedside shift change report given to Breanna Floyd RN (oncoming nurse) by Radha Sky (offgoing nurse). Report included the following information SBAR, Kardex, Intake/Output, MAR, Recent Results, Med Rec Status and Cardiac Rhythm NSR    Shift worked:  7709-0202     Shift summary and any significant changes:     Plan for PICC line placement tomorrow     Concerns for physician to address:  D/C planning to SNF     Zone phone for oncoming shift:           Activity:  Activity Level: Up with Assistance  Number times ambulated in hallways past shift: 0  Number of times OOB to chair past shift: 3    Cardiac:   Cardiac Monitoring: Yes      Cardiac Rhythm: Sinus Rhythm    Access:   Current line(s): PIV     Genitourinary:   Urinary status: voiding and external catheter    Respiratory:   O2 Device: Nasal cannula  Chronic home O2 use?: YES  Incentive spirometer at bedside: YES     GI:  Last Bowel Movement Date: 12/11/21  Current diet:  ADULT DIET Regular  Passing flatus: YES  Tolerating current diet: YES       Pain Management:   Patient states pain is manageable on current regimen: YES    Skin:  Benedict Score: 18  Interventions: turn team, increase time out of bed, PT/OT consult and internal/external urinary devices    Patient Safety:  Fall Score:  Total Score: 4  Interventions: bed/chair alarm, assistive device (walker, cane, etc), gripper socks, pt to call before getting OOB and stay with me (per policy)  High Fall Risk: Yes    Length of Stay:  Expected LOS: 4d 19h  Actual LOS: [address] [facility]

## 2021-12-12 NOTE — PROGRESS NOTES
End of Shift Note    Bedside shift change report given to BRO Tabor (oncoming nurse) by Jeremiah Sarkar RN (offgoing nurse). Report included the following information SBAR, Kardex, ED Summary, Procedure Summary, Intake/Output, MAR, Recent Results, Med Rec Status and Cardiac Rhythm Sinus Rhythm    Shift worked:  7p-7a     Shift summary and any significant changes:     pt c/o of dry mouth, did mouth care multiple times and received orders for PRN mouth kote     Concerns for physician to address:       Zone phone for oncoming shift:          Activity:  Activity Level: Up with Assistance  Number times ambulated in hallways past shift: 0  Number of times OOB to chair past shift: 1    Cardiac:   Cardiac Monitoring: Yes      Cardiac Rhythm: Sinus Rhythm    Access:   Current line(s): PIV     Genitourinary:   Urinary status: voiding and external catheter    Respiratory:   O2 Device: Nasal cannula  Chronic home O2 use?: YES  Incentive spirometer at bedside: NO     GI:  Last Bowel Movement Date: 12/11/21  Current diet:  ADULT DIET Regular  Passing flatus: YES  Tolerating current diet: YES       Pain Management:   Patient states pain is manageable on current regimen: YES    Skin:  Benedict Score: 18  Interventions: float heels, increase time out of bed, limit briefs and internal/external urinary devices    Patient Safety:  Fall Score:  Total Score: 4  Interventions: bed/chair alarm, gripper socks, pt to call before getting OOB and stay with me (per policy)  High Fall Risk: Yes    Length of Stay:  Expected LOS: 4d 19h  Actual LOS: 4250 Nidhi Booker, RN

## 2021-12-12 NOTE — PROGRESS NOTES
Problem: Pain  Goal: *Control of Pain  Outcome: Progressing Towards Goal  Goal: *PALLIATIVE CARE:  Alleviation of Pain  Outcome: Progressing Towards Goal     Problem: Patient Education: Go to Patient Education Activity  Goal: Patient/Family Education  Outcome: Progressing Towards Goal     Problem: Falls - Risk of  Goal: *Absence of Falls  Description: Document Alexander Fadi Fall Risk and appropriate interventions in the flowsheet. Outcome: Progressing Towards Goal  Note: Fall Risk Interventions:  Mobility Interventions: Assess mobility with egress test, Bed/chair exit alarm, OT consult for ADLs, Patient to call before getting OOB, PT Consult for mobility concerns, Strengthening exercises (ROM-active/passive)    Mentation Interventions: Adequate sleep, hydration, pain control, Bed/chair exit alarm, Door open when patient unattended, Room close to nurse's station    Medication Interventions: Assess postural VS orthostatic hypotension, Bed/chair exit alarm, Evaluate medications/consider consulting pharmacy, Patient to call before getting OOB    Elimination Interventions: Bed/chair exit alarm, Call light in reach, Patient to call for help with toileting needs, Toileting schedule/hourly rounds    History of Falls Interventions: Bed/chair exit alarm, Room close to nurse's station         Problem: Patient Education: Go to Patient Education Activity  Goal: Patient/Family Education  Outcome: Progressing Towards Goal     Problem: Arrhythmia Pathway (Adult)  Goal: *Absence of arrhythmia  Outcome: Progressing Towards Goal     Problem: Patient Education: Go to Patient Education Activity  Goal: Patient/Family Education  Outcome: Progressing Towards Goal     Problem: Pressure Injury - Risk of  Goal: *Prevention of pressure injury  Description: Document Benedict Scale and appropriate interventions in the flowsheet.   Outcome: Progressing Towards Goal  Note: Pressure Injury Interventions:  Sensory Interventions: Assess changes in LOC, Float heels, Minimize linen layers, Pressure redistribution bed/mattress (bed type)    Moisture Interventions: Absorbent underpads, Internal/External urinary devices, Limit adult briefs, Maintain skin hydration (lotion/cream), Minimize layers    Activity Interventions: Increase time out of bed, Pressure redistribution bed/mattress(bed type)    Mobility Interventions: Float heels, Turn and reposition approx.  every two hours(pillow and wedges)    Nutrition Interventions: Document food/fluid/supplement intake    Friction and Shear Interventions: Lift sheet, Minimize layers                Problem: Patient Education: Go to Patient Education Activity  Goal: Patient/Family Education  Outcome: Progressing Towards Goal     Problem: Patient Education: Go to Patient Education Activity  Goal: Patient/Family Education  Outcome: Progressing Towards Goal     Problem: Patient Education: Go to Patient Education Activity  Goal: Patient/Family Education  Outcome: Progressing Towards Goal     Problem: Patient Education: Go to Patient Education Activity  Goal: Patient/Family Education  Outcome: Resolved/Met     Problem: Sepsis: Day of Diagnosis  Goal: Off Pathway (Use only if patient is Off Pathway)  Outcome: Resolved/Met  Goal: *Fluid resuscitation  Outcome: Resolved/Met  Goal: *Paired blood cultures prior to first dose of antibiotic  Outcome: Resolved/Met  Goal: *First dose of  appropriate antibiotic within 3 hours of arrival to ED, within 1 hour of arrival to ICU  Outcome: Resolved/Met  Goal: *Lactic acid with first set of blood cultures  Outcome: Resolved/Met  Goal: *Pneumococcal immunization (if eligible)  Outcome: Resolved/Met  Goal: *Influenza immunization (if eligible)  Outcome: Resolved/Met  Goal: Activity/Safety  Outcome: Resolved/Met  Goal: Consults, if ordered  Outcome: Resolved/Met  Goal: Diagnostic Test/Procedures  Outcome: Resolved/Met  Goal: Nutrition/Diet  Outcome: Resolved/Met  Goal: Discharge Planning  Outcome: Resolved/Met  Goal: Medications  Outcome: Resolved/Met  Goal: Respiratory  Outcome: Resolved/Met  Goal: Treatments/Interventions/Procedures  Outcome: Resolved/Met  Goal: Psychosocial  Outcome: Resolved/Met     Problem: Sepsis: Day 2  Goal: Off Pathway (Use only if patient is Off Pathway)  Outcome: Resolved/Met  Goal: *Central Venous Pressure maintained at 8-12 mm Hg  Outcome: Resolved/Met  Goal: *Hemodynamically stable  Outcome: Resolved/Met  Goal: *Tolerating diet  Outcome: Resolved/Met  Goal: Activity/Safety  Outcome: Resolved/Met  Goal: Consults, if ordered  Outcome: Resolved/Met  Goal: Diagnostic Test/Procedures  Outcome: Resolved/Met  Goal: Nutrition/Diet  Outcome: Resolved/Met  Goal: Discharge Planning  Outcome: Resolved/Met  Goal: Medications  Outcome: Resolved/Met  Goal: Respiratory  Outcome: Resolved/Met  Goal: Treatments/Interventions/Procedures  Outcome: Resolved/Met  Goal: Psychosocial  Outcome: Resolved/Met     Problem: Sepsis: Day 3  Goal: Off Pathway (Use only if patient is Off Pathway)  Outcome: Resolved/Met  Goal: *Central Venous Pressure maintained at 8-12 mm Hg  Outcome: Resolved/Met  Goal: *Oxygen saturation within defined limits  Outcome: Resolved/Met  Goal: *Vital sign stability  Outcome: Resolved/Met  Goal: *Tolerating diet  Outcome: Resolved/Met  Goal: *Demonstrates progressive activity  Outcome: Resolved/Met  Goal: Activity/Safety  Outcome: Resolved/Met  Goal: Consults, if ordered  Outcome: Resolved/Met  Goal: Diagnostic Test/Procedures  Outcome: Resolved/Met  Goal: Nutrition/Diet  Outcome: Resolved/Met  Goal: Discharge Planning  Outcome: Resolved/Met  Goal: Medications  Outcome: Resolved/Met  Goal: Respiratory  Outcome: Resolved/Met  Goal: Treatments/Interventions/Procedures  Outcome: Resolved/Met  Goal: Psychosocial  Outcome: Resolved/Met     Problem: Sepsis: Day 4  Goal: Off Pathway (Use only if patient is Off Pathway)  Outcome: Resolved/Met  Goal: Activity/Safety  Outcome: Resolved/Met  Goal: Consults, if ordered  Outcome: Resolved/Met  Goal: Diagnostic Test/Procedures  Outcome: Resolved/Met  Goal: Nutrition/Diet  Outcome: Resolved/Met  Goal: Discharge Planning  Outcome: Resolved/Met  Goal: Medications  Outcome: Resolved/Met  Goal: Respiratory  Outcome: Resolved/Met  Goal: Treatments/Interventions/Procedures  Outcome: Resolved/Met  Goal: Psychosocial  Outcome: Resolved/Met  Goal: *Oxygen saturation within defined limits  Outcome: Resolved/Met  Goal: *Hemodynamically stable  Outcome: Resolved/Met  Goal: *Vital signs within defined limits  Outcome: Resolved/Met  Goal: *Tolerating diet  Outcome: Resolved/Met  Goal: *Demonstrates progressive activity  Outcome: Resolved/Met  Goal: *Fluid volume maintenance  Outcome: Resolved/Met     Problem: Sepsis: Day 5  Goal: Off Pathway (Use only if patient is Off Pathway)  Outcome: Resolved/Met  Goal: *Oxygen saturation within defined limits  Outcome: Resolved/Met  Goal: *Vital signs within defined limits  Outcome: Resolved/Met  Goal: *Tolerating diet  Outcome: Resolved/Met  Goal: *Demonstrates progressive activity  Outcome: Resolved/Met  Goal: *Discharge plan identified  Outcome: Resolved/Met  Goal: Activity/Safety  Outcome: Resolved/Met  Goal: Consults, if ordered  Outcome: Resolved/Met  Goal: Diagnostic Test/Procedures  Outcome: Resolved/Met  Goal: Nutrition/Diet  Outcome: Resolved/Met  Goal: Discharge Planning  Outcome: Resolved/Met  Goal: Medications  Outcome: Resolved/Met  Goal: Respiratory  Outcome: Resolved/Met  Goal: Treatments/Interventions/Procedures  Outcome: Resolved/Met  Goal: Psychosocial  Outcome: Resolved/Met     Problem: Sepsis: Day 6  Goal: Off Pathway (Use only if patient is Off Pathway)  Outcome: Resolved/Met  Goal: *Oxygen saturation within defined limits  Outcome: Resolved/Met  Goal: *Vital signs within defined limits  Outcome: Resolved/Met  Goal: *Tolerating diet  Outcome: Resolved/Met  Goal: *Demonstrates progressive activity  Outcome: Resolved/Met  Goal: Influenza immunization  Outcome: Resolved/Met  Goal: *Pneumococcal immunization  Outcome: Resolved/Met  Goal: Activity/Safety  Outcome: Resolved/Met  Goal: Diagnostic Test/Procedures  Outcome: Resolved/Met  Goal: Nutrition/Diet  Outcome: Resolved/Met  Goal: Discharge Planning  Outcome: Resolved/Met  Goal: Medications  Outcome: Resolved/Met  Goal: Respiratory  Outcome: Resolved/Met  Goal: Treatments/Interventions/Procedures  Outcome: Resolved/Met  Goal: Psychosocial  Outcome: Resolved/Met     Problem: Sepsis: Discharge Outcomes  Goal: *Vital signs within defined limits  Outcome: Progressing Towards Goal  Goal: *Tolerating diet  Outcome: Progressing Towards Goal  Goal: *Verbalizes understanding and describes prescribed diet  Outcome: Progressing Towards Goal  Goal: *Demonstrates progressive activity  Outcome: Progressing Towards Goal  Goal: *Describes follow-up/return visits to physicians  Outcome: Progressing Towards Goal  Goal: *Verbalizes name, dosage, time, side effects, and number of days to continue medications  Outcome: Progressing Towards Goal  Goal: *Influenza immunization (Oct-Mar only)  Outcome: Progressing Towards Goal  Goal: *Pneumococcal immunization  Outcome: Progressing Towards Goal  Goal: *Lungs clear or at baseline  Outcome: Progressing Towards Goal  Goal: *Oxygen saturation returns to baseline or 90% or better on room air  Outcome: Progressing Towards Goal  Goal: *Glycemic control  Outcome: Progressing Towards Goal  Goal: *Absence of deep venous thrombosis signs and symptoms(Stroke Metric)  Outcome: Progressing Towards Goal  Goal: *Describes available resources and support systems  Outcome: Progressing Towards Goal  Goal: *Optimal pain control at patient's stated goal  Outcome: Progressing Towards Goal

## 2021-12-12 NOTE — PROGRESS NOTES
Problem: Pain  Goal: *Control of Pain  Outcome: Progressing Towards Goal  Goal: *PALLIATIVE CARE:  Alleviation of Pain  Outcome: Progressing Towards Goal     Problem: Patient Education: Go to Patient Education Activity  Goal: Patient/Family Education  Outcome: Progressing Towards Goal     Problem: Falls - Risk of  Goal: *Absence of Falls  Description: Document Kiana Rivera Fall Risk and appropriate interventions in the flowsheet. Outcome: Progressing Towards Goal  Note: Fall Risk Interventions:  Mobility Interventions: Bed/chair exit alarm, Communicate number of staff needed for ambulation/transfer, Patient to call before getting OOB    Mentation Interventions: Adequate sleep, hydration, pain control, Bed/chair exit alarm, Door open when patient unattended, Room close to nurse's station    Medication Interventions: Bed/chair exit alarm, Evaluate medications/consider consulting pharmacy, Teach patient to arise slowly    Elimination Interventions: Bed/chair exit alarm, Call light in reach, Patient to call for help with toileting needs, Toilet paper/wipes in reach, Stay With Me (per policy), Toileting schedule/hourly rounds    History of Falls Interventions: Bed/chair exit alarm, Door open when patient unattended, Investigate reason for fall, Room close to nurse's station         Problem: Patient Education: Go to Patient Education Activity  Goal: Patient/Family Education  Outcome: Progressing Towards Goal     Problem: Arrhythmia Pathway (Adult)  Goal: *Absence of arrhythmia  Outcome: Progressing Towards Goal     Problem: Patient Education: Go to Patient Education Activity  Goal: Patient/Family Education  Outcome: Progressing Towards Goal     Problem: Pressure Injury - Risk of  Goal: *Prevention of pressure injury  Description: Document Benedict Scale and appropriate interventions in the flowsheet.   Outcome: Progressing Towards Goal  Note: Pressure Injury Interventions:  Sensory Interventions: Assess changes in LOC, Assess need for specialty bed, Avoid rigorous massage over bony prominences, Check visual cues for pain, Float heels, Keep linens dry and wrinkle-free, Maintain/enhance activity level, Minimize linen layers, Monitor skin under medical devices, Turn and reposition approx. every two hours (pillows and wedges if needed)    Moisture Interventions: Absorbent underpads, Apply protective barrier, creams and emollients, Assess need for specialty bed, Internal/External urinary devices, Limit adult briefs, Maintain skin hydration (lotion/cream), Minimize layers, Moisture barrier, Offer toileting Q_hr    Activity Interventions: Assess need for specialty bed, Increase time out of bed    Mobility Interventions: Assess need for specialty bed, Float heels, HOB 30 degrees or less, Turn and reposition approx.  every two hours(pillow and wedges)    Nutrition Interventions: Document food/fluid/supplement intake    Friction and Shear Interventions: Apply protective barrier, creams and emollients, Feet elevated on foot rest, HOB 30 degrees or less, Lift sheet, Minimize layers                Problem: Patient Education: Go to Patient Education Activity  Goal: Patient/Family Education  Outcome: Progressing Towards Goal     Problem: Patient Education: Go to Patient Education Activity  Goal: Patient/Family Education  Outcome: Progressing Towards Goal     Problem: Patient Education: Go to Patient Education Activity  Goal: Patient/Family Education  Outcome: Progressing Towards Goal     Problem: Patient Education: Go to Patient Education Activity  Goal: Patient/Family Education  Outcome: Progressing Towards Goal     Problem: Sepsis: Day of Diagnosis  Goal: Off Pathway (Use only if patient is Off Pathway)  Outcome: Progressing Towards Goal  Goal: *Fluid resuscitation  Outcome: Progressing Towards Goal  Goal: *Paired blood cultures prior to first dose of antibiotic  Outcome: Progressing Towards Goal  Goal: *First dose of  appropriate antibiotic within 3 hours of arrival to ED, within 1 hour of arrival to ICU  Outcome: Progressing Towards Goal  Goal: *Lactic acid with first set of blood cultures  Outcome: Progressing Towards Goal  Goal: *Pneumococcal immunization (if eligible)  Outcome: Progressing Towards Goal  Goal: *Influenza immunization (if eligible)  Outcome: Progressing Towards Goal  Goal: Activity/Safety  Outcome: Progressing Towards Goal  Goal: Consults, if ordered  Outcome: Progressing Towards Goal  Goal: Diagnostic Test/Procedures  Outcome: Progressing Towards Goal  Goal: Nutrition/Diet  Outcome: Progressing Towards Goal  Goal: Discharge Planning  Outcome: Progressing Towards Goal  Goal: Medications  Outcome: Progressing Towards Goal  Goal: Respiratory  Outcome: Progressing Towards Goal  Goal: Treatments/Interventions/Procedures  Outcome: Progressing Towards Goal  Goal: Psychosocial  Outcome: Progressing Towards Goal     Problem: Sepsis: Day 2  Goal: Off Pathway (Use only if patient is Off Pathway)  Outcome: Progressing Towards Goal  Goal: *Central Venous Pressure maintained at 8-12 mm Hg  Outcome: Progressing Towards Goal  Goal: *Hemodynamically stable  Outcome: Progressing Towards Goal  Goal: *Tolerating diet  Outcome: Progressing Towards Goal  Goal: Activity/Safety  Outcome: Progressing Towards Goal  Goal: Consults, if ordered  Outcome: Progressing Towards Goal  Goal: Diagnostic Test/Procedures  Outcome: Progressing Towards Goal  Goal: Nutrition/Diet  Outcome: Progressing Towards Goal  Goal: Discharge Planning  Outcome: Progressing Towards Goal  Goal: Medications  Outcome: Progressing Towards Goal  Goal: Respiratory  Outcome: Progressing Towards Goal  Goal: Treatments/Interventions/Procedures  Outcome: Progressing Towards Goal  Goal: Psychosocial  Outcome: Progressing Towards Goal     Problem: Sepsis: Day 3  Goal: Off Pathway (Use only if patient is Off Pathway)  Outcome: Progressing Towards Goal  Goal: *Central Venous Pressure maintained at 8-12 mm Hg  Outcome: Progressing Towards Goal  Goal: *Oxygen saturation within defined limits  Outcome: Progressing Towards Goal  Goal: *Vital sign stability  Outcome: Progressing Towards Goal  Goal: *Tolerating diet  Outcome: Progressing Towards Goal  Goal: *Demonstrates progressive activity  Outcome: Progressing Towards Goal  Goal: Activity/Safety  Outcome: Progressing Towards Goal  Goal: Consults, if ordered  Outcome: Progressing Towards Goal  Goal: Diagnostic Test/Procedures  Outcome: Progressing Towards Goal  Goal: Nutrition/Diet  Outcome: Progressing Towards Goal  Goal: Discharge Planning  Outcome: Progressing Towards Goal  Goal: Medications  Outcome: Progressing Towards Goal  Goal: Respiratory  Outcome: Progressing Towards Goal  Goal: Treatments/Interventions/Procedures  Outcome: Progressing Towards Goal  Goal: Psychosocial  Outcome: Progressing Towards Goal     Problem: Sepsis: Day 4  Goal: Off Pathway (Use only if patient is Off Pathway)  Outcome: Progressing Towards Goal  Goal: Activity/Safety  Outcome: Progressing Towards Goal  Goal: Consults, if ordered  Outcome: Progressing Towards Goal  Goal: Diagnostic Test/Procedures  Outcome: Progressing Towards Goal  Goal: Nutrition/Diet  Outcome: Progressing Towards Goal  Goal: Discharge Planning  Outcome: Progressing Towards Goal  Goal: Medications  Outcome: Progressing Towards Goal  Goal: Respiratory  Outcome: Progressing Towards Goal  Goal: Treatments/Interventions/Procedures  Outcome: Progressing Towards Goal  Goal: Psychosocial  Outcome: Progressing Towards Goal  Goal: *Oxygen saturation within defined limits  Outcome: Progressing Towards Goal  Goal: *Hemodynamically stable  Outcome: Progressing Towards Goal  Goal: *Vital signs within defined limits  Outcome: Progressing Towards Goal  Goal: *Tolerating diet  Outcome: Progressing Towards Goal  Goal: *Demonstrates progressive activity  Outcome: Progressing Towards Goal  Goal: *Fluid volume maintenance  Outcome: Progressing Towards Goal     Problem: Sepsis: Day 5  Goal: Off Pathway (Use only if patient is Off Pathway)  Outcome: Progressing Towards Goal  Goal: *Oxygen saturation within defined limits  Outcome: Progressing Towards Goal  Goal: *Vital signs within defined limits  Outcome: Progressing Towards Goal  Goal: *Tolerating diet  Outcome: Progressing Towards Goal  Goal: *Demonstrates progressive activity  Outcome: Progressing Towards Goal  Goal: *Discharge plan identified  Outcome: Progressing Towards Goal  Goal: Activity/Safety  Outcome: Progressing Towards Goal  Goal: Consults, if ordered  Outcome: Progressing Towards Goal  Goal: Diagnostic Test/Procedures  Outcome: Progressing Towards Goal  Goal: Nutrition/Diet  Outcome: Progressing Towards Goal  Goal: Discharge Planning  Outcome: Progressing Towards Goal  Goal: Medications  Outcome: Progressing Towards Goal  Goal: Respiratory  Outcome: Progressing Towards Goal  Goal: Treatments/Interventions/Procedures  Outcome: Progressing Towards Goal  Goal: Psychosocial  Outcome: Progressing Towards Goal     Problem: Sepsis: Day 6  Goal: Off Pathway (Use only if patient is Off Pathway)  Outcome: Progressing Towards Goal  Goal: *Oxygen saturation within defined limits  Outcome: Progressing Towards Goal  Goal: *Vital signs within defined limits  Outcome: Progressing Towards Goal  Goal: *Tolerating diet  Outcome: Progressing Towards Goal  Goal: *Demonstrates progressive activity  Outcome: Progressing Towards Goal  Goal: Influenza immunization  Outcome: Progressing Towards Goal  Goal: *Pneumococcal immunization  Outcome: Progressing Towards Goal  Goal: Activity/Safety  Outcome: Progressing Towards Goal  Goal: Diagnostic Test/Procedures  Outcome: Progressing Towards Goal  Goal: Nutrition/Diet  Outcome: Progressing Towards Goal  Goal: Discharge Planning  Outcome: Progressing Towards Goal  Goal: Medications  Outcome: Progressing Towards Goal  Goal: Respiratory  Outcome: Progressing Towards Goal  Goal: Treatments/Interventions/Procedures  Outcome: Progressing Towards Goal  Goal: Psychosocial  Outcome: Progressing Towards Goal     Problem: Sepsis: Discharge Outcomes  Goal: *Vital signs within defined limits  Outcome: Progressing Towards Goal  Goal: *Tolerating diet  Outcome: Progressing Towards Goal  Goal: *Verbalizes understanding and describes prescribed diet  Outcome: Progressing Towards Goal  Goal: *Demonstrates progressive activity  Outcome: Progressing Towards Goal  Goal: *Describes follow-up/return visits to physicians  Outcome: Progressing Towards Goal  Goal: *Verbalizes name, dosage, time, side effects, and number of days to continue medications  Outcome: Progressing Towards Goal  Goal: *Influenza immunization (Oct-Mar only)  Outcome: Progressing Towards Goal  Goal: *Pneumococcal immunization  Outcome: Progressing Towards Goal  Goal: *Lungs clear or at baseline  Outcome: Progressing Towards Goal  Goal: *Oxygen saturation returns to baseline or 90% or better on room air  Outcome: Progressing Towards Goal  Goal: *Glycemic control  Outcome: Progressing Towards Goal  Goal: *Absence of deep venous thrombosis signs and symptoms(Stroke Metric)  Outcome: Progressing Towards Goal  Goal: *Describes available resources and support systems  Outcome: Progressing Towards Goal  Goal: *Optimal pain control at patient's stated goal  Outcome: Progressing Towards Goal

## 2021-12-12 NOTE — PROGRESS NOTES
Received notification from bedside RN about patient with regards to: dry mouth, requesting oral agents for relief  VS: /81, HR 77, RR 18, O2 sat 94% on NC 2 L    Intervention given: Artificial saliva oral spray PRN ordered

## 2021-12-13 LAB
BACTERIA SPEC CULT: NORMAL
BACTERIA SPEC CULT: NORMAL
ERYTHROCYTE [DISTWIDTH] IN BLOOD BY AUTOMATED COUNT: 14.2 % (ref 11.5–14.5)
ERYTHROCYTE [SEDIMENTATION RATE] IN BLOOD: 85 MM/HR (ref 0–20)
GRAM STN SPEC: NORMAL
GRAM STN SPEC: NORMAL
HCT VFR BLD AUTO: 36 % (ref 36.6–50.3)
HGB BLD-MCNC: 11 G/DL (ref 12.1–17)
MCH RBC QN AUTO: 28.4 PG (ref 26–34)
MCHC RBC AUTO-ENTMCNC: 30.6 G/DL (ref 30–36.5)
MCV RBC AUTO: 93 FL (ref 80–99)
NRBC # BLD: 0 K/UL (ref 0–0.01)
NRBC BLD-RTO: 0 PER 100 WBC
PLATELET # BLD AUTO: 325 K/UL (ref 150–400)
PMV BLD AUTO: 8.9 FL (ref 8.9–12.9)
RBC # BLD AUTO: 3.87 M/UL (ref 4.1–5.7)
SERVICE CMNT-IMP: NORMAL
WBC # BLD AUTO: 16.4 K/UL (ref 4.1–11.1)

## 2021-12-13 PROCEDURE — 74011000250 HC RX REV CODE- 250: Performed by: INTERNAL MEDICINE

## 2021-12-13 PROCEDURE — 97530 THERAPEUTIC ACTIVITIES: CPT

## 2021-12-13 PROCEDURE — 65660000001 HC RM ICU INTERMED STEPDOWN

## 2021-12-13 PROCEDURE — 74011250637 HC RX REV CODE- 250/637: Performed by: INTERNAL MEDICINE

## 2021-12-13 PROCEDURE — 36415 COLL VENOUS BLD VENIPUNCTURE: CPT

## 2021-12-13 PROCEDURE — 85027 COMPLETE CBC AUTOMATED: CPT

## 2021-12-13 PROCEDURE — 77030018786 HC NDL GD F/USND BARD -B

## 2021-12-13 PROCEDURE — 99233 SBSQ HOSP IP/OBS HIGH 50: CPT | Performed by: INTERNAL MEDICINE

## 2021-12-13 PROCEDURE — 77010033678 HC OXYGEN DAILY

## 2021-12-13 PROCEDURE — C1751 CATH, INF, PER/CENT/MIDLINE: HCPCS

## 2021-12-13 PROCEDURE — 97530 THERAPEUTIC ACTIVITIES: CPT | Performed by: OCCUPATIONAL THERAPIST

## 2021-12-13 PROCEDURE — 74011636637 HC RX REV CODE- 636/637: Performed by: STUDENT IN AN ORGANIZED HEALTH CARE EDUCATION/TRAINING PROGRAM

## 2021-12-13 PROCEDURE — 85652 RBC SED RATE AUTOMATED: CPT

## 2021-12-13 PROCEDURE — 94640 AIRWAY INHALATION TREATMENT: CPT

## 2021-12-13 PROCEDURE — 2709999900 HC NON-CHARGEABLE SUPPLY

## 2021-12-13 PROCEDURE — 76937 US GUIDE VASCULAR ACCESS: CPT

## 2021-12-13 PROCEDURE — 97116 GAIT TRAINING THERAPY: CPT

## 2021-12-13 PROCEDURE — 74011250636 HC RX REV CODE- 250/636: Performed by: INTERNAL MEDICINE

## 2021-12-13 RX ORDER — HEPARIN 100 UNIT/ML
300 SYRINGE INTRAVENOUS AS NEEDED
Status: DISCONTINUED | OUTPATIENT
Start: 2021-12-13 | End: 2021-12-14 | Stop reason: HOSPADM

## 2021-12-13 RX ADMIN — AMIODARONE HYDROCHLORIDE 200 MG: 200 TABLET ORAL at 08:21

## 2021-12-13 RX ADMIN — ACETAMINOPHEN 650 MG: 325 TABLET ORAL at 06:40

## 2021-12-13 RX ADMIN — DILTIAZEM HYDROCHLORIDE 240 MG: 240 CAPSULE, COATED, EXTENDED RELEASE ORAL at 08:21

## 2021-12-13 RX ADMIN — FINASTERIDE 5 MG: 5 TABLET, FILM COATED ORAL at 20:57

## 2021-12-13 RX ADMIN — CEFAZOLIN 2 G: 1 INJECTION, POWDER, FOR SOLUTION INTRAMUSCULAR; INTRAVENOUS at 01:52

## 2021-12-13 RX ADMIN — CEFAZOLIN 2 G: 1 INJECTION, POWDER, FOR SOLUTION INTRAMUSCULAR; INTRAVENOUS at 08:19

## 2021-12-13 RX ADMIN — PREDNISONE 20 MG: 20 TABLET ORAL at 08:20

## 2021-12-13 RX ADMIN — GUAIFENESIN 600 MG: 600 TABLET, EXTENDED RELEASE ORAL at 08:21

## 2021-12-13 RX ADMIN — Medication 1 CAPSULE: at 08:21

## 2021-12-13 RX ADMIN — TAMSULOSIN HYDROCHLORIDE 0.4 MG: 0.4 CAPSULE ORAL at 08:21

## 2021-12-13 RX ADMIN — CEFAZOLIN 2 G: 1 INJECTION, POWDER, FOR SOLUTION INTRAMUSCULAR; INTRAVENOUS at 17:30

## 2021-12-13 RX ADMIN — METOPROLOL TARTRATE 25 MG: 25 TABLET, FILM COATED ORAL at 17:32

## 2021-12-13 RX ADMIN — PANTOPRAZOLE SODIUM 40 MG: 40 TABLET, DELAYED RELEASE ORAL at 08:20

## 2021-12-13 RX ADMIN — APIXABAN 5 MG: 5 TABLET, FILM COATED ORAL at 20:57

## 2021-12-13 RX ADMIN — Medication 10 ML: at 17:37

## 2021-12-13 RX ADMIN — ASPIRIN 81 MG: 81 TABLET, COATED ORAL at 08:19

## 2021-12-13 RX ADMIN — GUAIFENESIN 600 MG: 600 TABLET, EXTENDED RELEASE ORAL at 20:57

## 2021-12-13 RX ADMIN — METOPROLOL TARTRATE 25 MG: 25 TABLET, FILM COATED ORAL at 08:21

## 2021-12-13 RX ADMIN — ROSUVASTATIN CALCIUM 40 MG: 40 TABLET, FILM COATED ORAL at 20:57

## 2021-12-13 RX ADMIN — FUROSEMIDE 20 MG: 40 TABLET ORAL at 08:21

## 2021-12-13 RX ADMIN — LOSARTAN POTASSIUM 50 MG: 50 TABLET, FILM COATED ORAL at 08:21

## 2021-12-13 RX ADMIN — APIXABAN 5 MG: 5 TABLET, FILM COATED ORAL at 08:21

## 2021-12-13 RX ADMIN — DIGOXIN 0.12 MG: 125 TABLET ORAL at 08:19

## 2021-12-13 RX ADMIN — Medication 10 ML: at 06:06

## 2021-12-13 NOTE — PROGRESS NOTES
1515: Bedside shift change report given to BRO Pinto (oncoming nurse) by Ivet Land RN (offgoing nurse). Report included the following information SBAR, Intake/Output, MAR, Recent Results and Cardiac Rhythm NSR/Tach. 1900:  End of Shift Note    Bedside shift change report given to Lanre Cavazos RN (oncoming nurse) by Roberto Meza RN (offgoing nurse). Report included the following information SBAR, Intake/Output, MAR, Recent Results and Cardiac Rhythm NSR/Tach    Shift worked:  Evening     Shift summary and any significant changes:     None     Concerns for physician to address:  None     Zone phone for oncoming shift:   [phone]       Activity:  Activity Level: Up with Assistance  Number times ambulated in hallways past shift: 0  Number of times OOB to chair past shift: 2    Cardiac:   Cardiac Monitoring: Yes      Cardiac Rhythm: Sinus Rhythm, Sinus Tachy    Access:   Current line(s): PICC     Genitourinary:   Urinary status: voiding and external catheter    Respiratory:   O2 Device: Nasal cannula  Chronic home O2 use?: YES  Incentive spirometer at bedside: YES  Actual Volume (ml): 750 ml  GI:  Last Bowel Movement Date: 12/12/21  Current diet:  ADULT DIET Regular  Passing flatus: YES  Tolerating current diet: YES       Pain Management:   Patient states pain is manageable on current regimen: YES    Skin:  Benedict Score: 18  Interventions: increase time out of bed, PT/OT consult and internal/external urinary devices    Patient Safety:  Fall Score:  Total Score: 4  Interventions: bed/chair alarm, assistive device (walker, cane, etc), gripper socks and pt to call before getting OOB  High Fall Risk: Yes    Length of Stay:  Expected LOS: 4d 19h  Actual LOS: Bertha Pleitez RN

## 2021-12-13 NOTE — PROGRESS NOTES
1500  End of Shift Note    Bedside shift change report given to BRO Pinto (oncoming nurse) by Rome Logan RN (offgoing nurse). Report included the following information SBAR, Kardex, Intake/Output and MAR    Shift worked:  [id number]     Shift summary and any significant changes:     none     Concerns for physician to address:  none     Zone phone for oncoming shift:   [phone]       Activity:  Activity Level: Up with Assistance  Number times ambulated in hallways past shift: 1  Number of times OOB to chair past shift: 1    Cardiac:   Cardiac Monitoring: Yes      Cardiac Rhythm: Sinus Rhythm    Access:   Current line(s): PIV and PICC     Genitourinary:   Urinary status: external catheter    Respiratory:   O2 Device: Nasal cannula  Chronic home O2 use?: YES  Incentive spirometer at bedside: N/A     GI:  Last Bowel Movement Date: 12/12/21  Current diet:  ADULT DIET Regular  Passing flatus: YES  Tolerating current diet: YES       Pain Management:   Patient states pain is manageable on current regimen: YES    Skin:  Benedict Score: 18  Interventions: increase time out of bed    Patient Safety:  Fall Score:  Total Score: 4  Interventions: bed/chair alarm  High Fall Risk: Yes    Length of Stay:  Expected LOS: 4d 19h  Actual LOS: 8      Rome Logan RN

## 2021-12-13 NOTE — PROGRESS NOTES
0700 Bedside shift change report given to Alfonzo Lewis (oncoming nurse) by BRO Eli (offgoing nurse). Report included the following information SBAR, Kardex, Intake/Output and MAR.

## 2021-12-13 NOTE — PROGRESS NOTES
PICC Education: Explained reason and rationale for PICC placement along with providing education in order to make an informed consent including nature, risks, benefits, potential complications, care and maintenance of PICC line. The opportunity for questions or concerns was given. Patient  gave written consent for PICC procedure to be done at the bedside. Patient  verbalizes understanding at this time. Procedure:  Time out completed. Pre procedure assessment done. Maximum sterile barrier precautions observed throughout procedure. Lidocaine 1% 3.0ml sc given prior to cannulation  Cannulated BRACHIAL vein using ultrasound guidance and modified seldinger technique. Inserted SINGLE lumen 4 fr PICC in  RIGHT arm using, [employer] Tip Location System and 3CG   Patient has sinus rhythm. Tip Positioning System indicating tall P wave and no negative deflection before P wave which would indicate the PICC tip is properly placed in the distal SVC or the CAJ. PICC tip was confirmed by 2 PICC nurses and 3CG printout was placed on patients chart. Blood return verified and flushed with 20ml NS in each port. Sterile dressing applied with Biopatch, Stat loc, occlusive dressing per protocol. Curios caps applied to each port. Reason for access (Long Term antibiotics X 4 Weeks). Complications related to insertion (None). Patient tolerated procedure well with minimal blood loss. PICC procedure performed by: Razia Garibay RN, BSN, [facility] / Vascular Access Nurse  Assisted by: Imtiaz Daigle RN, JESSICA / Vascular Access Nurse    RIGHT  arm circumference: 34 cm. Catheter internal length:  46 cm  Catheter external length: 0 cm  TOTAL Length:46 cm  PICC catheter occupies 4% of vein    Brand of catheter:  Suzan Majors  Lot #[id number]   REF# [id number]    EXP 10/31/2022. Primary nurse  notified PICC line may be used and to hang new infusion tubing prior to use.       Razia Garibay RN, BSN, [facility]   Vascular Access Team

## 2021-12-13 NOTE — PROGRESS NOTES
Comprehensive Nutrition Assessment    Type and Reason for Visit: Initial, RD nutrition re-screen/LOS    Nutrition Recommendations/Plan:   Continue regular diet  Please document % meals consumed in flowsheet I/O's under intake     Nutrition Assessment:      Chart reviewed for LOS. Pt noted for sepsis, right ankle cellulitis/tenosynovitis, acute hypoxic respiratory failure, COPD exacerbation, aflutter, cardiomyopathy, HTN, DLD, GERD. MST negative for nutrition risk factors. Good meal intake documented recently. No acute nutrition concerns at this time. Patient Vitals for the past 168 hrs:   % Diet Eaten   12/13/21 0821 76 - 100%   12/12/21 0710 76 - 100%   12/11/21 1153 76 - 100%   12/11/21 0749 0%   12/10/21 1922 51 - 75%   12/10/21 1253 76 - 100%   12/08/21 1847 76 - 100%   12/08/21 1506 51 - 75%   12/07/21 1510 26 - 50%   12/07/21 1014 0%   12/07/21 0711 0%     Wt Readings from Last 5 Encounters:   12/13/21 118 kg (260 lb 2.3 oz)   09/07/21 121.6 kg (268 lb)   07/02/21 119.6 kg (263 lb 10.7 oz)   07/01/21 117.9 kg (260 lb)   05/21/21 117.9 kg (260 lb)   ]    Estimated Daily Nutrient Needs:  Energy (kcal): 2138 kcals (BMR x 1. 3AF - 400); Weight Used for Energy Requirements: Current  Protein (g): 78g (1.0g/kg IBW); Weight Used for Protein Requirements: Ideal  Fluid (ml/day): 2100mL; Method Used for Fluid Requirements: 1 ml/kcal      Nutrition Related Findings:  Meds: ancef, lasix, risaquad. Edema: 1+LLU, 2+pitting RLE. BM 12/12. Wounds:    None       Current Nutrition Therapies:  ADULT DIET Regular    Anthropometric Measures:  · Height:  5' 11\" (180.3 cm)  · Current Body Wt:  118 kg (260 lb 2.3 oz)   · Ideal Body Wt:  172 lbs:  151.2 %   · BMI Category:  Obese class 2 (BMI 35.0-39. 9)       Nutrition Diagnosis:   No nutrition diagnosis at this time     Nutrition Interventions:   Food and/or Nutrient Delivery: Continue current diet  Nutrition Education and Counseling: No recommendations at this time  Coordination of Nutrition Care: No recommendation at this time    Goals:  PO intake >70% meals next 5-7 days       Nutrition Monitoring and Evaluation:   Behavioral-Environmental Outcomes: None identified  Food/Nutrient Intake Outcomes: Food and nutrient intake  Physical Signs/Symptoms Outcomes: Biochemical data, Weight, Fluid status or edema    Discharge Planning:    Continue current diet     Electronically signed by Vincent Oliver RD on 12/13/2021 at 2:09 PM    Contact: [phone]

## 2021-12-13 NOTE — PROGRESS NOTES
End of Shift Note    Bedside shift change report given to Ani Bliss (oncoming nurse) by Ana Espinoza RN (offgoing nurse). Report included the following information SBAR, Kardex, ED Summary, Intake/Output, MAR, Recent Results, Med Rec Status and Cardiac Rhythm Sinus Rhythm    Shift worked:  7p-7a     Shift summary and any significant changes:     uneventful shift, planing to have PICC placement today and possible d/c     Concerns for physician to address:       Zone phone for oncoming shift:          Activity:  Activity Level: Up with Assistance  Number times ambulated in hallways past shift: 0  Number of times OOB to chair past shift: 0    Cardiac:   Cardiac Monitoring: Yes      Cardiac Rhythm: Sinus Rhythm    Access:   Current line(s): PIV     Genitourinary:   Urinary status: voiding and external catheter    Respiratory:   O2 Device: Nasal cannula  Chronic home O2 use?: YES  Incentive spirometer at bedside: NO     GI:  Last Bowel Movement Date: 12/12/21  Current diet:  ADULT DIET Regular  Passing flatus: YES  Tolerating current diet: YES       Pain Management:   Patient states pain is manageable on current regimen: YES    Skin:  Benedict Score: 18  Interventions: float heels, increase time out of bed, limit briefs and internal/external urinary devices    Patient Safety:  Fall Score:  Total Score: 4  Interventions: bed/chair alarm, gripper socks, pt to call before getting OOB and stay with me (per policy)  High Fall Risk: Yes    Length of Stay:  Expected LOS: 4d 19h  Actual [facility] [address]

## 2021-12-13 NOTE — PROGRESS NOTES
Cardiology Progress Note      12/13/2021    Admit Date: 12/5/2021          Subjective:  No chest pain, syncope, dizziness, palpitations. Hasn't walked yet because of his right foot/ankle. Visit Vitals  /70 (BP 1 Location: Left upper arm, BP Patient Position: Lying)   Pulse 97   Temp 98.1 °F (36.7 °C)   Resp 18   Ht 5' 11\" (1.803 m)   Wt 118 kg (260 lb 2.3 oz)   SpO2 92%   BMI 36.28 kg/m²     12/11 1901 - 12/13 0700  In: 1369 [P.O.:1610; I.V.:60]  Out: 5300 [Urine:5300]        Objective:      Physical Exam:  VS as above  Chest CTA  Card RRR no changes     Data Review:   Recent Results (from the past 24 hour(s))   CBC W/O DIFF    Collection Time: 12/13/21  2:08 AM   Result Value Ref Range    WBC 16.4 (H) 4.1 - 11.1 K/uL    RBC 3.87 (L) 4.10 - 5.70 M/uL    HGB 11.0 (L) 12.1 - 17.0 g/dL    HCT 36.0 (L) 36.6 - 50.3 %    MCV 93.0 80.0 - 99.0 FL    MCH 28.4 26.0 - 34.0 PG    MCHC 30.6 30.0 - 36.5 g/dL    RDW 14.2 11.5 - 14.5 %    PLATELET 972 121 - 807 K/uL    MPV 8.9 8.9 - 12.9 FL    NRBC 0.0 0  WBC    ABSOLUTE NRBC 0.00 0.00 - 0.01 K/uL   SED RATE (ESR)    Collection Time: 12/13/21  2:08 AM   Result Value Ref Range    Sed rate, automated 85 (H) 0 - 20 mm/hr     Telemetry: SR      Assessment:     1. Recurrent atrial flutter, unspecified.  S/p cardioversion to sinus on 12/7.  He's had an Afib RF ablation in 9/2021.  2. Cardiomyopathy, likely nonischemic and may be tachycardia-mediated. EF 25-30%. 3. Acute systolic congestive heart failure.  Better. 4. COPD exacerbation? 5. GPC bacteremia.  Blood cultures from 12/5 and 12/8 positive for pan-sensitive Staph aureus.  12/9 and 12/10 no growth so far. No vegetations on KIM 12/10. 6. Hypertensive heart disease with heart failure. 7. R ankle pain s/p joint aspiration 12/9.  tenosynovitis.     Plan: Cont current Rx. Tolerating cardiac meds.   EF is reduced, not an immediate candidate for prophylactic ICD due to infection and his EF may improve with time as well. Covered with amiodarone anyway. We talked about trying to get him off amiodarone eventually (after a second ablation), this would be sometime in 2022.      Signed By: Oralia Snyder MD     December 13, 2021

## 2021-12-13 NOTE — PROGRESS NOTES
Hospitalist Progress Note    NAME: Lucina Leyva   :  1950   MRN:  [id number]       Assessment / Plan:  Sepsis  MSSA bacteremia  Right ankle cellulitis/ Tenosynovitis    Fever/ leukocytosis  Blood culture from  positive for MSSA, / bottle positive,  and 12/10 negative, PICC line today  C/w cefazolin, ID planning for 4 weeks iv abx  UA /CT chest negative  Ortho following for ankle cellulitis/ Tenosynovitis, arthrocentesis done, showed no organisms, however was on abx for 2 days already  KIM negative  Will need SNF placment    Acute on chronic respiratory failure due to  COPD exacerbation  Chest CT negative for infection  Continue 3LNC. He reports that he uses anywhere from 3-6 L at home as needed  Continue inhalers and PO Prednisone tapered and stopped    Aflutter, rate uncontrolled  Cardiomyopathy  Echo EF 25-30%. No AS, trace MR  Continue Eliquis  Continue amiodarone, Cardizem and metoprolol  S/P DCCV , successful     Hypertension  Dyslipidemia  BPH  -continue proscar, flomax     GERD  -continue PPI     30.0 - 39.9 Obese / Body mass index is 37.67 kg/m²  Estimated discharge date:   Barriers: Medically ready needs SNF     Code status: Full  Prophylaxis: Eliquis  Recommended Disposition: SNF     Subjective:     Chief Complaint / Reason for Physician Visit  Follow up for Sepsis/MSSA bacteremia  His right ankle is less swollen today, pain is mild      Review of Systems:  Symptom Y/N Comments  Symptom Y/N Comments   Fever/Chills n   Chest Pain n    Poor Appetite n   Edema n    Cough    Abdominal Pain     Sputum    Joint Pain     SOB/BOWDEN    Pruritis/Rash     Nausea/vomit    Tolerating PT/OT     Diarrhea    Tolerating Diet     Constipation    Other       Could NOT obtain due to:      Objective:     VITALS:   Last 24hrs VS reviewed since prior progress note.  Most recent are:  Patient Vitals for the past 24 hrs:   Temp Pulse Resp BP SpO2   21 1122  72 18 (!) 154/75 92 %   12/13/21 1114 98.1 °F (36.7 °C) 87 18 (!) 175/93 92 %   12/13/21 0821 98.1 °F (36.7 °C) 97 18 125/70 92 %   12/13/21 0819  97  125/70    12/13/21 0757 98.6 °F (37 °C) 94 20 (!) 155/82 94 %   12/13/21 0747     95 %   12/13/21 0350 98.6 °F (37 °C) 69 22 (!) 160/69 96 %   12/12/21 2314 98.6 °F (37 °C) 74 20 (!) 153/73 95 %   12/12/21 2312 98.6 °F (37 °C)       12/12/21 1918 98 °F (36.7 °C) 70 20 136/79 93 %   12/12/21 1453 99 °F (37.2 °C) 72 22 132/71 93 %       Intake/Output Summary (Last 24 hours) at 12/13/2021 1152  Last data filed at 12/13/2021 4787  Gross per 24 hour   Intake 360 ml   Output 3300 ml   Net -2940 ml        I had a face to face encounter and independently examined this patient on 12/13/2021, as outlined below:  PHYSICAL EXAM:  General: WD, WN. Alert, cooperative, no acute distress    EENT:  EOMI. Anicteric sclerae. MMM  Resp:  CTA bilaterally, no wheezing or rales. No accessory muscle use  CV:  Regular  rhythm,  No edema  GI:  Soft, Non distended, Non tender. +Bowel sounds  Neurologic:  Alert and oriented X 3, normal speech,   Psych:   Good insight. Not anxious nor agitated  Skin:  No rashes. No jaundice    Reviewed most current lab test results and cultures  YES  Reviewed most current radiology test results   YES  Review and summation of old records today    NO  Reviewed patient's current orders and MAR    YES  PMH/SH reviewed - no change compared to H&P  ________________________________________________________________________  Care Plan discussed with:    Comments   Patient y    Family      RN y    Care Manager     Consultant                        Multidiciplinary team rounds were held today with , nursing, pharmacist and clinical coordinator. Patient's plan of care was discussed; medications were reviewed and discharge planning was addressed.      ________________________________________________________________________  Total NON critical care TIME: 37 Minutes    Total CRITICAL CARE TIME Spent:   Minutes non procedure based      Comments   >50% of visit spent in counseling and coordination of care     ________________________________________________________________________  Don Sotelo MD     Procedures: see electronic medical records for all procedures/Xrays and details which were not copied into this note but were reviewed prior to creation of Plan. LABS:  I reviewed today's most current labs and imaging studies.   Pertinent labs include:  Recent Labs     12/13/21  0208 12/12/21  0033   WBC 16.4* 17.2*   HGB 11.0* 11.2*   HCT 36.0* 36.8    327     Recent Labs     12/12/21 0033      K 3.9   CL 96*   CO2 36*   *   BUN 19   CREA 1.19   CA 8.9       Signed: Don Sotelo MD

## 2021-12-13 NOTE — PROGRESS NOTES
Problem: Pain  Goal: *Control of Pain  Outcome: Progressing Towards Goal  Goal: *PALLIATIVE CARE:  Alleviation of Pain  Outcome: Progressing Towards Goal     Problem: Patient Education: Go to Patient Education Activity  Goal: Patient/Family Education  Outcome: Progressing Towards Goal     Problem: Falls - Risk of  Goal: *Absence of Falls  Description: Document Garfield Fall Risk and appropriate interventions in the flowsheet.   Outcome: Progressing Towards Goal  Note: Fall Risk Interventions:  Mobility Interventions: Bed/chair exit alarm, OT consult for ADLs, Patient to call before getting OOB, PT Consult for mobility concerns, PT Consult for assist device competence, Utilize walker, cane, or other assistive device, Strengthening exercises (ROM-active/passive)    Mentation Interventions: Bed/chair exit alarm, Adequate sleep, hydration, pain control, Door open when patient unattended, Familiar objects from home, Increase mobility, More frequent rounding, Reorient patient, Room close to nurse's station, Toileting rounds, Update white board    Medication Interventions: Bed/chair exit alarm, Patient to call before getting OOB, Teach patient to arise slowly    Elimination Interventions: Bed/chair exit alarm, Call light in reach, Patient to call for help with toileting needs, Toilet paper/wipes in reach, Toileting schedule/hourly rounds    History of Falls Interventions: Bed/chair exit alarm, Door open when patient unattended, Room close to nurse's station         Problem: Patient Education: Go to Patient Education Activity  Goal: Patient/Family Education  Outcome: Progressing Towards Goal     Problem: Arrhythmia Pathway (Adult)  Goal: *Absence of arrhythmia  Outcome: Progressing Towards Goal     Problem: Patient Education: Go to Patient Education Activity  Goal: Patient/Family Education  Outcome: Progressing Towards Goal     Problem: Pressure Injury - Risk of  Goal: *Prevention of pressure injury  Description: Document Benedict Scale and appropriate interventions in the flowsheet. Outcome: Progressing Towards Goal  Note: Pressure Injury Interventions:  Sensory Interventions: Assess changes in LOC, Minimize linen layers, Maintain/enhance activity level, Keep linens dry and wrinkle-free, Float heels, Chair cushion, Pressure redistribution bed/mattress (bed type), Sit a 90-degree angle/use footstool if needed, Turn and reposition approx.  every two hours (pillows and wedges if needed), Use 30-degree side-lying position    Moisture Interventions: Absorbent underpads, Check for incontinence Q2 hours and as needed, Internal/External urinary devices, Minimize layers, Offer toileting Q_hr    Activity Interventions: Increase time out of bed, Pressure redistribution bed/mattress(bed type), PT/OT evaluation    Mobility Interventions: HOB 30 degrees or less, Pressure redistribution bed/mattress (bed type), PT/OT evaluation    Nutrition Interventions: Document food/fluid/supplement intake, Offer support with meals,snacks and hydration    Friction and Shear Interventions: Apply protective barrier, creams and emollients, HOB 30 degrees or less, Minimize layers, Sit at 90-degree angle                Problem: Patient Education: Go to Patient Education Activity  Goal: Patient/Family Education  Outcome: Progressing Towards Goal     Problem: Patient Education: Go to Patient Education Activity  Goal: Patient/Family Education  Outcome: Progressing Towards Goal     Problem: Patient Education: Go to Patient Education Activity  Goal: Patient/Family Education  Outcome: Progressing Towards Goal     Problem: Sepsis: Discharge Outcomes  Goal: *Vital signs within defined limits  Outcome: Progressing Towards Goal  Goal: *Tolerating diet  Outcome: Progressing Towards Goal  Goal: *Verbalizes understanding and describes prescribed diet  Outcome: Progressing Towards Goal  Goal: *Demonstrates progressive activity  Outcome: Progressing Towards Goal  Goal: *Describes follow-up/return visits to physicians  Outcome: Progressing Towards Goal  Goal: *Verbalizes name, dosage, time, side effects, and number of days to continue medications  Outcome: Progressing Towards Goal  Goal: *Influenza immunization (Oct-Mar only)  Outcome: Progressing Towards Goal  Goal: *Pneumococcal immunization  Outcome: Progressing Towards Goal  Goal: *Lungs clear or at baseline  Outcome: Progressing Towards Goal  Goal: *Oxygen saturation returns to baseline or 90% or better on room air  Outcome: Progressing Towards Goal  Goal: *Glycemic control  Outcome: Progressing Towards Goal  Goal: *Absence of deep venous thrombosis signs and symptoms(Stroke Metric)  Outcome: Progressing Towards Goal  Goal: *Describes available resources and support systems  Outcome: Progressing Towards Goal  Goal: *Optimal pain control at patient's stated goal  Outcome: Progressing Towards Goal     Problem: Infection - Risk of, Central Venous Catheter-Associated Bloodstream Infection  Goal: *Absence of infection signs and symptoms  Outcome: Progressing Towards Goal     Problem: Patient Education: Go to Patient Education Activity  Goal: Patient/Family Education  Outcome: Progressing Towards Goal     Problem: Patient Education: Go to Patient Education Activity  Goal: Patient/Family Education  Outcome: Progressing Towards Goal

## 2021-12-13 NOTE — PROGRESS NOTES
Problem: Self Care Deficits Care Plan (Adult)  Goal: *Acute Goals and Plan of Care (Insert Text)  Description: FUNCTIONAL STATUS PRIOR TO ADMISSION: Patient was independent and active without use of DME.     HOME SUPPORT: The patient lived with wife but did not require assist.    Occupational Therapy Goals  Initiated 12/9/2021  1. Patient will perform grooming in standing with supervision/set-up within 7 day(s). 2.  Patient will perform bathing with minimal assistance/contact guard assist within 7 day(s). 3.  Patient will perform lower body dressing with minimal assistance/contact guard assist within 7 day(s). 4.  Patient will perform toilet transfers with contact guard assist within 7 day(s). 5.  Patient will perform all aspects of toileting with contact guard assist within 7 day(s). 6.  Patient will participate in upper extremity therapeutic exercise/activities with supervision/set-up for 8 minutes within 7 day(s). 7.  Patient will perform standing adls for at least 8 minutes within 7 days. Outcome: Progressing Towards Goal   OCCUPATIONAL THERAPY TREATMENT  Patient: Lucina Leyva (71 y.o. male)  Date: 12/13/2021  Diagnosis: Respiratory failure, acute-on-chronic (HCC) [J96.20]  CHF exacerbation (HCC) [I50.9]  Febrile [R50.9]  Atrial flutter (HCC) [I48.92]  Elevated brain natriuretic peptide (BNP) level [R79.89]   <principal problem not specified>       Precautions: Fall, WBAT, Other (comment) (R CAM boot)  Chart, occupational therapy assessment, plan of care, and goals were reviewed. ASSESSMENT  Patient continues with skilled OT services and is progressing towards goals. When OT entered the room pt was seating in the chair with report of buttock pain from prolonged sitting. Therapist obtained waffle cushion for pressure relief. Pt had been sitting up for prolonged length of time this morning and was requesting to return to bed.  OT educated the client on safety when completing sit to stand and stand to sit. OT required verbal cues for hand placement. Patient verbalized his understanding of education. Client required CGA when ambulating with rolling walking for support. He required min assistance for line management. Patient will be IV antibiotics and has not returned to his independent baseline, recommend SNF for short term rehab. Current Level of Function Impacting Discharge (ADLs): CGA for transfers with RW, SBA for UBD    Other factors to consider for discharge: Need IV antibiotics          PLAN :  Patient continues to benefit from skilled intervention to address the above impairments. Continue treatment per established plan of care to address goals. Recommend with staff: Mobilize to bathroom for toileting needs    Recommend next OT session: Dressing, Safety cues and increased endurance during functional tasks     Recommendation for discharge: (in order for the patient to meet his/her long term goals)  Therapy up to 5 days/week in SNF setting    This discharge recommendation:  Has been made in collaboration with the attending provider and/or case management    IF patient discharges home will need the following DME: walker: rolling       SUBJECTIVE:   Patient stated My butt hurts from sitting here, I have no butt.     OBJECTIVE DATA SUMMARY:   Cognitive/Behavioral Status:  Neurologic State: Alert; Appropriate for age  Orientation Level: Appropriate for age; Oriented to place; Oriented to person; Oriented to situation; Oriented to time  Cognition: Appropriate decision making; Appropriate for age attention/concentration;  Follows commands  Perception: Appears intact  Perseveration: No perseveration noted  Safety/Judgement: Fall prevention (He needs cues for hand placement for sit-stand  & stand-sit)    Functional Mobility and Transfers for ADLs:  Bed Mobility:  Rolling: Contact guard assistance  Supine to Sit: Contact guard assistance  Sit to Supine: Contact guard assistance  Scooting: Contact guard assistance    Transfers:  Sit to Stand: Contact guard assistance     Bed to Chair: Contact guard assistance (w/ rolling walker)    Balance:  Sitting: Intact  Standing: With support  Standing - Static: Good  Standing - Dynamic : Constant support; Fair (W/ rolling walker)    ADL Intervention:                      Upper Body [address]: Stand-by assistance              Cognitive Retraining  Safety/Judgement: Fall prevention (He needs cues for hand placement for sit-stand  & stand-sit)          Pain:  Reported minimal pain    Activity Tolerance:   Fair and requires rest breaks    After treatment patient left in no apparent distress:   Supine in bed, Call bell within reach, and Side rails x 3    COMMUNICATION/COLLABORATION:   The patients plan of care was discussed with: Physical therapy assistant, Registered nurse, and patient .      Tatiana Preston OTR/L  Time Calculation: 9 mins

## 2021-12-13 NOTE — PROGRESS NOTES
Problem: Mobility Impaired (Adult and Pediatric)  Goal: *Acute Goals and Plan of Care (Insert Text)  Description: FUNCTIONAL STATUS PRIOR TO ADMISSION: Patient was independent and active without use of DME. Pt was driving, working in the home via office upstairs. Pt utilized BIPAP at night on 6L O2.      HOME SUPPORT PRIOR TO ADMISSION: The patient lived with spouse but did not require assist.    Physical Therapy Goals  Initiated 12/9/2021  1. Patient will move from supine to sit and sit to supine , scoot up and down, and roll side to side in bed with modified independence within 7 day(s). 2.  Patient will transfer from bed to chair and chair to bed with modified independence using the least restrictive device within 7 day(s). 3.  Patient will perform sit to stand with modified independence within 7 day(s). 4.  Patient will ambulate with minimal assistance/contact guard assist for 100 feet with the least restrictive device within 7 day(s). 5.  Patient will ascend/descend flight of stairs with handrail(s) with minimal assistance/contact guard assist within 7 day(s). Outcome: Progressing Towards Goal   PHYSICAL THERAPY TREATMENT  Patient: Jennifer Soto (41 y.o. male)  Date: 12/13/2021  Diagnosis: Respiratory failure, acute-on-chronic (HCC) [J96.20]  CHF exacerbation (HCC) [I50.9]  Febrile [R50.9]  Atrial flutter (HCC) [I48.92]  Elevated brain natriuretic peptide (BNP) level [R79.89]   <principal problem not specified>       Precautions: Fall, WBAT, Other (comment) (R CAM boot)  Chart, physical therapy assessment, plan of care and goals were reviewed. ASSESSMENT  Patient continues with skilled PT services and is progressing towards goals. Pt found in good spirits, voice awareness about need for SNF placement. Pt removes NC noting not feeling necessary at the time. Fair transition to EOB, but assist is needed for scoot to EOB. Pt is not able to doris Cam shoe and L sock.  Poor safety awareness, with standing transfers, but improved with repetitions. 4 standing trials completed, with increasing tolerance to R WB and understanding of gait sequencing (R step to). Pt has balance difficulty and spacing issues with directional changes. Pt willing to trial sitting in chair, post session. Pillow place under, due to C/o rapid discomfort with sitting. During 61' distance with RA, pt desats to 76%. 2 L placed. Placement is suggested to increase OOB and gait safety. Repetition and extended treatment time will be beneficial.     Current Level of Function Impacting Discharge (mobility/balance): Min assist with standing transfer. (Frequent cues for appropriate UE support)     Other factors to consider for discharge: Unable to doris R Cam boot/shoe         PLAN :  Patient continues to benefit from skilled intervention to address the above impairments. Continue treatment per established plan of care. to address goals. Recommendation for discharge: (in order for the patient to meet his/her long term goals)  Therapy up to 5 days/week in SNF setting    This discharge recommendation:  Has been made in collaboration with the attending provider and/or case management    IF patient discharges home will need the following DME: rolling walker       SUBJECTIVE:   Patient stated Alfonso Bryce was wondering How this is going to work. I haven't been able to walk.     OBJECTIVE DATA SUMMARY:   Critical Behavior:  Neurologic State: Alert  Orientation Level: Oriented X4  Cognition: Appropriate decision making  Safety/Judgement: Decreased insight into deficits, Decreased awareness of need for assistance, Decreased awareness of need for safety  Functional Mobility Training:  Bed Mobility:  Rolling: Contact guard assistance  Supine to Sit: Contact guard assistance  Scooting: Minimum assistance  Transfers:  Sit to Stand: Contact guard assistance; Minimum assistance  Stand to Sit: Contact guard assistance  Balance:  Sitting: Intact  Standing: Impaired; With support  Standing - Static: Constant support; Fair  Standing - Dynamic : Constant support; Poor  Ambulation/Gait Training:  Distance (ft): 60 Feet (ft)  Assistive Device: Gait belt; Walker, rolling Cam shoe   Ambulation - Level of Assistance: Moderate assistance; Maximum assistance; Additional time; Assist x2  Gait Abnormalities: Antalgic; Decreased step clearance; Step to gait  Right Side Weight Bearing: As tolerated  Left Side Weight Bearing: Full  Base of Support: Widened  Speed/Sheri: Fluctuations  Step Length: Left shortened; Right shortened  Interventions: Safety awareness training; Tactile cues; Verbal cues  Pain Rating:  Fair    Activity Tolerance:   Fair, desaturates with exertion and requires oxygen, and requires rest breaks    After treatment patient left in no apparent distress:   Sitting in chair, Heels elevated for pressure relief, and Call bell within reach    COMMUNICATION/COLLABORATION:   The patients plan of care was discussed with: Registered nurse and Rehabilitation technician.      Yahaira Aguirre PTA   Time Calculation: 39 mins

## 2021-12-13 NOTE — PROGRESS NOTES
Problem: Pain  Goal: *Control of Pain  Outcome: Progressing Towards Goal  Goal: *PALLIATIVE CARE:  Alleviation of Pain  Outcome: Progressing Towards Goal     Problem: Patient Education: Go to Patient Education Activity  Goal: Patient/Family Education  Outcome: Progressing Towards Goal     Problem: Falls - Risk of  Goal: *Absence of Falls  Description: Document Wayne Mancia Fall Risk and appropriate interventions in the flowsheet. Outcome: Progressing Towards Goal  Note: Fall Risk Interventions:  Mobility Interventions: Bed/chair exit alarm, Communicate number of staff needed for ambulation/transfer, Patient to call before getting OOB    Mentation Interventions: Adequate sleep, hydration, pain control, Bed/chair exit alarm, Door open when patient unattended, Room close to nurse's station    Medication Interventions: Bed/chair exit alarm, Patient to call before getting OOB, Teach patient to arise slowly    Elimination Interventions: Bed/chair exit alarm, Call light in reach, Patient to call for help with toileting needs, Stay With Me (per policy), Toilet paper/wipes in reach, Toileting schedule/hourly rounds    History of Falls Interventions: Bed/chair exit alarm, Door open when patient unattended, Investigate reason for fall, Room close to nurse's station         Problem: Patient Education: Go to Patient Education Activity  Goal: Patient/Family Education  Outcome: Progressing Towards Goal     Problem: Arrhythmia Pathway (Adult)  Goal: *Absence of arrhythmia  Outcome: Progressing Towards Goal     Problem: Patient Education: Go to Patient Education Activity  Goal: Patient/Family Education  Outcome: Progressing Towards Goal     Problem: Pressure Injury - Risk of  Goal: *Prevention of pressure injury  Description: Document Benedict Scale and appropriate interventions in the flowsheet.   Outcome: Progressing Towards Goal  Note: Pressure Injury Interventions:  Sensory Interventions: Assess changes in LOC, Assess need for specialty bed, Avoid rigorous massage over bony prominences, Check visual cues for pain, Float heels, Keep linens dry and wrinkle-free, Maintain/enhance activity level, Minimize linen layers, Monitor skin under medical devices    Moisture Interventions: Absorbent underpads, Apply protective barrier, creams and emollients, Assess need for specialty bed, Check for incontinence Q2 hours and as needed, Internal/External urinary devices, Limit adult briefs, Maintain skin hydration (lotion/cream), Minimize layers, Moisture barrier, Offer toileting Q_hr    Activity Interventions: Assess need for specialty bed, Increase time out of bed    Mobility Interventions: Assess need for specialty bed, Float heels, HOB 30 degrees or less    Nutrition Interventions: Document food/fluid/supplement intake    Friction and Shear Interventions: Apply protective barrier, creams and emollients, Foam dressings/transparent film/skin sealants, HOB 30 degrees or less, Lift sheet, Minimize layers                Problem: Patient Education: Go to Patient Education Activity  Goal: Patient/Family Education  Outcome: Progressing Towards Goal     Problem: Patient Education: Go to Patient Education Activity  Goal: Patient/Family Education  Outcome: Progressing Towards Goal     Problem: Patient Education: Go to Patient Education Activity  Goal: Patient/Family Education  Outcome: Progressing Towards Goal     Problem: Sepsis: Discharge Outcomes  Goal: *Vital signs within defined limits  Outcome: Progressing Towards Goal  Goal: *Tolerating diet  Outcome: Progressing Towards Goal  Goal: *Verbalizes understanding and describes prescribed diet  Outcome: Progressing Towards Goal  Goal: *Demonstrates progressive activity  Outcome: Progressing Towards Goal  Goal: *Describes follow-up/return visits to physicians  Outcome: Progressing Towards Goal  Goal: *Verbalizes name, dosage, time, side effects, and number of days to continue medications  Outcome: Progressing Towards Goal  Goal: *Influenza immunization (Oct-Mar only)  Outcome: Progressing Towards Goal  Goal: *Pneumococcal immunization  Outcome: Progressing Towards Goal  Goal: *Lungs clear or at baseline  Outcome: Progressing Towards Goal  Goal: *Oxygen saturation returns to baseline or 90% or better on room air  Outcome: Progressing Towards Goal  Goal: *Glycemic control  Outcome: Progressing Towards Goal  Goal: *Absence of deep venous thrombosis signs and symptoms(Stroke Metric)  Outcome: Progressing Towards Goal  Goal: *Describes available resources and support systems  Outcome: Progressing Towards Goal  Goal: *Optimal pain control at patient's stated goal  Outcome: Progressing Towards Goal

## 2021-12-13 NOTE — PROGRESS NOTES
Infectious Disease Progress Note    IMPRESSION:       -MSSA bacteremia  Blood cultures-12/5 + for MSSA 4/4  Repeat cultures-12/8 + for probable MSSA 1/2  Negative cultures-12/9, 12/10  TTE-12/57-RK-kwzfyozsfxn thickening,  KIM -no vegetation    -Cellulitis, flexor tenosynovitis of right ankle  No evidence of  Acute OM on MRI. Ankle  Less erythematous, swollen, mild warm to touch  S/p aspiration by Ortho.-Fluid WBC 18,360 PMN-95% , RBC>100, culture - NG  Patient has scars suggestive of healed pustules on ankle, scabs on lower leg. Patient advised to use a skin moisturizer, avoid scratching skin. D/w Ortho-currently no concerns for joint involvement.      -Acute on chronic respiratory failure  93- 95% on 2L O2. CXR-negative for infiltrate    -URI and sinus drainage  Negative respiratory panel , Covid PCR    COPD exacerbation  On prednisone p.o.    -Recurrent atrial flutter  On amiodarone    -Cardiomyopathy EF 25-30    -Obesity BMI 37.4       PLAN:        -Antibiotic orders discharge   -Cefazolin 2 g IV every 8 hourly end date 1/6/22  -Remove PICC at end of therapy  -Weekly CBC, CMP and ESR every other week fax results to [phone], call with critical labs at [phone]  -Encourage adequate daily fluid intake, daily probiotic/yogurt  -ID clinic follow-up-1/20/22 at 2:30 PM.        Possible adverse effects of long term antibiotics are inclusive of but not limited to following  BM suppression, neutropenia , cytopenias , aplastic anemia hemorrhage liver & renal dysfunction/ liver , renal failure  , GI dysfunction- N, V  Diarrhea,C.difficile disease, rash , allergy , anaphylaxis. toxic epidermal necrolysis  Neuro toxicity , seizure disorder  Side effects tend to be more pronounced in the elderly               Subjective:     Patient seen today. Denies complaints  Right ankle-swelling, erythema noted.   No warmth    Review of Systems:  A comprehensive review of systems was negative except for that written in the History of Present Illness. 14 point ROS obtained . All other systems negative . Objective:     Blood pressure (!) 154/75, pulse 72, temperature 98.1 °F (36.7 °C), resp. rate 18, height 5' 11\" (1.803 m), weight 260 lb 2.3 oz (118 kg), SpO2 92 %. Temp (24hrs), Av.5 °F (36.9 °C), Min:98 °F (36.7 °C), Max:99 °F (37.2 °C)      Patient Vitals for the past 24 hrs:   Temp Pulse Resp BP SpO2   21 1122  72 18 (!) 154/75 92 %   21 1114 98.1 °F (36.7 °C) 87 18 (!) 175/93 92 %   21 0821 98.1 °F (36.7 °C) 97 18 125/70 92 %   21 0819  97  125/70    21 0757 98.6 °F (37 °C) 94 20 (!) 155/82 94 %   21 0747     95 %   21 0350 98.6 °F (37 °C) 69 22 (!) 160/69 96 %   21 2314 98.6 °F (37 °C) 74 20 (!) 153/73 95 %   21 2312 98.6 °F (37 °C)       21 1918 98 °F (36.7 °C) 70 20 136/79 93 %   21 1453 99 °F (37.2 °C) 72 22 132/71 93 %         Lines:  Peripheral IV:       Physical Exam:   General:  Awake, cooperative,    Eyes:  Sclera anicteric. Pupils equally round and reactive to light. Mouth/Throat: Mucous membranes normal, oral pharynx clear   Neck: Supple   Lungs:   Reduced auscultation bases   CV:  Regular rate and rhythm,no murmur, click, rub or gallop   Abdomen:   Soft, non-tender.  bowel sounds normal. non-distended   Extremities: No cyanosis or edema   Skin: Skin color, texture, turgor normal. no acute rash or lesions   Lymph nodes: Cervical and supraclavicular normal   Musculoskeletal: Swelling, erythema , warmth  + R/ankle   Lines/Devices:  Intact, no erythema, drainage or tenderness   Psych: Awake and oriented, normal mood affect       Data Review:ed   CBC:   Recent Labs     21  0208 21   WBC 16.4* 17.2*   RBC 3.87* 3.89*   HGB 11.0* 11.2*   HCT 36.0* 36.8    327     CMP:   Recent Labs     21   *      K 3.9   CL 96*   CO2 36*   BUN 19   CREA 1.19   CA 8.9   AGAP 5   BUCR 16       Studies reviewed: Lab Results   Component Value Date/Time    Culture result: NO GROWTH 3 DAYS 12/10/2021 03:41 AM    Culture result: Culture performed on Unspun Fluid 12/09/2021 02:22 PM    Culture result: NO GROWTH 4 DAYS 12/09/2021 02:22 PM    Culture result: NO GROWTH 4 DAYS 12/09/2021 03:06 AM    Culture result: (A) 12/08/2021 11:25 AM     STAPHYLOCOCCUS AUREUS GROWING IN 1 OF 2 BOTTLES DRAWN SITE = R WRIST  PLEASE REFER TO [id number] FOR SENSITIVITIES    Culture result: REMAINING BOTTLE(S) HAS/HAVE NO GROWTH SO FAR 12/08/2021 11:25 AM    Culture result:  12/08/2021 11:25 AM     (NOTE) PRELIMARY REPORT OF GRAM POS COCCI IN [facility] CALLED TO AND READ BACK BY 1305 Tessa Yousif RN 12.9.21 AT 1207   CAB        XR Results (most recent):  Results from [place] encounter on 12/05/21    XR ANKLE RT MIN 3 V    Narrative  EXAM: XR ANKLE RT MIN 3 V    INDICATION: Trauma. COMPARISON: None. FINDINGS: Three views of the right ankle demonstrate no fracture or disruption  of the ankle mortise. There is no other acute osseous or articular abnormality. There are vascular calcifications. Impression  No acute abnormality. Vascular calcifications. Patient Active Problem List   Diagnosis Code    Hypertension I10    Arrhythmia I49.9    Obesity (BMI 30.0-34. 9) E66.9    Acute respiratory failure with hypoxia (HCC) J96.01    Atrial flutter with rapid ventricular response (HCC) I48.92    Atrial fibrillation (HCC) I48.91    Congestive heart failure (HCC) I50.9    Atrial flutter (HCC) I48.92    Febrile R50.9    CHF exacerbation (HCC) I50.9    Elevated brain natriuretic peptide (BNP) level R79.89    Respiratory failure, acute-on-chronic (HCC) J96.20         ICD-10-CM ICD-9-CM    1. Acute respiratory failure with hypoxia (HCC)  J96.01 518.81    2. Atrial flutter with rapid ventricular response (HCC)  I48.92 427.32    3. Acute exacerbation of chronic obstructive pulmonary disease (COPD) ([facility]  J44.1 491.21    4.  Cardiomyopathy, unspecified type ([facility]  I42.9 425.4    5. Cellulitis of right ankle  L03.115 682.6    6. Chronic respiratory failure, unspecified whether with hypoxia or hypercapnia ([facility])  J96.10 518.83    7. Obesity (BMI 30-39. 9)  E66.9 278.00    8. Staphylococcus aureus bacteremia  R78.81 790.7     B95.61 041.11    9. Tenosynovitis of ankle  M65.9 727.06    10. Viral upper respiratory tract infection  J06.9 465.9    11. Acute on chronic congestive heart failure, unspecified heart failure type ([facility])  I50.9 428.0    12. Obesity (BMI 30.0-34. 9)  E66.9 278.00        I have discussed the diagnosis with the patient and the intended plan as seen in the above orders. I have discussed medication side effects and warnings with the patient as well.     Reviewed test results at length with patient    Anti-infectives:     Nafcillin IV-12/8  S/pCeftriaxone 12/5, 12/728  S/p vancomycin     Roc Holden MD FACP

## 2021-12-13 NOTE — PROGRESS NOTES
Spiritual Care Assessment/Progress Note  [facility]      NAME: Kari Cheema      MRN: [id number]  AGE: 65 y.o.  SEX: male  Jainism Affiliation: Muslim   Language: English     12/13/2021     Total Time (in minutes): 10     Spiritual Assessment begun in [facility] 2 CARDIOPULMONARY CARE through conversation with:         [x]Patient        [] Family    [] Friend(s)        Reason for Consult: Initial/Spiritual assessment, patient floor     Spiritual beliefs: (Please include comment if needed)     [] Identifies with a michelle tradition:         [] Supported by a michelle community:            [] Claims no spiritual orientation:           [] Seeking spiritual identity:                [] Adheres to an individual form of spirituality:           [x] Not able to assess:                           Identified resources for coping:      [] Prayer                               [] Music                  [] Guided Imagery     [] Family/friends                 [] Pet visits     [] Devotional reading                         [x] Unknown     [] Other:                                               Interventions offered during this visit: (See comments for more details)    Patient Interventions: Initial/Spiritual assessment, patient floor           Plan of Care:     [] Support spiritual and/or cultural needs    [] Support AMD and/or advance care planning process      [] Support grieving process   [] Coordinate Rites and/or Rituals    [] Coordination with community clergy   [] No spiritual needs identified at this time   [] Detailed Plan of Care below (See Comments)  [] Make referral to Music Therapy  [] Make referral to Pet Therapy     [] Make referral to Addiction services  [] Make referral to [facility]  [] Make referral to Spiritual Care Partner  [] No future visits requested        [x] Contact Spiritual Care for further referrals     Comments:  's visit was on 2 Cardiopulmonary Care unit,  for initial spiritual assessment. Patient indicated he was doing well today and stated he was talking to hi wife at that time. He was advised that spiritual care can be reached through staff referrals. He thanked  for stopping by. Visited by: Freddy Maya.    Paging Service: [phone])

## 2021-12-13 NOTE — PROGRESS NOTES
Transition of Care Plan:     RUR: 15%  Disposition:  [facility]- Discharge 12/14/2021   Follow up appointments: PCP; Cardiology  DME needed: Rolling Walker  Transportation at Discharge: Pt will be transported by medical transport  [address] or means to access home:  Pt has access       IM Medicare Letter: Given 12/14/2021  Is patient a BCPI-A Bundle: N/A                    If yes, was Bundle Letter given?:  N/A  Is patient a [place] and [facility] with [facility]? N/A  If yes, was [employer] transfer form completed and [facility] notified? N/A  Caregiver Mey Mckinney- wife [phone]  Discharge Caregiver contacted prior to discharge?   CM will notify caregiver at d/c.     5:15pm-  set up medical transport with [facility] via [employer].  time 11:00am for 12/14/2021.     2:15pm- CM called Jatinder Gutierrez, Nurse Liaison with [facility] via phone. Jatinder Gutierrez stated that they have a bed available on 12/14/2021.    2:00pm- CM met with pt at bedside to discuss d/c plan. CM informed pt that he was accepted to [facility]. Pt was in agreement to go to [facility].    11:45am- CM met with pt at bedside to discuss d/c plan. Pt stated that he needs to go to SNF facility to get back stronger. CM reviewed with pt the SNF list. Pt selected [facility] and [facility]. [address] documentation signed by pt and placed in pt's bedside chart. Referral sent to SNF via [facility]. Medicare pt has received, reviewed, and signed 2nd  letter informing them of their right to appeal the discharge. Signed copy has been placed on pt bedside chart. 9:48am- Rodrigo Boone with [facility] called CM via phone to inform CM that pt's co-pay for medication is $17. Rodrigo Boone informed CM she will need labs and PICC report. Rodrigo Boone inquired with CM as to the [facility] agency pt is currently with. CM will continue to follow patient for discharge planning needs and arrange for services as deemed necessary.     Matias De Luna, MS  Care Manager-[facility]  [phone]

## 2021-12-14 VITALS
SYSTOLIC BLOOD PRESSURE: 149 MMHG | OXYGEN SATURATION: 93 % | BODY MASS INDEX: 36.33 KG/M2 | RESPIRATION RATE: 20 BRPM | TEMPERATURE: 98 F | WEIGHT: 259.48 LBS | HEART RATE: 70 BPM | HEIGHT: 71 IN | DIASTOLIC BLOOD PRESSURE: 72 MMHG

## 2021-12-14 LAB
BACTERIA SPEC CULT: ABNORMAL
COVID-19 RAPID TEST, COVR: NOT DETECTED
SERVICE CMNT-IMP: ABNORMAL
SOURCE, COVRS: NORMAL

## 2021-12-14 PROCEDURE — 94640 AIRWAY INHALATION TREATMENT: CPT

## 2021-12-14 PROCEDURE — 77010033678 HC OXYGEN DAILY

## 2021-12-14 PROCEDURE — 74011250637 HC RX REV CODE- 250/637: Performed by: INTERNAL MEDICINE

## 2021-12-14 PROCEDURE — 74011000250 HC RX REV CODE- 250: Performed by: INTERNAL MEDICINE

## 2021-12-14 PROCEDURE — 87635 SARS-COV-2 COVID-19 AMP PRB: CPT

## 2021-12-14 PROCEDURE — 74011250636 HC RX REV CODE- 250/636: Performed by: INTERNAL MEDICINE

## 2021-12-14 RX ORDER — TRAMADOL HYDROCHLORIDE 50 MG/1
50 TABLET ORAL
Qty: 9 TABLET | Refills: 0 | Status: SHIPPED | OUTPATIENT
Start: 2021-12-14 | End: 2021-12-17

## 2021-12-14 RX ORDER — LOSARTAN POTASSIUM 50 MG/1
50 TABLET ORAL DAILY
Qty: 30 TABLET | Refills: 1 | Status: SHIPPED | OUTPATIENT
Start: 2021-12-15

## 2021-12-14 RX ORDER — DIGOXIN 125 MCG
0.12 TABLET ORAL DAILY
Qty: 60 TABLET | Refills: 1 | Status: SHIPPED | OUTPATIENT
Start: 2021-12-15

## 2021-12-14 RX ORDER — METOPROLOL SUCCINATE 25 MG/1
50 TABLET, EXTENDED RELEASE ORAL DAILY
Qty: 30 TABLET | Refills: 1 | Status: SHIPPED
Start: 2021-12-14

## 2021-12-14 RX ADMIN — APIXABAN 5 MG: 5 TABLET, FILM COATED ORAL at 08:04

## 2021-12-14 RX ADMIN — FUROSEMIDE 20 MG: 40 TABLET ORAL at 08:04

## 2021-12-14 RX ADMIN — PANTOPRAZOLE SODIUM 40 MG: 40 TABLET, DELAYED RELEASE ORAL at 08:04

## 2021-12-14 RX ADMIN — GUAIFENESIN 600 MG: 600 TABLET, EXTENDED RELEASE ORAL at 08:04

## 2021-12-14 RX ADMIN — CEFAZOLIN 2 G: 1 INJECTION, POWDER, FOR SOLUTION INTRAMUSCULAR; INTRAVENOUS at 08:04

## 2021-12-14 RX ADMIN — Medication 1 CAPSULE: at 08:04

## 2021-12-14 RX ADMIN — Medication 10 ML: at 05:07

## 2021-12-14 RX ADMIN — ASPIRIN 81 MG: 81 TABLET, COATED ORAL at 08:04

## 2021-12-14 RX ADMIN — DILTIAZEM HYDROCHLORIDE 240 MG: 240 CAPSULE, COATED, EXTENDED RELEASE ORAL at 08:04

## 2021-12-14 RX ADMIN — CEFAZOLIN 2 G: 1 INJECTION, POWDER, FOR SOLUTION INTRAMUSCULAR; INTRAVENOUS at 01:34

## 2021-12-14 RX ADMIN — LOSARTAN POTASSIUM 50 MG: 50 TABLET, FILM COATED ORAL at 08:04

## 2021-12-14 RX ADMIN — METOPROLOL TARTRATE 25 MG: 25 TABLET, FILM COATED ORAL at 08:04

## 2021-12-14 RX ADMIN — TAMSULOSIN HYDROCHLORIDE 0.4 MG: 0.4 CAPSULE ORAL at 08:04

## 2021-12-14 RX ADMIN — AMIODARONE HYDROCHLORIDE 200 MG: 200 TABLET ORAL at 08:04

## 2021-12-14 RX ADMIN — DIGOXIN 0.12 MG: 125 TABLET ORAL at 08:04

## 2021-12-14 NOTE — DISCHARGE SUMMARY
Hospitalist Discharge Summary     Patient ID:  Theresa Calixto  [id number]  24 y.o.  1950 12/5/2021    PCP on record: Ryland Mcmahon MD    Admit date: 12/5/2021  Discharge date and time: 12/14/2021    DISCHARGE DIAGNOSIS:  Sepsis d/t MSSA bacteremia  Right ankle tenosynovitis/cellulits  COPD exacerbation  Afib  Cardiomyopathy    CONSULTATIONS:  IP CONSULT TO ORTHOPEDIC SURGERY  IP CONSULT TO CARDIOLOGY  IP CONSULT TO INFECTIOUS DISEASES    Excerpted HPI from H&P of Mustapha Su MD:  Harper Mckeon is a 65 y.o.   male who presents with above chief complaint.     Rayna Pass y. o. male with PMHx significant for atrial flutter on Eliquis, COPD on 6 L nasal cannula at baseline at night on CPAP, GERD, hypertension, who presents the chief complaint of worsening shortness of breath over the last 3 to 4 days.  Patient typically wears nasal cannula at night, however has needed it during the day due to progressively worsening shortness of breath.  He additionally notes that he rolled his ankle about 11 days ago and is still having some right ankle discomfort and is having some difficulty walking secondary to pain.  States some dysuria as well.  Was febrile and hypoxic prior to arrival.       ______________________________________________________________________  DISCHARGE SUMMARY/HOSPITAL COURSE:  for full details see H&P, daily progress notes, labs, consult notes.      Sepsis  MSSA bacteremia  Right ankle cellulitis/ Tenosynovitis     Blood culture from 12/5 positive for MSSA, 12/6 2/4 bottle positive, 12/9 and 12/10 negative  C/w cefazolin end day 1/6/2022 as per ID  UA /CT chest negative  Ortho following for ankle cellulitis/ Tenosynovitis, arthrocentesis done, showed no organisms, however was on abx for 2 days already  KIM negative     Acute on chronic respiratory failure due to  COPD exacerbation  Chest CT negative for infection  Continue 3LNC.  He reports that he uses anywhere from 3-6 L at home as needed  Continue inhalers and PO Prednisone tapered and stopped    Aflutter, rate uncontrolled  Cardiomyopathy  Echo EF 25-30%.  No AS, trace MR  Continue Eliquis  Continue amiodarone, Cardizem and metoprolol  S/P DCCV 12/07, successful  F/u cardiology      Hypertension  Dyslipidemia  BPH  GERD  Resume home meds        _______________________________________________________________________  Patient seen and examined by me on discharge day. Pertinent Findings:  Gen:    Not in distress  Chest: Clear lungs  CVS:   Regular rhythm. No edema  Abd:  Soft, not distended, not tender  Neuro:  Alert,   _______________________________________________________________________  DISCHARGE MEDICATIONS:   Current Discharge Medication List      START taking these medications    Details   ceFAZolin 2 g IV syringe 2 g by IntraVENous route every eight (8) hours. End date is 1/6/2022  Qty: 75 Dose, Refills: 0  Start date: 12/14/2021      digoxin (LANOXIN) 0.125 mg tablet Take 1 Tablet by mouth daily. Qty: 60 Tablet, Refills: 1  Start date: 12/15/2021      L.acid,para-B. bifidum-S.therm (RISAQUAD) 8 billion cell cap cap Take 1 Capsule by mouth daily. Qty: 30 Capsule, Refills: 0  Start date: 12/15/2021      losartan (COZAAR) 50 mg tablet Take 1 Tablet by mouth daily. Qty: 30 Tablet, Refills: 1  Start date: 12/15/2021      traMADoL (ULTRAM) 50 mg tablet Take 1 Tablet by mouth every eight (8) hours as needed for Pain for up to 3 days. Max Daily Amount: 150 mg.  Qty: 9 Tablet, Refills: 0  Start date: 12/14/2021, End date: 12/17/2021    Associated Diagnoses: Tenosynovitis of right ankle         CONTINUE these medications which have CHANGED    Details   metoprolol succinate (Toprol XL) 25 mg XL tablet Take 2 Tablets by mouth daily.   Qty: 30 Tablet, Refills: 1  Start date: 12/14/2021         CONTINUE these medications which have NOT CHANGED    Details   amiodarone (PACERONE) 400 mg tablet Take 1 Tablet by mouth. dilTIAZem ER (TIAZAC) 240 mg capsule Take 240 mg by mouth daily. Indications: paroxysmal supraventricular tachycardia      finasteride (PROSCAR) 5 mg tablet Take 5 mg by mouth daily. vit A/vit C/vit E/zinc/copper (PRESERVISION AREDS PO) Take 1 Caplet by mouth two (2) times a day. ascorbic acid, vitamin C, (Vitamin C) 500 mg tablet Take 500 mg by mouth daily. cyanocobalamin (Vitamin B-12) 1,000 mcg tablet Take 1,000 mcg by mouth daily. fluticasone-umeclidinium-vilanterol (Trelegy Ellipta) 100-62.5-25 mcg inhaler Take 1 Puff by inhalation daily. pantoprazole (Protonix) 40 mg tablet Take 40 mg by mouth daily. furosemide (LASIX) 20 mg tablet 1 tab po daily  Qty: 30 Tab, Refills: 0      apixaban (ELIQUIS) 5 mg tablet Take 1 Tab by mouth two (2) times a day. Qty: 60 Tab, Refills: 0      rosuvastatin (Crestor) 40 mg tablet Take 40 mg by mouth nightly. tamsulosin (FLOMAX) 0.4 mg capsule Take 0.4 mg by mouth daily. aspirin delayed-release 81 mg tablet Take 81 mg by mouth daily. melatonin 3 mg tablet Take 3 mg by mouth nightly. gemfibrozil (LOPID) 600 mg tablet Take 600 mg by mouth two (2) times a day. cholecalciferol (VITAMIN D3) 1,000 unit cap Take 1,000 Units by mouth daily. MULTIVIT-MIN/FA/LYCOPEN/LUTEIN (CENTRUM SILVER ULTRA MEN'S PO) Take 1 Tab by mouth daily. Omega-3-DHA-EPA-Fish Oil 1,000 mg (120 mg-180 mg) cap Take 1 Cap by mouth daily. albuterol (VENTOLIN HFA) 90 mcg/actuation inhaler Take 1 Puff by inhalation every four (4) hours as needed. STOP taking these medications       ibuprofen (MOTRIN) 200 mg tablet Comments:   Reason for Stopping:                 Patient Follow Up Instructions:    Activity: Activity as tolerated  Diet: Cardiac Diet  Wound Care: None needed    Follow-up with     Follow-up Information     Follow up With Specialties Details Why [address], Infusion Therapy  This is your IV Infusion provider [address]. [address] [facility]  [phone]    [facility]  This is your provider for rolling walker. [facility],[address] [phone]     [address]  This is your home health agency. If you have not heard from them in 2-3 days, please give them a call.   [address] [place] [address]    Yasmin Raymond, [address]   [address]  [phone]      Debra Patino MD Infectious Disease, Internal Medicine Go on 1/20/2022 at 2:30 pm [phone] [facility]  [phone]          ________________________________________________________________    Risk of deterioration: Low    Condition at Discharge:  Stable  __________________________________________________________________    Disposition  SNF/LTC    ____________________________________________________________________    Code Status: Full Code  ___________________________________________________________________      Total time in minutes spent coordinating this discharge (includes going over instructions, follow-up, prescriptions, and preparing report for sign off to her PCP) :  >30 minutes    Signed:  Eulalia Munguia MD

## 2021-12-14 NOTE — PROGRESS NOTES
Transition of Care Plan:     RUR: 15%  Disposition:  [facility]   Follow up appointments: PCP; Cardiology  DME needed: Rolling Walker  Transportation at Discharge: Pt will transport by medical transport. [place] or means to access home:  N/A     Medicare Letter: Given 12/14/2021  Is patient a BCPI-A Bundle: N/A                    If yes, was Bundle Letter given?:  N/A  Is patient a  and connected with the [facility]? N/A  If yes, was [facility] transfer form completed and [facility] notified? N/A  Caregiver Ángela Thayer- wife [phone]  Discharge Caregiver contacted prior to discharge?   Yes    9:54am-No further CM needs identified. CM notified pt's nurse of d/c.    9:50am-CM informed pt's nurse that pt needs at [facility].     Transition of Care Plan to SNF/Rehab    SNF/Rehab Transition:  Patient has been accepted to [facility] and meets criteria for admission. Patient will transported by [facility] and expected to leave at 11:00am    Communication to Patient/Family:  Met with patient and pt's wife (identified care giver) and they are agreeable to the transition plan. Communication to SNF/Rehab:  Bedside RN, [facility], has been notified to update the transition plan to the facility and call report (phone number [phone]). Discharge information has been updated on the AVS.     Discharge instructions to be fax'd to facility at (Fax # [phone]). Nursing Please include all hard scripts for controlled substances, med rec and dc summary, and AVS in packet.      SNF/Rehab Transition:  Patient to follow-up with Home Health: [facility]  PCP/Specialist: Dr. Adria Nageotte and confirmed with facility, [facility] they can manage the patient care needs for the following:     [employer] with (X) only those applicable:    Medication:  [x]  Medications will be available at the facility  []  IV Antibiotics   []  Controlled Substance - hard copy to be sent with patient []  Weekly Labs   Documents:  [] Hard RX  [] MAR  [] Kardex  [x] AVS  []Transfer Summary  [x]Discharge   Equipment:  []  CPAP/BiPAP  []  Wound Vacuum  []  Sauceda or Urinary Device  []  PICC/Central Line  []  Nebulizer  []  Ventilator   Treatment:  []Isolation (for MRSA, VRE, etc.)  []Surgical Drain Management  []Tracheostomy Care  []Dressing Changes  []Dialysis with transportation and chair time   []PEG Care  []Oxygen  []Daily Weights for Heart Failure   Dietary:  []Any diet limitations  []Tube Feedings   []Total Parenteral Management (TPN)   Eligible for Medicaid Long Term Services and Supports  Yes:  [] Eligible for medical assistance or will become eligible within 180 days and UAI completed. [] Provider/Patient and/or support system has requested screening. [] UAI copy provided to patient or responsible party,   [] UAI unavailable at discharge will send once processed to SNF provider. [] UAI unavailable at discharged mailed to patient  No:   [x] Private pay and is not financially eligible for Medicaid within the next 180 days. [] Reside out-of-state. [] A residents of a state owned/operated facility that is licensed  by [place] [address] [employer] and [employer] or [facility]  [] Enrollment in [facility] hospice services  [] [address]  [] Patient /Family declines to have screening completed or provide financial information for screening     Financial Resources:  Medicaid    [] Initiated and application pending   [] Full coverage     Advanced Care Plan:  []Surrogate Decision Maker of Care  []POA  [x]Communicated Code Status FULL code   Other       9:45am- CM met with pt at bedside to discuss d/c plan. CM inquired with pt about any needs. No new needs at this time. CM informed pt of transportation time. 9:42am- CM called pt's wife via phone to discuss d/c plan. CM informed pt's wife of transportation time.      Care Management Interventions  PCP Verified by CM: Yes  Palliative Care Criteria Met (RRAT>21 & CHF Dx)?: No  Mode of Transport at Discharge: BLS  Transition of Care Consult (CM Consult): SNF ([address] [facility])  Partner SNF: Yes  Discharge Durable Medical Equipment: No  Physical Therapy Consult: Yes  Occupational Therapy Consult: Yes  Speech Therapy Consult: No  Support Systems: Spouse/Significant Other  Confirm Follow Up Transport: Self (Pt does drive)  The Plan for Transition of Care is Related to the Following Treatment Goals : SNF  The Patient and/or Patient Representative was Provided with a Choice of Provider and Agrees with the Discharge Plan?: Yes  Name of the Patient Representative Who was Provided with a Choice of Provider and Agrees with the Discharge Plan: Self  Freedom of Choice List was Provided with Basic Dialogue that Supports the Patient's Individualized Plan of Care/Goals, Treatment Preferences and Shares the Quality Data Associated with the Providers?: Yes  The [employer] Information Provided?: No  Discharge Location  Discharge Placement: Skilled nursing facility [address] [facility])    CM will continue to follow patient for discharge planning needs and arrange for services as deemed necessary.     Blayne Espinal [address]  [phone]

## 2021-12-14 NOTE — PROGRESS NOTES
Problem: Pain  Goal: *Control of Pain  Outcome: Resolved/Met  Goal: *PALLIATIVE CARE:  Alleviation of Pain  Outcome: Resolved/Met     Problem: Patient Education: Go to Patient Education Activity  Goal: Patient/Family Education  Outcome: Resolved/Met     Problem: Falls - Risk of  Goal: *Absence of Falls  Description: Document Jah Fall Risk and appropriate interventions in the flowsheet. Outcome: Resolved/Met     Problem: Patient Education: Go to Patient Education Activity  Goal: Patient/Family Education  Outcome: Resolved/Met     Problem: Arrhythmia Pathway (Adult)  Goal: *Absence of arrhythmia  Outcome: Resolved/Met     Problem: Patient Education: Go to Patient Education Activity  Goal: Patient/Family Education  Outcome: Resolved/Met     Problem: Pressure Injury - Risk of  Goal: *Prevention of pressure injury  Description: Document Benedict Scale and appropriate interventions in the flowsheet.   Outcome: Resolved/Met     Problem: Patient Education: Go to Patient Education Activity  Goal: Patient/Family Education  Outcome: Resolved/Met     Problem: Patient Education: Go to Patient Education Activity  Goal: Patient/Family Education  Outcome: Resolved/Met     Problem: Patient Education: Go to Patient Education Activity  Goal: Patient/Family Education  Outcome: Resolved/Met     Problem: Sepsis: Discharge Outcomes  Goal: *Vital signs within defined limits  Outcome: Resolved/Met  Goal: *Tolerating diet  Outcome: Resolved/Met  Goal: *Verbalizes understanding and describes prescribed diet  Outcome: Resolved/Met  Goal: *Demonstrates progressive activity  Outcome: Resolved/Met  Goal: *Describes follow-up/return visits to physicians  Outcome: Resolved/Met  Goal: *Verbalizes name, dosage, time, side effects, and number of days to continue medications  Outcome: Resolved/Met  Goal: *Influenza immunization (Oct-Mar only)  Outcome: Resolved/Met  Goal: *Pneumococcal immunization  Outcome: Resolved/Met  Goal: *Lungs clear or at baseline  Outcome: Resolved/Met  Goal: *Oxygen saturation returns to baseline or 90% or better on room air  Outcome: Resolved/Met  Goal: *Glycemic control  Outcome: Resolved/Met  Goal: *Absence of deep venous thrombosis signs and symptoms(Stroke Metric)  Outcome: Resolved/Met  Goal: *Describes available resources and support systems  Outcome: Resolved/Met  Goal: *Optimal pain control at patient's stated goal  Outcome: Resolved/Met     Problem: Infection - Risk of, Central Venous Catheter-Associated Bloodstream Infection  Goal: *Absence of infection signs and symptoms  Outcome: Resolved/Met     Problem: Patient Education: Go to Patient Education Activity  Goal: Patient/Family Education  Outcome: Resolved/Met

## 2021-12-14 NOTE — PROGRESS NOTES
Pharmacist Discharge Medication Reconciliation    Significant PMH:   Past Medical History:   Diagnosis Date    Arrhythmia     SVT; Dr. Dory Gonzales Fall 2018    Chronic obstructive pulmonary disease (HCC)     mild    GERD (gastroesophageal reflux disease)     Hypertension     Thyroid disease     benign thyroid growth, checked annually by Dr. Pritesh Fernandes     Encounter Diagnoses:   Encounter Diagnoses   Name Primary? Acute respiratory failure with hypoxia (HCC) Yes    Atrial flutter with rapid ventricular response (HCC)     Acute exacerbation of chronic obstructive pulmonary disease (COPD) ([facility])     Cardiomyopathy, unspecified type ([facility]     Cellulitis of right ankle     Chronic respiratory failure, unspecified whether with hypoxia or hypercapnia ([facility])     Obesity (BMI 30-39. 9)     Staphylococcus aureus bacteremia     Tenosynovitis of ankle     Viral upper respiratory tract infection     Acute on chronic congestive heart failure, unspecified heart failure type (HCC)     Obesity (BMI 30.0-34. 9)     Tenosynovitis of right ankle      Allergies: Patient has no known allergies. Discharge Medications:   Current Discharge Medication List        START taking these medications    Details   ceFAZolin 2 g IV syringe 2 g by IntraVENous route every eight (8) hours. End date is 1/6/2022  Qty: 75 Dose, Refills: 0  Start date: 12/14/2021      digoxin (LANOXIN) 0.125 mg tablet Take 1 Tablet by mouth daily. Qty: 60 Tablet, Refills: 1  Start date: 12/15/2021      L.acid,para-B. bifidum-S.therm (RISAQUAD) 8 billion cell cap cap Take 1 Capsule by mouth daily. Qty: 30 Capsule, Refills: 0  Start date: 12/15/2021      losartan (COZAAR) 50 mg tablet Take 1 Tablet by mouth daily. Qty: 30 Tablet, Refills: 1  Start date: 12/15/2021      traMADoL (ULTRAM) 50 mg tablet Take 1 Tablet by mouth every eight (8) hours as needed for Pain for up to 3 days.  Max Daily Amount: 150 mg.  Qty: 9 Tablet, Refills: 0  Start date: 12/14/2021, End date: 12/17/2021    Associated Diagnoses: Tenosynovitis of right ankle           CONTINUE these medications which have CHANGED    Details   metoprolol succinate (Toprol XL) 25 mg XL tablet Take 2 Tablets by mouth daily. Qty: 30 Tablet, Refills: 1  Start date: 12/14/2021           CONTINUE these medications which have NOT CHANGED    Details   amiodarone (PACERONE) 400 mg tablet Take 1 Tablet by mouth. dilTIAZem ER (TIAZAC) 240 mg capsule Take 240 mg by mouth daily. Indications: paroxysmal supraventricular tachycardia      finasteride (PROSCAR) 5 mg tablet Take 5 mg by mouth daily. vit A/vit C/vit E/zinc/copper (PRESERVISION AREDS PO) Take 1 Caplet by mouth two (2) times a day. ascorbic acid, vitamin C, (Vitamin C) 500 mg tablet Take 500 mg by mouth daily. cyanocobalamin (Vitamin B-12) 1,000 mcg tablet Take 1,000 mcg by mouth daily. fluticasone-umeclidinium-vilanterol (Trelegy Ellipta) 100-62.5-25 mcg inhaler Take 1 Puff by inhalation daily. pantoprazole (Protonix) 40 mg tablet Take 40 mg by mouth daily. furosemide (LASIX) 20 mg tablet 1 tab po daily  Qty: 30 Tab, Refills: 0      apixaban (ELIQUIS) 5 mg tablet Take 1 Tab by mouth two (2) times a day. Qty: 60 Tab, Refills: 0      rosuvastatin (Crestor) 40 mg tablet Take 40 mg by mouth nightly. tamsulosin (FLOMAX) 0.4 mg capsule Take 0.4 mg by mouth daily. aspirin delayed-release 81 mg tablet Take 81 mg by mouth daily. melatonin 3 mg tablet Take 3 mg by mouth nightly. gemfibrozil (LOPID) 600 mg tablet Take 600 mg by mouth two (2) times a day. cholecalciferol (VITAMIN D3) 1,000 unit cap Take 1,000 Units by mouth daily. MULTIVIT-MIN/FA/LYCOPEN/LUTEIN (CENTRUM SILVER ULTRA MEN'S PO) Take 1 Tab by mouth daily. Omega-3-DHA-EPA-Fish Oil 1,000 mg (120 mg-180 mg) cap Take 1 Cap by mouth daily. albuterol (VENTOLIN HFA) 90 mcg/actuation inhaler Take 1 Puff by inhalation every four (4) hours as needed. STOP taking these medications       ibuprofen (MOTRIN) 200 mg tablet Comments:   Reason for Stopping:               The patient's chart, MAR and AVS were reviewed by Angel Branham, [facility].     Discharging Provider: Andreas Watson MD    Thank you,     Angel Branham, [facility]

## 2021-12-14 NOTE — PROGRESS NOTES
Cardiology Progress Note      12/14/2021    Admit Date: 12/5/2021          Subjective:  No chest pain, syncope, dizziness, palpitations. Visit Vitals  BP (!) 149/72 (BP 1 Location: Left upper arm, BP Patient Position: At rest)   Pulse 70   Temp 98 °F (36.7 °C)   Resp 20   Ht 5' 11\" (1.803 m)   Wt 117.7 kg (259 lb 7.7 oz)   SpO2 93%   BMI 36.19 kg/m²     12/12 1901 - 12/14 0700  In: 1480 [P.O.:1440; I.V.:40]  Out: 5200 [Urine:5200]        Objective:      Physical Exam:  VS as above  Chest CTA  Card RRR no changes     Data Review:   Recent Results (from the past 24 hour(s))   COVID-19 RAPID TEST    Collection Time: 12/14/21 10:01 AM   Result Value Ref Range    Specimen source Nasopharyngeal      COVID-19 rapid test Not detected NOTD       Telemetry: SR      Assessment:     1. Recurrent atrial flutter, unspecified.  S/p cardioversion to sinus on 12/7.  He's had an Afib RF ablation in 9/2021.  2. Cardiomyopathy, likely nonischemic and may be tachycardia-mediated. EF 25-30%. 3. Acute systolic congestive heart failure.  Better. 4. COPD exacerbation. Better. 5. GPC bacteremia.  Blood cultures from 12/5 and 12/8 positive for pan-sensitive Staph aureus.  12/9 and 12/10 no growth so far. No ultrasound evidence of endocarditis on KIM 12/10. 6. Hypertensive heart disease with heart failure. 7. R ankle pain s/p joint aspiration 12/9.  Tenosynovitis.     Plan: Cont current Rx. Tolerating cardiac meds. EF is reduced, not an immediate candidate for prophylactic ICD due to infection and his EF may improve with time as well. Covered with amiodarone anyway. We talked about trying to get him off amiodarone eventually (after a second ablation), this would be sometime in 2022. He'll follow-up with Dr. Thang Thacker in early 1/2022 and then we'll go forward from there.      Signed By: Luda Brand MD     December 14, 2021

## 2021-12-14 NOTE — DISCHARGE INSTRUCTIONS
HOSPITALIST DISCHARGE INSTRUCTIONS    NAME: Steve Ghotra   :  [id number]   MRN:  [id number]     Date/Time:  2021 8:56 AM    ADMIT DATE: 2021   DISCHARGE DATE: 2021     Attending Physician: Pablo Bagley MD    DISCHARGE DIAGNOSIS:  MSSA bacteremia  Right ankle tenosynovitis  Afib RVR  Cardiomyopathy    Infectious disease instruction:    -Antibiotic orders discharge   -Cefazolin 2 g IV every 8 hourly end date 22  -Remove PICC at end of therapy  -Weekly CBC, CMP and ESR every other week fax results to [phone], call with critical labs at [phone]  -Encourage adequate daily fluid intake, daily probiotic/yogurt  -ID clinic follow-up-22 at 2:30 PM, with Dr. Abilio Hand         Medications: Per above medication reconciliation. Pain Management: per above medications    Recommended diet: Cardiac Diet    Recommended activity: Activity as tolerated    Wound care: None    Indwelling devices: PICC line  to be removed at antibiotic completion on 2022    Supplemental Oxygen: Chronic Oxygen,  3-6  LNC at baseline    Required Lab work: Per SNF routine    Glucose management:  None    Code status: Full        Outside physician follow up: Follow-up Information     Follow up With Specialties Details Why [address], Infusion Therapy  This is your IV Infusion provider [facility]. [address] [place] [address]  [phone]    [facility]  This is your provider for rolling walker. [address] [place] [address],Suite [facility]  [phone]     [address]  This is your home health agency. If you have not heard from them in 2-3 days, please give them a call.   [address] [place] [address]  [address] [place] [address]  [phone]    Alicia Pacheco MD Family Medicine [facility]  [phone]      Tova Stauffer MD Infectious Disease, Internal Medicine Go on 1/20/2022 at 2:30 pm [address] [facility]  [phone]                 Skilled nursing facility/ SNF MD responsible for above on discharge. Information obtained by :  I understand that if any problems occur once I am at home I am to contact my physician. I understand and acknowledge receipt of the instructions indicated above.                                                                                                                                            Physician's or R.N.'s Signature                                                                  Date/Time                                                                                                                                              Patient or Repres

## 2021-12-14 NOTE — PROGRESS NOTES
Pt refused PT due to:    [x]  \"I'm getting set to go\"  []  Eating  []  Pain  []  Pt lethargic  []  Off Unit  Will f/u, if D/c is held. Thank you.   Amy Nick, PTA

## 2021-12-14 NOTE — PROGRESS NOTES
0700 Bedside shift change report given to Shira Christensen RN (oncoming nurse) by Ramona Moctezuma RN (offgoing nurse). Report included the following information SBAR, Kardex, Intake/Output, MAR, Recent Results and Cardiac Rhythm NSR w/ 1st Degree AVB. 2477 Per CM's note, appears pt is to be picked up for D/C today at 1100 via [facility]. Will have pt prepared to discharge at that time. [address] ordered and sent to lab for processing per [facility] D/C protocol. 1055 Attempted to call report to Rehab. RN unable to take report at this time. Facility to back shortly for report. See other note. Rapid COVID negative. Pt to D/C shortly with [facility].     1220 Spoke with . Updated pickup time now 1300. Attempted for a second time to call report to SNF. No answer at facility. Will attempt a third time to call report shortly. [address] report to [facility] at [address] and [address] Awaiting arrival of [facility] at this time. 1410 [facility] at bedside. Pt transfered to [facility]. Pt discharged with copy of AVS and all personal belongings, including walker.

## 2021-12-15 LAB
BACTERIA SPEC CULT: NORMAL
SERVICE CMNT-IMP: NORMAL

## 2021-12-16 LAB
BACTERIA SPEC CULT: NORMAL
SERVICE CMNT-IMP: NORMAL

## 2022-01-05 ENCOUNTER — PATIENT OUTREACH (OUTPATIENT)
Dept: CASE MANAGEMENT | Age: 72
End: 2022-01-05

## 2022-01-09 NOTE — PROGRESS NOTES
[address] [place] [address] Dr Discharge Note    *The information contained in this note was received during a weekly care coordination call with the post acute facility*      Patient left AMA form  [address] [place] [address] ([facility]) on 12/20/21. PCP: MD WYATT Garcia appointment:   Future Appointments   Date Time Provider Bertha Ahn   1/20/2022  2:30 PM Drea Lewis MD SHEPPARD DANIELSON AT [facility]       Community Care Team will sign off at this time. Medications were not reconciled and general patient assessment was not completed during this skilled nursing facility outreach.        Edinson Angel   BSN, RN  [employer]

## 2022-01-20 ENCOUNTER — VIRTUAL VISIT (OUTPATIENT)
Dept: INFECTIOUS DISEASES | Age: 72
End: 2022-01-20
Payer: MEDICARE

## 2022-01-20 DIAGNOSIS — I42.9 CARDIOMYOPATHY, UNSPECIFIED TYPE (HCC): ICD-10-CM

## 2022-01-20 DIAGNOSIS — B95.61 MSSA BACTEREMIA: Primary | ICD-10-CM

## 2022-01-20 DIAGNOSIS — E66.9 OBESITY (BMI 30-39.9): ICD-10-CM

## 2022-01-20 DIAGNOSIS — J42 CHRONIC BRONCHITIS, UNSPECIFIED CHRONIC BRONCHITIS TYPE (HCC): ICD-10-CM

## 2022-01-20 DIAGNOSIS — J96.21 ACUTE ON CHRONIC RESPIRATORY FAILURE WITH HYPOXIA (HCC): ICD-10-CM

## 2022-01-20 DIAGNOSIS — L03.115 CELLULITIS OF RIGHT ANKLE: ICD-10-CM

## 2022-01-20 DIAGNOSIS — J34.89 SINUS DRAINAGE: ICD-10-CM

## 2022-01-20 DIAGNOSIS — M65.9 TENOSYNOVITIS OF ANKLE: ICD-10-CM

## 2022-01-20 DIAGNOSIS — R78.81 MSSA BACTEREMIA: Primary | ICD-10-CM

## 2022-01-20 PROCEDURE — G8536 NO DOC ELDER MAL SCRN: HCPCS | Performed by: INTERNAL MEDICINE

## 2022-01-20 PROCEDURE — 99214 OFFICE O/P EST MOD 30 MIN: CPT | Performed by: INTERNAL MEDICINE

## 2022-01-20 PROCEDURE — G8756 NO BP MEASURE DOC: HCPCS | Performed by: INTERNAL MEDICINE

## 2022-01-20 PROCEDURE — G8427 DOCREV CUR MEDS BY ELIG CLIN: HCPCS | Performed by: INTERNAL MEDICINE

## 2022-01-20 PROCEDURE — G8510 SCR DEP NEG, NO PLAN REQD: HCPCS | Performed by: INTERNAL MEDICINE

## 2022-01-20 PROCEDURE — 1101F PT FALLS ASSESS-DOCD LE1/YR: CPT | Performed by: INTERNAL MEDICINE

## 2022-01-20 PROCEDURE — 3017F COLORECTAL CA SCREEN DOC REV: CPT | Performed by: INTERNAL MEDICINE

## 2022-01-20 PROCEDURE — G8417 CALC BMI ABV UP PARAM F/U: HCPCS | Performed by: INTERNAL MEDICINE

## 2022-01-20 NOTE — PROGRESS NOTES
Infectious Disease Progress Note    IMPRESSION:       -MSSA bacteremia  Blood cultures-12/5 + for MSSA 4/4  Repeat cultures-12/8 + for probable MSSA 1/2  Negative cultures-12/9, 12/10  TTE-12/54-GR-xmzqgrsbduu thickening,  KIM -no vegetation    -Cellulitis, flexor tenosynovitis of right ankle, completely resolved   No swelling, erythema or pain  no evidence of  Acute OM on MRI. S/p aspiration by Ortho.-Fluid WBC 18,360 PMN-95% , RBC>100, culture pending  Patient has scars suggestive of healed pustules on ankle, scabs on lower leg. Patient advised to use a skin moisturizer, avoid scratching skin.      -Acute on chronic respiratory failure  COPD, clinically improved. Patient on O2 at night    -URI and sinus drainage, improved  Still having some drainage. Patient advised to do steam inhalations, ENT      -Recurrent atrial flutter  On amiodarone    -Cardiomyopathy EF 25-30    -Obesity BMI 37.4       PLAN:        -Patient has completed cefazolin 2 g IV every 8 hourly end date 1/6/22  -No evidence of acute infection at this time.  -Patient advised to call if any swelling, erythema, warmth of ankle noted. Patient voiced understanding                 Subjective:     Patient seen today on hospital follow-up. Patient denies complaints  No right ankle-swelling, erythema . No pain  Patient states he did well with the antibiotic. After completion of therapy on 1/5 he noted that his ankle continued to improve significantly. Patient is feeling better overall. Breathing is better. He is on O2 at night  Sinus drainage less, some drainage still present. Patient advised to do steam inhalation, see ENT. Patient advised to call if any swelling, redness, pain of right ankle recurs. Patient voiced understanding.       Pursuant to the emergency declaration under the [facility] authority and the [employer] Act, this Virtual Visit was conducted, with patient's consent, to reduce the patient's risk of exposure to COVID-19 and provide continuity of care for an established patient. Services were provided through a video synchronous discussion virtually to substitute for in-person visit. Pt is  aware that this Telehealth encounter is a billable service, with coverage as determined by her insurance carrier. She is aware that she may receive a bill and has provided verbal consent to proceed: Yes. Review of Systems:  A comprehensive review of systems was negative except for that written in the History of Present Illness. 14 point ROS obtained . All other systems negative . Objective:     Physical exam  Physical exam:     GEN: NAD  NECK- supple  RESP: no distress  NEURO:non focal  No LE edema  R/ankle -no swelling, erythema. ROM adequate, full range of movement noted     Data Reviewied -01/04/2022  WBC 8.3, hemoglobin 11.2, creatinine 1.01  ESR 67 ( previously 85/86) . Patient advised to take a multivitamin, increase protein intake( white meat)         Studies reviewed:      Lab Results   Component Value Date/Time    Culture result: NO GROWTH 6 DAYS 12/10/2021 03:41 AM    Culture result: Culture performed on Unspun Fluid 12/09/2021 02:22 PM    Culture result: NO GROWTH 4 DAYS 12/09/2021 02:22 PM    Culture result: NO GROWTH 6 DAYS 12/09/2021 03:06 AM    Culture result: (A) 12/08/2021 11:25 AM     STAPHYLOCOCCUS AUREUS GROWING IN 1 OF 2 BOTTLES DRAWN SITE = R WRIST  PLEASE REFER TO [id number] FOR SENSITIVITIES    Culture result: REMAINING BOTTLE(S) HAS/HAVE NO GROWTH IN 5 DAYS 12/08/2021 11:25 AM    Culture result:  12/08/2021 11:25 AM     (NOTE) PRELIMARY REPORT OF GRAM POS [facility] IN [employer] CALLED TO AND READ BACK BY 1305 Tessa Yousif RN 12.9.21 AT 8160   CAB        XR Results (most recent):  Results from [place] encounter on 12/05/21    XR ANKLE RT MIN 3 V    Narrative  EXAM: XR ANKLE RT MIN 3 V    INDICATION: Trauma.     COMPARISON: None.    FINDINGS: Three views of the right ankle demonstrate no fracture or disruption  of the ankle mortise. There is no other acute osseous or articular abnormality. There are vascular calcifications. Impression  No acute abnormality. Vascular calcifications. Patient Active Problem List   Diagnosis Code    Hypertension I10    Arrhythmia I49.9    Obesity (BMI 30.0-34. 9) E66.9    Acute respiratory failure with hypoxia (HCC) J96.01    Atrial flutter with rapid ventricular response (HCC) I48.92    Atrial fibrillation (HCC) I48.91    Congestive heart failure (HCC) I50.9    Atrial flutter (HCC) I48.92    Febrile R50.9    CHF exacerbation (HCC) I50.9    Elevated brain natriuretic peptide (BNP) level R79.89    Respiratory failure, acute-on-chronic (HCC) J96.20       I have discussed the diagnosis with the patient and the intended plan as seen in the above orders. I have discussed medication side effects and warnings with the patient as well.     Reviewed test results at length with patient     Charlotte Tobin MD [address]

## 2022-01-20 NOTE — PROGRESS NOTES
Virtual Visit  Send link to [phone]    Identified pt with two pt identifiers(name and ). Reviewed record in preparation for visit and have obtained necessary documentation. Chief Complaint   Patient presents with   [address]      There were no vitals filed for this visit. Health Maintenance Review: Patient reminded of \"due or due soon\" health maintenance. I have asked the patient to contact his/her primary care provider (PCP) for follow-up on his/her health maintenance. Coordination of Care Questionnaire:  :   1) Have you been to an emergency room, urgent care, or hospitalized since your last visit? If yes, where when, and reason for visit? no       2. Have seen or consulted any other health care provider since your last visit? If yes, where when, and reason for visit? NO      Patient is accompanied by self I have received verbal consent from Clair Coronado to discuss any/all medical information while they are present in the room.

## 2022-02-09 ENCOUNTER — APPOINTMENT (OUTPATIENT)
Dept: GENERAL RADIOLOGY | Age: 72
End: 2022-02-09
Attending: EMERGENCY MEDICINE
Payer: MEDICARE

## 2022-02-09 ENCOUNTER — HOSPITAL ENCOUNTER (EMERGENCY)
Age: 72
Discharge: HOME | End: 2022-02-09
Attending: EMERGENCY MEDICINE
Payer: MEDICARE

## 2022-02-09 VITALS
HEIGHT: 71 IN | TEMPERATURE: 98.3 F | RESPIRATION RATE: 22 BRPM | WEIGHT: 257.94 LBS | HEART RATE: 93 BPM | DIASTOLIC BLOOD PRESSURE: 58 MMHG | BODY MASS INDEX: 36.11 KG/M2 | OXYGEN SATURATION: 92 % | SYSTOLIC BLOOD PRESSURE: 131 MMHG

## 2022-02-09 DIAGNOSIS — J96.21 ACUTE ON CHRONIC RESPIRATORY FAILURE WITH HYPOXIA (HCC): Primary | ICD-10-CM

## 2022-02-09 DIAGNOSIS — J44.1 ACUTE EXACERBATION OF CHRONIC OBSTRUCTIVE PULMONARY DISEASE (COPD) (HCC): ICD-10-CM

## 2022-02-09 LAB
ALBUMIN SERPL-MCNC: 2.8 G/DL (ref 3.5–5)
ALBUMIN/GLOB SERPL: 0.6 {RATIO} (ref 1.1–2.2)
ALP SERPL-CCNC: 68 U/L (ref 45–117)
ALT SERPL-CCNC: 26 U/L (ref 12–78)
ANION GAP SERPL CALC-SCNC: 5 MMOL/L (ref 5–15)
AST SERPL-CCNC: 19 U/L (ref 15–37)
ATRIAL RATE: 90 BPM
BASOPHILS # BLD: 0 K/UL (ref 0–0.1)
BASOPHILS NFR BLD: 0 % (ref 0–1)
BILIRUB SERPL-MCNC: 0.3 MG/DL (ref 0.2–1)
BNP SERPL-MCNC: 169 PG/ML
BUN SERPL-MCNC: 19 MG/DL (ref 6–20)
BUN/CREAT SERPL: 13 (ref 12–20)
CALCIUM SERPL-MCNC: 8.8 MG/DL (ref 8.5–10.1)
CALCULATED P AXIS, ECG09: 71 DEGREES
CALCULATED R AXIS, ECG10: 22 DEGREES
CALCULATED T AXIS, ECG11: 71 DEGREES
CHLORIDE SERPL-SCNC: 101 MMOL/L (ref 97–108)
CK SERPL-CCNC: 129 U/L (ref 39–308)
CO2 SERPL-SCNC: 30 MMOL/L (ref 21–32)
COVID-19 RAPID TEST, COVR: NOT DETECTED
CREAT SERPL-MCNC: 1.42 MG/DL (ref 0.7–1.3)
DIAGNOSIS, 93000: NORMAL
DIFFERENTIAL METHOD BLD: ABNORMAL
DIGOXIN SERPL-MCNC: 1.2 NG/ML (ref 0.9–2)
EOSINOPHIL # BLD: 0.1 K/UL (ref 0–0.4)
EOSINOPHIL NFR BLD: 1 % (ref 0–7)
ERYTHROCYTE [DISTWIDTH] IN BLOOD BY AUTOMATED COUNT: 13.7 % (ref 11.5–14.5)
GLOBULIN SER CALC-MCNC: 4.9 G/DL (ref 2–4)
GLUCOSE SERPL-MCNC: 112 MG/DL (ref 65–100)
HCT VFR BLD AUTO: 35 % (ref 36.6–50.3)
HGB BLD-MCNC: 11 G/DL (ref 12.1–17)
IMM GRANULOCYTES # BLD AUTO: 0.2 K/UL (ref 0–0.04)
IMM GRANULOCYTES NFR BLD AUTO: 2 % (ref 0–0.5)
LYMPHOCYTES # BLD: 1.2 K/UL (ref 0.8–3.5)
LYMPHOCYTES NFR BLD: 11 % (ref 12–49)
MCH RBC QN AUTO: 28.6 PG (ref 26–34)
MCHC RBC AUTO-ENTMCNC: 31.4 G/DL (ref 30–36.5)
MCV RBC AUTO: 91.1 FL (ref 80–99)
MONOCYTES # BLD: 1.6 K/UL (ref 0–1)
MONOCYTES NFR BLD: 14 % (ref 5–13)
NEUTS SEG # BLD: 8.1 K/UL (ref 1.8–8)
NEUTS SEG NFR BLD: 72 % (ref 32–75)
NRBC # BLD: 0 K/UL (ref 0–0.01)
NRBC BLD-RTO: 0 PER 100 WBC
P-R INTERVAL, ECG05: 210 MS
PLATELET # BLD AUTO: 306 K/UL (ref 150–400)
PMV BLD AUTO: 9.5 FL (ref 8.9–12.9)
POTASSIUM SERPL-SCNC: 5.2 MMOL/L (ref 3.5–5.1)
PROT SERPL-MCNC: 7.7 G/DL (ref 6.4–8.2)
Q-T INTERVAL, ECG07: 342 MS
QRS DURATION, ECG06: 98 MS
QTC CALCULATION (BEZET), ECG08: 418 MS
RBC # BLD AUTO: 3.84 M/UL (ref 4.1–5.7)
SODIUM SERPL-SCNC: 136 MMOL/L (ref 136–145)
SOURCE, COVRS: NORMAL
TROPONIN-HIGH SENSITIVITY: 7 NG/L (ref 0–76)
TROPONIN-HIGH SENSITIVITY: 8 NG/L (ref 0–76)
TSH SERPL DL<=0.05 MIU/L-ACNC: 0.37 UIU/ML (ref 0.36–3.74)
VENTRICULAR RATE, ECG03: 90 BPM
WBC # BLD AUTO: 11.3 K/UL (ref 4.1–11.1)

## 2022-02-09 PROCEDURE — 96374 THER/PROPH/DIAG INJ IV PUSH: CPT

## 2022-02-09 PROCEDURE — 83880 ASSAY OF NATRIURETIC PEPTIDE: CPT

## 2022-02-09 PROCEDURE — 80053 COMPREHEN METABOLIC PANEL: CPT

## 2022-02-09 PROCEDURE — 74011250636 HC RX REV CODE- 250/636: Performed by: EMERGENCY MEDICINE

## 2022-02-09 PROCEDURE — 94640 AIRWAY INHALATION TREATMENT: CPT

## 2022-02-09 PROCEDURE — 74011250637 HC RX REV CODE- 250/637: Performed by: EMERGENCY MEDICINE

## 2022-02-09 PROCEDURE — 80162 ASSAY OF DIGOXIN TOTAL: CPT

## 2022-02-09 PROCEDURE — 77030029684 HC NEB SM VOL KT MONA -A

## 2022-02-09 PROCEDURE — 82550 ASSAY OF CK (CPK): CPT

## 2022-02-09 PROCEDURE — 85025 COMPLETE CBC W/AUTO DIFF WBC: CPT

## 2022-02-09 PROCEDURE — 71045 X-RAY EXAM CHEST 1 VIEW: CPT

## 2022-02-09 PROCEDURE — 74011000250 HC RX REV CODE- 250: Performed by: EMERGENCY MEDICINE

## 2022-02-09 PROCEDURE — 84443 ASSAY THYROID STIM HORMONE: CPT

## 2022-02-09 PROCEDURE — 84484 ASSAY OF TROPONIN QUANT: CPT

## 2022-02-09 PROCEDURE — 87635 SARS-COV-2 COVID-19 AMP PRB: CPT

## 2022-02-09 PROCEDURE — 93005 ELECTROCARDIOGRAM TRACING: CPT

## 2022-02-09 PROCEDURE — 36415 COLL VENOUS BLD VENIPUNCTURE: CPT

## 2022-02-09 PROCEDURE — 99285 EMERGENCY DEPT VISIT HI MDM: CPT

## 2022-02-09 RX ORDER — LEVALBUTEROL INHALATION SOLUTION 1.25 MG/3ML
1.25 SOLUTION RESPIRATORY (INHALATION)
Status: COMPLETED | OUTPATIENT
Start: 2022-02-09 | End: 2022-02-09

## 2022-02-09 RX ORDER — PREDNISONE 50 MG/1
50 TABLET ORAL DAILY
Qty: 4 TABLET | Refills: 0 | Status: SHIPPED | OUTPATIENT
Start: 2022-02-09 | End: 2022-02-13

## 2022-02-09 RX ORDER — PREDNISONE 50 MG/1
50 TABLET ORAL DAILY
Qty: 4 TABLET | Refills: 0 | Status: SHIPPED | OUTPATIENT
Start: 2022-02-09 | End: 2022-02-09 | Stop reason: SDUPTHER

## 2022-02-09 RX ORDER — DOXYCYCLINE HYCLATE 100 MG
100 TABLET ORAL 2 TIMES DAILY
Qty: 10 TABLET | Refills: 0 | Status: SHIPPED | OUTPATIENT
Start: 2022-02-09 | End: 2022-02-09 | Stop reason: SDUPTHER

## 2022-02-09 RX ORDER — DOXYCYCLINE HYCLATE 100 MG
100 TABLET ORAL 2 TIMES DAILY
Qty: 10 TABLET | Refills: 0 | Status: SHIPPED | OUTPATIENT
Start: 2022-02-09 | End: 2022-02-14

## 2022-02-09 RX ORDER — DOXYCYCLINE HYCLATE 100 MG
100 TABLET ORAL
Status: COMPLETED | OUTPATIENT
Start: 2022-02-09 | End: 2022-02-09

## 2022-02-09 RX ADMIN — LEVALBUTEROL HYDROCHLORIDE 1.25 MG: 1.25 SOLUTION RESPIRATORY (INHALATION) at 16:34

## 2022-02-09 RX ADMIN — DOXYCYCLINE HYCLATE 100 MG: 100 TABLET, COATED ORAL at 14:26

## 2022-02-09 RX ADMIN — LEVALBUTEROL HYDROCHLORIDE 1.25 MG: 1.25 SOLUTION RESPIRATORY (INHALATION) at 14:34

## 2022-02-09 RX ADMIN — METHYLPREDNISOLONE SODIUM SUCCINATE 125 MG: 125 INJECTION, POWDER, FOR SOLUTION INTRAMUSCULAR; INTRAVENOUS at 14:26

## 2022-02-09 NOTE — ED PROVIDER NOTES
EMERGENCY DEPARTMENT HISTORY AND PHYSICAL EXAM      Date: 2/9/2022  Patient Name: Jay Miles    History of Presenting Illness     Chief Complaint   Patient presents with    Shortness of Breath     Pt ambulatory into triage with a cc of shortness of breath x 3 days ago; pt has hx of COPD; O2 saturation in triage is 74 % on room air       History Provided By: Patient    HPI: Jay Miles, 66 y.o. male presents to the ED with cc of shortness of breath. 43-year-old male with history of COPD on oxygen PRN, atrial fibrillation on amiodarone and Eliquis, thyroid disease presents today with a chief complaint of shortness of breath. Patient reports symptoms began approximately 1 week ago and have been gradually worsening. Reports symptoms feel similar to \"bronchitis\" in the past.  Patient denies any chest pain but does report tightness. Reports increasing shortness of breath and using oxygen more frequently. Denies abdominal pain, nausea, vomiting or diarrhea. Denies any productive sputum but does report dry cough. Also reports mild tremor that has been ongoing, attributed this to albuterol which has causes rapid heart rate in past.    No fever. No leg swelling. Reports compliance with Eliquis. There are no other complaints, changes, or physical findings at this time. PCP: Sophia Chaves MD    No current facility-administered medications on file prior to encounter. Current Outpatient Medications on File Prior to Encounter   Medication Sig Dispense Refill    ceFAZolin 2 g IV syringe 2 g by IntraVENous route every eight (8) hours. End date is 1/6/2022 (Patient not taking: Reported on 1/20/2022) 75 Dose 0    digoxin (LANOXIN) 0.125 mg tablet Take 1 Tablet by mouth daily. 60 Tablet 1    L.acid,para-B. bifidum-S.therm (RISAQUAD) 8 billion cell cap cap Take 1 Capsule by mouth daily.  (Patient not taking: Reported on 1/20/2022) 30 Capsule 0    losartan (COZAAR) 50 mg tablet Take 1 Tablet by mouth daily. 30 Tablet 1    metoprolol succinate (Toprol XL) 25 mg XL tablet Take 2 Tablets by mouth daily. 30 Tablet 1    amiodarone (PACERONE) 400 mg tablet Take 1 Tablet by mouth.  dilTIAZem ER (TIAZAC) 240 mg capsule Take 240 mg by mouth daily. Indications: paroxysmal supraventricular tachycardia      finasteride (PROSCAR) 5 mg tablet Take 5 mg by mouth daily.  vit A/vit C/vit E/zinc/copper (PRESERVISION AREDS PO) Take 1 Caplet by mouth two (2) times a day.  ascorbic acid, vitamin C, (Vitamin C) 500 mg tablet Take 500 mg by mouth daily.  cyanocobalamin (Vitamin B-12) 1,000 mcg tablet Take 1,000 mcg by mouth daily.  fluticasone-umeclidinium-vilanterol (Trelegy Ellipta) 100-62.5-25 mcg inhaler Take 1 Puff by inhalation daily.  pantoprazole (Protonix) 40 mg tablet Take 40 mg by mouth daily.  furosemide (LASIX) 20 mg tablet 1 tab po daily 30 Tab 0    apixaban (ELIQUIS) 5 mg tablet Take 1 Tab by mouth two (2) times a day. 60 Tab 0    rosuvastatin (Crestor) 40 mg tablet Take 40 mg by mouth nightly.  tamsulosin (FLOMAX) 0.4 mg capsule Take 0.4 mg by mouth daily.  aspirin delayed-release 81 mg tablet Take 81 mg by mouth daily.  melatonin 3 mg tablet Take 3 mg by mouth nightly.  albuterol (VENTOLIN HFA) 90 mcg/actuation inhaler Take 1 Puff by inhalation every four (4) hours as needed.  gemfibrozil (LOPID) 600 mg tablet Take 600 mg by mouth two (2) times a day.  cholecalciferol (VITAMIN D3) 1,000 unit cap Take 1,000 Units by mouth daily.  MULTIVIT-MIN/FA/LYCOPEN/LUTEIN (CENTRUM SILVER ULTRA MEN'S PO) Take 1 Tab by mouth daily.  Omega-3-DHA-EPA-Fish Oil 1,000 mg (120 mg-180 mg) cap Take 1 Cap by mouth daily.          Past History     Past Medical History:  Past Medical History:   Diagnosis Date    Arrhythmia     SVT; Dr. Angel Mcgill Fall 2018    Chronic obstructive pulmonary disease (HCC)     mild    GERD (gastroesophageal reflux disease)     Hypertension     Thyroid disease     benign thyroid growth, checked annually by Dr. Gopal Randle       Past Surgical History:  Past Surgical History:   Procedure Laterality Date    COLONOSCOPY N/A 2/5/2019    COL[facility] performed [facility]oa [facility] at [facility] ENDOSCOPY    COLONOSCOPY,MAYELA RODRIGUES,SNARE  2/5/2019         HX HEENT Bilateral     cataract extraction    [place] COLON CA SCRN NOT  [address]  2/5/2019            Family History:  Family History   Problem Relation Age of Onset    Hypertension Mother     Heart Disease Mother     Hypertension Father     Diabetes Father        Social History:  Social History     Tobacco Use    Smoking status: Former Smoker     Years: 5.00    Smokeless tobacco: Never Used   Substance Use Topics    Alcohol use: Yes     Alcohol/week: 7.0 standard drinks     Types: 7 Glasses of wine per week    Drug use: No       Allergies:  No Known Allergies      Review of Systems   Review of Systems   Constitutional: Positive for chills. Negative for fever. HENT: Negative for voice change. Eyes: Negative for pain and redness. Respiratory: Positive for cough and shortness of breath. Negative for chest tightness. Cardiovascular: Negative for chest pain and leg swelling. Gastrointestinal: Negative for abdominal pain, diarrhea, nausea and vomiting. Genitourinary: Negative for hematuria. Musculoskeletal: Negative for gait problem. Skin: Negative for color change, pallor and rash. Neurological: Negative for facial asymmetry, weakness and headaches. Hematological: Does not bruise/bleed easily. Psychiatric/Behavioral: Negative for behavioral problems. All other systems reviewed and are negative. Physical Exam   Physical Exam  Vitals and nursing note reviewed. Constitutional:       Comments: 66-year-old male, resting in bed, appears uncomfortable   HENT:      Head: Normocephalic and atraumatic.       Nose: Nose normal.      Mouth/Throat: Mouth: Mucous membranes are moist.   Eyes:      Pupils: Pupils are equal, round, and reactive to light. Cardiovascular:      Rate and Rhythm: Normal rate and regular rhythm. Pulses: Normal pulses. Heart sounds: No murmur heard. No friction rub. No gallop. Pulmonary:      Effort: Respiratory distress present. Breath sounds: Examination of the right-lower field reveals wheezing. Examination of the left-lower field reveals wheezing. Decreased breath sounds and wheezing present. No rhonchi or rales. Abdominal:      General: Abdomen is flat. There is no distension. Palpations: Abdomen is soft. Tenderness: There is no abdominal tenderness. Musculoskeletal:         General: Normal range of motion. Cervical back: Normal range of motion. Right lower leg: No edema. Left lower leg: No edema. Skin:     General: Skin is warm and dry. Capillary Refill: Capillary refill takes less than 2 seconds. Neurological:      General: No focal deficit present. Mental Status: He is alert. Psychiatric:         Mood and Affect: Mood normal.         Diagnostic Study Results     Labs -     Recent Results (from the past 12 hour(s))   CBC WITH AUTOMATED DIFF    Collection Time: 02/09/22  1:13 PM   Result Value Ref Range    WBC 11.3 (H) 4.1 - 11.1 K/uL    RBC 3.84 (L) 4.10 - 5.70 M/uL    HGB 11.0 (L) 12.1 - 17.0 g/dL    HCT 35.0 (L) 36.6 - 50.3 %    MCV 91.1 80.0 - 99.0 FL    MCH 28.6 26.0 - 34.0 PG    MCHC 31.4 30.0 - 36.5 g/dL    RDW 13.7 11.5 - 14.5 %    PLATELET 678 630 - 626 K/uL    MPV 9.5 8.9 - 12.9 FL    NRBC 0.0 0  WBC    ABSOLUTE NRBC 0.00 0.00 - 0.01 K/uL    NEUTROPHILS 72 32 - 75 %    LYMPHOCYTES 11 (L) 12 - 49 %    MONOCYTES 14 (H) 5 - 13 %    EOSINOPHILS 1 0 - 7 %    BASOPHILS 0 0 - 1 %    IMMATURE GRANULOCYTES 2 (H) 0.0 - 0.5 %    ABS. NEUTROPHILS 8.1 (H) 1.8 - 8.0 K/UL    ABS. LYMPHOCYTES 1.2 0.8 - 3.5 K/UL    ABS. MONOCYTES 1.6 (H) 0.0 - 1.0 K/UL    ABS. EOSINOPHILS 0.1 0.0 - 0.4 K/UL    ABS. BASOPHILS 0.0 0.0 - 0.1 K/UL    ABS. IMM. GRANS. 0.2 (H) 0.00 - 0.04 K/UL    DF AUTOMATED     METABOLIC PANEL, COMPREHENSIVE    Collection Time: 02/09/22  1:13 PM   Result Value Ref Range    Sodium 136 136 - 145 mmol/L    Potassium 5.2 (H) 3.5 - 5.1 mmol/L    Chloride 101 97 - 108 mmol/L    CO2 30 21 - 32 mmol/L    Anion gap 5 5 - 15 mmol/L    Glucose 112 (H) 65 - 100 mg/dL    BUN 19 6 - 20 MG/DL    Creatinine 1.42 (H) 0.70 - 1.30 MG/DL    BUN/Creatinine ratio 13 12 - 20      GFR est AA 59 (L) >60 ml/min/1.73m2    GFR est non-AA 49 (L) >60 ml/min/1.73m2    Calcium 8.8 8.5 - 10.1 MG/DL    Bilirubin, total 0.3 0.2 - 1.0 MG/DL    ALT (SGPT) 26 12 - 78 U/L    AST (SGOT) 19 15 - 37 U/L    Alk.  phosphatase 68 45 - 117 U/L    Protein, total 7.7 6.4 - 8.2 g/dL    Albumin 2.8 (L) 3.5 - 5.0 g/dL    Globulin 4.9 (H) 2.0 - 4.0 g/dL    A-G Ratio 0.6 (L) 1.1 - 2.2     CK W/ REFLX CKMB    Collection Time: 02/09/22  1:13 PM   Result Value Ref Range     39 - 308 U/L   TROPONIN-HIGH SENSITIVITY    Collection Time: 02/09/22  1:13 PM   Result Value Ref Range    Troponin-High Sensitivity 7 0 - 76 ng/L   NT-PRO BNP    Collection Time: 02/09/22  1:13 PM   Result Value Ref Range    NT pro- (H) <125 PG/ML   TSH 3RD GENERATION    Collection Time: 02/09/22  1:13 PM   Result Value Ref Range    TSH 0.37 0.36 - 3.74 uIU/mL   EKG, 12 LEAD, INITIAL    Collection Time: 02/09/22  1:14 PM   Result Value Ref Range    Ventricular Rate 90 BPM    Atrial Rate 90 BPM    P-R Interval 210 ms    QRS Duration 98 ms    Q-T Interval 342 ms    QTC Calculation (Bezet) 418 ms    Calculated P Axis 71 degrees    Calculated R Axis 22 degrees    Calculated T Axis 71 degrees    Diagnosis       ** Poor data quality, interpretation may be adversely affected  Sinus rhythm with 1st degree AV block  Low voltage QRS  Nonspecific T wave flattening  When compared with ECG of 07-DEC-2021 09:28,  No significant change was found  Confirmed by Raghav Silva ([id number]) on 2/9/2022 5:09:28 PM     COVID-19 RAPID TEST    Collection Time: 02/09/22  1:45 PM   Result Value Ref Range    Specimen source Nasopharyngeal      COVID-19 rapid test Not detected NOTD     DIGOXIN    Collection Time: 02/09/22  3:45 PM   Result Value Ref Range    Digoxin level 1.2 0.90 - 2.00 NG/ML   TROPONIN-HIGH SENSITIVITY    Collection Time: 02/09/22  4:25 PM   Result Value Ref Range    Troponin-High Sensitivity 8 0 - 76 ng/L       Radiologic Studies -   XR CHEST PORT   Final Result   1. No evidence of an acute cardiopulmonary process. CT Results  (Last 48 hours)    None        CXR Results  (Last 48 hours)               02/09/22 1404  XR CHEST PORT Final result    Impression:  1. No evidence of an acute cardiopulmonary process. Narrative:  EXAM:  XR CHEST PORT       INDICATION: sob       COMPARISON: Chest x-ray 12/5/2021, chest CT 12/6/2021. TECHNIQUE: Single frontal view of the chest.       FINDINGS: No lobar consolidation, pleural effusion, or pneumothorax. The   cardiomediastinal silhouette is not enlarged. No acute or aggressive osseous   lesion. Medical Decision Making   I am the first provider for this patient. I reviewed the vital signs, available nursing notes, past medical history, past surgical history, family history and social history. Vital Signs-Reviewed the patient's vital signs.   Patient Vitals for the past 12 hrs:   Temp Pulse Resp BP SpO2   02/09/22 1634     92 %   02/09/22 1559 98.3 °F (36.8 °C)       02/09/22 1548  93 22 (!) 131/58 90 %   02/09/22 1518  83 25 (!) 132/52 (!) 89 %   02/09/22 1448  89 25 132/65 91 %   02/09/22 1434     95 %   02/09/22 1418  87 25 (!) 133/58 93 %   02/09/22 1357     93 %   02/09/22 1300 99.6 °F (37.6 °C) 100 24 (!) 150/67 (!) 84 %     Records Reviewed: Nursing Notes, Old Medical Records and Previous Laboratory Studies    Provider Notes (Medical Decision Making):     57-year-old male presents emergency department with a chief complaint of shortness of breath. Hypoxic into the 70s at triage. Patient immediately placed in a room, improved on NC. History of COPD on oxygen as needed. Hypoxic on arrival.  Placed on oxygen. Reports feels similar to \"bronchitis\" in past. Patient has been using oxygen more frequently at home. Will give steroids, doxycycline and Xopenex given patient does not tolerate albuterol. Will check basic labs, chest x-ray. For patient's tremor check digoxin level and TSH. Otherwise unclear etiology of mild tremor. ED Course:   Initial assessment performed. The patients presenting problems have been discussed, and they are in agreement with the care plan formulated and outlined with them. I have encouraged them to ask questions as they arise throughout their visit. ED Course as of 02/09/22 2021 Wed Feb 09, 2022   1456 Improved hypoxia and work of breathing on nebulizer. [MB]   2446 Labs reviewed, mild leukocytosis consistent with infectious etiology. Mild anemia. Patient does have mild HEVER with hyperkalemia, will continue monitor. COVID negative. The BNP/troponin nonspecifically elevated. [MB]   9837 Patient reassessed states he feels much better. Will repeat troponin and repeat now. Saturating well on 2 L 93%, he has oxygen at home. Chest x-ray clear. Doubt PE as patient is therapeutically anticoagulated. Offered admission versus discharge with close follow-up. Patient states he feels comfortable going home as he is oxygen at home. [MB]   1710 Repeat troponin is negative. [MB]   2675 Placed note in chart for patient to follow-up with pulmonology, follows with [facility]. Using shared decision-making after offering patient admission, patient feels comfortable and prefers to be discharged. He was treated to the extent he would allow and has capacity make this decision.   Will discharge with antibiotics and steroids and strict return precautions. [MB]      ED Course User Index  [MB] MD Son Truong MD      Disposition:    Discharged    DISCHARGE PLAN:  1. Discharge Medication List as of 2/9/2022  5:42 PM      START taking these medications    Details   doxycycline (VIBRA-TABS) 100 mg tablet Take 1 Tablet by mouth two (2) times a day for 5 days. , Normal, Disp-10 Tablet, R-0, JEAN CARLOS      predniSONE (DELTASONE) 50 mg tablet Take 1 Tablet by mouth daily for 4 days. , Normal, Disp-4 Tablet, R-0         CONTINUE these medications which have NOT CHANGED    Details   ceFAZolin 2 g IV syringe 2 g by IntraVENous route every eight (8) hours. End date is 1/6/2022, No Print, Disp-75 Dose, R-0      digoxin (LANOXIN) 0.125 mg tablet Take 1 Tablet by mouth daily. , Normal, Disp-60 Tablet, R-1      L.acid,para-B. bifidum-S.therm (RISAQUAD) 8 billion cell cap cap Take 1 Capsule by mouth daily. , Normal, Disp-30 Capsule, R-0      losartan (COZAAR) 50 mg tablet Take 1 Tablet by mouth daily. , Normal, Disp-30 Tablet, R-1      metoprolol succinate (Toprol XL) 25 mg XL tablet Take 2 Tablets by mouth daily. , No Print, Disp-30 Tablet, R-1      amiodarone (PACERONE) 400 mg tablet Take 1 Tablet by mouth., Historical Med      dilTIAZem ER (TIAZAC) 240 mg capsule Take 240 mg by mouth daily. Indications: paroxysmal supraventricular tachycardia, Historical Med      finasteride (PROSCAR) 5 mg tablet Take 5 mg by mouth daily. , Historical Med      vit A/vit C/vit E/zinc/copper (PRESERVISION AREDS PO) Take 1 Caplet by mouth two (2) times a day., Historical Med      ascorbic acid, vitamin C, (Vitamin C) 500 mg tablet Take 500 mg by mouth daily. , Historical Med      cyanocobalamin (Vitamin B-12) 1,000 mcg tablet Take 1,000 mcg by mouth daily. , Historical Med      fluticasone-umeclidinium-vilanterol (Trelegy Ellipta) 100-62.5-25 mcg inhaler Take 1 Puff by inhalation daily. , Historical Med      pantoprazole (Protonix) 40 mg tablet Take 40 mg by mouth daily. , Historical Med      furosemide (LASIX) 20 mg tablet 1 tab po daily, Normal, Disp-30 Tab, R-0      apixaban (ELIQUIS) 5 mg tablet Take 1 Tab by mouth two (2) times a day., Normal, Disp-60 Tab, R-0      rosuvastatin (Crestor) 40 mg tablet Take 40 mg by mouth nightly., Historical Med      tamsulosin (FLOMAX) 0.4 mg capsule Take 0.4 mg by mouth daily. , Historical Med      aspirin delayed-release 81 mg tablet Take 81 mg by mouth daily. , Historical Med      melatonin 3 mg tablet Take 3 mg by mouth nightly., Historical Med      albuterol (VENTOLIN HFA) 90 mcg/actuation inhaler Take 1 Puff by inhalation every four (4) hours as needed., Historical Med      gemfibrozil (LOPID) 600 mg tablet Take 600 mg by mouth two (2) times a day., Historical Med      cholecalciferol (VITAMIN D3) 1,000 unit cap Take 1,000 Units by mouth daily. , Historical Med      MULTIVIT-MIN/FA/LYCOPEN/LUTEIN (CENTRUM SILVER ULTRA MEN'S PO) Take 1 Tab by mouth daily. , Historical Med      Omega-3-DHA-EPA-Fish Oil 1,000 mg (120 mg-180 mg) cap Take 1 Cap by mouth daily. , Historical Med           2. Follow-up Information     Follow up With Specialties Details Why Contact Brody Cagle MD Family Medicine In 3 days  [address] [facility] [address] [place] [id number] [phone]      [facility] EMERGENCY DEPT Emergency Medicine  If symptoms worsen [address] [place] [address]    Roxanne Kerr MD Pulmonary Disease In 1 week  [address] [facility]  [facility]  Freddy Kenia [address] [place] [address]  [phone]          3. Return to ED if worse     Diagnosis     Clinical Impression:   1. Acute on chronic respiratory failure with hypoxia (HCC)    2. Acute exacerbation of chronic obstructive pulmonary disease (COPD) (HCC)        Attestations:    Oscar Cnaada MD    Please note that this dictation was completed with [employer] the [employer] voice recognition software.   Quite often unanticipated grammatical, syntax, homophones, and other interpretive errors are inadvertently transcribed by the computer software. Please disregard these errors. Please excuse any errors that have escaped final proofreading. Thank you.

## 2022-02-09 NOTE — DISCHARGE INSTRUCTIONS
You were seen in the ER for your symptoms. Your Covid test was negative and your labs are good other than some mild edition. Your x-ray did not show any pneumonia. Please take the steroids and antibiotics for an exacerbation of your chronic bronchitis. Please follow-up with your primary doctor and pulmonologist, I did send a note to their office. Please use your oxygen as needed and nebulizers. Please return for any symptoms at any time, we are always happy to reassess you.

## 2022-02-11 ENCOUNTER — TRANSCRIBE ORDER (OUTPATIENT)
Dept: SCHEDULING | Age: 72
End: 2022-02-11

## 2022-02-11 DIAGNOSIS — J44.9 COPD (CHRONIC OBSTRUCTIVE PULMONARY DISEASE) (HCC): Primary | ICD-10-CM

## 2022-02-16 ENCOUNTER — HOSPITAL ENCOUNTER (OUTPATIENT)
Dept: PULMONOLOGY | Age: 72
Discharge: HOME | End: 2022-02-16
Attending: PHYSICIAN ASSISTANT
Payer: MEDICARE

## 2022-02-16 DIAGNOSIS — J44.9 COPD (CHRONIC OBSTRUCTIVE PULMONARY DISEASE) (HCC): ICD-10-CM

## 2022-02-16 LAB
ARTERIAL PATENCY WRIST A: YES
BASE DEFICIT BLDA-SCNC: 0.4 MMOL/L
BDY SITE: ABNORMAL
BREATHS.SPONTANEOUS ON VENT: 20
GAS FLOW.O2 O2 DELIVERY SYS: 1 L/MIN
HCO3 BLDA-SCNC: 24 MMOL/L (ref 22–26)
PCO2 BLDA: 41 MMHG (ref 35–45)
PH BLDA: 7.4 [PH] (ref 7.35–7.45)
PO2 BLDA: 64 MMHG (ref 80–100)
SAO2 % BLD: 92 % (ref 92–97)
SAO2% DEVICE SAO2% SENSOR NAME: ABNORMAL
SPECIMEN SITE: ABNORMAL

## 2022-02-16 PROCEDURE — 36600 WITHDRAWAL OF ARTERIAL BLOOD: CPT

## 2022-02-16 PROCEDURE — 82803 BLOOD GASES ANY COMBINATION: CPT

## 2022-02-21 ENCOUNTER — HOSPITAL ENCOUNTER (INPATIENT)
Age: 72
LOS: 3 days | Discharge: HOME | DRG: 981 | End: 2022-02-24
Attending: EMERGENCY MEDICINE | Admitting: INTERNAL MEDICINE
Payer: MEDICARE

## 2022-02-21 ENCOUNTER — APPOINTMENT (OUTPATIENT)
Dept: GENERAL RADIOLOGY | Age: 72
DRG: 981 | End: 2022-02-21
Attending: EMERGENCY MEDICINE
Payer: MEDICARE

## 2022-02-21 ENCOUNTER — APPOINTMENT (OUTPATIENT)
Dept: CT IMAGING | Age: 72
DRG: 981 | End: 2022-02-21
Attending: EMERGENCY MEDICINE
Payer: MEDICARE

## 2022-02-21 DIAGNOSIS — J44.9 CHRONIC OBSTRUCTIVE PULMONARY DISEASE, UNSPECIFIED COPD TYPE (HCC): ICD-10-CM

## 2022-02-21 DIAGNOSIS — J96.01 ACUTE RESPIRATORY FAILURE WITH HYPOXIA (HCC): Primary | ICD-10-CM

## 2022-02-21 DIAGNOSIS — I48.92 ATRIAL FLUTTER, UNSPECIFIED TYPE (HCC): ICD-10-CM

## 2022-02-21 PROBLEM — J96.00 ARF (ACUTE RESPIRATORY FAILURE) (HCC): Status: ACTIVE | Noted: 2022-02-21

## 2022-02-21 LAB
ALBUMIN SERPL-MCNC: 2.6 G/DL (ref 3.5–5)
ALBUMIN/GLOB SERPL: 0.6 {RATIO} (ref 1.1–2.2)
ALP SERPL-CCNC: 73 U/L (ref 45–117)
ALT SERPL-CCNC: 50 U/L (ref 12–78)
ANION GAP SERPL CALC-SCNC: 6 MMOL/L (ref 5–15)
AST SERPL-CCNC: 28 U/L (ref 15–37)
BASOPHILS # BLD: 0 K/UL (ref 0–0.1)
BASOPHILS NFR BLD: 0 % (ref 0–1)
BILIRUB SERPL-MCNC: 0.4 MG/DL (ref 0.2–1)
BNP SERPL-MCNC: 250 PG/ML
BUN SERPL-MCNC: 21 MG/DL (ref 6–20)
BUN/CREAT SERPL: 15 (ref 12–20)
CALCIUM SERPL-MCNC: 9 MG/DL (ref 8.5–10.1)
CHLORIDE SERPL-SCNC: 97 MMOL/L (ref 97–108)
CO2 SERPL-SCNC: 31 MMOL/L (ref 21–32)
COVID-19 RAPID TEST, COVR: NOT DETECTED
CREAT SERPL-MCNC: 1.41 MG/DL (ref 0.7–1.3)
DIFFERENTIAL METHOD BLD: ABNORMAL
EOSINOPHIL # BLD: 0.1 K/UL (ref 0–0.4)
EOSINOPHIL NFR BLD: 1 % (ref 0–7)
ERYTHROCYTE [DISTWIDTH] IN BLOOD BY AUTOMATED COUNT: 14.3 % (ref 11.5–14.5)
GLOBULIN SER CALC-MCNC: 4.5 G/DL (ref 2–4)
GLUCOSE SERPL-MCNC: 97 MG/DL (ref 65–100)
HCT VFR BLD AUTO: 34.4 % (ref 36.6–50.3)
HGB BLD-MCNC: 10.7 G/DL (ref 12.1–17)
IMM GRANULOCYTES # BLD AUTO: 0.2 K/UL (ref 0–0.04)
IMM GRANULOCYTES NFR BLD AUTO: 1 % (ref 0–0.5)
LYMPHOCYTES # BLD: 1.2 K/UL (ref 0.8–3.5)
LYMPHOCYTES NFR BLD: 9 % (ref 12–49)
MCH RBC QN AUTO: 27.9 PG (ref 26–34)
MCHC RBC AUTO-ENTMCNC: 31.1 G/DL (ref 30–36.5)
MCV RBC AUTO: 89.6 FL (ref 80–99)
MONOCYTES # BLD: 1.5 K/UL (ref 0–1)
MONOCYTES NFR BLD: 11 % (ref 5–13)
NEUTS SEG # BLD: 10.9 K/UL (ref 1.8–8)
NEUTS SEG NFR BLD: 78 % (ref 32–75)
NRBC # BLD: 0.02 K/UL (ref 0–0.01)
NRBC BLD-RTO: 0.1 PER 100 WBC
PLATELET # BLD AUTO: 300 K/UL (ref 150–400)
PMV BLD AUTO: 9.2 FL (ref 8.9–12.9)
POTASSIUM SERPL-SCNC: 4.9 MMOL/L (ref 3.5–5.1)
PROT SERPL-MCNC: 7.1 G/DL (ref 6.4–8.2)
RBC # BLD AUTO: 3.84 M/UL (ref 4.1–5.7)
SODIUM SERPL-SCNC: 134 MMOL/L (ref 136–145)
SOURCE, COVRS: NORMAL
TROPONIN-HIGH SENSITIVITY: 9 NG/L (ref 0–76)
WBC # BLD AUTO: 13.9 K/UL (ref 4.1–11.1)

## 2022-02-21 PROCEDURE — 74011000258 HC RX REV CODE- 258: Performed by: INTERNAL MEDICINE

## 2022-02-21 PROCEDURE — 36415 COLL VENOUS BLD VENIPUNCTURE: CPT

## 2022-02-21 PROCEDURE — 74011000636 HC RX REV CODE- 636: Performed by: EMERGENCY MEDICINE

## 2022-02-21 PROCEDURE — 80053 COMPREHEN METABOLIC PANEL: CPT

## 2022-02-21 PROCEDURE — 74011000250 HC RX REV CODE- 250: Performed by: INTERNAL MEDICINE

## 2022-02-21 PROCEDURE — 71275 CT ANGIOGRAPHY CHEST: CPT

## 2022-02-21 PROCEDURE — 74011250637 HC RX REV CODE- 250/637: Performed by: INTERNAL MEDICINE

## 2022-02-21 PROCEDURE — 84484 ASSAY OF TROPONIN QUANT: CPT

## 2022-02-21 PROCEDURE — 93005 ELECTROCARDIOGRAM TRACING: CPT

## 2022-02-21 PROCEDURE — 83880 ASSAY OF NATRIURETIC PEPTIDE: CPT

## 2022-02-21 PROCEDURE — 85025 COMPLETE CBC W/AUTO DIFF WBC: CPT

## 2022-02-21 PROCEDURE — 65660000000 HC RM CCU STEPDOWN

## 2022-02-21 PROCEDURE — 99284 EMERGENCY DEPT VISIT MOD MDM: CPT

## 2022-02-21 PROCEDURE — 87635 SARS-COV-2 COVID-19 AMP PRB: CPT

## 2022-02-21 PROCEDURE — 74011250636 HC RX REV CODE- 250/636: Performed by: INTERNAL MEDICINE

## 2022-02-21 PROCEDURE — 71045 X-RAY EXAM CHEST 1 VIEW: CPT

## 2022-02-21 RX ORDER — SODIUM CHLORIDE 0.9 % (FLUSH) 0.9 %
5-40 SYRINGE (ML) INJECTION AS NEEDED
Status: DISCONTINUED | OUTPATIENT
Start: 2022-02-21 | End: 2022-02-21 | Stop reason: SDUPTHER

## 2022-02-21 RX ORDER — METOPROLOL SUCCINATE 50 MG/1
50 TABLET, EXTENDED RELEASE ORAL DAILY
Status: DISCONTINUED | OUTPATIENT
Start: 2022-02-22 | End: 2022-02-24 | Stop reason: HOSPADM

## 2022-02-21 RX ORDER — FINASTERIDE 5 MG/1
5 TABLET, FILM COATED ORAL DAILY
Status: DISCONTINUED | OUTPATIENT
Start: 2022-02-22 | End: 2022-02-24 | Stop reason: HOSPADM

## 2022-02-21 RX ORDER — DOXYCYCLINE HYCLATE 100 MG
100 TABLET ORAL EVERY 12 HOURS
Status: DISCONTINUED | OUTPATIENT
Start: 2022-02-21 | End: 2022-02-24 | Stop reason: HOSPADM

## 2022-02-21 RX ORDER — ASPIRIN 81 MG/1
81 TABLET ORAL DAILY
Status: DISCONTINUED | OUTPATIENT
Start: 2022-02-22 | End: 2022-02-24 | Stop reason: HOSPADM

## 2022-02-21 RX ORDER — TAMSULOSIN HYDROCHLORIDE 0.4 MG/1
0.4 CAPSULE ORAL DAILY
Status: DISCONTINUED | OUTPATIENT
Start: 2022-02-22 | End: 2022-02-24 | Stop reason: HOSPADM

## 2022-02-21 RX ORDER — ONDANSETRON 4 MG/1
4 TABLET, ORALLY DISINTEGRATING ORAL
Status: DISCONTINUED | OUTPATIENT
Start: 2022-02-21 | End: 2022-02-24 | Stop reason: HOSPADM

## 2022-02-21 RX ORDER — SODIUM CHLORIDE 0.9 % (FLUSH) 0.9 %
5-40 SYRINGE (ML) INJECTION EVERY 8 HOURS
Status: DISCONTINUED | OUTPATIENT
Start: 2022-02-21 | End: 2022-02-24 | Stop reason: HOSPADM

## 2022-02-21 RX ORDER — LOSARTAN POTASSIUM 50 MG/1
50 TABLET ORAL DAILY
Status: DISCONTINUED | OUTPATIENT
Start: 2022-02-22 | End: 2022-02-24 | Stop reason: HOSPADM

## 2022-02-21 RX ORDER — SODIUM CHLORIDE 0.9 % (FLUSH) 0.9 %
5-40 SYRINGE (ML) INJECTION EVERY 8 HOURS
Status: DISCONTINUED | OUTPATIENT
Start: 2022-02-21 | End: 2022-02-21 | Stop reason: SDUPTHER

## 2022-02-21 RX ORDER — POLYETHYLENE GLYCOL 3350 17 G/17G
17 POWDER, FOR SOLUTION ORAL DAILY PRN
Status: DISCONTINUED | OUTPATIENT
Start: 2022-02-21 | End: 2022-02-21 | Stop reason: SDUPTHER

## 2022-02-21 RX ORDER — AMIODARONE HYDROCHLORIDE 200 MG/1
400 TABLET ORAL DAILY
Status: DISCONTINUED | OUTPATIENT
Start: 2022-02-22 | End: 2022-02-24

## 2022-02-21 RX ORDER — ROSUVASTATIN CALCIUM 40 MG/1
40 TABLET, COATED ORAL
Status: DISCONTINUED | OUTPATIENT
Start: 2022-02-22 | End: 2022-02-24 | Stop reason: HOSPADM

## 2022-02-21 RX ORDER — DILTIAZEM HYDROCHLORIDE 240 MG/1
240 CAPSULE, COATED, EXTENDED RELEASE ORAL DAILY
Status: DISCONTINUED | OUTPATIENT
Start: 2022-02-22 | End: 2022-02-24 | Stop reason: HOSPADM

## 2022-02-21 RX ORDER — POLYETHYLENE GLYCOL 3350 17 G/17G
17 POWDER, FOR SOLUTION ORAL DAILY PRN
Status: DISCONTINUED | OUTPATIENT
Start: 2022-02-21 | End: 2022-02-24 | Stop reason: HOSPADM

## 2022-02-21 RX ORDER — ACETAMINOPHEN 650 MG/1
650 SUPPOSITORY RECTAL
Status: DISCONTINUED | OUTPATIENT
Start: 2022-02-21 | End: 2022-02-24 | Stop reason: HOSPADM

## 2022-02-21 RX ORDER — PANTOPRAZOLE SODIUM 40 MG/1
40 TABLET, DELAYED RELEASE ORAL DAILY
Status: DISCONTINUED | OUTPATIENT
Start: 2022-02-22 | End: 2022-02-24 | Stop reason: HOSPADM

## 2022-02-21 RX ORDER — ACETAMINOPHEN 325 MG/1
650 TABLET ORAL
Status: DISCONTINUED | OUTPATIENT
Start: 2022-02-21 | End: 2022-02-24 | Stop reason: HOSPADM

## 2022-02-21 RX ORDER — SODIUM CHLORIDE 0.9 % (FLUSH) 0.9 %
5-40 SYRINGE (ML) INJECTION AS NEEDED
Status: DISCONTINUED | OUTPATIENT
Start: 2022-02-21 | End: 2022-02-24 | Stop reason: HOSPADM

## 2022-02-21 RX ORDER — ONDANSETRON 2 MG/ML
4 INJECTION INTRAMUSCULAR; INTRAVENOUS
Status: DISCONTINUED | OUTPATIENT
Start: 2022-02-21 | End: 2022-02-24 | Stop reason: HOSPADM

## 2022-02-21 RX ORDER — FAMOTIDINE 20 MG/1
20 TABLET, FILM COATED ORAL 2 TIMES DAILY
Status: DISCONTINUED | OUTPATIENT
Start: 2022-02-21 | End: 2022-02-24 | Stop reason: HOSPADM

## 2022-02-21 RX ADMIN — CEFTRIAXONE 1 G: 1 INJECTION, POWDER, FOR SOLUTION INTRAMUSCULAR; INTRAVENOUS at 21:25

## 2022-02-21 RX ADMIN — FAMOTIDINE 20 MG: 20 TABLET ORAL at 21:24

## 2022-02-21 RX ADMIN — IOPAMIDOL 69 ML: 755 INJECTION, SOLUTION INTRAVENOUS at 15:44

## 2022-02-21 RX ADMIN — SODIUM CHLORIDE, PRESERVATIVE FREE 10 ML: 5 INJECTION INTRAVENOUS at 21:29

## 2022-02-21 RX ADMIN — METHYLPREDNISOLONE SODIUM SUCCINATE 40 MG: 40 INJECTION, POWDER, FOR SOLUTION INTRAMUSCULAR; INTRAVENOUS at 21:24

## 2022-02-21 RX ADMIN — DOXYCYCLINE HYCLATE 100 MG: 100 TABLET, COATED ORAL at 21:23

## 2022-02-21 NOTE — ED PROVIDER NOTES
EMERGENCY DEPARTMENT HISTORY AND PHYSICAL EXAM      Date: 2/21/2022  Patient Name: Gil Briggs    History of Presenting Illness     Chief Complaint   Patient presents with    Shortness of Breath     SOB with exertion; dx'd recently with bronchitis; wears O2 at home; increased O2 req. at home; 82% on RA; 86% on 2 LNC; PMH afib with ablation; no c/o chest pain at this time       History Provided By: Patient and Medical records    HPI: Gil Briggs, 66 y.o. male presents to the ED with cc of shortness of breath and hypoxia. Patient has a history of COPD, and had been on oxygen as needed. He was admitted here from December 5, to December 14, for sepsis, cellulitis and COPD exacerbation. He returned to our ER on February 9 for shortness of breath. At that time, he had had symptoms for 1 week. He stated that his shortness of breath was gradually worsening. He had a negative Covid test, negative chest x-ray, and labs were unremarkable. Saturation was in the 70s initially, but improved to 92%. He received nebulizer treatments while here. He was offered admission but declined. He is here today, because he now has to use oxygen all the time. He was 82% sat on room air and 86% on 2 L nasal cannula. Denies chest pain, cough or fever. He said he did have chills this morning. He is fully vaccinated against COVID-19, but has not gotten the booster yet. He denies leg pain, leg edema, abdominal pain, nausea. He does complain of lightheadedness. He says that has been going on since his last visit. .    There are no other complaints, changes, or physical findings at this time. PCP: Kitty Briseno MD    No current facility-administered medications on file prior to encounter. Current Outpatient Medications on File Prior to Encounter   Medication Sig Dispense Refill    ceFAZolin 2 g IV syringe 2 g by IntraVENous route every eight (8) hours.  End date is 1/6/2022 (Patient not taking: Reported on 1/20/2022) 75 Dose 0    digoxin (LANOXIN) 0.125 mg tablet Take 1 Tablet by mouth daily. 60 Tablet 1    L.acid,para-B. bifidum-S.therm (RISAQUAD) 8 billion cell cap cap Take 1 Capsule by mouth daily. (Patient not taking: Reported on 1/20/2022) 30 Capsule 0    losartan (COZAAR) 50 mg tablet Take 1 Tablet by mouth daily. 30 Tablet 1    metoprolol succinate (Toprol XL) 25 mg XL tablet Take 2 Tablets by mouth daily. 30 Tablet 1    amiodarone (PACERONE) 400 mg tablet Take 1 Tablet by mouth.  dilTIAZem ER (TIAZAC) 240 mg capsule Take 240 mg by mouth daily. Indications: paroxysmal supraventricular tachycardia      finasteride (PROSCAR) 5 mg tablet Take 5 mg by mouth daily.  vit A/vit C/vit E/zinc/copper (PRESERVISION AREDS PO) Take 1 Caplet by mouth two (2) times a day.  ascorbic acid, vitamin C, (Vitamin C) 500 mg tablet Take 500 mg by mouth daily.  cyanocobalamin (Vitamin B-12) 1,000 mcg tablet Take 1,000 mcg by mouth daily.  fluticasone-umeclidinium-vilanterol (Trelegy Ellipta) 100-62.5-25 mcg inhaler Take 1 Puff by inhalation daily.  pantoprazole (Protonix) 40 mg tablet Take 40 mg by mouth daily.  furosemide (LASIX) 20 mg tablet 1 tab po daily 30 Tab 0    apixaban (ELIQUIS) 5 mg tablet Take 1 Tab by mouth two (2) times a day. 60 Tab 0    rosuvastatin (Crestor) 40 mg tablet Take 40 mg by mouth nightly.  tamsulosin (FLOMAX) 0.4 mg capsule Take 0.4 mg by mouth daily.  aspirin delayed-release 81 mg tablet Take 81 mg by mouth daily.  melatonin 3 mg tablet Take 3 mg by mouth nightly.  albuterol (VENTOLIN HFA) 90 mcg/actuation inhaler Take 1 Puff by inhalation every four (4) hours as needed.  gemfibrozil (LOPID) 600 mg tablet Take 600 mg by mouth two (2) times a day.  cholecalciferol (VITAMIN D3) 1,000 unit cap Take 1,000 Units by mouth daily.  MULTIVIT-MIN/FA/LYCOPEN/LUTEIN (CENTRUM SILVER ULTRA MEN'S PO) Take 1 Tab by mouth daily.       Omega-3-DHA-EPA-Fish Oil 1,000 mg (120 mg-180 mg) cap Take 1 Cap by mouth daily. Past History     Past Medical History:  Past Medical History:   Diagnosis Date    Arrhythmia     SVT; Dr. Regan Espinoza Fall 2018    Chronic obstructive pulmonary disease (HCC)     mild    GERD (gastroesophageal reflux disease)     Hypertension     Thyroid disease     benign thyroid growth, checked annually by Dr. Krystyna Serrano       Past Surgical History:  Past Surgical History:   Procedure Laterality Date    COLONOSCOPY N/A 2/5/2019    COLON[facility]uia [facility] at [facility] END[facility]COPY    COLONOSCOPY,MAYELA RODRIGUES,SNARE  2/5/2019         HX HEENT Bilateral     cataract extraction    [place] COLON [place] SCRN NOT  [address] IND  2/5/2019            Family History:  Family History   Problem Relation Age of Onset    Hypertension Mother     Heart Disease Mother     Hypertension Father     Diabetes Father        Social History:  Social History     Tobacco Use    Smoking status: Former Smoker     Years: 5.00    Smokeless tobacco: Never Used   Substance Use Topics    Alcohol use: Yes     Alcohol/week: 7.0 standard drinks     Types: 7 Glasses of wine per week    Drug use: No       Allergies:  No Known Allergies      Review of Systems   Review of Systems   Constitutional: Positive for chills. Negative for fever. HENT: Negative for congestion. Eyes: Negative. Respiratory: Positive for shortness of breath. Cardiovascular: Negative for chest pain. Gastrointestinal: Negative for abdominal pain. Endocrine: Negative for heat intolerance. Genitourinary: Negative. Musculoskeletal: Negative for back pain. Skin: Negative for rash. Allergic/Immunologic: Negative for immunocompromised state. Neurological: Positive for light-headedness. Hematological: Does not bruise/bleed easily. Psychiatric/Behavioral: Negative. All other systems reviewed and are negative.       Physical Exam   Physical Exam  Vitals and nursing note reviewed. Constitutional:       General: He is not in acute distress. Appearance: He is well-developed. HENT:      Head: Normocephalic and atraumatic. Cardiovascular:      Rate and Rhythm: Normal rate and regular rhythm. Heart sounds: Normal heart sounds. Pulmonary:      Effort: Pulmonary effort is normal.      Breath sounds: Normal breath sounds. Abdominal:      General: Bowel sounds are normal.      Palpations: Abdomen is soft. Musculoskeletal:      Cervical back: Normal range of motion. Skin:     General: Skin is warm and dry. Neurological:      General: No focal deficit present. Mental Status: He is alert and oriented to person, place, and time. Coordination: Coordination normal.   Psychiatric:         Mood and Affect: Mood normal.         Diagnostic Study Results     Labs -     Recent Results (from the past 12 hour(s))   CBC WITH AUTOMATED DIFF    Collection Time: 02/21/22  2:14 PM   Result Value Ref Range    WBC 13.9 (H) 4.1 - 11.1 K/uL    RBC 3.84 (L) 4.10 - 5.70 M/uL    HGB 10.7 (L) 12.1 - 17.0 g/dL    HCT 34.4 (L) 36.6 - 50.3 %    MCV 89.6 80.0 - 99.0 FL    MCH 27.9 26.0 - 34.0 PG    MCHC 31.1 30.0 - 36.5 g/dL    RDW 14.3 11.5 - 14.5 %    PLATELET 608 049 - 294 K/uL    MPV 9.2 8.9 - 12.9 FL    NRBC 0.1 (H) 0  WBC    ABSOLUTE NRBC 0.02 (H) 0.00 - 0.01 K/uL    NEUTROPHILS 78 (H) 32 - 75 %    LYMPHOCYTES 9 (L) 12 - 49 %    MONOCYTES 11 5 - 13 %    EOSINOPHILS 1 0 - 7 %    BASOPHILS 0 0 - 1 %    IMMATURE GRANULOCYTES 1 (H) 0.0 - 0.5 %    ABS. NEUTROPHILS 10.9 (H) 1.8 - 8.0 K/UL    ABS. LYMPHOCYTES 1.2 0.8 - 3.5 K/UL    ABS. MONOCYTES 1.5 (H) 0.0 - 1.0 K/UL    ABS. EOSINOPHILS 0.1 0.0 - 0.4 K/UL    ABS. BASOPHILS 0.0 0.0 - 0.1 K/UL    ABS. IMM.  GRANS. 0.2 (H) 0.00 - 0.04 K/UL    DF AUTOMATED     METABOLIC PANEL, COMPREHENSIVE    Collection Time: 02/21/22  2:14 PM   Result Value Ref Range    Sodium 134 (L) 136 - 145 mmol/L    Potassium 4.9 3.5 - 5.1 mmol/L    Chloride 97 97 - 108 mmol/L    CO2 31 21 - 32 mmol/L    Anion gap 6 5 - 15 mmol/L    Glucose 97 65 - 100 mg/dL    BUN 21 (H) 6 - 20 MG/DL    Creatinine 1.41 (H) 0.70 - 1.30 MG/DL    BUN/Creatinine ratio 15 12 - 20      GFR est AA 60 (L) >60 ml/min/1.73m2    GFR est non-AA 49 (L) >60 ml/min/1.73m2    Calcium 9.0 8.5 - 10.1 MG/DL    Bilirubin, total 0.4 0.2 - 1.0 MG/DL    ALT (SGPT) 50 12 - 78 U/L    AST (SGOT) 28 15 - 37 U/L    Alk. phosphatase 73 45 - 117 U/L    Protein, total 7.1 6.4 - 8.2 g/dL    Albumin 2.6 (L) 3.5 - 5.0 g/dL    Globulin 4.5 (H) 2.0 - 4.0 g/dL    A-G Ratio 0.6 (L) 1.1 - 2.2     TROPONIN-HIGH SENSITIVITY    Collection Time: 02/21/22  2:14 PM   Result Value Ref Range    Troponin-High Sensitivity 9 0 - 76 ng/L   NT-PRO BNP    Collection Time: 02/21/22  2:14 PM   Result Value Ref Range    NT pro- (H) <125 PG/ML       Radiologic Studies -   CTA CHEST W OR W WO CONT   Final Result   1. No evidence of acute pulmonary thromboembolism or other acute finding in the   chest.   2. Emphysema. Mild cardiomegaly. .         XR CHEST PORT   Final Result      No acute process. CT Results  (Last 48 hours)    None        CXR Results  (Last 48 hours)               02/21/22 1422  XR CHEST PORT Final result    Impression:      No acute process. Narrative:  EXAM:  XR CHEST PORT       INDICATION:  sob       COMPARISON:  2/9/2022       FINDINGS:       A portable AP radiograph of the chest was obtained at 14:19 hours. The patient   is on a cardiac monitor. The lungs are clear. The cardiac and mediastinal   contours and pulmonary vascularity are normal. Tiny metallic fragments are seen   in the region of the left shoulder. Medical Decision Making   I am the first provider for this patient. I reviewed the vital signs, available nursing notes, past medical history, past surgical history, family history and social history.     Vital Signs-Reviewed the patient's vital signs. Patient Vitals for the past 12 hrs:   Temp Pulse Resp BP SpO2   02/21/22 1322 98.8 °F (37.1 °C) 88 20 (!) 159/63 (!) 88 %       EKG interpretation: (Preliminary)  Rhythm: normal sinus rhythm; and regular . Rate (approx.): 86; Axis: normal; ID interval: normal; QRS interval: normal ; ST/T wave: normal; Other findings: .    Records Reviewed: Nursing Notes, Old Medical Records, Previous electrocardiograms, Previous Radiology Studies and Previous Laboratory Studies    Provider Notes (Medical Decision Making):   Respiratory failure, CHF, ACS, pulmonary embolism, bronchitis, COPD exacerbation, dehydration, electrolyte abnormality, pneumonia    ED Course:   Initial assessment performed. The patients presenting problems have been discussed, and they are in agreement with the care plan formulated and outlined with them. I have encouraged them to ask questions as they arise throughout their visit. Consult note:    Patient is being admitted by Dr. Khloe Christensen, hospitalist             Critical Care Time:   CRITICAL CARE NOTE :    3:31 PM    IMPENDING DETERIORATION -Respiratory, Cardiovascular and Metabolic  ASSOCIATED RISK FACTORS - Hypoxia, Dysrhythmia and Metabolic changes  MANAGEMENT- Bedside Assessment and Supervision of Care  INTERPRETATION -  Xrays, CT Scan, ECG and Blood Pressure  INTERVENTIONS - hemodynamic mngmt and Metobolic interventions  CASE REVIEW - Hospitalist/Intensivist, Nursing and Family  TREATMENT RESPONSE -Unchanged   PERFORMED BY - Self    NOTES   :  I have spent 35 minutes of critical care time involved in lab review, consultations with specialist, family decision- making, bedside attention and documentation. This time excludes time spent in any separate billed procedures. During this entire length of time I was immediately available to the patient . Antonio Simeon MD      Disposition:  admit    DISCHARGE PLAN:  1. Current Discharge Medication List        2.    Follow-up Information None       3. Return to ED if worse     Diagnosis     Clinical Impression:   1. Acute respiratory failure with hypoxia ([address]    2. Chronic obstructive pulmonary disease, unspecified COPD type ([address]        Attestations:    Lilian Antonio MD    Please note that this dictation was completed with [employer], the computer voice recognition software. Quite often unanticipated grammatical, syntax, homophones, and other interpretive errors are inadvertently transcribed by the computer software. Please disregard these errors. Please excuse any errors that have escaped final proofreading. Thank you.

## 2022-02-21 NOTE — PROGRESS NOTES
TRANSFER - IN REPORT:    Verbal report received from Xochitl(name) on Alphonsus Fothergill  being received from ER(unit) for routine progression of care      Report consisted of patients Situation, Background, Assessment and   Recommendations(SBAR). Information from the following report(s) SBAR, Kardex, Intake/Output, MAR and Recent Results was reviewed with the receiving nurse. Opportunity for questions and clarification was provided. Assessment completed upon patients arrival to unit and care assumed.

## 2022-02-21 NOTE — H&P
Hospitalist Admission Note    NAME: Morelia Lai   :  1950   MRN:  [id number]     Date/Time:  2022 6:46 PM    Patient PCP: Brian Paul MD  ______________________________________________________________________  Given the patient's current clinical presentation, I have a high level of concern for decompensation if discharged from the emergency department. Complex decision making was performed, which includes reviewing the patient's available past medical records, laboratory results, and x-ray films. My assessment of this patient's clinical condition and my plan of care is as follows.     Assessment / Plan:  Acute  chronic hypoxic respiratory failure secondary to COPD exacerbation and possible CHF exacerbation systolic acute on chronic  Admit patient to telemetry  Start patient on Solu-Medrol  Start patient on antibiotic therapy Rocephin and doxy pulmonary consultationDuoNeb nebulizerrepeat echocardiogramcardiology consultation  Continue Lasix  Daily weight  Continue digoxin    Hypertensioncontinue home antihypertensive medication      History of atrial flutter  Continue Eliquiscontinue amiodarone  Continue Cardizem    Hyperlipidemia  Continue Crestor    BPHcontinue finasteride      Code Status: Full  Surrogate Decision Maker:    DVT Prophylaxis: Eliquis   GI Prophylaxis: not indicated          Subjective:   CHIEF COMPLAINT: SOB    HISTORY OF PRESENT ILLNESS:     66years old male from home with past medical history significant for CHF, GERD, COPD, thyroid disease presented to the hospital for evaluation of worsening shortness of breath associated with hypoxia, patient on as needed oxygen at home patient stated for the last week complaint from worsening shortness of breath with ambulation, patient denies any chest pain denies any cough any fever any chills denies any productive cough, CTA chest was done show no PE and show emphysema mild cardiomegaly. We were asked to admit for work up and evaluation of the above problems. Past Medical History:   Diagnosis Date    Arrhythmia     SVT; Dr. René Phillips Fall 2018    Chronic obstructive pulmonary disease (HCC)     mild    GERD (gastroesophageal reflux disease)     Hypertension     Thyroid disease     benign thyroid growth, checked annually by Dr. Wolf Oleary        Past Surgical History:   Procedure Laterality Date    COLONOSCOPY N/A 2/5/2019    COL[facility] performed [facility] Dami [facility] at [facility] ENDOSCOPY    COLONOSCOPY,REMV RAVI,SNARE  2/5/2019         HX HEENT Bilateral     cataract extraction    [place] COLON [place] SCRN NOT  [address] IND  2/5/2019            Social History     Tobacco Use    Smoking status: Former Smoker     Years: 5.00    Smokeless tobacco: Never Used   Substance Use Topics    Alcohol use: Yes     Alcohol/week: 7.0 standard drinks     Types: 7 Glasses of wine per week        Family History   Problem Relation Age of Onset    Hypertension Mother     Heart Disease Mother     Hypertension Father     Diabetes Father      No Known Allergies     Prior to Admission medications    Medication Sig Start Date End Date Taking? Authorizing Provider   ceFAZolin 2 g IV syringe 2 g by IntraVENous route every eight (8) hours. End date is 1/6/2022  Patient not taking: Reported on 1/20/2022 12/14/21   Wallace Cook MD   digoxin (LANOXIN) 0.125 mg tablet Take 1 Tablet by mouth daily. 12/15/21   Wallace Cook MD   L.acid,para-B. bifidum-S.therm (RISAQUAD) 8 billion cell cap cap Take 1 Capsule by mouth daily. Patient not taking: Reported on 1/20/2022 12/15/21   Wallace Cook MD   losartan (COZAAR) 50 mg tablet Take 1 Tablet by mouth daily. 12/15/21   Wallace Cook MD   metoprolol succinate (Toprol XL) 25 mg XL tablet Take 2 Tablets by mouth daily.  12/14/21   Wallace Cook MD   amiodarone (PACERONE) 400 mg tablet Take 1 Tablet by mouth. 7/19/21   Provider, Historical   dilTIAZem ER (TIAZAC) 240 mg capsule Take 240 mg by mouth daily. Indications: paroxysmal supraventricular tachycardia    Provider, Historical   finasteride (PROSCAR) 5 mg tablet Take 5 mg by mouth daily. Provider, Historical   vit A/vit C/vit E/zinc/copper (PRESERVISION AREDS PO) Take 1 Caplet by mouth two (2) times a day. Provider, Historical   ascorbic acid, vitamin C, (Vitamin C) 500 mg tablet Take 500 mg by mouth daily. Provider, Historical   cyanocobalamin (Vitamin B-12) 1,000 mcg tablet Take 1,000 mcg by mouth daily. Provider, Historical   fluticasone-umeclidinium-vilanterol (Trelegy Ellipta) 100-62.5-25 mcg inhaler Take 1 Puff by inhalation daily. Provider, Historical   pantoprazole (Protonix) 40 mg tablet Take 40 mg by mouth daily. Provider, Historical   furosemide (LASIX) 20 mg tablet 1 tab po daily 3/10/21   Kris Souza MD   apixaban (ELIQUIS) 5 mg tablet Take 1 Tab by mouth two (2) times a day. 3/9/21   Kris Souza MD   rosuvastatin (Crestor) 40 mg tablet Take 40 mg by mouth nightly. Other, MD Elaina   tamsulosin (FLOMAX) 0.4 mg capsule Take 0.4 mg by mouth daily. Provider, Historical   aspirin delayed-release 81 mg tablet Take 81 mg by mouth daily. Provider, Historical   melatonin 3 mg tablet Take 3 mg by mouth nightly. Provider, Historical   albuterol (VENTOLIN HFA) 90 mcg/actuation inhaler Take 1 Puff by inhalation every four (4) hours as needed. Provider, Historical   gemfibrozil (LOPID) 600 mg tablet Take 600 mg by mouth two (2) times a day. Provider, Historical   cholecalciferol (VITAMIN D3) 1,000 unit cap Take 1,000 Units by mouth daily. Provider, Historical   MULTIVIT-MIN/FA/LYCOPEN/LUTEIN (CENTRUM SILVER ULTRA MEN'S PO) Take 1 Tab by mouth daily. Provider, Historical   Omega-3-DHA-EPA-Fish Oil 1,000 mg (120 mg-180 mg) cap Take 1 Cap by mouth daily.     Provider, Historical       REVIEW OF SYSTEMS:     I am not able to complete the review of systems because: The patient is intubated and sedated    The patient has altered mental status due to his acute medical problems    The patient has baseline aphasia from prior stroke(s)    The patient has baseline dementia and is not reliable historian    The patient is in acute medical distress and unable to provide information           Total of 12 systems reviewed as follows:       POSITIVE= underlined text  Negative = text not underlined  General:  fever, chills, sweats, generalized weakness, weight loss/gain,      loss of appetite   Eyes:    blurred vision, eye pain, loss of vision, double vision  ENT:    rhinorrhea, pharyngitis   Respiratory:   cough, sputum production, SOB, BOWDEN, wheezing, pleuritic pain   Cardiology:   chest pain, palpitations, orthopnea, PND, edema, syncope   Gastrointestinal:  abdominal pain , N/V, diarrhea, dysphagia, constipation, bleeding   Genitourinary:  frequency, urgency, dysuria, hematuria, incontinence   Muskuloskeletal :  arthralgia, myalgia, back pain  Hematology:  easy bruising, nose or gum bleeding, lymphadenopathy   Dermatological: rash, ulceration, pruritis, color change / jaundice  Endocrine:   hot flashes or polydipsia   Neurological:  headache, dizziness, confusion, focal weakness, paresthesia,     Speech difficulties, memory loss, gait difficulty  Psychological: Feelings of anxiety, depression, agitation    Objective:   VITALS:    Visit Vitals  BP (!) 151/77   Pulse 81   Temp 99.1 °F (37.3 °C)   Resp 20   Ht 5' 11\" (1.803 m)   Wt 115.3 kg (254 lb 3.1 oz)   SpO2 95%   BMI 35.45 kg/m²       PHYSICAL EXAM:    General:    Alert, cooperative, no distress, appears stated age. HEENT: Atraumatic, anicteric sclerae, pink conjunctivae     No oral ulcers, mucosa moist, throat clear, dentition fair  Neck:  Supple, symmetrical,  thyroid: non tender  Lungs:   Clear to auscultation bilaterally. No Wheezing or Rhonchi. No rales.   Chest wall:  No tenderness No Accessory muscle use. Heart:   Regular  rhythm,  No  murmur   No edema  Abdomen:   Soft, non-tender. Not distended. Bowel sounds normal  Extremities: No cyanosis. No clubbing,      Skin turgor normal, Capillary refill normal, Radial dial pulse 2+  Skin:     Not pale. Not Jaundiced  No rashes   Psych:  Good insight. Not depressed. Not anxious or agitated. Neurologic: EOMs intact. No facial asymmetry. No aphasia or slurred speech. Symmetrical strength, Sensation grossly intact. Alert and oriented X 4.     _______________________________________________________________________  Care Plan discussed with:    Comments   Patient y    Family      RN y    Care Manager                    Consultant:      _______________________________________________________________________  Expected  Disposition:   Home with Family y   HH/PT/OT/RN    SNF/LTC    ANOOP    ________________________________________________________________________  TOTAL TIME:  54  Minutes    Critical Care Provided     Minutes non procedure based      Comments    y Reviewed previous records   >50% of visit spent in counseling and coordination of care y Discussion with patient and/or family and questions answered       ________________________________________________________________________  Signed: Blanca Fu MD    Procedures: see electronic medical records for all procedures/Xrays and details which were not copied into this note but were reviewed prior to creation of Plan.     LAB DATA REVIEWED:    Recent Results (from the past 24 hour(s))   CBC WITH AUTOMATED DIFF    Collection Time: 02/21/22  2:14 PM   Result Value Ref Range    WBC 13.9 (H) 4.1 - 11.1 K/uL    RBC 3.84 (L) 4.10 - 5.70 M/uL    HGB 10.7 (L) 12.1 - 17.0 g/dL    HCT 34.4 (L) 36.6 - 50.3 %    MCV 89.6 80.0 - 99.0 FL    MCH 27.9 26.0 - 34.0 PG    MCHC 31.1 30.0 - 36.5 g/dL    RDW 14.3 11.5 - 14.5 %    PLATELET 795 118 - 482 K/uL    MPV 9.2 8.9 - 12.9 FL    NRBC 0.1 (H) 0  WBC ABSOLUTE NRBC 0.02 (H) 0.00 - 0.01 K/uL    NEUTROPHILS 78 (H) 32 - 75 %    LYMPHOCYTES 9 (L) 12 - 49 %    MONOCYTES 11 5 - 13 %    EOSINOPHILS 1 0 - 7 %    BASOPHILS 0 0 - 1 %    IMMATURE GRANULOCYTES 1 (H) 0.0 - 0.5 %    ABS. NEUTROPHILS 10.9 (H) 1.8 - 8.0 K/UL    ABS. LYMPHOCYTES 1.2 0.8 - 3.5 K/UL    ABS. MONOCYTES 1.5 (H) 0.0 - 1.0 K/UL    ABS. EOSINOPHILS 0.1 0.0 - 0.4 K/UL    ABS. BASOPHILS 0.0 0.0 - 0.1 K/UL    ABS. IMM. GRANS. 0.2 (H) 0.00 - 0.04 K/UL    DF AUTOMATED     METABOLIC PANEL, COMPREHENSIVE    Collection Time: 02/21/22  2:14 PM   Result Value Ref Range    Sodium 134 (L) 136 - 145 mmol/L    Potassium 4.9 3.5 - 5.1 mmol/L    Chloride 97 97 - 108 mmol/L    CO2 31 21 - 32 mmol/L    Anion gap 6 5 - 15 mmol/L    Glucose 97 65 - 100 mg/dL    BUN 21 (H) 6 - 20 MG/DL    Creatinine 1.41 (H) 0.70 - 1.30 MG/DL    BUN/Creatinine ratio 15 12 - 20      GFR est AA 60 (L) >60 ml/min/1.73m2    GFR est non-AA 49 (L) >60 ml/min/1.73m2    Calcium 9.0 8.5 - 10.1 MG/DL    Bilirubin, total 0.4 0.2 - 1.0 MG/DL    ALT (SGPT) 50 12 - 78 U/L    AST (SGOT) 28 15 - 37 U/L    Alk.  phosphatase 73 45 - 117 U/L    Protein, total 7.1 6.4 - 8.2 g/dL    Albumin 2.6 (L) 3.5 - 5.0 g/dL    Globulin 4.5 (H) 2.0 - 4.0 g/dL    A-G Ratio 0.6 (L) 1.1 - 2.2     TROPONIN-HIGH SENSITIVITY    Collection Time: 02/21/22  2:14 PM   Result Value Ref Range    Troponin-High Sensitivity 9 0 - 76 ng/L   NT-PRO BNP    Collection Time: 02/21/22  2:14 PM   Result Value Ref Range    NT pro- (H) <125 PG/ML   COVID-19 RAPID TEST    Collection Time: 02/21/22  4:24 PM   Result Value Ref Range    Specimen source Nasopharyngeal      COVID-19 rapid test Not detected NOTD

## 2022-02-22 LAB
ANION GAP SERPL CALC-SCNC: 3 MMOL/L (ref 5–15)
ATRIAL RATE: 86 BPM
ATRIAL RATE: 86 BPM
BUN SERPL-MCNC: 21 MG/DL (ref 6–20)
BUN/CREAT SERPL: 16 (ref 12–20)
CALCIUM SERPL-MCNC: 9.4 MG/DL (ref 8.5–10.1)
CALCULATED P AXIS, ECG09: 71 DEGREES
CALCULATED P AXIS, ECG09: 75 DEGREES
CALCULATED R AXIS, ECG10: 17 DEGREES
CALCULATED R AXIS, ECG10: 33 DEGREES
CALCULATED T AXIS, ECG11: 84 DEGREES
CALCULATED T AXIS, ECG11: 94 DEGREES
CHLORIDE SERPL-SCNC: 101 MMOL/L (ref 97–108)
CO2 SERPL-SCNC: 30 MMOL/L (ref 21–32)
CREAT SERPL-MCNC: 1.34 MG/DL (ref 0.7–1.3)
DIAGNOSIS, 93000: NORMAL
DIAGNOSIS, 93000: NORMAL
ERYTHROCYTE [DISTWIDTH] IN BLOOD BY AUTOMATED COUNT: 14 % (ref 11.5–14.5)
GLUCOSE SERPL-MCNC: 138 MG/DL (ref 65–100)
HCT VFR BLD AUTO: 38.3 % (ref 36.6–50.3)
HGB BLD-MCNC: 11.7 G/DL (ref 12.1–17)
MCH RBC QN AUTO: 27.8 PG (ref 26–34)
MCHC RBC AUTO-ENTMCNC: 30.5 G/DL (ref 30–36.5)
MCV RBC AUTO: 91 FL (ref 80–99)
NRBC # BLD: 0 K/UL (ref 0–0.01)
NRBC BLD-RTO: 0 PER 100 WBC
P-R INTERVAL, ECG05: 188 MS
P-R INTERVAL, ECG05: 192 MS
PLATELET # BLD AUTO: 335 K/UL (ref 150–400)
PMV BLD AUTO: 9.3 FL (ref 8.9–12.9)
POTASSIUM SERPL-SCNC: 5.1 MMOL/L (ref 3.5–5.1)
Q-T INTERVAL, ECG07: 368 MS
Q-T INTERVAL, ECG07: 374 MS
QRS DURATION, ECG06: 100 MS
QRS DURATION, ECG06: 108 MS
QTC CALCULATION (BEZET), ECG08: 440 MS
QTC CALCULATION (BEZET), ECG08: 447 MS
RBC # BLD AUTO: 4.21 M/UL (ref 4.1–5.7)
SODIUM SERPL-SCNC: 134 MMOL/L (ref 136–145)
VENTRICULAR RATE, ECG03: 86 BPM
VENTRICULAR RATE, ECG03: 86 BPM
WBC # BLD AUTO: 12.5 K/UL (ref 4.1–11.1)

## 2022-02-22 PROCEDURE — 94640 AIRWAY INHALATION TREATMENT: CPT

## 2022-02-22 PROCEDURE — 74011250637 HC RX REV CODE- 250/637: Performed by: INTERNAL MEDICINE

## 2022-02-22 PROCEDURE — 74011000258 HC RX REV CODE- 258: Performed by: INTERNAL MEDICINE

## 2022-02-22 PROCEDURE — 74011000250 HC RX REV CODE- 250: Performed by: INTERNAL MEDICINE

## 2022-02-22 PROCEDURE — 77010033678 HC OXYGEN DAILY

## 2022-02-22 PROCEDURE — 85027 COMPLETE CBC AUTOMATED: CPT

## 2022-02-22 PROCEDURE — 94760 N-INVAS EAR/PLS OXIMETRY 1: CPT

## 2022-02-22 PROCEDURE — 74011250636 HC RX REV CODE- 250/636: Performed by: INTERNAL MEDICINE

## 2022-02-22 PROCEDURE — 93005 ELECTROCARDIOGRAM TRACING: CPT

## 2022-02-22 PROCEDURE — 80048 BASIC METABOLIC PNL TOTAL CA: CPT

## 2022-02-22 PROCEDURE — 36415 COLL VENOUS BLD VENIPUNCTURE: CPT

## 2022-02-22 PROCEDURE — 65660000000 HC RM CCU STEPDOWN

## 2022-02-22 PROCEDURE — 80163 ASSAY OF DIGOXIN FREE: CPT

## 2022-02-22 RX ORDER — FUROSEMIDE 20 MG/1
20 TABLET ORAL DAILY
Status: DISCONTINUED | OUTPATIENT
Start: 2022-02-22 | End: 2022-02-24 | Stop reason: HOSPADM

## 2022-02-22 RX ORDER — ARFORMOTEROL TARTRATE 15 UG/2ML
15 SOLUTION RESPIRATORY (INHALATION)
Status: DISCONTINUED | OUTPATIENT
Start: 2022-02-22 | End: 2022-02-24 | Stop reason: HOSPADM

## 2022-02-22 RX ORDER — IPRATROPIUM BROMIDE 0.5 MG/2.5ML
0.5 SOLUTION RESPIRATORY (INHALATION)
Status: DISCONTINUED | OUTPATIENT
Start: 2022-02-22 | End: 2022-02-24 | Stop reason: HOSPADM

## 2022-02-22 RX ORDER — BUDESONIDE 0.25 MG/2ML
250 INHALANT ORAL
Status: DISCONTINUED | OUTPATIENT
Start: 2022-02-22 | End: 2022-02-24 | Stop reason: HOSPADM

## 2022-02-22 RX ADMIN — LOSARTAN POTASSIUM 50 MG: 50 TABLET, FILM COATED ORAL at 10:10

## 2022-02-22 RX ADMIN — SODIUM CHLORIDE, PRESERVATIVE FREE 10 ML: 5 INJECTION INTRAVENOUS at 21:15

## 2022-02-22 RX ADMIN — TAMSULOSIN HYDROCHLORIDE 0.4 MG: 0.4 CAPSULE ORAL at 10:09

## 2022-02-22 RX ADMIN — ARFORMOTEROL TARTRATE 15 MCG: 15 SOLUTION RESPIRATORY (INHALATION) at 13:35

## 2022-02-22 RX ADMIN — METOPROLOL SUCCINATE 50 MG: 50 TABLET, FILM COATED, EXTENDED RELEASE ORAL at 10:09

## 2022-02-22 RX ADMIN — FAMOTIDINE 20 MG: 20 TABLET ORAL at 17:39

## 2022-02-22 RX ADMIN — BUDESONIDE 250 MCG: 0.25 INHALANT RESPIRATORY (INHALATION) at 13:35

## 2022-02-22 RX ADMIN — METHYLPREDNISOLONE SODIUM SUCCINATE 40 MG: 40 INJECTION, POWDER, FOR SOLUTION INTRAMUSCULAR; INTRAVENOUS at 14:53

## 2022-02-22 RX ADMIN — IPRATROPIUM BROMIDE 0.5 MG: 0.5 SOLUTION RESPIRATORY (INHALATION) at 19:41

## 2022-02-22 RX ADMIN — FAMOTIDINE 20 MG: 20 TABLET ORAL at 10:09

## 2022-02-22 RX ADMIN — ASPIRIN 81 MG: 81 TABLET, COATED ORAL at 10:10

## 2022-02-22 RX ADMIN — AMIODARONE HYDROCHLORIDE 400 MG: 200 TABLET ORAL at 10:10

## 2022-02-22 RX ADMIN — METHYLPREDNISOLONE SODIUM SUCCINATE 40 MG: 40 INJECTION, POWDER, FOR SOLUTION INTRAMUSCULAR; INTRAVENOUS at 06:18

## 2022-02-22 RX ADMIN — FINASTERIDE 5 MG: 5 TABLET, FILM COATED ORAL at 10:09

## 2022-02-22 RX ADMIN — METHYLPREDNISOLONE SODIUM SUCCINATE 40 MG: 40 INJECTION, POWDER, FOR SOLUTION INTRAMUSCULAR; INTRAVENOUS at 21:10

## 2022-02-22 RX ADMIN — DOXYCYCLINE HYCLATE 100 MG: 100 TABLET, COATED ORAL at 10:09

## 2022-02-22 RX ADMIN — PANTOPRAZOLE SODIUM 40 MG: 40 TABLET, DELAYED RELEASE ORAL at 10:10

## 2022-02-22 RX ADMIN — ROSUVASTATIN 40 MG: 20 TABLET, FILM COATED ORAL at 00:33

## 2022-02-22 RX ADMIN — SODIUM CHLORIDE, PRESERVATIVE FREE 10 ML: 5 INJECTION INTRAVENOUS at 06:18

## 2022-02-22 RX ADMIN — ROSUVASTATIN 40 MG: 20 TABLET, FILM COATED ORAL at 21:10

## 2022-02-22 RX ADMIN — APIXABAN 5 MG: 5 TABLET, FILM COATED ORAL at 10:08

## 2022-02-22 RX ADMIN — ARFORMOTEROL TARTRATE 15 MCG: 15 SOLUTION RESPIRATORY (INHALATION) at 19:41

## 2022-02-22 RX ADMIN — DOXYCYCLINE HYCLATE 100 MG: 100 TABLET, COATED ORAL at 21:10

## 2022-02-22 RX ADMIN — BUDESONIDE 250 MCG: 0.25 INHALANT RESPIRATORY (INHALATION) at 19:41

## 2022-02-22 RX ADMIN — CEFTRIAXONE 1 G: 1 INJECTION, POWDER, FOR SOLUTION INTRAMUSCULAR; INTRAVENOUS at 19:47

## 2022-02-22 RX ADMIN — IPRATROPIUM BROMIDE 0.5 MG: 0.5 SOLUTION RESPIRATORY (INHALATION) at 13:35

## 2022-02-22 RX ADMIN — DILTIAZEM HYDROCHLORIDE 240 MG: 120 CAPSULE, COATED, EXTENDED RELEASE ORAL at 10:16

## 2022-02-22 RX ADMIN — FUROSEMIDE 20 MG: 20 TABLET ORAL at 10:09

## 2022-02-22 RX ADMIN — APIXABAN 5 MG: 5 TABLET, FILM COATED ORAL at 17:38

## 2022-02-22 RX ADMIN — SODIUM CHLORIDE, PRESERVATIVE FREE 10 ML: 5 INJECTION INTRAVENOUS at 14:53

## 2022-02-22 NOTE — PROGRESS NOTES
Hospitalist Progress Note    NAME: Doron Hurley   :  1950   MRN:  [id number]       Assessment / Plan:  Acute  chronic hypoxic respiratory failure secondary to COPD exacerbation and possible CHF exacerbation systolic acute on chronic  Leukocytosis  We will continue with IV steroid, nebs, IV antibiotics. Check procalcitonin, if low will stop the IV antibiotics. proBNP is 250, continue with home dose Lasix 20 mg daily    Atrial flutter status post ablation in December  Continue Eliquis, Cardizem, continue amiodarone  EP consulted, plan for EP studies while inpatient  Hypertension  Continue with Cozaar, Toprol, Cardizem     Hyperlipidemia  Continue Crestor     BPHcontinue finasteride        Code Status: Full  Surrogate Decision Maker:     DVT Prophylaxis: Eliquis   GI Prophylaxis: not indicated     30.0 - 39.9 Obese / Body mass index is 35.45 kg/m². Estimated discharge date:   Barriers:       Subjective:     Chief Complaint / Reason for Physician Visit  Fu copd exacerbation . No acute issues, discussed with RN events overnight. Review of Systems:  Symptom Y/N Comments  Symptom Y/N Comments   Fever/Chills n   Chest Pain n    Poor Appetite    Edema     Cough    Abdominal Pain n    Sputum    Joint Pain     SOB/BOWDEN y   Pruritis/Rash     Nausea/vomit    Tolerating PT/OT     Diarrhea    Tolerating Diet y    Constipation    Other       Could NOT obtain due to:      Objective:     VITALS:   Last 24hrs VS reviewed since prior progress note.  Most recent are:  Patient Vitals for the past 24 hrs:   Temp Pulse Resp BP SpO2   22 0756 97.5 °F (36.4 °C) 96 18 (!) 140/63 91 %   22 0100 97.7 °F (36.5 °C) 87 18 (!) 145/77 96 %   22 1954 98.9 °F (37.2 °C) 89 18 (!) 147/69 90 %   22 1823 99.1 °F (37.3 °C) 81 20 (!) 151/77 95 %   22 1742     93 %   22 1322 98.8 °F (37.1 °C) 88 20 (!) 159/63 (!) 88 %     No intake or output data in the 24 hours ending 02/22/22 0853     I had a face to face encounter and independently examined this patient on 2/22/2022, as outlined below:  PHYSICAL EXAM:  General: WD, WN. Alert, cooperative, no acute distress    EENT:  EOMI. Anicteric sclerae. MMM  Resp:  CTA bilaterally, no wheezing or rales. No accessory muscle use  CV:  Regular  rhythm,  No edema  GI:  Soft, Non distended, Non tender. +Bowel sounds  Neurologic:  Alert and oriented X 3, normal speech,   Psych:   Good insight. Not anxious nor agitated  Skin:  No rashes. No jaundice    Reviewed most current lab test results and cultures  YES  Reviewed most current radiology test results   YES  Review and summation of old records today    NO  Reviewed patient's current orders and MAR    YES  PMH/ reviewed - no change compared to H&P  ________________________________________________________________________  Care Plan discussed with:    Comments   Patient x    Family      RN x    Care Manager     Consultant                        Multidiciplinary team rounds were held today with , nursing, pharmacist and clinical coordinator. Patient's plan of care was discussed; medications were reviewed and discharge planning was addressed. ________________________________________________________________________  Total NON critical care TIME: 35 Minutes    Total CRITICAL CARE TIME Spent:   Minutes non procedure based      Comments   >50% of visit spent in counseling and coordination of care     ________________________________________________________________________  Lilliam Whitaker MD     Procedures: see electronic medical records for all procedures/Xrays and details which were not copied into this note but were reviewed prior to creation of Plan. LABS:  I reviewed today's most current labs and imaging studies.   Pertinent labs include:  Recent Labs     02/22/22  0314 02/21/22  1414   WBC 12.5* 13.9*   HGB 11.7* 10.7*   HCT 38.3 34.4*    300     Recent Labs 02/22/22  0314 02/21/22  1414   * 134*   K 5.1 4.9    97   CO2 30 31   * 97   BUN 21* 21*   CREA 1.34* 1.41*   CA 9.4 9.0   ALB  --  2.6*   TBILI  --  0.4   ALT  --  50       Signed: Lucia Encarnacion MD

## 2022-02-22 NOTE — PROGRESS NOTES
EP/Arrhythmia consult seen, full note to follow. He was admitted with bronchitis, CXR is clear. He was cardioverted in 12/2021 for unspecified atrial flutter (might be a right-sided circuit based on ECG), remains on amiodarone. He did have PVI, LA roof line, and CTI line with RF in 9/2021. I am suspicious he may have had typical RA flutter in the past, so will do an EP study while he's here and if he needs touch-up RF of the CTI line, then that can be done. I'd likely stop amiodarone then and he'll F/U with me as an OP to see if he has any breakthrough arrhythmia otherwise. I'll check the schedule to see if he can be done either tomorrow or Thursday.     Signed By: Anuel Elizabeth MD     February 22, 2022

## 2022-02-22 NOTE — INTERDISCIPLINARY ROUNDS
Interdisciplinary Rounds were completed on 02/22/22 for this patient. Rounds included nursing, clinical care leader, pharmacy, and case management. Plan of care discussed. See clinical pathway and/or care plan for interventions and desired outcomes.

## 2022-02-22 NOTE — PROGRESS NOTES
Bedside and Verbal shift change report given to yen (oncoming nurse) by Virgilio Cavazos (offgoing nurse). Report included the following information SBAR, Kardex, Intake/Output, MAR and Recent Results.

## 2022-02-23 LAB — PROCALCITONIN SERPL-MCNC: <0.05 NG/ML

## 2022-02-23 PROCEDURE — 36415 COLL VENOUS BLD VENIPUNCTURE: CPT

## 2022-02-23 PROCEDURE — 74011000250 HC RX REV CODE- 250: Performed by: INTERNAL MEDICINE

## 2022-02-23 PROCEDURE — 65660000000 HC RM CCU STEPDOWN

## 2022-02-23 PROCEDURE — 74011250637 HC RX REV CODE- 250/637: Performed by: INTERNAL MEDICINE

## 2022-02-23 PROCEDURE — 84145 PROCALCITONIN (PCT): CPT

## 2022-02-23 PROCEDURE — 94640 AIRWAY INHALATION TREATMENT: CPT

## 2022-02-23 PROCEDURE — 74011250636 HC RX REV CODE- 250/636: Performed by: INTERNAL MEDICINE

## 2022-02-23 PROCEDURE — 77010033678 HC OXYGEN DAILY

## 2022-02-23 PROCEDURE — 94760 N-INVAS EAR/PLS OXIMETRY 1: CPT

## 2022-02-23 RX ADMIN — DILTIAZEM HYDROCHLORIDE 240 MG: 120 CAPSULE, COATED, EXTENDED RELEASE ORAL at 09:00

## 2022-02-23 RX ADMIN — ARFORMOTEROL TARTRATE 15 MCG: 15 SOLUTION RESPIRATORY (INHALATION) at 20:50

## 2022-02-23 RX ADMIN — PANTOPRAZOLE SODIUM 40 MG: 40 TABLET, DELAYED RELEASE ORAL at 08:55

## 2022-02-23 RX ADMIN — BUDESONIDE 250 MCG: 0.25 INHALANT RESPIRATORY (INHALATION) at 08:37

## 2022-02-23 RX ADMIN — SODIUM CHLORIDE, PRESERVATIVE FREE 10 ML: 5 INJECTION INTRAVENOUS at 21:30

## 2022-02-23 RX ADMIN — METOPROLOL SUCCINATE 50 MG: 50 TABLET, FILM COATED, EXTENDED RELEASE ORAL at 08:55

## 2022-02-23 RX ADMIN — AMIODARONE HYDROCHLORIDE 400 MG: 200 TABLET ORAL at 08:55

## 2022-02-23 RX ADMIN — LOSARTAN POTASSIUM 50 MG: 50 TABLET, FILM COATED ORAL at 08:55

## 2022-02-23 RX ADMIN — DOXYCYCLINE HYCLATE 100 MG: 100 TABLET, COATED ORAL at 21:23

## 2022-02-23 RX ADMIN — SODIUM CHLORIDE, PRESERVATIVE FREE 10 ML: 5 INJECTION INTRAVENOUS at 14:58

## 2022-02-23 RX ADMIN — BUDESONIDE 250 MCG: 0.25 INHALANT RESPIRATORY (INHALATION) at 20:50

## 2022-02-23 RX ADMIN — TAMSULOSIN HYDROCHLORIDE 0.4 MG: 0.4 CAPSULE ORAL at 08:55

## 2022-02-23 RX ADMIN — METHYLPREDNISOLONE SODIUM SUCCINATE 40 MG: 40 INJECTION, POWDER, FOR SOLUTION INTRAMUSCULAR; INTRAVENOUS at 06:48

## 2022-02-23 RX ADMIN — FINASTERIDE 5 MG: 5 TABLET, FILM COATED ORAL at 08:55

## 2022-02-23 RX ADMIN — IPRATROPIUM BROMIDE 0.5 MG: 0.5 SOLUTION RESPIRATORY (INHALATION) at 15:08

## 2022-02-23 RX ADMIN — ARFORMOTEROL TARTRATE 15 MCG: 15 SOLUTION RESPIRATORY (INHALATION) at 08:36

## 2022-02-23 RX ADMIN — FAMOTIDINE 20 MG: 20 TABLET ORAL at 17:50

## 2022-02-23 RX ADMIN — IPRATROPIUM BROMIDE 0.5 MG: 0.5 SOLUTION RESPIRATORY (INHALATION) at 20:50

## 2022-02-23 RX ADMIN — ROSUVASTATIN 40 MG: 20 TABLET, FILM COATED ORAL at 21:30

## 2022-02-23 RX ADMIN — DOXYCYCLINE HYCLATE 100 MG: 100 TABLET, COATED ORAL at 08:55

## 2022-02-23 RX ADMIN — IPRATROPIUM BROMIDE 0.5 MG: 0.5 SOLUTION RESPIRATORY (INHALATION) at 08:36

## 2022-02-23 RX ADMIN — FUROSEMIDE 20 MG: 20 TABLET ORAL at 08:55

## 2022-02-23 RX ADMIN — SODIUM CHLORIDE, PRESERVATIVE FREE 10 ML: 5 INJECTION INTRAVENOUS at 06:28

## 2022-02-23 RX ADMIN — FAMOTIDINE 20 MG: 20 TABLET ORAL at 08:55

## 2022-02-23 RX ADMIN — METHYLPREDNISOLONE SODIUM SUCCINATE 40 MG: 40 INJECTION, POWDER, FOR SOLUTION INTRAMUSCULAR; INTRAVENOUS at 14:51

## 2022-02-23 RX ADMIN — ASPIRIN 81 MG: 81 TABLET, COATED ORAL at 08:55

## 2022-02-23 RX ADMIN — METHYLPREDNISOLONE SODIUM SUCCINATE 40 MG: 40 INJECTION, POWDER, FOR SOLUTION INTRAMUSCULAR; INTRAVENOUS at 21:24

## 2022-02-23 NOTE — PROGRESS NOTES
Bedside and Verbal shift change report given to [facility] (oncoming nurse) by Anselmo Portillo (offgoing nurse).  Report included the following information SBAR, Kardex, Intake/Output, MAR and Recent Results

## 2022-02-23 NOTE — PROGRESS NOTES
Transition of Care Plan:    RUR:  19% high risk for readmission  Disposition: Home with outpatient follow up vs Home with [facility] and follow up  Follow up appointments: PCP, Cardiology and Pulmonology (patient states he has appointments already scheduled)   DME needed:  Pt has Home O2 ([employer]iza Landau)  Wife will need to bring tank at discharge (Has trilogy that he found he no longer needs and will have a study to determine if he will qualify for BiPap  Transportation at Discharge:  Spouse will transport  [place] or means to access home:  Spouse has access       Medicare Letter:  Pt will need 2nd IMM letter at discharge  Is patient a BCPI-A Bundle:   Not identified as a bundle patient        If yes, was Bundle Letter given?:    Is patient a [place] and connected with the [place]? Pt is a [facility] but does not obtain services from the [place]               If yes, was [employer] transfer form completed and [place] notified? Pt does not receive services through the [address]:  Spouse, Valarie Mac [phone]  Discharge Caregiver contacted prior to discharge? Pt will contact wife regarding updates and discharge plan  Care Conference needed?:  No CC needed at this time    CM met with patient to complete CM initial assessment. Pt alert and oriented, sitting up in chair when CM arrived to speak with him. Pt able to confirm demographics. He reports that he resides in a 2 level home with his spouse, Wilner Nicole. Pt reports being independent with ADLs. He states he discharged in September with IV antibiotics and used an IV infusion and [facility] agency. Pt unable to remember [facility] agency used. Pt plans to return home at discharge. He does not anticipate a need for HH at discharge. Pt's spouse will transport when ready and will will need to bring O2 tank to hospital for transport home.     Reason for Admission:   Shortness of breath                 RUR Score:  19% high risk         PCP: First and Last name:  Merly Olivares MD     Name of Practice:   Are you a current patient: Yes/No: yes   Approximate date of last visit: January 2022   Can you do a virtual visit with your PCP:  Yes             Resources/supports as identified by patient/family:   Spouse and friends                Top Challenges facing patient (as identified by patient/family and CM): Finances/Medication cost?  Not identified as a barrier               Transportation? Pt drives and spouse transports when needed              Support system or lack thereof? Pt has good support                     Living arrangements? Resides in a 2 level home with spouse             Self-care/ADLs/Cognition? Pt was independent prior to admission, has home O2 ([employer])          Current Advanced Directive/Advance Care Plan:  Full Code      Healthcare Decision Maker:   Click here to complete [address] including selection of the Healthcare Decision Maker Relationship (ie \"Primary\")      Primary Decision MakerMary Sánchez - [phone]    Payor Source Payor: Hua Aguilar / Plan: Deana Stokes / Product Type: Medicare /                             Plan for utilizing home health:  Pt has used [facility] in the past due to having IV infusions, does not anticipate needing home health at discharge                  Transition of Care Plan:   Home with follow up appointments         Care Management Interventions  PCP Verified by CM: Yes (January 2022)  Mode of Transport at Discharge: Other (see comment)  Support Systems: Spouse/Significant Other  Confirm Follow Up Transport: Family  Discharge Location  Patient Expects to be Discharged to[de-identified] Home with outpatient services    Eusebia Calloway [employer], [facility] [place] - [id number]  Advanced Steps ACP Facilitator  Zone Phone: [phone]

## 2022-02-23 NOTE — PROGRESS NOTES
ADULT PROTOCOL: JET AEROSOL ASSESSMENT    Patient  Whit Woody     66 y.o.   male     2/22/2022  10:53 PM    Breath Sounds Pre Procedure: Right Breath Sounds: Clear,Diminished                               Left Breath Sounds: Clear,Diminished    Breath Sounds Post Procedure: Right Breath Sounds: Clear,Diminished                                 Left Breath Sounds: Clear,Diminished    Breathing pattern: Pre procedure Breathing Pattern: Regular          Post procedure Breathing Pattern: Regular    Heart Rate: Pre procedure Pulse: 79           Post procedure Pulse: 78    Resp Rate: Pre procedure Respirations: 18           Post procedure Respirations: 18    Peak Flow: Pre bronchodilator             Post bronchodilator       FVC/FEV1:  n/a    Incentive Spirometry:             Cough: Pre procedure Cough: Non-productive               Post procedure      Suctioned: NO    Sputum: Pre procedure                   Post procedure      Oxygen: O2 Device: Nasal cannula   Flow rate (L/min) 3. 5LPM     Changed: NO    SpO2: Pre procedure SpO2: 92 %   with oxygen              Post procedure SpO2: 93 %  with oxygen    Nebulizer Therapy: Current medications Aerosolized Medications: Brovana,Pulmicort,Ipratropium bromide      Changed: NO    Smoking History: n/a    Problem List:   Patient Active Problem List   Diagnosis Code    Hypertension I10    Arrhythmia I49.9    Obesity (BMI 30.0-34. 9) E66.9    Acute respiratory failure with hypoxia ([facility]) J96.01    Atrial flutter with rapid ventricular response (HCC) I48.92    Atrial fibrillation (HCC) I48.91    Congestive heart failure (HCC) I50.9    Atrial flutter (HCC) I48.92    Febrile R50.9    CHF exacerbation ([facility]) I50.9    Elevated brain natriuretic peptide (BNP) level R79.89    Respiratory failure, acute-on-chronic ([facility]) J96.20    ARF (acute respiratory failure) ([facility]) J96.00       Respiratory Therapist: RT Ramón No

## 2022-02-23 NOTE — CONSULTS
EP/ ARRHYTHMIA CONSULT    Patient ID:  Patient: Gm Amador  MRN: [id number]  Age: 66 y.o.  : 1950    Date of  Admission: 2022  3:06 PM   PCP:  Morro Carey MD    Assessment: 1. Unspecified atrial flutter, cardioverted to sinus rhythm in 2021. Still in sinus. 2. He was cardioverted in 2021 for unspecified atrial flutter (might be a right-sided circuit based on ECG), remains on amiodarone. He did have PVI, LA roof line, and CTI line with RF in 2021.  3. Bronchitis, CXR is clear. 4. Full code. Plan:     1. I am suspicious he may have had typical RA flutter in the past, so will do an EP study while he's here and if he needs touch-up RF of the CTI line, then that can be done. I'd likely stop amiodarone if so and then and he'll F/U with me as an OP to see if he has any breakthrough arrhythmia otherwise. If his CTI line is intact, then I may or may not decide on whether to pursue a left atial flutter or other arrhythmia at this time. 2. Previous issue with cardiomyopathy, likely nonischemic and may be tachycardia-mediated. EF 25-30%. Probably has had improvement in EF since last checked. 3. Let's continue metoprolol and diltiazem as prescribed. I'll try to get this information to Dr. Mac Anderson, his cardiologist.  Plan for EP study and possible right atrial flutter ablation on Thursday. [x]       High complexity decision making was performed in this patient at high risk for decompensation with multiple organ involvement. Gm Amador is a 66 y.o. male with a history of cardioverted atrial flutter in 2021 due to other circumstances (infection), who has done well since. Maintaining sinus rhythm as far as we can tell. He was admitted recently with bronchitis. Currently in sinus rhythm. Don't know if he's had atrial flutter since his cardioversion at this time.   No chest pain, syncope, palpitations, orthopnea, PND, or edema. He feels he's breathing better. Past Medical History:   Diagnosis Date    Arrhythmia     SVT; Dr. Jesu Lo Fall 2018    Chronic obstructive pulmonary disease (HCC)     mild    GERD (gastroesophageal reflux disease)     Hypertension     Thyroid disease     benign thyroid growth, checked annually by Dr. Deepa Saunders        Past Surgical History:   Procedure Laterality Date    COLONOSCOPY N/A 2/5/2019    COLONOSCOPY performed by Thaddeus Ricardo MD at \[facility]\"" ENDOSCOPY    COLONOSCOPY,REMV RAVI,SNARE  2/5/2019         HX HEENT Bilateral     cataract extraction    MI COLON CA SCRN NOT  [address] IND  2/5/2019            Social History     Tobacco Use    Smoking status: Former Smoker     Years: 5.00    Smokeless tobacco: Never Used   Substance Use Topics    Alcohol use:  Yes     Alcohol/week: 7.0 standard drinks     Types: 7 Glasses of wine per week        Family History   Problem Relation Age of Onset    Hypertension Mother     Heart Disease Mother     Hypertension Father     Diabetes Father         No Known Allergies       Current Facility-Administered Medications   Medication Dose Route Frequency    furosemide (LASIX) tablet 20 mg  20 mg Oral DAILY    arformoteroL (BROVANA) neb solution 15 mcg  15 mcg Nebulization BID RT    And    budesonide (PULMICORT) 250 mcg/2ml nebulizer susp  250 mcg Nebulization BID RT    And    ipratropium (ATROVENT) 0.02 % nebulizer solution 0.5 mg  0.5 mg Nebulization TID RT    acetaminophen (TYLENOL) tablet 650 mg  650 mg Oral Q6H PRN    Or    acetaminophen (TYLENOL) suppository 650 mg  650 mg Rectal Q6H PRN    ondansetron (ZOFRAN ODT) tablet 4 mg  4 mg Oral Q8H PRN    Or    ondansetron (ZOFRAN) injection 4 mg  4 mg IntraVENous Q6H PRN    sodium chloride (NS) flush 5-40 mL  5-40 mL IntraVENous Q8H    sodium chloride (NS) flush 5-40 mL  5-40 mL IntraVENous PRN    polyethylene glycol (MIRALAX) packet 17 g  17 g Oral DAILY PRN    methylPREDNISolone (PF) (SOLU-MEDROL) injection 40 mg  40 mg IntraVENous Q8H    cefTRIAXone (ROCEPHIN) 1 g in 0.9% sodium chloride (MBP/ADV) 50 mL MBP  1 g IntraVENous Q24H    doxycycline (VIBRA-TABS) tablet 100 mg  100 mg Oral Q12H    famotidine (PEPCID) tablet 20 mg  20 mg Oral BID    amiodarone (CORDARONE) tablet 400 mg  400 mg Oral DAILY    rosuvastatin (CRESTOR) tablet 40 mg  40 mg Oral QHS    losartan (COZAAR) tablet 50 mg  50 mg Oral DAILY    metoprolol succinate (TOPROL-XL) XL tablet 50 mg  50 mg Oral DAILY    finasteride (PROSCAR) tablet 5 mg  5 mg Oral DAILY    tamsulosin (FLOMAX) capsule 0.4 mg  0.4 mg Oral DAILY    dilTIAZem ER (CARDIZEM CD) capsule 240 mg  240 mg Oral DAILY    apixaban (ELIQUIS) tablet 5 mg  5 mg Oral BID    aspirin delayed-release tablet 81 mg  81 mg Oral DAILY    pantoprazole (PROTONIX) tablet 40 mg  40 mg Oral DAILY       Review of Symptoms:  See HPI as well.   General: negative for fever, chills, sweats, weakness, weight loss   Eyes: negative for blurred vision, eye pain, loss of vision, diplopia   Ear Nose and Throat: negative for rhinorrhea, pharyngitis, otalgia, tinnitus, speech or swallowing difficulties   Respiratory: negative for SOB, cough, sputum production, wheezing, BOWDEN, pleuritic pain   Cardiology: negative for chest pain, palpitations, orthopnea, PND, edema, syncope   Gastrointestinal: negative for abdominal pain, N/V, dysphagia, change in bowel habits, bleeding   Genitourinary: negative for frequency, urgency, dysuria, hematuria, incontinence   Muskuloskeletal : negative for arthralgia, myalgia   Hematology: negative for easy bruising, bleeding, lymphadenopathy   Dermatological: negative for rash, ulceration, mole change, new lesion   Endocrine: negative for hot flashes or polydipsia   Neurological: negative for headache, dizziness, confusion, focal weakness, paresthesia, memory loss, gait disturbance   Psychological: negative for anxiety, depression, agitation       Objective: Physical Exam:  Temp (24hrs), Av.8 °F (36.6 °C), Min:97.5 °F (36.4 °C), Max:98.1 °F (36.7 °C)    Patient Vitals for the past 8 hrs:   Pulse   22 1513 80    Patient Vitals for the past 8 hrs:   Resp   22 1513 18    Patient Vitals for the past 8 hrs:   BP   22 1513 (!) 117/56          Intake/Output Summary (Last 24 hours) at 2022 2117  Last data filed at 2022 1527  Gross per 24 hour   Intake 520 ml   Output 2 ml   Net 518 ml       Nondiaphoretic, not in acute distress. Supple, no palpable thyromegaly. No scleral icterus, mucous membranes moist, conjuctivae pink, no xanthelasma. Unlabored, clear to auscultation bilaterally anteriorly, symmetric air movement. Regular rate and rhythm, no murmur, pericardial rub, knock, or gallop. No JVD or peripheral edema. Palpable radial and DP/PT pulses bilaterally. Abdomen, soft, nontender, nondistended. Extremities without cyanosis or clubbing. Muscle tone and bulk normal.  Skin warm and dry. No rashes or ulcers. Neuro grossly nonfocal.  No tremor. Awake and appropriate. CARDIOGRAPHICS and STUDIES, I reviewed:    Telemetry:  No events. ECG:  Reviewed. Echo:  Reviewed. Labs:  No results for input(s): CPK, CKMB, CKNDX, TROIQ in the last 72 hours. No lab exists for component: CPKMB  No results found for: CHOL, CHOLX, CHLST, CHOLV, HDL, HDLP, LDL, LDLC, DLDLP, TGLX, TRIGL, TRIGP, CHHD, CHHDX  No results for input(s): INR, PTP, APTT, INREXT in the last 72 hours. Recent Labs     22  0314 22  1414   * 134*   K 5.1 4.9    97   CO2 30 31   BUN 21* 21*   CREA 1.34* 1.41*   * 97   CA 9.4 9.0   ALB  --  2.6*   WBC 12.5* 13.9*   HGB 11.7* 10.7*   HCT 38.3 34.4*    300     Recent Labs     22  1414   AP 73   TP 7.1   ALB 2.6*   GLOB 4.5*     No components found for: GLPOC  No results for input(s): PH, PCO2, PO2 in the last 72 hours.         Brionna Shelton MD  2022

## 2022-02-23 NOTE — PROGRESS NOTES
Hospitalist Progress Note    NAME: Melissa Gonzalez   :  1950   MRN:  [id number]       Assessment / Plan:  Acute  chronic hypoxic respiratory failure secondary to COPD exacerbation and possible CHF exacerbation systolic acute on chronic  Leukocytosis  We will continue with IV steroid, nebs, IV antibiotics. proBNP is 250, continue with home dose Lasix 20 mg daily  Improving leukocytosis, procalcitonin low, ceftriaxone stopped. Continue with doxycycline for COPD exacerbation    Atrial flutter status post ablation in December  Continue Eliquis Cardizem, continue amiodarone  EP consulted, plan for EP studies while inpatient on Thursday no change    Hypertension  Continue with Cozaar, Toprol, Cardizem     Hyperlipidemia  Continue Crestor     BPHcontinue finasteride        Code Status: Full  Surrogate Decision Maker:     DVT Prophylaxis: Eliquis   GI Prophylaxis: not indicated     30.0 - 39.9 Obese / Body mass index is 35.45 kg/m². Estimated discharge date:   Barriers:       Subjective:     Chief Complaint / Reason for Physician Visit  Fu copd exacerbation . Continue to improve   discussed with RN events overnight. Review of Systems:  Symptom Y/N Comments  Symptom Y/N Comments   Fever/Chills n   Chest Pain n    Poor Appetite    Edema     Cough    Abdominal Pain n    Sputum    Joint Pain     SOB/BOWDEN y   Pruritis/Rash     Nausea/vomit    Tolerating PT/OT     Diarrhea    Tolerating Diet y    Constipation    Other       Could NOT obtain due to:      Objective:     VITALS:   Last 24hrs VS reviewed since prior progress note.  Most recent are:  Patient Vitals for the past 24 hrs:   Temp Pulse Resp BP SpO2   22 1509     97 %   22 1130 98.5 °F (36.9 °C) 76 16 (!) 100/59 95 %   22 0837     96 %   22 0730 98.3 °F (36.8 °C) 75 16 (!) 149/60 92 %   22 0526 97.8 °F (36.6 °C) 69 18 (!) 133/58 94 %   22 0400  72      22 0000  73    02/22/22 2126 98.4 °F (36.9 °C) 72 18 (!) 130/57 95 %   02/22/22 2000  73      02/22/22 1941     92 %       Intake/Output Summary (Last 24 hours) at 2/23/2022 1518  Last data filed at 2/22/2022 1527  Gross per 24 hour   Intake    Output 1 ml   Net -1 ml        I had a face to face encounter and independently examined this patient on 2/23/2022, as outlined below:  PHYSICAL EXAM:  General: WD, WN. Alert, cooperative, no acute distress    EENT:  EOMI. Anicteric sclerae. MMM  Resp:  CTA bilaterally, no wheezing or rales. No accessory muscle use  CV:  Regular  rhythm,  No edema  GI:  Soft, Non distended, Non tender. +Bowel sounds  Neurologic:  Alert and oriented X 3, normal speech,   Psych:   Good insight. Not anxious nor agitated  Skin:  No rashes. No jaundice    Reviewed most current lab test results and cultures  YES  Reviewed most current radiology test results   YES  Review and summation of old records today    NO  Reviewed patient's current orders and MAR    YES  PMH/SH reviewed - no change compared to H&P  ________________________________________________________________________  Care Plan discussed with:    Comments   Patient x    Family      RN x    Care Manager     Consultant                        Multidiciplinary team rounds were held today with , nursing, pharmacist and clinical coordinator. Patient's plan of care was discussed; medications were reviewed and discharge planning was addressed.      ________________________________________________________________________  Total NON critical care TIME: 35 Minutes    Total CRITICAL CARE TIME Spent:   Minutes non procedure based      Comments   >50% of visit spent in counseling and coordination of care     ________________________________________________________________________  Sabra Hankins MD     Procedures: see electronic medical records for all procedures/Xrays and details which were not copied into this note but were reviewed prior to creation of Plan. LABS:  I reviewed today's most current labs and imaging studies.   Pertinent labs include:  Recent Labs     02/22/22  0314 02/21/22  1414   WBC 12.5* 13.9*   HGB 11.7* 10.7*   HCT 38.3 34.4*    300     Recent Labs     02/22/22  0314 02/21/22  1414   * 134*   K 5.1 4.9    97   CO2 30 31   * 97   BUN 21* 21*   CREA 1.34* 1.41*   CA 9.4 9.0   ALB  --  2.6*   TBILI  --  0.4   ALT  --  50       Signed: Yennifer Mejía MD

## 2022-02-23 NOTE — PROGRESS NOTES
EP/ ARRHYTHMIA Progress Note    Patient ID:  Patient: Gil Briggs  MRN: [id number]  Age: 66 y.o.  : 1950    Date of  Admission: 2022  3:06 PM   PCP:  Kitty Briseno MD    Assessment: 1. Unspecified atrial flutter, cardioverted to sinus rhythm in 2021. Still in sinus. 2. He was cardioverted in 2021 for unspecified atrial flutter (might be a right-sided circuit based on ECG), remains on amiodarone. He did have PVI, LA roof line, and CTI line with RF in 2021.  3. Bronchitis, CXR is clear. 4. Full code. Plan:     1. I am suspicious he may have had typical RA flutter in the past, so will do an EP study while he's here and if he needs touch-up RF of the CTI line, then that can be done. I'd likely stop amiodarone if so and then and he'll F/U with me as an OP to see if he has any breakthrough arrhythmia otherwise. If his CTI line is intact, will do a complex ablation in the future when he's at baseline. 2. Previous issue with cardiomyopathy, likely nonischemic and may be tachycardia-mediated. EF 25-30%. Probably has had improvement in EF since last checked, can get an echo in Dr. Tavon García office as an OP. 3. Let's continue metoprolol and diltiazem as prescribed. 4. Holding Eliquis for procedure. NPO after MN. Plan for EP study and possible right atrial flutter ablation on Thursday. [x]       High complexity decision making was performed in this patient at high risk for decompensation with multiple organ involvement. Gil Briggs is a 66 y.o. male with a history of cardioverted atrial flutter in 2021 due to other circumstances (infection), who has done well since. Maintaining sinus rhythm as far as we can tell. He was admitted recently with bronchitis. Currently in sinus rhythm. Don't know if he's had atrial flutter since his cardioversion at this time.   No chest pain, syncope, palpitations, orthopnea, PND, or edema. He feels he's breathing better. Past Medical History:   Diagnosis Date    Arrhythmia     SVT; Dr. Erendira Cueva Fall 2018    Chronic obstructive pulmonary disease (HCC)     mild    GERD (gastroesophageal reflux disease)     Hypertension     Thyroid disease     benign thyroid growth, checked annually by Dr. Dexter Dash        Past Surgical History:   Procedure Laterality Date    COLONOSCOPY N/A 2/5/2019    COLONOSCOPY performed by Mira Hoyos [facility] at [facility]OSCOPY    COLONOSCOPY,REMV RAVI,SNARE  2/5/2019         HX HEENT Bilateral     cataract extraction    [facility] COLON CA SCRN NOT  [address] IND  2/5/2019            Social History     Tobacco Use    Smoking status: Former Smoker     Years: 5.00    Smokeless tobacco: Never Used   Substance Use Topics    Alcohol use:  Yes     Alcohol/week: 7.0 standard drinks     Types: 7 Glasses of wine per week        Family History   Problem Relation Age of Onset    Hypertension Mother     Heart Disease Mother     Hypertension Father     Diabetes Father         No Known Allergies       Current Facility-Administered Medications   Medication Dose Route Frequency    methylPREDNISolone (PF) (SOLU-MEDROL) injection 40 mg  40 mg IntraVENous Q12H    furosemide (LASIX) tablet 20 mg  20 mg Oral DAILY    arformoteroL (BROVANA) neb solution 15 mcg  15 mcg Nebulization BID RT    And    budesonide (PULMICORT) 250 mcg/2ml nebulizer susp  250 mcg Nebulization BID RT    And    ipratropium (ATROVENT) 0.02 % nebulizer solution 0.5 mg  0.5 mg Nebulization TID RT    acetaminophen (TYLENOL) tablet 650 mg  650 mg Oral Q6H PRN    Or    acetaminophen (TYLENOL) suppository 650 mg  650 mg Rectal Q6H PRN    ondansetron (ZOFRAN ODT) tablet 4 mg  4 mg Oral Q8H PRN    Or    ondansetron (ZOFRAN) injection 4 mg  4 mg IntraVENous Q6H PRN    sodium chloride (NS) flush 5-40 mL  5-40 mL IntraVENous Q8H    sodium chloride (NS) flush 5-40 mL  5-40 mL IntraVENous PRN    polyethylene glycol (MIRALAX) packet 17 g  17 g Oral DAILY PRN    doxycycline (VIBRA-TABS) tablet 100 mg  100 mg Oral Q12H    famotidine (PEPCID) tablet 20 mg  20 mg Oral BID    amiodarone (CORDARONE) tablet 400 mg  400 mg Oral DAILY    rosuvastatin (CRESTOR) tablet 40 mg  40 mg Oral QHS    losartan (COZAAR) tablet 50 mg  50 mg Oral DAILY    metoprolol succinate (TOPROL-XL) XL tablet 50 mg  50 mg Oral DAILY    finasteride (PROSCAR) tablet 5 mg  5 mg Oral DAILY    tamsulosin (FLOMAX) capsule 0.4 mg  0.4 mg Oral DAILY    dilTIAZem ER (CARDIZEM CD) capsule 240 mg  240 mg Oral DAILY    [Held by provider] apixaban (ELIQUIS) tablet 5 mg  5 mg Oral BID    aspirin delayed-release tablet 81 mg  81 mg Oral DAILY    pantoprazole (PROTONIX) tablet 40 mg  40 mg Oral DAILY       Review of Symptoms:  Gastrointestinal: negative for abdominal pain, N/V, dysphagia, change in bowel habits, bleeding   Genitourinary: negative for frequency, urgency, dysuria, hematuria, incontinence      Objective:      Physical Exam:  Temp (24hrs), Av.2 °F (36.8 °C), Min:97.8 °F (36.6 °C), Max:98.5 °F (36.9 °C)    Patient Vitals for the past 8 hrs:   Pulse   22 1509 80   22 1130 76    Patient Vitals for the past 8 hrs:   Resp   22 1509 17   22 1130 16    Patient Vitals for the past 8 hrs:   BP   22 1509 (!) 138/59   22 1130 (!) 100/59        No intake or output data in the 24 hours ending 22 1641    Nondiaphoretic, not in acute distress. No scleral icterus, mucous membranes moist, conjuctivae pink, no xanthelasma. Unlabored, clear to auscultation bilaterally anteriorly, symmetric air movement. Regular rate and rhythm, no murmur, pericardial rub, knock, or gallop. No JVD or peripheral edema. Palpable radial pulses bilaterally. Abdomen, soft, nontender, nondistended. Extremities without cyanosis or clubbing. Muscle tone and bulk normal.  Skin warm and dry. No rashes or ulcers.   Neuro grossly nonfocal.  No tremor. Awake and appropriate. CARDIOGRAPHICS and STUDIES, I reviewed:    Telemetry:  No events. ECG:  Reviewed. Echo:  Reviewed. Labs:  No results for input(s): CPK, CKMB, CKNDX, TROIQ in the last 72 hours. No lab exists for component: CPKMB  No results found for: CHOL, CHOLX, CHLST, CHOLV, HDL, HDLP, LDL, LDLC, DLDLP, TGLX, TRIGL, TRIGP, CHHD, CHHDX  No results for input(s): INR, PTP, APTT, INREXT, INREXT in the last 72 hours. Recent Labs     02/22/22  0314 02/21/22  1414   * 134*   K 5.1 4.9    97   CO2 30 31   BUN 21* 21*   CREA 1.34* 1.41*   * 97   CA 9.4 9.0   ALB  --  2.6*   WBC 12.5* 13.9*   HGB 11.7* 10.7*   HCT 38.3 34.4*    300     Recent Labs     02/21/22  1414   AP 73   TP 7.1   ALB 2.6*   GLOB 4.5*     No components found for: GLPOC  No results for input(s): PH, PCO2, PO2 in the last 72 hours.         Andra Hansen MD  2/23/2022

## 2022-02-24 ENCOUNTER — ANESTHESIA EVENT (OUTPATIENT)
Dept: CARDIAC CATH/INVASIVE PROCEDURES | Age: 72
DRG: 981 | End: 2022-02-24
Payer: MEDICARE

## 2022-02-24 ENCOUNTER — ANESTHESIA (OUTPATIENT)
Dept: CARDIAC CATH/INVASIVE PROCEDURES | Age: 72
DRG: 981 | End: 2022-02-24
Payer: MEDICARE

## 2022-02-24 VITALS
BODY MASS INDEX: 34.55 KG/M2 | HEIGHT: 71 IN | DIASTOLIC BLOOD PRESSURE: 79 MMHG | HEART RATE: 74 BPM | WEIGHT: 246.8 LBS | RESPIRATION RATE: 22 BRPM | OXYGEN SATURATION: 92 % | TEMPERATURE: 97.8 F | SYSTOLIC BLOOD PRESSURE: 147 MMHG

## 2022-02-24 PROCEDURE — 74011000258 HC RX REV CODE- 258: Performed by: NURSE ANESTHETIST, CERTIFIED REGISTERED

## 2022-02-24 PROCEDURE — C1894 INTRO/SHEATH, NON-LASER: HCPCS | Performed by: INTERNAL MEDICINE

## 2022-02-24 PROCEDURE — 93621 COMP EP EVL L PAC&REC C SINS: CPT | Performed by: INTERNAL MEDICINE

## 2022-02-24 PROCEDURE — 77030042077 HC CBL ABLATN -C: Performed by: INTERNAL MEDICINE

## 2022-02-24 PROCEDURE — 74011250636 HC RX REV CODE- 250/636: Performed by: INTERNAL MEDICINE

## 2022-02-24 PROCEDURE — C1766 INTRO/SHEATH,STRBLE,NON-PEEL: HCPCS | Performed by: INTERNAL MEDICINE

## 2022-02-24 PROCEDURE — 74011250636 HC RX REV CODE- 250/636: Performed by: NURSE ANESTHETIST, CERTIFIED REGISTERED

## 2022-02-24 PROCEDURE — C1732 CATH, EP, DIAG/ABL, 3D/VECT: HCPCS | Performed by: INTERNAL MEDICINE

## 2022-02-24 PROCEDURE — 94760 N-INVAS EAR/PLS OXIMETRY 1: CPT

## 2022-02-24 PROCEDURE — 02583ZZ DESTRUCTION OF CONDUCTION MECHANISM, PERCUTANEOUS APPROACH: ICD-10-PCS | Performed by: INTERNAL MEDICINE

## 2022-02-24 PROCEDURE — 2709999900 HC NON-CHARGEABLE SUPPLY: Performed by: INTERNAL MEDICINE

## 2022-02-24 PROCEDURE — 74011250637 HC RX REV CODE- 250/637: Performed by: INTERNAL MEDICINE

## 2022-02-24 PROCEDURE — 76060000032 HC ANESTHESIA 0.5 TO 1 HR: Performed by: INTERNAL MEDICINE

## 2022-02-24 PROCEDURE — 77030027107 HC PTCH EXT REF CARTO3 J&J -F: Performed by: INTERNAL MEDICINE

## 2022-02-24 PROCEDURE — C1760 CLOSURE DEV, VASC: HCPCS | Performed by: INTERNAL MEDICINE

## 2022-02-24 PROCEDURE — 74011000250 HC RX REV CODE- 250: Performed by: NURSE ANESTHETIST, CERTIFIED REGISTERED

## 2022-02-24 PROCEDURE — 93653 COMPRE EP EVAL TX SVT: CPT | Performed by: INTERNAL MEDICINE

## 2022-02-24 PROCEDURE — 74011000250 HC RX REV CODE- 250: Performed by: INTERNAL MEDICINE

## 2022-02-24 PROCEDURE — 77030039825 HC MSK NSL PAP SUPERNO2VA VYRM -B: Performed by: ANESTHESIOLOGY

## 2022-02-24 PROCEDURE — 94640 AIRWAY INHALATION TREATMENT: CPT

## 2022-02-24 PROCEDURE — 77030035291 HC TBNG PMP SMARTABLATE J&J -B: Performed by: INTERNAL MEDICINE

## 2022-02-24 PROCEDURE — 02K83ZZ MAP CONDUCTION MECHANISM, PERCUTANEOUS APPROACH: ICD-10-PCS | Performed by: INTERNAL MEDICINE

## 2022-02-24 PROCEDURE — 77010033678 HC OXYGEN DAILY

## 2022-02-24 PROCEDURE — C1730 CATH, EP, 19 OR FEW ELECT: HCPCS | Performed by: INTERNAL MEDICINE

## 2022-02-24 PROCEDURE — 4A0234Z MEASUREMENT OF CARDIAC ELECTRICAL ACTIVITY, PERCUTANEOUS APPROACH: ICD-10-PCS | Performed by: INTERNAL MEDICINE

## 2022-02-24 RX ORDER — DOXYCYCLINE HYCLATE 100 MG
100 TABLET ORAL EVERY 12 HOURS
Qty: 4 TABLET | Refills: 0 | Status: SHIPPED | OUTPATIENT
Start: 2022-02-24 | End: 2022-02-26

## 2022-02-24 RX ORDER — PREDNISONE 20 MG/1
40 TABLET ORAL
Qty: 10 TABLET | Refills: 0 | Status: SHIPPED | OUTPATIENT
Start: 2022-02-24 | End: 2022-03-01

## 2022-02-24 RX ORDER — FENTANYL CITRATE 50 UG/ML
INJECTION, SOLUTION INTRAMUSCULAR; INTRAVENOUS AS NEEDED
Status: DISCONTINUED | OUTPATIENT
Start: 2022-02-24 | End: 2022-02-24 | Stop reason: HOSPADM

## 2022-02-24 RX ORDER — MIDAZOLAM HYDROCHLORIDE 1 MG/ML
INJECTION, SOLUTION INTRAMUSCULAR; INTRAVENOUS AS NEEDED
Status: DISCONTINUED | OUTPATIENT
Start: 2022-02-24 | End: 2022-02-24 | Stop reason: HOSPADM

## 2022-02-24 RX ORDER — LIDOCAINE HYDROCHLORIDE 10 MG/ML
INJECTION INFILTRATION; PERINEURAL AS NEEDED
Status: DISCONTINUED | OUTPATIENT
Start: 2022-02-24 | End: 2022-02-24 | Stop reason: HOSPADM

## 2022-02-24 RX ORDER — SODIUM CHLORIDE 9 MG/ML
INJECTION, SOLUTION INTRAVENOUS
Status: DISCONTINUED | OUTPATIENT
Start: 2022-02-24 | End: 2022-02-24 | Stop reason: HOSPADM

## 2022-02-24 RX ORDER — SODIUM CHLORIDE 0.9 % (FLUSH) 0.9 %
5-40 SYRINGE (ML) INJECTION EVERY 8 HOURS
Status: DISCONTINUED | OUTPATIENT
Start: 2022-02-24 | End: 2022-02-24 | Stop reason: HOSPADM

## 2022-02-24 RX ORDER — SODIUM CHLORIDE 0.9 % (FLUSH) 0.9 %
5-40 SYRINGE (ML) INJECTION AS NEEDED
Status: DISCONTINUED | OUTPATIENT
Start: 2022-02-24 | End: 2022-02-24 | Stop reason: HOSPADM

## 2022-02-24 RX ORDER — HEPARIN SODIUM 200 [USP'U]/100ML
INJECTION, SOLUTION INTRAVENOUS
Status: COMPLETED | OUTPATIENT
Start: 2022-02-24 | End: 2022-02-24

## 2022-02-24 RX ORDER — DEXMEDETOMIDINE HYDROCHLORIDE 100 UG/ML
INJECTION, SOLUTION INTRAVENOUS AS NEEDED
Status: DISCONTINUED | OUTPATIENT
Start: 2022-02-24 | End: 2022-02-24 | Stop reason: HOSPADM

## 2022-02-24 RX ORDER — PROPOFOL 10 MG/ML
INJECTION, EMULSION INTRAVENOUS
Status: DISCONTINUED | OUTPATIENT
Start: 2022-02-24 | End: 2022-02-24 | Stop reason: HOSPADM

## 2022-02-24 RX ADMIN — DOXYCYCLINE HYCLATE 100 MG: 100 TABLET, COATED ORAL at 11:22

## 2022-02-24 RX ADMIN — FENTANYL CITRATE 50 MCG: 50 INJECTION, SOLUTION INTRAMUSCULAR; INTRAVENOUS at 09:03

## 2022-02-24 RX ADMIN — MIDAZOLAM HYDROCHLORIDE 2 MG: 1 INJECTION, SOLUTION INTRAMUSCULAR; INTRAVENOUS at 08:53

## 2022-02-24 RX ADMIN — BUDESONIDE 250 MCG: 0.25 INHALANT RESPIRATORY (INHALATION) at 07:42

## 2022-02-24 RX ADMIN — ASPIRIN 81 MG: 81 TABLET, COATED ORAL at 11:21

## 2022-02-24 RX ADMIN — METOPROLOL SUCCINATE 50 MG: 50 TABLET, FILM COATED, EXTENDED RELEASE ORAL at 11:21

## 2022-02-24 RX ADMIN — TAMSULOSIN HYDROCHLORIDE 0.4 MG: 0.4 CAPSULE ORAL at 11:21

## 2022-02-24 RX ADMIN — FUROSEMIDE 20 MG: 20 TABLET ORAL at 11:22

## 2022-02-24 RX ADMIN — FINASTERIDE 5 MG: 5 TABLET, FILM COATED ORAL at 11:22

## 2022-02-24 RX ADMIN — ARFORMOTEROL TARTRATE 15 MCG: 15 SOLUTION RESPIRATORY (INHALATION) at 07:42

## 2022-02-24 RX ADMIN — METHYLPREDNISOLONE SODIUM SUCCINATE 40 MG: 40 INJECTION, POWDER, FOR SOLUTION INTRAMUSCULAR; INTRAVENOUS at 11:22

## 2022-02-24 RX ADMIN — LOSARTAN POTASSIUM 50 MG: 50 TABLET, FILM COATED ORAL at 11:21

## 2022-02-24 RX ADMIN — FENTANYL CITRATE 50 MCG: 50 INJECTION, SOLUTION INTRAMUSCULAR; INTRAVENOUS at 09:04

## 2022-02-24 RX ADMIN — SODIUM CHLORIDE: 9 INJECTION, SOLUTION INTRAVENOUS at 08:53

## 2022-02-24 RX ADMIN — DEXMEDETOMIDINE HYDROCHLORIDE 4 MCG: 100 INJECTION, SOLUTION, CONCENTRATE INTRAVENOUS at 09:06

## 2022-02-24 RX ADMIN — DEXMEDETOMIDINE HYDROCHLORIDE 4 MCG: 100 INJECTION, SOLUTION, CONCENTRATE INTRAVENOUS at 08:58

## 2022-02-24 RX ADMIN — PROPOFOL 50 MCG/KG/MIN: 10 INJECTION, EMULSION INTRAVENOUS at 09:01

## 2022-02-24 RX ADMIN — PANTOPRAZOLE SODIUM 40 MG: 40 TABLET, DELAYED RELEASE ORAL at 11:22

## 2022-02-24 RX ADMIN — IPRATROPIUM BROMIDE 0.5 MG: 0.5 SOLUTION RESPIRATORY (INHALATION) at 13:11

## 2022-02-24 RX ADMIN — IPRATROPIUM BROMIDE 0.5 MG: 0.5 SOLUTION RESPIRATORY (INHALATION) at 07:42

## 2022-02-24 RX ADMIN — SODIUM CHLORIDE, PRESERVATIVE FREE 10 ML: 5 INJECTION INTRAVENOUS at 06:06

## 2022-02-24 NOTE — PROGRESS NOTES
Brief Procedure Note    Patient: Laci Mendes MRN: [id number]  SSN: [id number]    YOB: 1950  Age: 66 y.o. Sex: male      Date of Procedure: 2/24/2022     Pre-procedure Diagnosis: Typical atrial flutter    Post-procedure Diagnosis: Typical atrial flutter    Procedure:  EP study revealing a leak in the old CTI line, RF therapy applied and bidirectional block confirmed    Performed By: León Lake MD     Anesthesia: Moderate Sedation    Estimated Blood Loss: Less than 10 mL      Specimens:  None    Findings:  As above, see full note    Complications: None    Implants: Vascade venous closure used x 2    Recommendations: Continue anticoagulation. Stop amiodarone. F/U routinely in the office (he has an appt already).     Signed By: León Lake MD     February 24, 2022

## 2022-02-24 NOTE — DISCHARGE SUMMARY
Hospitalist Discharge Summary     Patient ID:  Sergio Monterroso  [id number]  66 y.o.  1950 2/21/2022    PCP on record: Ghulam Thornton MD    Admit date: 2/21/2022  Discharge date and time: 2/24/2022    DISCHARGE DIAGNOSIS:  Acute  chronic hypoxic respiratory failure secondary to COPD exacerbation and possible CHF exacerbation systolic acute on chronic  Leukocytosis  Atrial flutter status post ablation in December  Hypertension  Hyperlipidemia   BPH    CONSULTATIONS:  IP CONSULT TO CARDIOLOGY    Excerpted HPI from H&P of Calvin Crenshaw MD:  CHIEF COMPLAINT: SOB     HISTORY OF PRESENT ILLNESS:     66years old male from home with past medical history significant for CHF, GERD, COPD, thyroid disease presented to the hospital for evaluation of worsening shortness of breath associated with hypoxia, patient on as needed oxygen at home patient stated for the last week complaint from worsening shortness of breath with ambulation, patient denies any chest pain denies any cough any fever any chills denies any productive cough, CTA chest was done show no PE and show emphysema mild cardiomegaly.     We were asked to admit for work up and evaluation of the above problems.        ______________________________________________________________________  DISCHARGE SUMMARY/HOSPITAL COURSE:  for full details see H&P, daily progress notes, labs, consult notes. Acute  chronic hypoxic respiratory failure secondary to COPD exacerbation and possible CHF exacerbation systolic acute on chronic  Leukocytosis  S/p IV steroid, nebs, IV antibiotics switched to PO . proBNP is 250, continue with home dose Lasix 20 mg daily  Improving leukocytosis, procalcitonin low, ceftriaxone stopped.   Continue with doxycycline for COPD exacerbation     Atrial flutter status post ablation in December  S/p aflutter ablation on 02/24  Continue Eliquis, Cardizem, continue amiodarone       Hypertension  Continue with Cozaar, Toprol, Cardizem     Hyperlipidemia  Continue Crestor     BPHcontinue finasteride     ______________________________________________________________________  Patient seen and examined by me on discharge day. Pertinent Findings:  Gen:    Not in distress  Chest: Clear lungs  CVS:   Regular rhythm. No edema  Abd:  Soft, not distended, not tender  Neuro:  Alert, orientedx3  _______________________________________________________________________  DISCHARGE MEDICATIONS:   Current Discharge Medication List      START taking these medications    Details   doxycycline (VIBRA-TABS) 100 mg tablet Take 1 Tablet by mouth every twelve (12) hours for 4 doses. Qty: 4 Tablet, Refills: 0  Start date: 2/24/2022, End date: 2/26/2022      predniSONE (DELTASONE) 20 mg tablet Take 40 mg by mouth daily (with breakfast) for 5 days. Qty: 10 Tablet, Refills: 0  Start date: 2/24/2022, End date: 3/1/2022         CONTINUE these medications which have CHANGED    Details   apixaban (ELIQUIS) 5 mg tablet Take 1 Tablet by mouth two (2) times a day. Restarting from 02/25  Qty: 60 Tablet, Refills: 0  Start date: 2/24/2022         CONTINUE these medications which have NOT CHANGED    Details   digoxin (LANOXIN) 0.125 mg tablet Take 1 Tablet by mouth daily. Qty: 60 Tablet, Refills: 1      losartan (COZAAR) 50 mg tablet Take 1 Tablet by mouth daily. Qty: 30 Tablet, Refills: 1      metoprolol succinate (Toprol XL) 25 mg XL tablet Take 2 Tablets by mouth daily. Qty: 30 Tablet, Refills: 1      dilTIAZem ER (TIAZAC) 240 mg capsule Take 240 mg by mouth daily. Indications: paroxysmal supraventricular tachycardia      fluticasone-umeclidinium-vilanterol (Trelegy Ellipta) 100-62.5-25 mcg inhaler Take 1 Puff by inhalation daily. pantoprazole (Protonix) 40 mg tablet Take 40 mg by mouth daily. furosemide (LASIX) 20 mg tablet 1 tab po daily  Qty: 30 Tab, Refills: 0      rosuvastatin (Crestor) 40 mg tablet Take 40 mg by mouth nightly.       aspirin delayed-release 81 mg tablet Take 81 mg by mouth daily. Omega-3-DHA-EPA-Fish Oil 1,000 mg (120 mg-180 mg) cap Take 1 Cap by mouth daily. finasteride (PROSCAR) 5 mg tablet Take 5 mg by mouth daily. vit A/vit C/vit E/zinc/copper (PRESERVISION AREDS PO) Take 1 Caplet by mouth two (2) times a day. ascorbic acid, vitamin C, (Vitamin C) 500 mg tablet Take 500 mg by mouth daily. cyanocobalamin (Vitamin B-12) 1,000 mcg tablet Take 1,000 mcg by mouth daily. tamsulosin (FLOMAX) 0.4 mg capsule Take 0.4 mg by mouth daily. melatonin 3 mg tablet Take 3 mg by mouth nightly. cholecalciferol (VITAMIN D3) 1,000 unit cap Take 1,000 Units by mouth daily. MULTIVIT-MIN/FA/LYCOPEN/LUTEIN (CENTRUM SILVER ULTRA MEN'S PO) Take 1 Tab by mouth daily. STOP taking these medications       amiodarone (PACERONE) 400 mg tablet Comments:   Reason for Stopping:         ceFAZolin 2 g IV syringe Comments:   Reason for Stopping:                 Patient Follow Up Instructions:    Activity: Activity as tolerated  Diet: Cardiac Diet  Wound Care: None needed        Follow-up Information     Follow up With Specialties Details Why Danielle Nieto MD [facility] [id number] [phone]          ________________________________________________________________    Risk of deterioration: Moderate    Condition at Discharge:  Stable  __________________________________________________________________    Disposition  Home with family, no needs    ____________________________________________________________________    Code Status: Full Code  ___________________________________________________________________      Total time in minutes spent coordinating this discharge (includes going over instructions, follow-up, prescriptions, and preparing report for sign off to her PCP) :  >30 minutes    Signed:  Marylee Hilding, MD

## 2022-02-24 NOTE — PROGRESS NOTES
Stop amiodarone.   Continue anticoagulation starting tomorrow AM.  From my standpoint, OK for discharge after 4 hours of bedrest if ambulatory and taking oral.

## 2022-02-24 NOTE — DISCHARGE INSTRUCTIONS
POST-ABLATION DISCHARGE INSTRUCTIONS:    You had a touch-up right atrial flutter ablation using RF with Dr. Christie Vasquez on 2/24. Stop amiodarone, continue your cardiac medications otherwise. Please keep the follow-up appointment that you already have with Dr. Christie Vasquez in the next 1-2 months. Do not drive, operate any machinery, or sign any legal documents for 24 hours after your procedure. You must have someone to drive you home. You may take a shower 24 hours after your cardiac procedure. Be sure to get the dressing wet and then remove it; gently wash the area with warm soapy water. Pat dry and leave open to air. To help prevent infections, be sure to keep the cath site clean and dry. No lotions, creams, powders, ointments, etc. in the cath site for approximately 1 week.  Do not take a tub bath, get in a hot tub or swimming pool for approximately 5 days or until the cath site is completely healed.  No strenuous activity or heavy lifting over 20 lbs. for 7 days.  After your procedure, some bruising or discomfort is common during the healing process. Tylenol, 1-2 tablets every 6 hours as needed, is recommended if you experience any discomfort. If you experience any signs or symptoms of infection such as fever, chills, or poorly healing incision, persistent tenderness or swelling in the groin, redness and/or warmth to the touch, numbness, significant tingling or pain at the groin site or affected extremity, rash, drainage from the site, or if the leg feels tight or swollen, call your physician right away.  If bleeding at the site occurs, take a clean gauze pad and apply direct pressure to the groin just above the puncture site, and call your physician right away.  If your procedure involved ablation therapy, you may feel some mild or vague chest discomfort due to delivery of heat therapy to the heart muscle. This should resolve in 1-2 days.   If it gets worse or is associated with shortness of breath, dizziness, loss of consciousness, call your physician right away or call 911 if emergency medical care is needed.

## 2022-02-24 NOTE — PROGRESS NOTES
PROCEDURE SITE CHECK:    Procedure Site check: No bleeding, no hematoma, no pain/discomfort reported. No change in patient status. Continue to monitor patient and status. TRANSFER - IN REPORT:    Verbal report received from [facility] on Doron Hurley  being received from EP LAB for routine post - op. Report consisted of patients Situation, Background, Assessment and Recommendations(SBAR). Information from the following report(s) SBAR and Procedure Summary was reviewed with the receiving clinician. Opportunity for questions and clarification was provided. Assessment completed upon patients arrival to [address] [place] [address]. Cardiac Cath Lab Recovery Arrival Note:    Doron Hurley arrived to [facility] recovery area. Patient procedure= EPS and Flutter ablation with Anesthesia. Patient on cardiac monitor, non-invasive blood pressure, SPO2 monitor. On ROOM IR. IV  of NS on pump at 100 ml/hr. Patient status doing well without problems. Patient is A&Ox 3. Patient reports no complaints.     PACU recovery with EP Lab staff    Patient seen by Dr Yates Found

## 2022-02-24 NOTE — PROGRESS NOTES
Problem: Falls - Risk of  Goal: *Absence of Falls  Description: Document Yadira Counts Fall Risk and appropriate interventions in the flowsheet.   Outcome: Progressing Towards Goal  Note: Fall Risk Interventions:            Medication Interventions: Teach patient to arise slowly         History of Falls Interventions: Door open when patient unattended,Room close to nurse's station

## 2022-02-24 NOTE — PROGRESS NOTES
PROCEDURE SITE CHECK:    Procedure Site check: No bleeding, no hematoma, no pain/discomfort reported. No change in patient status. Continue to monitor patient and status. EP/ End of Procedure/ TRANSFER - OUT REPORT:    Verbal report given to [facility] staff on Alphonsus Fothergill for routine progression of care       Report consisted of patients Situation, Background, Assessment and   Recommendations(SBAR). Information from the following report(s) SBAR was reviewed with the receiving nurse. Opportunity for questions and clarification was provided.

## 2022-02-24 NOTE — ANESTHESIA POSTPROCEDURE EVALUATION
Procedure(s):  ABLATION A-FLUTTER.    total IV anesthesia    Anesthesia Post Evaluation        Patient location during evaluation: PACU  Note status: Adequate. Level of consciousness: responsive to verbal stimuli and sleepy but conscious  Pain management: satisfactory to patient  Airway patency: patent  Anesthetic complications: no  Cardiovascular status: acceptable  Respiratory status: acceptable  Hydration status: acceptable  Comments: +Post-Anesthesia Evaluation and Assessment    Patient: Clair Coronado MRN: [id number]  SSN: [id number]   YOB: 1950  Age: 66 y.o. Sex: male      Cardiovascular Function/Vital Signs    /70 (BP 1 Location: Right arm)   Pulse 75   Temp 36.6 °C (97.8 °F)   Resp 16   Ht 5' 11\" (1.803 m)   Wt 111.9 kg (246 lb 12.8 oz)   SpO2 93%   BMI 34.42 kg/m²     Patient is status post Procedure(s):  ABLATION A-FLUTTER. Nausea/Vomiting: Controlled. Postoperative hydration reviewed and adequate. Pain:  Pain Scale 1: Numeric (0 - 10) (02/24/22 1003)  Pain Intensity 1: 0 (02/24/22 1003)   Managed. Neurological Status: At baseline. Mental Status and Level of Consciousness: Arousable. Pulmonary Status:   O2 Device: None (Room air) (02/24/22 1003)   Adequate oxygenation and airway patent. Complications related to anesthesia: None    Post-anesthesia assessment completed. No concerns. Signed By: Jong Dean MD    2/24/2022  Post anesthesia nausea and vomiting:  controlled      INITIAL Post-op Vital signs:   Vitals Value Taken Time   /70 02/24/22 1035   Temp 36.6 °C (97.8 °F) 02/24/22 0951   Pulse 74 02/24/22 1036   Resp 12 02/24/22 1036   SpO2 92 % 02/24/22 1036   Vitals shown include unvalidated device data.

## 2022-02-24 NOTE — ROUTINE PROCESS
Discharge instructions reviewed with patient. IV removed. Site CDI. Patient discharged via wheelchair to care of wife.

## 2022-02-24 NOTE — ANESTHESIA PREPROCEDURE EVALUATION
Relevant Problems   CARDIOVASCULAR   (+) Arrhythmia   (+) Atrial fibrillation (HCC)   (+) Atrial flutter (HCC)   (+) Atrial flutter with rapid ventricular response (HCC)   (+) CHF exacerbation (HCC)   (+) Congestive heart failure (HCC)   (+) Hypertension       Anesthetic History   No history of anesthetic complications            Review of Systems / Medical History  Patient summary reviewed, nursing notes reviewed and pertinent labs reviewed    Pulmonary    COPD      Smoker      Comments: Former Smoker  Hx respiratory failure with hypoxia   Neuro/Psych   Within defined limits           Cardiovascular    Hypertension      CHF  Dysrhythmias : atrial flutter and SVT      Exercise tolerance: <4 METS     GI/Hepatic/Renal     GERD    Renal disease: CRI       Endo/Other      Hypothyroidism  Morbid obesity     Other Findings              Physical Exam    Airway  Mallampati: II  TM Distance: > 6 cm  Neck ROM: normal range of motion   Mouth opening: Normal     Cardiovascular  Regular rate and rhythm,  S1 and S2 normal,  no murmur, click, rub, or gallop  Rhythm: irregular           Dental  No notable dental hx       Pulmonary  Breath sounds clear to auscultation               Abdominal  GI exam deferred       Other Findings            Anesthetic Plan    ASA: 3  Anesthesia type: total IV anesthesia          Induction: Intravenous  Anesthetic plan and risks discussed with: Patient

## 2022-02-26 LAB — DIGOXIN FREE SERPL-MCNC: 0.6 NG/ML (ref 0.3–1.5)

## 2022-03-08 NOTE — PROGRESS NOTES
Physician Progress Note      Rere Pinto  CSN #:                  [id number]  :                       1950  ADMIT DATE:       2022 3:06 PM  Amrit Roach [place] DATE:        2022 6:44 PM  RESPONDING  PROVIDER #:        Saintclair Beck MD          QUERY TEXT:    Pt admitted with SOB w/Hypoxia and has CHF documented. If possible, please document in progress notes and discharge summary further specificity regarding the type and acuity of CHF:      The medical record reflects the following:    Risk Factors: 65 y/o male PMHx: CHF, to SOB w/hypoxia, noted P-, CT with mild cardiomegaly  past echo (2021 30-35%). Clinical Indicators:  H&P: Acute  chronic hypoxic respiratory failure secondary to COPD exacerbation and possible CHF exacerbation systolic acute on chronic. Meds   1000 Lasix 20mg PO daily  1000 Losartan 50mg po daily  1000 Toprol - XL po 50mg daily  1000 Diltiazem ER  PO 240mg daily    2022 14:14  NT pro-BNP: 250 (H)     CXR: No acute process     CTA Chest  IMPRESSION  1. No evidence of acute pulmonary thromboembolism or other acute finding in the chest.  2. Emphysema. Mild cardiomegaly    Historical Data  12/10/21 Echo Final result:  LV: Estimated LVEF is 30 - 35%. Normal cavity size and wall thickness. Moderately reduced systolic function. MV: Mild to moderate mitral valve regurgitation is present. No signs for endocarditis by KIM. Treatment: Admit telemetry, hospitalist consult, cardiology consult, Labs, resume home meds- lasix, Daily weights, vitals per unit routine. Options provided:  -- Acute on Chronic Systolic CHF/HFrEF  -- Chronic Systolic CHF/HFrEF  -- Other - I will add my own diagnosis  -- Disagree - Not applicable / Not valid  -- Disagree - Clinically unable to determine / Unknown  -- Refer to Clinical Documentation Reviewer    PROVIDER RESPONSE TEXT:    This patient is in acute on chronic systolic CHF/HFrEF.     Query created by: Santiago Jolly on 3/1/2022 9:30 AM      Electronically signed by:  Papito Olmstead MD 3/8/2022 8:01 AM

## 2022-03-18 PROBLEM — I50.9 CONGESTIVE HEART FAILURE (HCC): Status: ACTIVE | Noted: 2021-12-05

## 2022-03-19 PROBLEM — I50.9 CHF EXACERBATION (HCC): Status: ACTIVE | Noted: 2021-12-05

## 2022-03-19 PROBLEM — J96.01 ACUTE RESPIRATORY FAILURE WITH HYPOXIA (HCC): Status: ACTIVE | Noted: 2021-03-04

## 2022-03-19 PROBLEM — I48.92 ATRIAL FLUTTER WITH RAPID VENTRICULAR RESPONSE (HCC): Status: ACTIVE | Noted: 2021-07-02

## 2022-03-19 PROBLEM — I48.91 ATRIAL FIBRILLATION (HCC): Status: ACTIVE | Noted: 2021-09-07

## 2022-03-19 PROBLEM — I10 HYPERTENSION: Status: ACTIVE | Noted: 2018-02-12

## 2022-03-19 PROBLEM — I48.92 ATRIAL FLUTTER (HCC): Status: ACTIVE | Noted: 2021-12-05

## 2022-03-19 PROBLEM — E66.811 OBESITY (BMI 30.0-34.9): Status: ACTIVE | Noted: 2018-02-12

## 2022-03-19 PROBLEM — E66.9 OBESITY (BMI 30.0-34.9): Status: ACTIVE | Noted: 2018-02-12

## 2022-03-19 PROBLEM — R50.9 FEBRILE: Status: ACTIVE | Noted: 2021-12-05

## 2022-03-20 PROBLEM — R79.89 ELEVATED BRAIN NATRIURETIC PEPTIDE (BNP) LEVEL: Status: ACTIVE | Noted: 2021-12-05

## 2022-03-20 PROBLEM — J96.00 ARF (ACUTE RESPIRATORY FAILURE) (HCC): Status: ACTIVE | Noted: 2022-02-21

## 2022-03-20 PROBLEM — I49.9 ARRHYTHMIA: Status: ACTIVE | Noted: 2018-02-12

## 2022-03-20 PROBLEM — J96.20 RESPIRATORY FAILURE, ACUTE-ON-CHRONIC (HCC): Status: ACTIVE | Noted: 2021-12-05

## 2022-12-15 ENCOUNTER — TRANSCRIBE ORDER (OUTPATIENT)
Dept: SCHEDULING | Age: 72
End: 2022-12-15

## 2022-12-15 DIAGNOSIS — Z87.891 HISTORY OF TOBACCO USE: Primary | ICD-10-CM

## 2022-12-21 ENCOUNTER — TRANSCRIBE ORDER (OUTPATIENT)
Dept: SCHEDULING | Age: 72
End: 2022-12-21

## 2022-12-21 DIAGNOSIS — Z87.891 HISTORY OF TOBACCO USE: Primary | ICD-10-CM

## 2023-03-10 ENCOUNTER — HOSPITAL ENCOUNTER (OUTPATIENT)
Dept: CT IMAGING | Age: 73
Discharge: HOME | End: 2023-03-10
Attending: INTERNAL MEDICINE
Payer: MEDICARE

## 2023-03-10 DIAGNOSIS — Z87.891 HISTORY OF TOBACCO USE: ICD-10-CM

## 2023-03-10 PROCEDURE — 71271 CT THORAX LUNG CANCER SCR C-: CPT

## 2024-03-13 ENCOUNTER — HOSPITAL ENCOUNTER (OUTPATIENT)
Facility: HOSPITAL | Age: 74
Discharge: HOME | End: 2024-03-16
Attending: INTERNAL MEDICINE
Payer: MEDICARE

## 2024-03-13 DIAGNOSIS — F17.211 CIGARETTE NICOTINE DEPENDENCE IN REMISSION: ICD-10-CM

## 2024-03-13 DIAGNOSIS — Z87.891 PERSONAL HISTORY OF TOBACCO USE: ICD-10-CM

## 2024-03-13 PROCEDURE — 71271 CT THORAX LUNG CANCER SCR C-: CPT

## 2024-03-29 ENCOUNTER — APPOINTMENT (OUTPATIENT)
Facility: HOSPITAL | Age: 74
End: 2024-03-29
Payer: MEDICARE

## 2024-03-29 ENCOUNTER — HOSPITAL ENCOUNTER (EMERGENCY)
Facility: HOSPITAL | Age: 74
Discharge: HOME | End: 2024-03-29
Attending: EMERGENCY MEDICINE
Payer: MEDICARE

## 2024-03-29 VITALS
HEIGHT: 71 IN | TEMPERATURE: 97.5 F | WEIGHT: 235 LBS | SYSTOLIC BLOOD PRESSURE: 131 MMHG | RESPIRATION RATE: 16 BRPM | OXYGEN SATURATION: 92 % | BODY MASS INDEX: 32.9 KG/M2 | DIASTOLIC BLOOD PRESSURE: 87 MMHG | HEART RATE: 89 BPM

## 2024-03-29 DIAGNOSIS — Z86.39 HX OF HYPERKALEMIA: Primary | ICD-10-CM

## 2024-03-29 LAB
ALBUMIN SERPL-MCNC: 3.5 G/DL (ref 3.5–5)
ALBUMIN/GLOB SERPL: 0.9 (ref 1.1–2.2)
ALP SERPL-CCNC: 89 U/L (ref 45–117)
ALT SERPL-CCNC: 29 U/L (ref 12–78)
ANION GAP SERPL CALC-SCNC: 5 MMOL/L (ref 5–15)
AST SERPL-CCNC: 10 U/L (ref 15–37)
BASOPHILS # BLD: 0 K/UL (ref 0–0.1)
BASOPHILS NFR BLD: 0 % (ref 0–1)
BILIRUB SERPL-MCNC: 0.3 MG/DL (ref 0.2–1)
BUN SERPL-MCNC: 35 MG/DL (ref 6–20)
BUN/CREAT SERPL: 28 (ref 12–20)
CALCIUM SERPL-MCNC: 9.2 MG/DL (ref 8.5–10.1)
CHLORIDE SERPL-SCNC: 105 MMOL/L (ref 97–108)
CO2 SERPL-SCNC: 28 MMOL/L (ref 21–32)
CREAT SERPL-MCNC: 1.23 MG/DL (ref 0.7–1.3)
DIFFERENTIAL METHOD BLD: NORMAL
EKG ATRIAL RATE: 100 BPM
EKG DIAGNOSIS: NORMAL
EKG Q-T INTERVAL: 330 MS
EKG QRS DURATION: 100 MS
EKG QTC CALCULATION (BAZETT): 432 MS
EKG R AXIS: 62 DEGREES
EKG T AXIS: 80 DEGREES
EKG VENTRICULAR RATE: 103 BPM
EOSINOPHIL # BLD: 0.1 K/UL (ref 0–0.4)
EOSINOPHIL NFR BLD: 2 % (ref 0–7)
ERYTHROCYTE [DISTWIDTH] IN BLOOD BY AUTOMATED COUNT: 13.5 % (ref 11.5–14.5)
GLOBULIN SER CALC-MCNC: 3.7 G/DL (ref 2–4)
GLUCOSE SERPL-MCNC: 112 MG/DL (ref 65–100)
HCT VFR BLD AUTO: 41.9 % (ref 36.6–50.3)
HGB BLD-MCNC: 13.2 G/DL (ref 12.1–17)
IMM GRANULOCYTES # BLD AUTO: 0 K/UL (ref 0–0.04)
IMM GRANULOCYTES NFR BLD AUTO: 0 % (ref 0–0.5)
LYMPHOCYTES # BLD: 1.8 K/UL (ref 0.8–3.5)
LYMPHOCYTES NFR BLD: 22 % (ref 12–49)
MAGNESIUM SERPL-MCNC: 1.8 MG/DL (ref 1.6–2.4)
MCH RBC QN AUTO: 29.1 PG (ref 26–34)
MCHC RBC AUTO-ENTMCNC: 31.5 G/DL (ref 30–36.5)
MCV RBC AUTO: 92.5 FL (ref 80–99)
MONOCYTES # BLD: 0.6 K/UL (ref 0–1)
MONOCYTES NFR BLD: 8 % (ref 5–13)
NEUTS SEG # BLD: 5.6 K/UL (ref 1.8–8)
NEUTS SEG NFR BLD: 68 % (ref 32–75)
NRBC # BLD: 0 K/UL (ref 0–0.01)
NRBC BLD-RTO: 0 PER 100 WBC
PLATELET # BLD AUTO: 220 K/UL (ref 150–400)
PMV BLD AUTO: 10.6 FL (ref 8.9–12.9)
POTASSIUM SERPL-SCNC: 4.8 MMOL/L (ref 3.5–5.1)
PROT SERPL-MCNC: 7.2 G/DL (ref 6.4–8.2)
RBC # BLD AUTO: 4.53 M/UL (ref 4.1–5.7)
SODIUM SERPL-SCNC: 138 MMOL/L (ref 136–145)
TROPONIN I SERPL HS-MCNC: 5 NG/L (ref 0–76)
WBC # BLD AUTO: 8.2 K/UL (ref 4.1–11.1)

## 2024-03-29 PROCEDURE — 83735 ASSAY OF MAGNESIUM: CPT

## 2024-03-29 PROCEDURE — 71045 X-RAY EXAM CHEST 1 VIEW: CPT

## 2024-03-29 PROCEDURE — 85025 COMPLETE CBC W/AUTO DIFF WBC: CPT

## 2024-03-29 PROCEDURE — 93005 ELECTROCARDIOGRAM TRACING: CPT | Performed by: EMERGENCY MEDICINE

## 2024-03-29 PROCEDURE — 80053 COMPREHEN METABOLIC PANEL: CPT

## 2024-03-29 PROCEDURE — 36415 COLL VENOUS BLD VENIPUNCTURE: CPT

## 2024-03-29 PROCEDURE — 99285 EMERGENCY DEPT VISIT HI MDM: CPT

## 2024-03-29 PROCEDURE — 71046 X-RAY EXAM CHEST 2 VIEWS: CPT

## 2024-03-29 PROCEDURE — 84484 ASSAY OF TROPONIN QUANT: CPT

## 2024-03-29 ASSESSMENT — PAIN SCALES - GENERAL: PAINLEVEL_OUTOF10: 0

## 2024-03-29 ASSESSMENT — LIFESTYLE VARIABLES
HOW OFTEN DO YOU HAVE A DRINK CONTAINING ALCOHOL: MONTHLY OR LESS
HOW MANY STANDARD DRINKS CONTAINING ALCOHOL DO YOU HAVE ON A TYPICAL DAY: 3 OR 4

## 2024-03-29 NOTE — ED PROVIDER NOTES
[facility] EMERGENCY DEPT  EMERGENCY DEPARTMENT ENCOUNTER       Pt Name: Gary Nuno  MRN: [id number]  Birthdate 1950  Date of evaluation: 3/29/2024  Provider: Leon Garcia MD   PCP: Stephane Titus MD  Note Started: 12:44 PM EDT 3/29/24     CHIEF COMPLAINT       Chief Complaint   Patient presents with    Irregular Heart Beat     \"Tyra been in Afib \" cardiology wanted him to wait it out. Pt is on blood thinner-Eliquis and Metoprolol, but his potassium is high 6.2 today and advise to come to ED as this has been climbing.         HISTORY OF PRESENT ILLNESS: 1 or more elements      History From: Patient, History limited by: none     Gary Nuno is a 73 y.o. male patient with history of SVT, A-fib, COPD, hypertension, here for potassium of 6.2.  He says he has been in A-fib but he is on metoprolol and Eliquis.  He has no symptoms other than he can feel that his heartbeat is irregular.  He had his potassium drawn a day, was told it was 6.2.  He denies taking potassium or eating excessive amounts of bananas.  Denies chest pain, shortness of breath.  He says he has a cough which is nonproductive.  He denies fever or chills.  Denies leg pain, leg edema, dizziness       Please See MDM for Additional Details of the HPI/PMH  Nursing Notes were all reviewed and agreed with or any disagreements were addressed in the HPI.     REVIEW OF SYSTEMS        Positives and Pertinent negatives as per HPI.    PAST HISTORY     Past Medical History:  Past Medical History:   Diagnosis Date    Arrhythmia     SVT; Dr. Kirby Fall 2018    Chronic obstructive pulmonary disease (HCC)     mild    GERD (gastroesophageal reflux disease)     Hypertension     Thyroid disease     benign thyroid growth, checked annually by Dr. Molina       Past Surgical History:  Past Surgical History:   Procedure Laterality Date    COLON CA SCRN NOT HI RSK IND  2/5/2019         COLONOSCOPY N/A 2/5/2019    COLONOSCOPY performed by

## 2024-06-18 ENCOUNTER — HOSPITAL ENCOUNTER (OUTPATIENT)
Facility: HOSPITAL | Age: 74
Discharge: HOME | End: 2024-06-18
Attending: INTERNAL MEDICINE | Admitting: INTERNAL MEDICINE
Payer: MEDICARE

## 2024-06-18 ENCOUNTER — ANESTHESIA EVENT (OUTPATIENT)
Facility: HOSPITAL | Age: 74
End: 2024-06-18
Payer: MEDICARE

## 2024-06-18 ENCOUNTER — HOSPITAL ENCOUNTER (OUTPATIENT)
Facility: HOSPITAL | Age: 74
Discharge: HOME | End: 2024-06-20
Attending: INTERNAL MEDICINE
Payer: MEDICARE

## 2024-06-18 ENCOUNTER — ANESTHESIA (OUTPATIENT)
Facility: HOSPITAL | Age: 74
End: 2024-06-18
Payer: MEDICARE

## 2024-06-18 VITALS
WEIGHT: 235 LBS | TEMPERATURE: 98.4 F | DIASTOLIC BLOOD PRESSURE: 74 MMHG | RESPIRATION RATE: 19 BRPM | OXYGEN SATURATION: 93 % | HEART RATE: 96 BPM | BODY MASS INDEX: 32.9 KG/M2 | HEIGHT: 71 IN | SYSTOLIC BLOOD PRESSURE: 144 MMHG

## 2024-06-18 DIAGNOSIS — I48.91 A-FIB (HCC): ICD-10-CM

## 2024-06-18 PROCEDURE — 3700000001 HC ADD 15 MINUTES (ANESTHESIA): Performed by: INTERNAL MEDICINE

## 2024-06-18 PROCEDURE — 2500000003 HC RX 250 WO HCPCS

## 2024-06-18 PROCEDURE — 2709999900 HC NON-CHARGEABLE SUPPLY: Performed by: INTERNAL MEDICINE

## 2024-06-18 PROCEDURE — 3700000000 HC ANESTHESIA ATTENDED CARE: Performed by: INTERNAL MEDICINE

## 2024-06-18 PROCEDURE — C1760 CLOSURE DEV, VASC: HCPCS | Performed by: INTERNAL MEDICINE

## 2024-06-18 PROCEDURE — 6360000002 HC RX W HCPCS

## 2024-06-18 PROCEDURE — C1731 CATH, EP, 20 OR MORE ELEC: HCPCS | Performed by: INTERNAL MEDICINE

## 2024-06-18 PROCEDURE — 2720000010 HC SURG SUPPLY STERILE: Performed by: INTERNAL MEDICINE

## 2024-06-18 PROCEDURE — 2580000003 HC RX 258

## 2024-06-18 PROCEDURE — 6360000002 HC RX W HCPCS: Performed by: NURSE ANESTHETIST, CERTIFIED REGISTERED

## 2024-06-18 PROCEDURE — C1732 CATH, EP, DIAG/ABL, 3D/VECT: HCPCS | Performed by: INTERNAL MEDICINE

## 2024-06-18 PROCEDURE — 7100000001 HC PACU RECOVERY - ADDTL 15 MIN: Performed by: INTERNAL MEDICINE

## 2024-06-18 PROCEDURE — 2580000003 HC RX 258: Performed by: ANESTHESIOLOGY

## 2024-06-18 PROCEDURE — 2500000003 HC RX 250 WO HCPCS: Performed by: NURSE ANESTHETIST, CERTIFIED REGISTERED

## 2024-06-18 PROCEDURE — 85347 COAGULATION TIME ACTIVATED: CPT

## 2024-06-18 PROCEDURE — 93320 DOPPLER ECHO COMPLETE: CPT

## 2024-06-18 PROCEDURE — C1766 INTRO/SHEATH,STRBLE,NON-PEEL: HCPCS | Performed by: INTERNAL MEDICINE

## 2024-06-18 PROCEDURE — C1894 INTRO/SHEATH, NON-LASER: HCPCS | Performed by: INTERNAL MEDICINE

## 2024-06-18 PROCEDURE — C1759 CATH, INTRA ECHOCARDIOGRAPHY: HCPCS | Performed by: INTERNAL MEDICINE

## 2024-06-18 PROCEDURE — 7100000000 HC PACU RECOVERY - FIRST 15 MIN: Performed by: INTERNAL MEDICINE

## 2024-06-18 RX ORDER — SODIUM CHLORIDE 0.9 % (FLUSH) 0.9 %
5-40 SYRINGE (ML) INJECTION PRN
Status: DISCONTINUED | OUTPATIENT
Start: 2024-06-18 | End: 2024-06-18 | Stop reason: HOSPADM

## 2024-06-18 RX ORDER — GLYCOPYRROLATE 0.2 MG/ML
INJECTION INTRAMUSCULAR; INTRAVENOUS PRN
Status: DISCONTINUED | OUTPATIENT
Start: 2024-06-18 | End: 2024-06-18 | Stop reason: SDUPTHER

## 2024-06-18 RX ORDER — NEOSTIGMINE METHYLSULFATE 1 MG/ML
INJECTION, SOLUTION INTRAVENOUS PRN
Status: DISCONTINUED | OUTPATIENT
Start: 2024-06-18 | End: 2024-06-18 | Stop reason: SDUPTHER

## 2024-06-18 RX ORDER — ONDANSETRON 2 MG/ML
4 INJECTION INTRAMUSCULAR; INTRAVENOUS
Status: DISCONTINUED | OUTPATIENT
Start: 2024-06-18 | End: 2024-06-18 | Stop reason: HOSPADM

## 2024-06-18 RX ORDER — ROCURONIUM BROMIDE 10 MG/ML
INJECTION, SOLUTION INTRAVENOUS PRN
Status: DISCONTINUED | OUTPATIENT
Start: 2024-06-18 | End: 2024-06-18 | Stop reason: SDUPTHER

## 2024-06-18 RX ORDER — HEPARIN SODIUM 1000 [USP'U]/ML
INJECTION, SOLUTION INTRAVENOUS; SUBCUTANEOUS PRN
Status: DISCONTINUED | OUTPATIENT
Start: 2024-06-18 | End: 2024-06-18 | Stop reason: SDUPTHER

## 2024-06-18 RX ORDER — IPRATROPIUM BROMIDE 21 UG/1
2 SPRAY, METERED NASAL EVERY 12 HOURS
COMMUNITY

## 2024-06-18 RX ORDER — OXYCODONE HYDROCHLORIDE 5 MG/1
5 TABLET ORAL
Status: DISCONTINUED | OUTPATIENT
Start: 2024-06-18 | End: 2024-06-18 | Stop reason: HOSPADM

## 2024-06-18 RX ORDER — SODIUM CHLORIDE 0.9 % (FLUSH) 0.9 %
5-40 SYRINGE (ML) INJECTION EVERY 12 HOURS SCHEDULED
Status: DISCONTINUED | OUTPATIENT
Start: 2024-06-18 | End: 2024-06-18 | Stop reason: HOSPADM

## 2024-06-18 RX ORDER — FENTANYL CITRATE 50 UG/ML
25 INJECTION, SOLUTION INTRAMUSCULAR; INTRAVENOUS EVERY 5 MIN PRN
Status: DISCONTINUED | OUTPATIENT
Start: 2024-06-18 | End: 2024-06-18 | Stop reason: HOSPADM

## 2024-06-18 RX ORDER — DIPHENHYDRAMINE HYDROCHLORIDE 50 MG/ML
12.5 INJECTION INTRAMUSCULAR; INTRAVENOUS
Status: DISCONTINUED | OUTPATIENT
Start: 2024-06-18 | End: 2024-06-18 | Stop reason: HOSPADM

## 2024-06-18 RX ORDER — PROCHLORPERAZINE EDISYLATE 5 MG/ML
5 INJECTION INTRAMUSCULAR; INTRAVENOUS
Status: DISCONTINUED | OUTPATIENT
Start: 2024-06-18 | End: 2024-06-18 | Stop reason: HOSPADM

## 2024-06-18 RX ORDER — NALOXONE HYDROCHLORIDE 0.4 MG/ML
INJECTION, SOLUTION INTRAMUSCULAR; INTRAVENOUS; SUBCUTANEOUS PRN
Status: DISCONTINUED | OUTPATIENT
Start: 2024-06-18 | End: 2024-06-18 | Stop reason: HOSPADM

## 2024-06-18 RX ORDER — EPHEDRINE SULFATE/0.9% NACL/PF 50 MG/5 ML
SYRINGE (ML) INTRAVENOUS PRN
Status: DISCONTINUED | OUTPATIENT
Start: 2024-06-18 | End: 2024-06-18 | Stop reason: SDUPTHER

## 2024-06-18 RX ORDER — LABETALOL HYDROCHLORIDE 5 MG/ML
10 INJECTION INTRAVENOUS
Status: DISCONTINUED | OUTPATIENT
Start: 2024-06-18 | End: 2024-06-18 | Stop reason: HOSPADM

## 2024-06-18 RX ORDER — SUCCINYLCHOLINE CHLORIDE 20 MG/ML
INJECTION INTRAMUSCULAR; INTRAVENOUS PRN
Status: DISCONTINUED | OUTPATIENT
Start: 2024-06-18 | End: 2024-06-18 | Stop reason: SDUPTHER

## 2024-06-18 RX ORDER — DEXAMETHASONE SODIUM PHOSPHATE 4 MG/ML
INJECTION, SOLUTION INTRA-ARTICULAR; INTRALESIONAL; INTRAMUSCULAR; INTRAVENOUS; SOFT TISSUE PRN
Status: DISCONTINUED | OUTPATIENT
Start: 2024-06-18 | End: 2024-06-18 | Stop reason: SDUPTHER

## 2024-06-18 RX ORDER — SODIUM CHLORIDE 9 MG/ML
INJECTION, SOLUTION INTRAVENOUS PRN
Status: DISCONTINUED | OUTPATIENT
Start: 2024-06-18 | End: 2024-06-18 | Stop reason: HOSPADM

## 2024-06-18 RX ORDER — PHENYLEPHRINE HCL IN 0.9% NACL 0.4MG/10ML
SYRINGE (ML) INTRAVENOUS PRN
Status: DISCONTINUED | OUTPATIENT
Start: 2024-06-18 | End: 2024-06-18 | Stop reason: SDUPTHER

## 2024-06-18 RX ORDER — PROTAMINE SULFATE 10 MG/ML
INJECTION, SOLUTION INTRAVENOUS PRN
Status: DISCONTINUED | OUTPATIENT
Start: 2024-06-18 | End: 2024-06-18 | Stop reason: SDUPTHER

## 2024-06-18 RX ORDER — MIDAZOLAM HYDROCHLORIDE 5 MG/5ML
2 INJECTION, SOLUTION INTRAMUSCULAR; INTRAVENOUS
Status: DISCONTINUED | OUTPATIENT
Start: 2024-06-18 | End: 2024-06-18 | Stop reason: HOSPADM

## 2024-06-18 RX ORDER — BUDESONIDE, GLYCOPYRROLATE, AND FORMOTEROL FUMARATE 160; 9; 4.8 UG/1; UG/1; UG/1
AEROSOL, METERED RESPIRATORY (INHALATION)
COMMUNITY

## 2024-06-18 RX ORDER — SEMAGLUTIDE 1.34 MG/ML
1 INJECTION, SOLUTION SUBCUTANEOUS
COMMUNITY

## 2024-06-18 RX ORDER — ONDANSETRON 2 MG/ML
INJECTION INTRAMUSCULAR; INTRAVENOUS PRN
Status: DISCONTINUED | OUTPATIENT
Start: 2024-06-18 | End: 2024-06-18 | Stop reason: SDUPTHER

## 2024-06-18 RX ORDER — EPINEPHRINE 1 MG/ML(1)
AMPUL (ML) INJECTION PRN
Status: DISCONTINUED | OUTPATIENT
Start: 2024-06-18 | End: 2024-06-18 | Stop reason: SDUPTHER

## 2024-06-18 RX ORDER — HYDROMORPHONE HYDROCHLORIDE 1 MG/ML
0.5 INJECTION, SOLUTION INTRAMUSCULAR; INTRAVENOUS; SUBCUTANEOUS EVERY 5 MIN PRN
Status: DISCONTINUED | OUTPATIENT
Start: 2024-06-18 | End: 2024-06-18 | Stop reason: HOSPADM

## 2024-06-18 RX ORDER — LIDOCAINE HYDROCHLORIDE 20 MG/ML
INJECTION, SOLUTION EPIDURAL; INFILTRATION; INTRACAUDAL; PERINEURAL PRN
Status: DISCONTINUED | OUTPATIENT
Start: 2024-06-18 | End: 2024-06-18 | Stop reason: SDUPTHER

## 2024-06-18 RX ORDER — MEPERIDINE HYDROCHLORIDE 25 MG/ML
12.5 INJECTION INTRAMUSCULAR; INTRAVENOUS; SUBCUTANEOUS EVERY 5 MIN PRN
Status: DISCONTINUED | OUTPATIENT
Start: 2024-06-18 | End: 2024-06-18 | Stop reason: HOSPADM

## 2024-06-18 RX ORDER — METOPROLOL TARTRATE 1 MG/ML
INJECTION, SOLUTION INTRAVENOUS PRN
Status: DISCONTINUED | OUTPATIENT
Start: 2024-06-18 | End: 2024-06-18 | Stop reason: SDUPTHER

## 2024-06-18 RX ORDER — FENTANYL CITRATE 50 UG/ML
INJECTION, SOLUTION INTRAMUSCULAR; INTRAVENOUS PRN
Status: DISCONTINUED | OUTPATIENT
Start: 2024-06-18 | End: 2024-06-18 | Stop reason: SDUPTHER

## 2024-06-18 RX ADMIN — ROCURONIUM BROMIDE 30 MG: 10 INJECTION INTRAVENOUS at 12:36

## 2024-06-18 RX ADMIN — Medication 10 MG: at 12:45

## 2024-06-18 RX ADMIN — LIDOCAINE HYDROCHLORIDE 100 MG: 20 INJECTION, SOLUTION EPIDURAL; INFILTRATION; INTRACAUDAL; PERINEURAL at 12:26

## 2024-06-18 RX ADMIN — SUCCINYLCHOLINE CHLORIDE 160 MG: 20 INJECTION, SOLUTION INTRAMUSCULAR; INTRAVENOUS at 12:27

## 2024-06-18 RX ADMIN — HEPARIN SODIUM 2000 UNITS: 1000 INJECTION, SOLUTION INTRAVENOUS; SUBCUTANEOUS at 14:36

## 2024-06-18 RX ADMIN — Medication 10 MG: at 13:35

## 2024-06-18 RX ADMIN — ONDANSETRON HYDROCHLORIDE 4 MG: 2 INJECTION, SOLUTION INTRAMUSCULAR; INTRAVENOUS at 15:17

## 2024-06-18 RX ADMIN — Medication 15 MG: at 13:04

## 2024-06-18 RX ADMIN — FENTANYL CITRATE 50 MCG: 50 INJECTION, SOLUTION INTRAMUSCULAR; INTRAVENOUS at 12:47

## 2024-06-18 RX ADMIN — SODIUM CHLORIDE: 9 INJECTION, SOLUTION INTRAVENOUS at 12:18

## 2024-06-18 RX ADMIN — Medication 15 MG: at 13:40

## 2024-06-18 RX ADMIN — PROPOFOL 130 MG: 10 INJECTION, EMULSION INTRAVENOUS at 12:26

## 2024-06-18 RX ADMIN — PROTAMINE SULFATE 70 MG: 10 INJECTION, SOLUTION INTRAVENOUS at 15:05

## 2024-06-18 RX ADMIN — PHENYLEPHRINE HYDROCHLORIDE 40 MCG/MIN: 10 INJECTION INTRAVENOUS at 12:42

## 2024-06-18 RX ADMIN — Medication 3 MG: at 15:17

## 2024-06-18 RX ADMIN — Medication 40 MCG: at 15:25

## 2024-06-18 RX ADMIN — HEPARIN SODIUM 3000 UNITS: 1000 INJECTION, SOLUTION INTRAVENOUS; SUBCUTANEOUS at 13:17

## 2024-06-18 RX ADMIN — DEXAMETHASONE SODIUM PHOSPHATE 8 MG: 4 INJECTION, SOLUTION INTRAMUSCULAR; INTRAVENOUS at 12:42

## 2024-06-18 RX ADMIN — HEPARIN SODIUM 12000 UNITS: 1000 INJECTION, SOLUTION INTRAVENOUS; SUBCUTANEOUS at 12:57

## 2024-06-18 RX ADMIN — EPINEPHRINE 2 MCG: 1 INJECTION INTRAMUSCULAR; INTRAVENOUS; SUBCUTANEOUS at 13:07

## 2024-06-18 RX ADMIN — EPINEPHRINE 2 MCG: 1 INJECTION INTRAMUSCULAR; INTRAVENOUS; SUBCUTANEOUS at 13:40

## 2024-06-18 RX ADMIN — HEPARIN SODIUM 2000 UNITS: 1000 INJECTION, SOLUTION INTRAVENOUS; SUBCUTANEOUS at 13:38

## 2024-06-18 RX ADMIN — METOROPROLOL TARTRATE 5 MG: 5 INJECTION, SOLUTION INTRAVENOUS at 15:40

## 2024-06-18 RX ADMIN — FENTANYL CITRATE 50 MCG: 50 INJECTION, SOLUTION INTRAMUSCULAR; INTRAVENOUS at 14:45

## 2024-06-18 RX ADMIN — Medication 10 MG: at 14:23

## 2024-06-18 RX ADMIN — GLYCOPYRROLATE 0.4 MG: 0.2 INJECTION, SOLUTION INTRAMUSCULAR; INTRAVENOUS at 15:17

## 2024-06-18 RX ADMIN — ROCURONIUM BROMIDE 10 MG: 10 INJECTION INTRAVENOUS at 14:45

## 2024-06-18 ASSESSMENT — COPD QUESTIONNAIRES: CAT_SEVERITY: MILD

## 2024-06-18 NOTE — PLAN OF CARE
Problem: Chronic Conditions and Co-morbidities  Goal: Patient's chronic conditions and co-morbidity symptoms are monitored and maintained or improved  Outcome: Completed  Flowsheets (Taken 6/18/2024 1650)  Care Plan - Patient's Chronic Conditions and Co-Morbidity Symptoms are Monitored and Maintained or Improved: Monitor and assess patient's chronic conditions and comorbid symptoms for stability, deterioration, or improvement     Problem: Discharge Planning  Goal: Discharge to home or other facility with appropriate resources  Outcome: Completed  Flowsheets (Taken 6/18/2024 1650)  Discharge to home or other facility with appropriate resources: Identify barriers to discharge with patient and caregiver     Problem: Safety - Adult  Goal: Free from fall injury  Outcome: Completed

## 2024-06-18 NOTE — ANESTHESIA PRE PROCEDURE
Department of Anesthesiology  Preprocedure Note       Name:  Gary Nuno   Age:  73 y.o.  :  1950                                          MRN:  [id number]         Date:  2024      Surgeon: Surgeon(s):  Ben Flood MD    Procedure: Procedure(s):  Ablation A-fib w complete ep study    Medications prior to admission:   Prior to Admission medications    Medication Sig Start Date End Date Taking? Authorizing Provider   Semaglutide, 1 MG/DOSE, (OZEMPIC, 1 MG/DOSE,) 4 MG/3ML SOPN sc injection Inject 1 mg into the skin every 7 days   Yes Provider, Historical, MD   Budeson-Glycopyrrol-Formoterol (BREZTRI AEROSPHERE) 160-9-4.8 MCG/ACT AERO Inhale into the lungs   Yes Provider, Historical, MD   ipratropium (ATROVENT) 0.03 % nasal spray 2 sprays by Each Nostril route in the morning and 2 sprays in the evening.   Yes Provider, Historical, MD   albuterol sulfate HFA (PROVENTIL;VENTOLIN;PROAIR) 108 (90 Base) MCG/ACT inhaler Inhale 1 puff into the lungs every 4 hours as needed  Patient not taking: Reported on 2024    Automatic Reconciliation, Ar   apixaban (ELIQUIS) 5 MG TABS tablet Take 1 tablet by mouth 2 times daily 22   Automatic Reconciliation, Ar   ascorbic acid (VITAMIN C) 500 MG tablet Take 500 mg by mouth daily  Patient not taking: Reported on 2024    Automatic Reconciliation, Ar   aspirin 81 MG EC tablet Take 1 tablet by mouth daily    Automatic Reconciliation, Ar   vitamin D 25 MCG (1000 UT) CAPS Take 1,000 Units by mouth daily  Patient not taking: Reported on 2024    Automatic Reconciliation, Ar   cyanocobalamin 1000 MCG tablet Take 1,000 mcg by mouth daily  Patient not taking: Reported on 2024    Automatic Reconciliation, Ar   digoxin (LANOXIN) 125 MCG tablet Take 0.125 mg by mouth daily  Patient not taking: Reported on 2024 12/15/21   Automatic Reconciliation, Ar   dilTIAZem (TIAZAC) 240 MG extended release capsule Take 1 capsule by mouth daily

## 2024-06-18 NOTE — DISCHARGE INSTRUCTIONS
POST-ABLATION DISCHARGE INSTRUCTIONS:    You had an atrial fibrillation ablation with Dr. Flood.    NO changes to your medications.    Please call the office to make a follow-up appointment with Dr. Flood or his NP in 2-6 weeks, [phone].      Do not drive, operate any machinery, or sign any legal documents for 24 hours after your procedure.  You must have someone to drive you home.    You may take a shower 24 hours after your cardiac procedure.  Be sure to get the dressing wet and then remove it; gently wash the area with warm soapy water.  Pat dry and leave open to air.  To help prevent infections, be sure to keep the cath site clean and dry.  No lotions, creams, powders, ointments, etc. in the cath site for approximately 1 week.    Do not take a tub bath, get in a hot tub or swimming pool for approximately 5 days or until the cath site is completely healed.      No strenuous activity or heavy lifting over 20 lbs. for 7 days.     After your procedure, some bruising or discomfort is common during the healing process.  Tylenol, 1-2 tablets every 6 hours as needed, is recommended if you experience any discomfort.  If you experience any signs or symptoms of infection such as fever, chills, or poorly healing incision, persistent tenderness or swelling in the groin, redness and/or warmth to the touch, numbness, significant tingling or pain at the groin site or affected extremity, rash, drainage from the site, or if the leg feels tight or swollen, call your physician right away.    If bleeding at the site occurs, take a clean gauze pad and apply direct pressure to the groin just above the puncture site, and call your physician right away.    If your procedure involved ablation therapy, you may feel some mild or vague chest discomfort due to delivery of heat therapy to the heart muscle.  This should resolve in 1-2 days.  If it gets worse or is associated with shortness of breath, dizziness, loss of

## 2024-06-18 NOTE — PROGRESS NOTES
4:52 PM Patient arrived to IVCU from PACU. Right and Left groin, vascade/Quickclot/tegaderm, CDI, no bleeding/hematoma. HOB Flat. VSS, Sinus Tach, 3L O2 NC, Afebrile. Patient on bedrest.      6 PM Bedrest complete. Patient ambulated from the bed to the bathroom to void. Patient reports no CP/SOB. HR: Sinus Tach . Left and right groin CDI, no bleeding/hematoma.     7:30 PM Pt ready for discharge. RN reviewed all medications. RN reviewed how to follow up with the cardiologist. RN reviewed right and left groin site care instructions. Pt verbalized understanding. RN provided time to answer any and all questions. Patient to discharge with family.

## 2024-06-18 NOTE — FLOWSHEET NOTE
06/18/24 1545   Handoff   Communication Given Transfer Handoff   Handoff Given To Anali MISTRY   Handoff Received From Crissy (by phone) and NGHIA Pichardo CRNA   Handoff Communication Face to Face;At bedside

## 2024-06-18 NOTE — FLOWSHEET NOTE
06/18/24 1630   Handoff   Communication Given Transfer Handoff   Handoff Given To Cehrry RN   Handoff Received From Anali RN   Handoff Communication Telephone     TRANSFER - OUT REPORT:          Report consisted of patient's Situation, Background, Assessment and   Recommendations(SBAR).     Information from the following report(s)  was reviewed with the receiving nurse.           Lines:   Peripheral IV 06/18/24 Left Antecubital (Active)   Site Assessment Clean, dry & intact 06/18/24 1620   Line Status Flushed 06/18/24 1620   Line Care Connections checked and tightened;Line pulled back 06/18/24 1620   Phlebitis Assessment No symptoms 06/18/24 1620   Infiltration Assessment 0 06/18/24 1620   Alcohol Cap Used No 06/18/24 1620   Dressing Status Clean, dry & intact 06/18/24 1620   Dressing Type Transparent 06/18/24 1620        Opportunity for questions and clarification was provided.      Patient transported with:  Monitor, O2 @ 3lpm, and Registered Nurse

## 2024-06-18 NOTE — PROGRESS NOTES
Status post PULMONARY VEIN ISOLATION (RIPV), posterior box and anterior box (using anterior mitral isthmus lines), CTI line, plus additional focal ablation within the boxes as needed to get electrical isolation.  He was cardioverted multiple times during the case, the last just prior to the CTI line.  He held sinus rhythm/sinus tachycardia until the end of the case.  Full note to follow.    If needed, will add an antiarrhythmic drug to help him hold sinus.    Ben Flood MD

## 2024-06-18 NOTE — PROGRESS NOTES
Cardiac Cath Lab Recovery Arrival Note:      Gary Nuno arrived to Cardiac Cath Lab, Recovery Area. Staff introduced to patient. Patient identifiers verified with NAME and DATE OF BIRTH. Procedure verified with patient. Consent forms reviewed and signed by patient or authorized representative and verified. Allergies verified.     Patient and family oriented to department. Patient and family informed of procedure and plan of care.     Questions answered with review. Patient prepped for procedure, per orders from physician, prior to arrival.    Patient on cardiac monitor, non-invasive blood pressure, SPO2 monitor. On RA. Patient is A&Ox 4. Patient reports NO PAIN.     Patient in stretcher, in low position, with side rails up, call bell within reach, patient instructed to call if assistance as needed.    Patient prep in: [facility] Recovery Area, [place] [address].   Patient family has pager # N/A  Family in: FRANCIS[place].   Prep by: POLLY

## 2024-06-19 LAB
ACT BLD: 281 SECS (ref 79–138)
ACT BLD: 287 SECS (ref 79–138)
ACT BLD: 293 SECS (ref 79–138)
ACT BLD: 293 SECS (ref 79–138)
ACT BLD: 305 SECS (ref 79–138)

## 2024-06-19 NOTE — ANESTHESIA POSTPROCEDURE EVALUATION
Department of Anesthesiology  Postprocedure Note    Patient: Gary Nuno  MRN: [id number]  YOB: 1950  Date of evaluation: 6/19/2024    Procedure Summary       Date: 06/18/24 Room / Location: hospitals EP LAB / hospitals CARDIAC CATH LAB    Anesthesia Start: 1218 Anesthesia Stop: 1551    Procedures:       Ablation A-fib w complete ep study      Ablation following A-fib addl Diagnosis: A-fib (HCC)    Providers: Ben Flood MD Responsible Provider: Christian Bhandari MD    Anesthesia Type: general ASA Status: 3            Anesthesia Type: No value filed.    Bharti Phase I: Bharti Score: 9    Bharti Phase II:      Anesthesia Post Evaluation    Patient location during evaluation: PACU  Patient participation: complete - patient participated  Level of consciousness: awake and alert  Airway patency: patent  Nausea & Vomiting: no nausea and no vomiting  Cardiovascular status: hemodynamically stable  Respiratory status: acceptable  Hydration status: stable        No notable events documented.

## 2024-06-21 LAB
ECHO BSA: 2.31 M2
ECHO BSA: 2.31 M2

## 2024-08-01 NOTE — Clinical Note
All catheters removed.
Anesthesia present. Anesthesia will monitor and maintain: airway, IV access, sedation, medications, and vital signs. Refer to Anesthesia report for further documentation.
Bilateral groin clipped prepped with ChloraPrep and draped. Wet prep elapsed drying time: 3 mins.
Closed using Vascade.
Defibrillation and grounding pads were applied.
Dressed using transparent dressing.
EP catheter inserted in the CS.
EP catheter inserted in the RA.
History and physical documented and up to date, allergies reviewed, lab results reviewed, pre-procedure education provided, patient verbalized understanding of procedure, procedural consent signed and patient is NPO.
ID band present and verified. Family is not present.
Lidocaine given see mar
Mallampati: Class II - soft palate, uvula, fauces visible. ASA: Class 3 - patient with severe systemic disease.
No contrast administered during procedure. Amount: 0 mL.
No specimen collected. Estimated Blood Loss: <30 mL.
Physician has arrived.
Right femoral vein. Accessed successfully.  Access x2
Sheath #all: Presents as clean, dry, & intact, no bleeding and no hematoma.
Sheath #all: sheath exchanged for SYSTEM CLOSURE 6-12 FR AJIT VASC VASCADE MVP.
Sheath: inserted. Sheath inserted/placed in the right femoral  vein.
Sheath: inserted. Sheath inserted/placed in the right femoral  vein.
TRANSFER - IN REPORT:     Verbal report received from: [id number]. Report consisted of patient's Situation, Background, Assessment and   Recommendations(SBAR). Opportunity for questions and clarification was provided. Assessment completed upon patient's arrival to unit and care assumed. Patient transported with a Registered Nurse and [address] [place] [address] / [facility].
TRANSFER - OUT REPORT:     Verbal report given to: recovery. Report consisted of patient's Situation, Background, Assessment and   Recommendations(SBAR). Opportunity for questions and clarification was provided. Patient transported with a Registered Nurse and [address] [place] [address] / [facility] [employer]. Patient transported to: recovery.
The physician has been notified.
sheath exchanged for INTRO AGLS NXT 8 5F ARSENIO [facility] -- .
Previously negative

## 2024-08-14 ENCOUNTER — HOSPITAL ENCOUNTER (OUTPATIENT)
Facility: HOSPITAL | Age: 74
Discharge: HOME | End: 2024-08-16
Attending: INTERNAL MEDICINE
Payer: MEDICARE

## 2024-08-14 VITALS
HEART RATE: 100 BPM | SYSTOLIC BLOOD PRESSURE: 116 MMHG | BODY MASS INDEX: 33.19 KG/M2 | WEIGHT: 238 LBS | DIASTOLIC BLOOD PRESSURE: 72 MMHG | RESPIRATION RATE: 20 BRPM | OXYGEN SATURATION: 95 %

## 2024-08-14 DIAGNOSIS — I48.91 A-FIB (HCC): ICD-10-CM

## 2024-08-14 LAB
EKG ATRIAL RATE: 101 BPM
EKG ATRIAL RATE: 114 BPM
EKG DIAGNOSIS: NORMAL
EKG DIAGNOSIS: NORMAL
EKG P AXIS: 56 DEGREES
EKG P AXIS: 92 DEGREES
EKG P-R INTERVAL: 152 MS
EKG P-R INTERVAL: 218 MS
EKG Q-T INTERVAL: 308 MS
EKG Q-T INTERVAL: 314 MS
EKG QRS DURATION: 100 MS
EKG QRS DURATION: 92 MS
EKG QTC CALCULATION (BAZETT): 399 MS
EKG QTC CALCULATION (BAZETT): 432 MS
EKG R AXIS: -66 DEGREES
EKG R AXIS: 21 DEGREES
EKG T AXIS: 78 DEGREES
EKG T AXIS: 88 DEGREES
EKG VENTRICULAR RATE: 101 BPM
EKG VENTRICULAR RATE: 114 BPM

## 2024-08-14 PROCEDURE — 99152 MOD SED SAME PHYS/QHP 5/>YRS: CPT

## 2024-08-14 PROCEDURE — 6360000002 HC RX W HCPCS: Performed by: INTERNAL MEDICINE

## 2024-08-14 PROCEDURE — 92960 CARDIOVERSION ELECTRIC EXT: CPT

## 2024-08-14 PROCEDURE — 93005 ELECTROCARDIOGRAM TRACING: CPT | Performed by: INTERNAL MEDICINE

## 2024-08-14 RX ORDER — FENTANYL CITRATE 50 UG/ML
INJECTION, SOLUTION INTRAMUSCULAR; INTRAVENOUS PRN
Status: COMPLETED | OUTPATIENT
Start: 2024-08-14 | End: 2024-08-14

## 2024-08-14 RX ORDER — AMIODARONE HYDROCHLORIDE 200 MG/1
200 TABLET ORAL 2 TIMES DAILY
COMMUNITY

## 2024-08-14 RX ORDER — MIDAZOLAM HYDROCHLORIDE 1 MG/ML
INJECTION INTRAMUSCULAR; INTRAVENOUS PRN
Status: COMPLETED | OUTPATIENT
Start: 2024-08-14 | End: 2024-08-14

## 2024-08-14 RX ADMIN — FENTANYL CITRATE 25 MCG: 50 INJECTION, SOLUTION INTRAMUSCULAR; INTRAVENOUS at 08:04

## 2024-08-14 RX ADMIN — MIDAZOLAM HYDROCHLORIDE 2 MG: 1 INJECTION, SOLUTION INTRAMUSCULAR; INTRAVENOUS at 08:02

## 2024-08-14 RX ADMIN — FENTANYL CITRATE 50 MCG: 50 INJECTION, SOLUTION INTRAMUSCULAR; INTRAVENOUS at 08:02

## 2024-08-14 RX ADMIN — MIDAZOLAM HYDROCHLORIDE 1 MG: 1 INJECTION, SOLUTION INTRAMUSCULAR; INTRAVENOUS at 08:05

## 2024-08-14 NOTE — PROGRESS NOTES
Patient arrived to Non-Invasive Cardiology Lab for Out Patient [facility] Procedure. Staff introduced to patient. Patient identifiers verified with Name and Date of Birth. Procedure verified with patient. Consent forms reviewed and signed by patient or authorized representative and verified. Allergies verified. Patient informed of procedure and plan of care. Questions answered with review.     Patient on cardiac monitor, non-invasive blood pressure, SPO2 monitor. On room air. Patient is A&Ox3. Patient reports no complaints.    Patient on stretcher, in low position, with side rails up. Patient instructed to call for assistance as needed.    Family in waiting room.

## 2024-08-14 NOTE — DISCHARGE INSTRUCTIONS
DISCHARGE SUMMARY            PATIENT INSTRUCTIONS: Continue taking all the same medications .      The cardioversion procedure can cause redness to the skin where the patches were placed. You may treat this with Aloe or Cortisone cream over the counter lotion. If the skin appears very reddened or blistered contact your physician      Call to make an appointment with Dr. Kirby if you do not have any.    What to do at Home:  Recommended activity: No driving today      The discharge information has been reviewed with the PATIENT .  The PATIENT  verbalized understanding.

## 2024-08-14 NOTE — PROGRESS NOTES
Pt sedated with 3mg Versed and 75mcg Fentanyl, given 1 synchronized shock(s) at 200 Joules, A Flutter converted to Sinus Rhythm.(monitored sedation from 0801 to 0811)

## 2025-02-08 ENCOUNTER — APPOINTMENT (OUTPATIENT)
Facility: HOSPITAL | Age: 75
DRG: 193 | End: 2025-02-08
Payer: MEDICARE

## 2025-02-08 ENCOUNTER — HOSPITAL ENCOUNTER (INPATIENT)
Facility: HOSPITAL | Age: 75
LOS: 2 days | Discharge: HOME | DRG: 193 | End: 2025-02-10
Attending: STUDENT IN AN ORGANIZED HEALTH CARE EDUCATION/TRAINING PROGRAM | Admitting: STUDENT IN AN ORGANIZED HEALTH CARE EDUCATION/TRAINING PROGRAM
Payer: MEDICARE

## 2025-02-08 DIAGNOSIS — R09.02 HYPOXIA: Primary | ICD-10-CM

## 2025-02-08 DIAGNOSIS — R06.02 SHORTNESS OF BREATH: ICD-10-CM

## 2025-02-08 PROBLEM — J44.1 ACUTE EXACERBATION OF CHRONIC OBSTRUCTIVE PULMONARY DISEASE (COPD) (HCC): Status: ACTIVE | Noted: 2025-02-08

## 2025-02-08 LAB
ALBUMIN SERPL-MCNC: 3.6 G/DL (ref 3.5–5)
ALBUMIN/GLOB SERPL: 0.9 (ref 1.1–2.2)
ALP SERPL-CCNC: 79 U/L (ref 45–117)
ALT SERPL-CCNC: 36 U/L (ref 12–78)
ANION GAP SERPL CALC-SCNC: 9 MMOL/L (ref 2–12)
AST SERPL-CCNC: 30 U/L (ref 15–37)
BASOPHILS # BLD: 0.03 K/UL (ref 0–0.1)
BASOPHILS NFR BLD: 0.4 % (ref 0–1)
BILIRUB SERPL-MCNC: 0.4 MG/DL (ref 0.2–1)
BUN SERPL-MCNC: 18 MG/DL (ref 6–20)
BUN/CREAT SERPL: 15 (ref 12–20)
CALCIUM SERPL-MCNC: 8.7 MG/DL (ref 8.5–10.1)
CHLORIDE SERPL-SCNC: 96 MMOL/L (ref 97–108)
CO2 SERPL-SCNC: 27 MMOL/L (ref 21–32)
CREAT SERPL-MCNC: 1.22 MG/DL (ref 0.7–1.3)
DIFFERENTIAL METHOD BLD: ABNORMAL
EOSINOPHIL # BLD: 0.01 K/UL (ref 0–0.4)
EOSINOPHIL NFR BLD: 0.1 % (ref 0–7)
ERYTHROCYTE [DISTWIDTH] IN BLOOD BY AUTOMATED COUNT: 13.6 % (ref 11.5–14.5)
FLUAV RNA SPEC QL NAA+PROBE: DETECTED
FLUBV RNA SPEC QL NAA+PROBE: NOT DETECTED
GLOBULIN SER CALC-MCNC: 3.8 G/DL (ref 2–4)
GLUCOSE SERPL-MCNC: 141 MG/DL (ref 65–100)
HCT VFR BLD AUTO: 42.8 % (ref 36.6–50.3)
HGB BLD-MCNC: 13.8 G/DL (ref 12.1–17)
IMM GRANULOCYTES # BLD AUTO: 0.06 K/UL (ref 0–0.04)
IMM GRANULOCYTES NFR BLD AUTO: 0.7 % (ref 0–0.5)
LYMPHOCYTES # BLD: 0.89 K/UL (ref 0.8–3.5)
LYMPHOCYTES NFR BLD: 10.6 % (ref 12–49)
MAGNESIUM SERPL-MCNC: 1.8 MG/DL (ref 1.6–2.4)
MCH RBC QN AUTO: 28.4 PG (ref 26–34)
MCHC RBC AUTO-ENTMCNC: 32.2 G/DL (ref 30–36.5)
MCV RBC AUTO: 88.1 FL (ref 80–99)
MONOCYTES # BLD: 1.15 K/UL (ref 0–1)
MONOCYTES NFR BLD: 13.7 % (ref 5–13)
NEUTS SEG # BLD: 6.23 K/UL (ref 1.8–8)
NEUTS SEG NFR BLD: 74.5 % (ref 32–75)
NRBC # BLD: 0 K/UL (ref 0–0.01)
NRBC BLD-RTO: 0 PER 100 WBC
NT PRO BNP: 1015 PG/ML
PLATELET # BLD AUTO: 172 K/UL (ref 150–400)
PMV BLD AUTO: 10.2 FL (ref 8.9–12.9)
POTASSIUM SERPL-SCNC: 4.6 MMOL/L (ref 3.5–5.1)
PROT SERPL-MCNC: 7.4 G/DL (ref 6.4–8.2)
RBC # BLD AUTO: 4.86 M/UL (ref 4.1–5.7)
SARS-COV-2 RNA RESP QL NAA+PROBE: NOT DETECTED
SODIUM SERPL-SCNC: 132 MMOL/L (ref 136–145)
SOURCE: ABNORMAL
TROPONIN I SERPL HS-MCNC: 14 NG/L (ref 0–76)
WBC # BLD AUTO: 8.4 K/UL (ref 4.1–11.1)

## 2025-02-08 PROCEDURE — 2580000003 HC RX 258: Performed by: STUDENT IN AN ORGANIZED HEALTH CARE EDUCATION/TRAINING PROGRAM

## 2025-02-08 PROCEDURE — 2700000000 HC OXYGEN THERAPY PER DAY

## 2025-02-08 PROCEDURE — 36415 COLL VENOUS BLD VENIPUNCTURE: CPT

## 2025-02-08 PROCEDURE — 85025 COMPLETE CBC W/AUTO DIFF WBC: CPT

## 2025-02-08 PROCEDURE — 87636 SARSCOV2 & INF A&B AMP PRB: CPT

## 2025-02-08 PROCEDURE — 83735 ASSAY OF MAGNESIUM: CPT

## 2025-02-08 PROCEDURE — 99285 EMERGENCY DEPT VISIT HI MDM: CPT

## 2025-02-08 PROCEDURE — 93005 ELECTROCARDIOGRAM TRACING: CPT | Performed by: STUDENT IN AN ORGANIZED HEALTH CARE EDUCATION/TRAINING PROGRAM

## 2025-02-08 PROCEDURE — 6370000000 HC RX 637 (ALT 250 FOR IP): Performed by: STUDENT IN AN ORGANIZED HEALTH CARE EDUCATION/TRAINING PROGRAM

## 2025-02-08 PROCEDURE — 80053 COMPREHEN METABOLIC PANEL: CPT

## 2025-02-08 PROCEDURE — 96365 THER/PROPH/DIAG IV INF INIT: CPT

## 2025-02-08 PROCEDURE — 1100000000 HC RM PRIVATE

## 2025-02-08 PROCEDURE — 6360000002 HC RX W HCPCS: Performed by: STUDENT IN AN ORGANIZED HEALTH CARE EDUCATION/TRAINING PROGRAM

## 2025-02-08 PROCEDURE — 96375 TX/PRO/DX INJ NEW DRUG ADDON: CPT

## 2025-02-08 PROCEDURE — 83880 ASSAY OF NATRIURETIC PEPTIDE: CPT

## 2025-02-08 PROCEDURE — 84484 ASSAY OF TROPONIN QUANT: CPT

## 2025-02-08 PROCEDURE — 2500000003 HC RX 250 WO HCPCS: Performed by: STUDENT IN AN ORGANIZED HEALTH CARE EDUCATION/TRAINING PROGRAM

## 2025-02-08 PROCEDURE — 71045 X-RAY EXAM CHEST 1 VIEW: CPT

## 2025-02-08 RX ORDER — LEVALBUTEROL INHALATION SOLUTION 1.25 MG/3ML
1.25 SOLUTION RESPIRATORY (INHALATION)
Status: COMPLETED | OUTPATIENT
Start: 2025-02-08 | End: 2025-02-08

## 2025-02-08 RX ORDER — POTASSIUM CHLORIDE 1500 MG/1
40 TABLET, EXTENDED RELEASE ORAL PRN
Status: DISCONTINUED | OUTPATIENT
Start: 2025-02-08 | End: 2025-02-10 | Stop reason: HOSPADM

## 2025-02-08 RX ORDER — FUROSEMIDE 10 MG/ML
20 INJECTION INTRAMUSCULAR; INTRAVENOUS
Status: COMPLETED | OUTPATIENT
Start: 2025-02-08 | End: 2025-02-08

## 2025-02-08 RX ORDER — PANTOPRAZOLE SODIUM 40 MG/1
40 TABLET, DELAYED RELEASE ORAL DAILY
Status: DISCONTINUED | OUTPATIENT
Start: 2025-02-08 | End: 2025-02-10 | Stop reason: HOSPADM

## 2025-02-08 RX ORDER — SODIUM CHLORIDE 0.9 % (FLUSH) 0.9 %
5-40 SYRINGE (ML) INJECTION PRN
Status: DISCONTINUED | OUTPATIENT
Start: 2025-02-08 | End: 2025-02-10 | Stop reason: HOSPADM

## 2025-02-08 RX ORDER — MAGNESIUM SULFATE IN WATER 40 MG/ML
2000 INJECTION, SOLUTION INTRAVENOUS PRN
Status: DISCONTINUED | OUTPATIENT
Start: 2025-02-08 | End: 2025-02-10 | Stop reason: HOSPADM

## 2025-02-08 RX ORDER — AZITHROMYCIN 250 MG/1
500 TABLET, FILM COATED ORAL DAILY
Status: DISCONTINUED | OUTPATIENT
Start: 2025-02-09 | End: 2025-02-10 | Stop reason: HOSPADM

## 2025-02-08 RX ORDER — SODIUM CHLORIDE 0.9 % (FLUSH) 0.9 %
5-40 SYRINGE (ML) INJECTION EVERY 12 HOURS SCHEDULED
Status: DISCONTINUED | OUTPATIENT
Start: 2025-02-08 | End: 2025-02-10 | Stop reason: HOSPADM

## 2025-02-08 RX ORDER — ONDANSETRON 2 MG/ML
4 INJECTION INTRAMUSCULAR; INTRAVENOUS EVERY 6 HOURS PRN
Status: DISCONTINUED | OUTPATIENT
Start: 2025-02-08 | End: 2025-02-10 | Stop reason: HOSPADM

## 2025-02-08 RX ORDER — DEXTROSE MONOHYDRATE 100 MG/ML
INJECTION, SOLUTION INTRAVENOUS CONTINUOUS PRN
Status: DISCONTINUED | OUTPATIENT
Start: 2025-02-08 | End: 2025-02-10 | Stop reason: HOSPADM

## 2025-02-08 RX ORDER — FINASTERIDE 5 MG/1
5 TABLET, FILM COATED ORAL DAILY
Status: DISCONTINUED | OUTPATIENT
Start: 2025-02-08 | End: 2025-02-10 | Stop reason: HOSPADM

## 2025-02-08 RX ORDER — METOPROLOL SUCCINATE 50 MG/1
50 TABLET, EXTENDED RELEASE ORAL 2 TIMES DAILY
Status: DISCONTINUED | OUTPATIENT
Start: 2025-02-08 | End: 2025-02-10 | Stop reason: HOSPADM

## 2025-02-08 RX ORDER — ACETAMINOPHEN 650 MG/1
650 SUPPOSITORY RECTAL EVERY 6 HOURS PRN
Status: DISCONTINUED | OUTPATIENT
Start: 2025-02-08 | End: 2025-02-10 | Stop reason: HOSPADM

## 2025-02-08 RX ORDER — SODIUM CHLORIDE 9 MG/ML
INJECTION, SOLUTION INTRAVENOUS PRN
Status: DISCONTINUED | OUTPATIENT
Start: 2025-02-08 | End: 2025-02-10 | Stop reason: HOSPADM

## 2025-02-08 RX ORDER — ACETAMINOPHEN 325 MG/1
650 TABLET ORAL EVERY 4 HOURS PRN
Status: DISCONTINUED | OUTPATIENT
Start: 2025-02-08 | End: 2025-02-10 | Stop reason: HOSPADM

## 2025-02-08 RX ORDER — POTASSIUM CHLORIDE 7.45 MG/ML
10 INJECTION INTRAVENOUS PRN
Status: DISCONTINUED | OUTPATIENT
Start: 2025-02-08 | End: 2025-02-10 | Stop reason: HOSPADM

## 2025-02-08 RX ORDER — OSELTAMIVIR PHOSPHATE 75 MG/1
75 CAPSULE ORAL
Status: COMPLETED | OUTPATIENT
Start: 2025-02-08 | End: 2025-02-08

## 2025-02-08 RX ORDER — AMIODARONE HYDROCHLORIDE 200 MG/1
200 TABLET ORAL 2 TIMES DAILY
Status: DISCONTINUED | OUTPATIENT
Start: 2025-02-08 | End: 2025-02-10 | Stop reason: HOSPADM

## 2025-02-08 RX ORDER — TAMSULOSIN HYDROCHLORIDE 0.4 MG/1
0.4 CAPSULE ORAL DAILY
Status: DISCONTINUED | OUTPATIENT
Start: 2025-02-08 | End: 2025-02-10 | Stop reason: HOSPADM

## 2025-02-08 RX ORDER — BENZONATATE 100 MG/1
100 CAPSULE ORAL 3 TIMES DAILY PRN
Status: DISCONTINUED | OUTPATIENT
Start: 2025-02-08 | End: 2025-02-10 | Stop reason: HOSPADM

## 2025-02-08 RX ORDER — OSELTAMIVIR PHOSPHATE 30 MG/1
30 CAPSULE ORAL 2 TIMES DAILY
Status: DISCONTINUED | OUTPATIENT
Start: 2025-02-09 | End: 2025-02-09

## 2025-02-08 RX ORDER — IPRATROPIUM BROMIDE 21 UG/1
2 SPRAY, METERED NASAL EVERY 12 HOURS
Status: DISCONTINUED | OUTPATIENT
Start: 2025-02-08 | End: 2025-02-10 | Stop reason: HOSPADM

## 2025-02-08 RX ORDER — GLUCAGON 1 MG/ML
1 KIT INJECTION PRN
Status: DISCONTINUED | OUTPATIENT
Start: 2025-02-08 | End: 2025-02-10 | Stop reason: HOSPADM

## 2025-02-08 RX ORDER — PREDNISONE 20 MG/1
40 TABLET ORAL DAILY
Status: DISCONTINUED | OUTPATIENT
Start: 2025-02-09 | End: 2025-02-10 | Stop reason: HOSPADM

## 2025-02-08 RX ORDER — ACETAMINOPHEN 325 MG/1
650 TABLET ORAL EVERY 6 HOURS PRN
Status: DISCONTINUED | OUTPATIENT
Start: 2025-02-08 | End: 2025-02-10 | Stop reason: HOSPADM

## 2025-02-08 RX ORDER — ROSUVASTATIN CALCIUM 40 MG/1
40 TABLET, COATED ORAL NIGHTLY
Status: DISCONTINUED | OUTPATIENT
Start: 2025-02-08 | End: 2025-02-10 | Stop reason: HOSPADM

## 2025-02-08 RX ORDER — BUDESONIDE 0.5 MG/2ML
0.5 INHALANT ORAL
Status: DISCONTINUED | OUTPATIENT
Start: 2025-02-08 | End: 2025-02-10 | Stop reason: HOSPADM

## 2025-02-08 RX ORDER — INSULIN LISPRO 100 [IU]/ML
0-4 INJECTION, SOLUTION INTRAVENOUS; SUBCUTANEOUS
Status: DISCONTINUED | OUTPATIENT
Start: 2025-02-08 | End: 2025-02-10 | Stop reason: HOSPADM

## 2025-02-08 RX ORDER — POLYETHYLENE GLYCOL 3350 17 G/17G
17 POWDER, FOR SOLUTION ORAL DAILY PRN
Status: DISCONTINUED | OUTPATIENT
Start: 2025-02-08 | End: 2025-02-10 | Stop reason: HOSPADM

## 2025-02-08 RX ORDER — MAGNESIUM SULFATE 1 G/100ML
1000 INJECTION INTRAVENOUS ONCE
Status: COMPLETED | OUTPATIENT
Start: 2025-02-08 | End: 2025-02-09

## 2025-02-08 RX ORDER — GEMFIBROZIL 600 MG/1
600 TABLET, FILM COATED ORAL 2 TIMES DAILY
Status: DISCONTINUED | OUTPATIENT
Start: 2025-02-08 | End: 2025-02-10 | Stop reason: HOSPADM

## 2025-02-08 RX ORDER — LEVALBUTEROL INHALATION SOLUTION 1.25 MG/3ML
1.25 SOLUTION RESPIRATORY (INHALATION) EVERY 4 HOURS PRN
Status: DISCONTINUED | OUTPATIENT
Start: 2025-02-08 | End: 2025-02-10 | Stop reason: HOSPADM

## 2025-02-08 RX ORDER — DILTIAZEM HYDROCHLORIDE 120 MG/1
240 CAPSULE, EXTENDED RELEASE ORAL DAILY
Status: DISCONTINUED | OUTPATIENT
Start: 2025-02-08 | End: 2025-02-09

## 2025-02-08 RX ORDER — ONDANSETRON 4 MG/1
4 TABLET, ORALLY DISINTEGRATING ORAL EVERY 8 HOURS PRN
Status: DISCONTINUED | OUTPATIENT
Start: 2025-02-08 | End: 2025-02-10 | Stop reason: HOSPADM

## 2025-02-08 RX ORDER — ARFORMOTEROL TARTRATE 15 UG/2ML
15 SOLUTION RESPIRATORY (INHALATION)
Status: DISCONTINUED | OUTPATIENT
Start: 2025-02-08 | End: 2025-02-10 | Stop reason: HOSPADM

## 2025-02-08 RX ORDER — METHYLPREDNISOLONE SODIUM SUCCINATE 125 MG/2ML
125 INJECTION INTRAMUSCULAR; INTRAVENOUS ONCE
Status: COMPLETED | OUTPATIENT
Start: 2025-02-08 | End: 2025-02-08

## 2025-02-08 RX ADMIN — LEVALBUTEROL HYDROCHLORIDE 1.25 MG: 1.25 SOLUTION RESPIRATORY (INHALATION) at 17:02

## 2025-02-08 RX ADMIN — APIXABAN 5 MG: 5 TABLET, FILM COATED ORAL at 22:37

## 2025-02-08 RX ADMIN — AZITHROMYCIN MONOHYDRATE 500 MG: 500 INJECTION, POWDER, LYOPHILIZED, FOR SOLUTION INTRAVENOUS at 17:39

## 2025-02-08 RX ADMIN — ROSUVASTATIN CALCIUM 40 MG: 40 TABLET, FILM COATED ORAL at 22:36

## 2025-02-08 RX ADMIN — WATER 1000 MG: 1 INJECTION INTRAMUSCULAR; INTRAVENOUS; SUBCUTANEOUS at 17:09

## 2025-02-08 RX ADMIN — OSELTAMIVIR PHOSPHATE 75 MG: 75 CAPSULE ORAL at 23:04

## 2025-02-08 RX ADMIN — FUROSEMIDE 20 MG: 10 INJECTION, SOLUTION INTRAMUSCULAR; INTRAVENOUS at 17:41

## 2025-02-08 RX ADMIN — FINASTERIDE 5 MG: 5 TABLET, FILM COATED ORAL at 23:05

## 2025-02-08 RX ADMIN — METHYLPREDNISOLONE SODIUM SUCCINATE 125 MG: 125 INJECTION INTRAMUSCULAR; INTRAVENOUS at 17:05

## 2025-02-08 RX ADMIN — METOPROLOL SUCCINATE 50 MG: 50 TABLET, EXTENDED RELEASE ORAL at 22:37

## 2025-02-08 RX ADMIN — TAMSULOSIN HYDROCHLORIDE 0.4 MG: 0.4 CAPSULE ORAL at 22:38

## 2025-02-08 RX ADMIN — IPRATROPIUM BROMIDE 0.5 MG: 0.5 SOLUTION RESPIRATORY (INHALATION) at 17:02

## 2025-02-08 RX ADMIN — GEMFIBROZIL 600 MG: 600 TABLET ORAL at 23:05

## 2025-02-08 RX ADMIN — AMIODARONE HYDROCHLORIDE 200 MG: 200 TABLET ORAL at 22:36

## 2025-02-08 ASSESSMENT — LIFESTYLE VARIABLES
HOW OFTEN DO YOU HAVE A DRINK CONTAINING ALCOHOL: 4 OR MORE TIMES A WEEK
HOW MANY STANDARD DRINKS CONTAINING ALCOHOL DO YOU HAVE ON A TYPICAL DAY: 1 OR 2

## 2025-02-08 NOTE — ED NOTES
During triage, I noted patient's SPO2 to be in the 70s. I helped the patient to a wheelchair, and escorted him to ER room 31. Additional staff members met us. Patient placed on NRB at 15L and work up began.

## 2025-02-08 NOTE — ED PROVIDER NOTES
[facility] EMERGENCY DEPT  EMERGENCY DEPARTMENT HISTORY AND PHYSICAL EXAM      Date: 2/8/2025  Patient Name: Gary Nuno  MRN: [id number]  Birthdate 1950  Date of evaluation: 2/8/2025  Provider: Marlon Barba MD   Note Started: 5:32 PM EST 2/8/25    HISTORY OF PRESENT ILLNESS     Chief Complaint   Patient presents with    Shortness of Breath     Pt ambulatory to triage with c/o SOB, believes he has a viral illness causing his SOB. Pt has a flutter, is awaiting an ablation with cardiology.        History Provided By: Patient    HPI: Gary Nuno is a 74 y.o. male PMH as below including COPD wearing CPAP, but does not usually wear oxygen at home, presenting with acute shortness of breath and hypoxemia.  He was 76% at home and very short of breath.  He believes he has a viral illness has been short of breath for several days.  He has a history of atrial flutter and is awaiting an ablation with cardiology.  No chest pain with the symptoms.  He does report swelling in his legs bilaterally.  He reports compliance with his home medications.  He reports albuterol has caused tachycardia in the past and he does not use that type of inhaler normally but he has not tried lev albuterol yet.    PAST MEDICAL HISTORY   Past Medical History:  Past Medical History:   Diagnosis Date    Arrhythmia     SVT; Dr. Kirby Fall 2018    Chronic obstructive pulmonary disease (HCC)     mild    GERD (gastroesophageal reflux disease)     Hypertension     Thyroid disease     benign thyroid growth, checked annually by Dr. Molina       Past Surgical History:  Past Surgical History:   Procedure Laterality Date    COLON CA SCRN NOT HI RSK IND  2/5/2019         COLONOSCOPY N/A 2/5/2019    COLONOSCOPY performed by Bakari Medina Jr., MD at [facility] ENDOSCOPY    COLONOSCOPY,REMV LESN,SNARE  2/5/2019         EP DEVICE PROCEDURE N/A 6/18/2024    Ablation A-fib w complete ep study performed by Ben Flood MD at

## 2025-02-09 LAB
ALBUMIN SERPL-MCNC: 3.3 G/DL (ref 3.5–5)
ALBUMIN/GLOB SERPL: 0.8 (ref 1.1–2.2)
ALP SERPL-CCNC: 63 U/L (ref 45–117)
ALT SERPL-CCNC: 35 U/L (ref 12–78)
ANION GAP SERPL CALC-SCNC: 6 MMOL/L (ref 2–12)
APPEARANCE UR: CLEAR
AST SERPL-CCNC: 31 U/L (ref 15–37)
BACTERIA URNS QL MICRO: NEGATIVE /HPF
BASOPHILS # BLD: 0 K/UL (ref 0–0.1)
BASOPHILS NFR BLD: 0 % (ref 0–1)
BILIRUB SERPL-MCNC: 0.3 MG/DL (ref 0.2–1)
BILIRUB UR QL: NEGATIVE
BUN SERPL-MCNC: 15 MG/DL (ref 6–20)
BUN/CREAT SERPL: 13 (ref 12–20)
CALCIUM SERPL-MCNC: 8.7 MG/DL (ref 8.5–10.1)
CHLORIDE SERPL-SCNC: 98 MMOL/L (ref 97–108)
CO2 SERPL-SCNC: 28 MMOL/L (ref 21–32)
COLOR UR: ABNORMAL
CREAT SERPL-MCNC: 1.17 MG/DL (ref 0.7–1.3)
DIFFERENTIAL METHOD BLD: ABNORMAL
EKG ATRIAL RATE: 97 BPM
EKG DIAGNOSIS: NORMAL
EKG Q-T INTERVAL: 330 MS
EKG QRS DURATION: 118 MS
EKG QTC CALCULATION (BAZETT): 433 MS
EKG R AXIS: 56 DEGREES
EKG T AXIS: -1 DEGREES
EKG VENTRICULAR RATE: 104 BPM
EOSINOPHIL # BLD: 0 K/UL (ref 0–0.4)
EOSINOPHIL NFR BLD: 0 % (ref 0–7)
EPITH CASTS URNS QL MICRO: ABNORMAL /LPF
ERYTHROCYTE [DISTWIDTH] IN BLOOD BY AUTOMATED COUNT: 13.6 % (ref 11.5–14.5)
EST. AVERAGE GLUCOSE BLD GHB EST-MCNC: 117 MG/DL
GLOBULIN SER CALC-MCNC: 4.4 G/DL (ref 2–4)
GLUCOSE BLD STRIP.AUTO-MCNC: 116 MG/DL (ref 65–117)
GLUCOSE BLD STRIP.AUTO-MCNC: 130 MG/DL (ref 65–117)
GLUCOSE BLD STRIP.AUTO-MCNC: 132 MG/DL (ref 65–117)
GLUCOSE BLD STRIP.AUTO-MCNC: 148 MG/DL (ref 65–117)
GLUCOSE SERPL-MCNC: 122 MG/DL (ref 65–100)
GLUCOSE UR STRIP.AUTO-MCNC: NEGATIVE MG/DL
HBA1C MFR BLD: 5.7 % (ref 4–5.6)
HCT VFR BLD AUTO: 39.9 % (ref 36.6–50.3)
HGB BLD-MCNC: 13.5 G/DL (ref 12.1–17)
HGB UR QL STRIP: NEGATIVE
HYALINE CASTS URNS QL MICRO: ABNORMAL /LPF (ref 0–2)
IMM GRANULOCYTES # BLD AUTO: 0.05 K/UL (ref 0–0.04)
IMM GRANULOCYTES NFR BLD AUTO: 0.8 % (ref 0–0.5)
KETONES UR QL STRIP.AUTO: NEGATIVE MG/DL
LEUKOCYTE ESTERASE UR QL STRIP.AUTO: NEGATIVE
LYMPHOCYTES # BLD: 0.4 K/UL (ref 0.8–3.5)
LYMPHOCYTES NFR BLD: 6.6 % (ref 12–49)
MCH RBC QN AUTO: 29.4 PG (ref 26–34)
MCHC RBC AUTO-ENTMCNC: 33.8 G/DL (ref 30–36.5)
MCV RBC AUTO: 86.9 FL (ref 80–99)
MONOCYTES # BLD: 0.36 K/UL (ref 0–1)
MONOCYTES NFR BLD: 5.9 % (ref 5–13)
NEUTS SEG # BLD: 5.29 K/UL (ref 1.8–8)
NEUTS SEG NFR BLD: 86.7 % (ref 32–75)
NITRITE UR QL STRIP.AUTO: NEGATIVE
NRBC # BLD: 0 K/UL (ref 0–0.01)
NRBC BLD-RTO: 0 PER 100 WBC
PH UR STRIP: 5.5 (ref 5–8)
PLATELET # BLD AUTO: 182 K/UL (ref 150–400)
PMV BLD AUTO: 10.2 FL (ref 8.9–12.9)
POTASSIUM SERPL-SCNC: 4.7 MMOL/L (ref 3.5–5.1)
PROT SERPL-MCNC: 7.7 G/DL (ref 6.4–8.2)
PROT UR STRIP-MCNC: 30 MG/DL
RBC # BLD AUTO: 4.59 M/UL (ref 4.1–5.7)
RBC #/AREA URNS HPF: ABNORMAL /HPF (ref 0–5)
RBC MORPH BLD: ABNORMAL
SERVICE CMNT-IMP: ABNORMAL
SERVICE CMNT-IMP: NORMAL
SODIUM SERPL-SCNC: 132 MMOL/L (ref 136–145)
SP GR UR REFRACTOMETRY: 1.01
URINE CULTURE IF INDICATED: ABNORMAL
UROBILINOGEN UR QL STRIP.AUTO: 0.2 EU/DL (ref 0.2–1)
WBC # BLD AUTO: 6.1 K/UL (ref 4.1–11.1)
WBC URNS QL MICRO: ABNORMAL /HPF (ref 0–4)

## 2025-02-09 PROCEDURE — 83036 HEMOGLOBIN GLYCOSYLATED A1C: CPT

## 2025-02-09 PROCEDURE — 2700000000 HC OXYGEN THERAPY PER DAY

## 2025-02-09 PROCEDURE — 85025 COMPLETE CBC W/AUTO DIFF WBC: CPT

## 2025-02-09 PROCEDURE — 94640 AIRWAY INHALATION TREATMENT: CPT

## 2025-02-09 PROCEDURE — 2500000003 HC RX 250 WO HCPCS: Performed by: STUDENT IN AN ORGANIZED HEALTH CARE EDUCATION/TRAINING PROGRAM

## 2025-02-09 PROCEDURE — 82962 GLUCOSE BLOOD TEST: CPT

## 2025-02-09 PROCEDURE — 80053 COMPREHEN METABOLIC PANEL: CPT

## 2025-02-09 PROCEDURE — 1100000003 HC PRIVATE W/ TELEMETRY

## 2025-02-09 PROCEDURE — 6360000002 HC RX W HCPCS: Performed by: STUDENT IN AN ORGANIZED HEALTH CARE EDUCATION/TRAINING PROGRAM

## 2025-02-09 PROCEDURE — 81001 URINALYSIS AUTO W/SCOPE: CPT

## 2025-02-09 PROCEDURE — 36415 COLL VENOUS BLD VENIPUNCTURE: CPT

## 2025-02-09 PROCEDURE — 6370000000 HC RX 637 (ALT 250 FOR IP): Performed by: STUDENT IN AN ORGANIZED HEALTH CARE EDUCATION/TRAINING PROGRAM

## 2025-02-09 PROCEDURE — 6370000000 HC RX 637 (ALT 250 FOR IP): Performed by: INTERNAL MEDICINE

## 2025-02-09 RX ORDER — OSELTAMIVIR PHOSPHATE 75 MG/1
75 CAPSULE ORAL 2 TIMES DAILY
Status: DISCONTINUED | OUTPATIENT
Start: 2025-02-09 | End: 2025-02-10 | Stop reason: HOSPADM

## 2025-02-09 RX ORDER — DILTIAZEM HYDROCHLORIDE 240 MG/1
240 CAPSULE, COATED, EXTENDED RELEASE ORAL DAILY
Status: DISCONTINUED | OUTPATIENT
Start: 2025-02-09 | End: 2025-02-10 | Stop reason: HOSPADM

## 2025-02-09 RX ADMIN — AZITHROMYCIN 500 MG: 250 TABLET, FILM COATED ORAL at 20:34

## 2025-02-09 RX ADMIN — AMIODARONE HYDROCHLORIDE 200 MG: 200 TABLET ORAL at 09:15

## 2025-02-09 RX ADMIN — ARFORMOTEROL TARTRATE 15 MCG: 15 SOLUTION RESPIRATORY (INHALATION) at 21:14

## 2025-02-09 RX ADMIN — IPRATROPIUM BROMIDE 0.5 MG: 0.5 SOLUTION RESPIRATORY (INHALATION) at 21:04

## 2025-02-09 RX ADMIN — IPRATROPIUM BROMIDE 0.5 MG: 0.5 SOLUTION RESPIRATORY (INHALATION) at 14:24

## 2025-02-09 RX ADMIN — AMIODARONE HYDROCHLORIDE 200 MG: 200 TABLET ORAL at 20:35

## 2025-02-09 RX ADMIN — APIXABAN 5 MG: 5 TABLET, FILM COATED ORAL at 09:16

## 2025-02-09 RX ADMIN — SODIUM CHLORIDE, PRESERVATIVE FREE 10 ML: 5 INJECTION INTRAVENOUS at 20:33

## 2025-02-09 RX ADMIN — IPRATROPIUM BROMIDE 2 SPRAY: 21 SPRAY NASAL at 20:33

## 2025-02-09 RX ADMIN — FINASTERIDE 5 MG: 5 TABLET, FILM COATED ORAL at 09:15

## 2025-02-09 RX ADMIN — BUDESONIDE 500 MCG: 0.5 INHALANT RESPIRATORY (INHALATION) at 09:56

## 2025-02-09 RX ADMIN — BUDESONIDE 500 MCG: 0.5 INHALANT RESPIRATORY (INHALATION) at 21:14

## 2025-02-09 RX ADMIN — APIXABAN 5 MG: 5 TABLET, FILM COATED ORAL at 20:35

## 2025-02-09 RX ADMIN — ROSUVASTATIN CALCIUM 40 MG: 40 TABLET, FILM COATED ORAL at 20:35

## 2025-02-09 RX ADMIN — IPRATROPIUM BROMIDE 0.5 MG: 0.5 SOLUTION RESPIRATORY (INHALATION) at 09:51

## 2025-02-09 RX ADMIN — SODIUM CHLORIDE, PRESERVATIVE FREE 10 ML: 5 INJECTION INTRAVENOUS at 00:53

## 2025-02-09 RX ADMIN — PREDNISONE 40 MG: 20 TABLET ORAL at 09:16

## 2025-02-09 RX ADMIN — PANTOPRAZOLE SODIUM 40 MG: 40 TABLET, DELAYED RELEASE ORAL at 09:15

## 2025-02-09 RX ADMIN — GEMFIBROZIL 600 MG: 600 TABLET ORAL at 20:36

## 2025-02-09 RX ADMIN — TAMSULOSIN HYDROCHLORIDE 0.4 MG: 0.4 CAPSULE ORAL at 09:15

## 2025-02-09 RX ADMIN — ARFORMOTEROL TARTRATE 15 MCG: 15 SOLUTION RESPIRATORY (INHALATION) at 09:56

## 2025-02-09 RX ADMIN — IPRATROPIUM BROMIDE 2 SPRAY: 21 SPRAY NASAL at 11:19

## 2025-02-09 RX ADMIN — SODIUM CHLORIDE, PRESERVATIVE FREE 10 ML: 5 INJECTION INTRAVENOUS at 09:17

## 2025-02-09 RX ADMIN — MAGNESIUM SULFATE HEPTAHYDRATE 1000 MG: 1 INJECTION, SOLUTION INTRAVENOUS at 00:52

## 2025-02-09 RX ADMIN — OSELTAMIVIR PHOSPHATE 75 MG: 75 CAPSULE ORAL at 20:34

## 2025-02-09 RX ADMIN — GEMFIBROZIL 600 MG: 600 TABLET ORAL at 09:16

## 2025-02-09 RX ADMIN — OSELTAMIVIR 30 MG: 30 CAPSULE ORAL at 09:15

## 2025-02-09 RX ADMIN — METOPROLOL SUCCINATE 50 MG: 50 TABLET, EXTENDED RELEASE ORAL at 20:36

## 2025-02-09 RX ADMIN — METOPROLOL SUCCINATE 50 MG: 50 TABLET, EXTENDED RELEASE ORAL at 09:15

## 2025-02-09 RX ADMIN — DILTIAZEM HYDROCHLORIDE 240 MG: 240 CAPSULE, EXTENDED RELEASE ORAL at 09:16

## 2025-02-09 ASSESSMENT — PAIN SCALES - GENERAL
PAINLEVEL_OUTOF10: 0

## 2025-02-09 NOTE — ED TRIAGE NOTES
Bedside and Verbal shift change report given to Donald RN (oncoming nurse) by Shayne RN (offgoing nurse). Report included the following information Nurse Handoff Report, Index, ED SBAR, MAR, Recent Results, and Event Log.

## 2025-02-09 NOTE — PROGRESS NOTES
End of Shift Note    Bedside shift change report given to Parish MISTRY (oncoming nurse) by Nancy Vidal RN (offgoing nurse).  Report included the following information SBAR, Intake/Output, MAR, Recent Results, and Cardiac Rhythm Sinus tachycardia    Shift worked:  7P-7A     Shift summary and any significant changes:    Patient admitted from ED at 0120. Patient database completed, dual skin assessment performed. Patient on 3L O2 NC. No complaint of pain from patient. No significant changes overnight.     Concerns for physician to address:     Zone phone for oncoming shift:        Activity:  Level of Assistance: Standby assist, set-up cues, supervision of patient - no hands on  Number times ambulated in hallways past shift: 0  Number of times OOB to chair past shift: 0    Cardiac:   Cardiac Monitoring: Yes           Access:  Current line(s): PIV     Genitourinary:   Urinary Status: Voiding, Dysuria (patient states burning with urination)    Respiratory:   O2 Device: Nasal cannula  Chronic home O2 use?: NO  Incentive spirometer at bedside: NO    GI:  Last BM (including prior to admit): 02/08/25  Current diet:  ADULT DIET; Regular; Low Fat/Low Chol/High Fiber/JOSH  Passing flatus: YES    Pain Management:   Patient states pain is manageable on current regimen: YES    Skin:  En Scale Score: 21  Interventions: Wound Offloading (Prevention Methods): Turning, Repositioning, Pillows    Patient Safety:  Fall Risk: Nursing Judgement-Fall Risk High(Add Comments): No  Fall Risk Interventions  Nursing Judgement-Fall Risk High(Add Comments): No  Toilet Every 2 Hours-In Advance of Need: Yes  Hourly Visual Checks: Awake  Fall Visual Posted: Armband, Socks  Room Door Open: Deferred to promote rest  Alarm On: Bed  Patient Moved Closer to Nursing Station: No    Active Consults:   None    Length of Stay:  Expected LOS: 3  Actual LOS: 1    Nancy Vidal, RN

## 2025-02-09 NOTE — H&P
Hospitalist Admission Note    NAME:   Gary Nuno   : 1950   MRN: [id number]     Date/Time: 2025 10:32 PM    Patient PCP: Stephane Titus MD    ______________________________________________________________________  Given the patient's current clinical presentation, I have a high level of concern for decompensation if discharged from the emergency department.  Complex decision making was performed, which includes reviewing the patient's available past medical records, laboratory results, and x-ray films.       My assessment of this patient's clinical condition and my plan of care is as follows.    Assessment / Plan:  Gary Nuno is a 74 y.o.  male with PMHx significant for atrial flutter, COPD, GERD, hypertension, hyperlipidemia, diabetes mellitus, BPH who presents to the ED with complaints of cough, shortness of breath of 1 days duration    Influenza A  Acute COPD exacerbation  Hypoxic respiratory failure  -Influenza A positive, chest x-ray with no acute cardiopulmonary process  -Start Tamiflu, prednisone, azithromycin, as needed levalbuterol, scheduled ipratropium [SVT with albuterol], incentive spirometry.  Wean off O2 nasal cannula as tolerated, has oxygen at home but has not used it in years.  -Follow-up with pulmonology as outpatient    History of atrial flutter  -Presently in normal sinus rhythm, continue home amiodarone, diltiazem, metoprolol succinate, Eliquis.  Monitor on telemetry, keep K above 4, mag over 2    Diabetes mellitus type II  -Check A1c, low sliding scale insulin 3 times daily AC at bedtime    Hypertension  Hyperlipidemia  -Continue home antihypertensives, statin    BPH  Dysuria  -Check urinalysis with reflex culture, has received IV ceftriaxone in ED, will hold off on further ceftriaxone pending UA results.  -Continue home Flomax, finasteride    Hyponatremia  -Mild, likely secondary to decreased p.o. intake, encourage p.o. intake and trend

## 2025-02-09 NOTE — PROGRESS NOTES
End of Shift Note    Bedside shift change report given to FIDELIA Sifuentes (oncoming nurse) by Parish Teresa RN (offgoing nurse).  Report included the following information SBAR, Intake/Output, MAR, and Recent Results    Shift worked:  7a-730p     Shift summary and any significant changes:     Pt rested in bed majority of the day. Worked with RT on scheduled nebulizer. Likely home tomorrow if pt feels comfortable per MD.      Concerns for physician to address:       Zone phone for oncoming shift:          Activity:     Number times ambulated in hallways past shift: 0  Number of times OOB to chair past shift: 0    Cardiac:   Cardiac Monitoring: Yes           Access:   Current line(s): Peripheral IV    Genitourinary:   Urinary status: Patient is voiding without difficulty.    Respiratory:      Chronic home O2 use?: YES - pt states he does not utilize home O2  Incentive spirometer at bedside: NO       GI:     Current diet:  ADULT DIET; Regular; Low Fat/Low Chol/High Fiber/JOSH  Passing flatus: Yes  Tolerating current diet: Yes       Pain Management:   Patient states pain is manageable on current regimen: Yes    Skin:     Interventions:     Patient Safety:  Fall Score: PA Fall Risk Score: 45.37    Interventions:           Length of Stay:  Expected LOS: 3  Actual LOS: 1      Parish Teresa RN

## 2025-02-09 NOTE — ED NOTES
TRANSFER - OUT REPORT:    Verbal report given to Nancy MISTRY on Gary Nuno  being transferred to  310 for routine progression of patient care       Report consisted of patient's Situation, Background, Assessment and   Recommendations(SBAR).     Information from the following report(s) ED Encounter Summary, ED SBAR, MAR, Recent Results, and Med Rec Status was reviewed with the receiving nurse.    Robbins Fall Assessment:    Presents to emergency department  because of falls (Syncope, seizure, or loss of consciousness): No  Age > 70: No  Altered Mental Status, Intoxication with alcohol or substance confusion (Disorientation, impaired judgment, poor safety awaremess, or inability to follow instructions): No  Impaired Mobility: Ambulates or transfers with assistive devices or assistance; Unable to ambulate or transer.: No  Nursing Judgement: No          Lines:   Peripheral IV 02/08/25 Left Antecubital (Active)        Opportunity for questions and clarification was provided.      Patient transported with:  Monitor and Tech

## 2025-02-10 VITALS
OXYGEN SATURATION: 93 % | BODY MASS INDEX: 33.82 KG/M2 | RESPIRATION RATE: 16 BRPM | HEART RATE: 113 BPM | SYSTOLIC BLOOD PRESSURE: 103 MMHG | TEMPERATURE: 97.7 F | WEIGHT: 242.51 LBS | DIASTOLIC BLOOD PRESSURE: 89 MMHG

## 2025-02-10 LAB
ANION GAP SERPL CALC-SCNC: 5 MMOL/L (ref 2–12)
BASOPHILS # BLD: 0.01 K/UL (ref 0–0.1)
BASOPHILS NFR BLD: 0.1 % (ref 0–1)
BUN SERPL-MCNC: 24 MG/DL (ref 6–20)
BUN/CREAT SERPL: 18 (ref 12–20)
CALCIUM SERPL-MCNC: 8.5 MG/DL (ref 8.5–10.1)
CHLORIDE SERPL-SCNC: 103 MMOL/L (ref 97–108)
CO2 SERPL-SCNC: 29 MMOL/L (ref 21–32)
CREAT SERPL-MCNC: 1.33 MG/DL (ref 0.7–1.3)
DIFFERENTIAL METHOD BLD: ABNORMAL
EOSINOPHIL # BLD: 0 K/UL (ref 0–0.4)
EOSINOPHIL NFR BLD: 0 % (ref 0–7)
ERYTHROCYTE [DISTWIDTH] IN BLOOD BY AUTOMATED COUNT: 13.4 % (ref 11.5–14.5)
GLUCOSE BLD STRIP.AUTO-MCNC: 85 MG/DL (ref 65–117)
GLUCOSE BLD STRIP.AUTO-MCNC: 95 MG/DL (ref 65–117)
GLUCOSE SERPL-MCNC: 99 MG/DL (ref 65–100)
HCT VFR BLD AUTO: 42.5 % (ref 36.6–50.3)
HGB BLD-MCNC: 13.3 G/DL (ref 12.1–17)
IMM GRANULOCYTES # BLD AUTO: 0.07 K/UL (ref 0–0.04)
IMM GRANULOCYTES NFR BLD AUTO: 0.7 % (ref 0–0.5)
LYMPHOCYTES # BLD: 0.78 K/UL (ref 0.8–3.5)
LYMPHOCYTES NFR BLD: 7.6 % (ref 12–49)
MCH RBC QN AUTO: 28.2 PG (ref 26–34)
MCHC RBC AUTO-ENTMCNC: 31.3 G/DL (ref 30–36.5)
MCV RBC AUTO: 90.2 FL (ref 80–99)
MONOCYTES # BLD: 1.36 K/UL (ref 0–1)
MONOCYTES NFR BLD: 13.2 % (ref 5–13)
NEUTS SEG # BLD: 8.1 K/UL (ref 1.8–8)
NEUTS SEG NFR BLD: 78.4 % (ref 32–75)
NRBC # BLD: 0 K/UL (ref 0–0.01)
NRBC BLD-RTO: 0 PER 100 WBC
PLATELET # BLD AUTO: 176 K/UL (ref 150–400)
PMV BLD AUTO: 10 FL (ref 8.9–12.9)
POTASSIUM SERPL-SCNC: 4.9 MMOL/L (ref 3.5–5.1)
PROCALCITONIN SERPL-MCNC: <0.05 NG/ML
RBC # BLD AUTO: 4.71 M/UL (ref 4.1–5.7)
SERVICE CMNT-IMP: NORMAL
SERVICE CMNT-IMP: NORMAL
SODIUM SERPL-SCNC: 137 MMOL/L (ref 136–145)
WBC # BLD AUTO: 10.3 K/UL (ref 4.1–11.1)

## 2025-02-10 PROCEDURE — 94640 AIRWAY INHALATION TREATMENT: CPT

## 2025-02-10 PROCEDURE — 80048 BASIC METABOLIC PNL TOTAL CA: CPT

## 2025-02-10 PROCEDURE — 2700000000 HC OXYGEN THERAPY PER DAY

## 2025-02-10 PROCEDURE — 85025 COMPLETE CBC W/AUTO DIFF WBC: CPT

## 2025-02-10 PROCEDURE — 6370000000 HC RX 637 (ALT 250 FOR IP): Performed by: INTERNAL MEDICINE

## 2025-02-10 PROCEDURE — 82962 GLUCOSE BLOOD TEST: CPT

## 2025-02-10 PROCEDURE — 6360000002 HC RX W HCPCS: Performed by: STUDENT IN AN ORGANIZED HEALTH CARE EDUCATION/TRAINING PROGRAM

## 2025-02-10 PROCEDURE — 36415 COLL VENOUS BLD VENIPUNCTURE: CPT

## 2025-02-10 PROCEDURE — 84145 PROCALCITONIN (PCT): CPT

## 2025-02-10 PROCEDURE — 2500000003 HC RX 250 WO HCPCS: Performed by: STUDENT IN AN ORGANIZED HEALTH CARE EDUCATION/TRAINING PROGRAM

## 2025-02-10 PROCEDURE — 6370000000 HC RX 637 (ALT 250 FOR IP): Performed by: STUDENT IN AN ORGANIZED HEALTH CARE EDUCATION/TRAINING PROGRAM

## 2025-02-10 RX ORDER — BENZONATATE 100 MG/1
100 CAPSULE ORAL 3 TIMES DAILY PRN
Qty: 15 CAPSULE | Refills: 0 | Status: SHIPPED | OUTPATIENT
Start: 2025-02-10 | End: 2025-02-15

## 2025-02-10 RX ORDER — AZITHROMYCIN 500 MG/1
500 TABLET, FILM COATED ORAL DAILY
Qty: 2 TABLET | Refills: 0 | Status: SHIPPED | OUTPATIENT
Start: 2025-02-10 | End: 2025-02-12

## 2025-02-10 RX ORDER — OSELTAMIVIR PHOSPHATE 75 MG/1
75 CAPSULE ORAL 2 TIMES DAILY
Qty: 6 CAPSULE | Refills: 0 | Status: SHIPPED | OUTPATIENT
Start: 2025-02-10 | End: 2025-02-13

## 2025-02-10 RX ORDER — PREDNISONE 20 MG/1
40 TABLET ORAL DAILY
Qty: 6 TABLET | Refills: 0 | Status: SHIPPED | OUTPATIENT
Start: 2025-02-11 | End: 2025-02-14

## 2025-02-10 RX ORDER — GUAIFENESIN AND DEXTROMETHORPHAN HYDROBROMIDE 10; 100 MG/5ML; MG/5ML
5 SYRUP ORAL EVERY 4 HOURS PRN
Status: SHIPPED | COMMUNITY
Start: 2025-02-10

## 2025-02-10 RX ADMIN — TAMSULOSIN HYDROCHLORIDE 0.4 MG: 0.4 CAPSULE ORAL at 09:47

## 2025-02-10 RX ADMIN — SODIUM CHLORIDE, PRESERVATIVE FREE 10 ML: 5 INJECTION INTRAVENOUS at 09:54

## 2025-02-10 RX ADMIN — DILTIAZEM HYDROCHLORIDE 240 MG: 240 CAPSULE, EXTENDED RELEASE ORAL at 09:47

## 2025-02-10 RX ADMIN — BUDESONIDE 500 MCG: 0.5 INHALANT RESPIRATORY (INHALATION) at 08:09

## 2025-02-10 RX ADMIN — PANTOPRAZOLE SODIUM 40 MG: 40 TABLET, DELAYED RELEASE ORAL at 09:47

## 2025-02-10 RX ADMIN — PREDNISONE 40 MG: 20 TABLET ORAL at 09:47

## 2025-02-10 RX ADMIN — FINASTERIDE 5 MG: 5 TABLET, FILM COATED ORAL at 09:47

## 2025-02-10 RX ADMIN — ARFORMOTEROL TARTRATE 15 MCG: 15 SOLUTION RESPIRATORY (INHALATION) at 08:09

## 2025-02-10 RX ADMIN — OSELTAMIVIR PHOSPHATE 75 MG: 75 CAPSULE ORAL at 09:47

## 2025-02-10 RX ADMIN — GEMFIBROZIL 600 MG: 600 TABLET ORAL at 09:47

## 2025-02-10 RX ADMIN — IPRATROPIUM BROMIDE 0.5 MG: 0.5 SOLUTION RESPIRATORY (INHALATION) at 08:04

## 2025-02-10 RX ADMIN — APIXABAN 5 MG: 5 TABLET, FILM COATED ORAL at 09:46

## 2025-02-10 RX ADMIN — METOPROLOL SUCCINATE 50 MG: 50 TABLET, EXTENDED RELEASE ORAL at 09:47

## 2025-02-10 RX ADMIN — AMIODARONE HYDROCHLORIDE 200 MG: 200 TABLET ORAL at 09:46

## 2025-02-10 ASSESSMENT — PAIN SCALES - GENERAL: PAINLEVEL_OUTOF10: 0

## 2025-02-10 NOTE — DISCHARGE INSTRUCTIONS
Complete oseltamivir azithromycin for the flu and bronchitis    Complete steroids as directed    Use oxygen as needed to maintain sats > 90%    Resume all other home medications as before

## 2025-02-10 NOTE — PROGRESS NOTES
.Pulse oximetry assessment   93% at rest on room air (if 88% or less, skip next steps)  93-89% while ambulating on room air  96% at rest on 2LPM  93-96%% while ambulating on 2LPM   saturation

## 2025-02-10 NOTE — PROGRESS NOTES
.Pulse oximetry assessment   91% at rest on room air (if 88% or less, skip next steps)  73% while ambulating on room air  When ambulating on oxygen at 2 liters , started with oxygen saturation at 90%, then dropped to 83 % at end of hallway walk, had to increase oxygen to 4 liters to recover patient to 91%. Patient requiring oxygen up to 4 liters if ambulating any distance

## 2025-02-10 NOTE — PROGRESS NOTES
End of Shift Note    Bedside shift change report given to ARVIND Lynch (oncoming nurse) by Bear Salomon RN (offgoing nurse).  Report included the following information SBAR and MAR    Shift worked: 7pm - 7am     Shift summary and any significant changes:    Breathing comfortably on O2 at 2lpm on and off. No complaints of SOB. Uneventful night.     Concerns for physician to address: None     Zone phone for oncoming shift:          Activity:  Level of Assistance: Standby assist, set-up cues, supervision of patient - no hands on  Number times ambulated in hallways past shift: 0  Number of times OOB to chair past shift: 0    Cardiac:   Cardiac Monitoring: Yes      Cardiac Rhythm: Sinus tachy    Access:  Current line(s): PIV     Genitourinary:   Urinary Status: Voiding    Respiratory:   O2 Device: Nasal cannula  Chronic home O2 use?:   Incentive spirometer at bedside:     GI:  Last BM (including prior to admit): 02/08/25  Current diet:  ADULT DIET; Regular; Low Fat/Low Chol/High Fiber/JOSH  Passing flatus: YES    Pain Management:   Patient states pain is manageable on current regimen: YES    Skin:  En Scale Score: 21  Interventions: Wound Offloading (Prevention Methods): Bed, pressure redistribution/air    Patient Safety:  Fall Risk: Nursing Judgement-Fall Risk High(Add Comments): No  Fall Risk Interventions  Nursing Judgement-Fall Risk High(Add Comments): No  Toilet Every 2 Hours-In Advance of Need: Yes  Hourly Visual Checks: Awake, In bed  Fall Visual Posted: Socks  Room Door Open: Deferred for droplet isolation  Alarm On: Bed  Patient Moved Closer to Nursing Station: No    Active Consults:   None    Length of Stay:  Expected LOS: 3  Actual LOS: 2    Bear Salomon, RN

## 2025-02-10 NOTE — PROGRESS NOTES
Patient discharged t o home with family. Patient discharged with oxygen, tank provided by family. PIV removed. Instructions reviewed.

## 2025-02-10 NOTE — CARE COORDINATION
Care Management Initial Assessment       RUR: 13%  Readmission? No  1st IM letter given? Yes   1st  letter given: Yes    CM introduced self and role to wife, Nicci Nuno, verified pt demographics, insurance info,emergency contact and ACD.   Pt lives with  wife one story home with 4 steps to enter. Pt independent with  ADL's, ambulates and drives.  DME:  walker, cane, home oxygen - 2L HH: in the past  SNF: in the past. Uses [facility] and [facility] mail order.    Disposition:    Home   Brother to transport - will bring home oxygen        02/10/25 1309   Service Assessment   Patient Orientation Alert and Oriented   Cognition Alert   History Provided By Spouse  (Niccichase Nuno)   Primary Caregiver Self   Support Systems Spouse/Significant Other  (Nicci Nuno)   Patient's Healthcare Decision Maker is: Legal Next of Kin   PCP Verified by CM Yes   Last Visit to PCP Within last 3 months   Prior Functional Level Independent in ADLs/IADLs   Current Functional Level Independent in ADLs/IADLs   Can patient return to prior living arrangement Yes   Family able to assist with home care needs: Yes   Would you like for me to discuss the discharge plan with any other family members/significant others, and if so, who? Yes  (Spouse -Nicci Yaakov)   Financial Resources [place]   Community Resources None   Social/Functional History   Lives With Spouse   Type of Home House   Home Layout One level   Home Access Stairs to enter with rails   Entrance Stairs - Number of Steps 4   Home Equipment Cane;Walker - Rolling   Receives Help From Friend(s);Family   Prior Level of Assist for ADLs Independent   Prior Level of Assist for Homemaking Independent   Ambulation Assistance Independent   Prior Level of Assist for Transfers Independent   Active  Yes   Mode of Transportation Car   Discharge Planning   Type of Residence House   Living Arrangements Spouse/Significant Other  (Spouse -Nicci Nuno)

## 2025-02-10 NOTE — PROGRESS NOTES
Hospitalist Progress Note    NAME:   Gary Nuno   : 1950   MRN: [id number]     Date: 2025    Patient PCP: Stephane Titus MD    Hospital Problem list:     Influenza A  Acute COPD exacerbation  Hypoxic respiratory failure  -Influenza A positive, chest x-ray with no acute cardiopulmonary process  -Start Tamiflu, prednisone, azithromycin, as needed levalbuterol, scheduled ipratropium [SVT with albuterol], incentive spirometry.  Wean off O2 nasal cannula as tolerated, has oxygen at home but has not used it in years.  -Follow-up with pulmonology as outpatient     History of atrial flutter  -Presently in normal sinus rhythm, continue home amiodarone, diltiazem, metoprolol succinate, Eliquis.  Monitor on telemetry, keep K above 4, mag over 2     Diabetes mellitus type II  -Check A1c, low sliding scale insulin 3 times daily AC at bedtime     Hypertension  Hyperlipidemia  -Continue home antihypertensives, statin     BPH  Dysuria  -Check urinalysis with reflex culture, has received IV ceftriaxone in ED, will hold off on further ceftriaxone pending UA results.  -Continue home Flomax, finasteride     Hyponatremia  -Mild, likely secondary to decreased p.o. intake, encourage p.o. intake and trend BMP     Code Status: Full code  DVT Prophylaxis: Eliquis      History, assessment and plan for 2025:     Estimated discharge date: 2025    Needs to be done before discharge:  Improved respiratory status  Wean oxygen      Reason for physician visit:    \"I am still coughing\".  Discussed with RN events overnight.   Remains on oxygen, normally wears at home as needed    No HA, abdominal pain, N/V, diarrhea    Medical Decision Making:   I personally reviewed labs: CBC, CMP  I personally reviewed imaging:  I personally reviewed EKG:  Toxic drug monitoring:   Discussed case with: Patient, nursing staff    Total NON critical care TIME:  35  Minutes    Total CRITICAL CARE TIME Spent:   Minutes non

## 2025-02-10 NOTE — PROGRESS NOTES
PCP Hospital follow-up transitional care appointment has been scheduled with Dr. Stephane Titus on 2/20/25 09201. This is the first available appt due to limited provider availability. PCP office does not offer alternate provider option for hospital follow up. [facility] information on AVS for patient resource. Pending patient discharge.

## 2025-02-10 NOTE — CARE COORDINATION
Case Management    Pt cleared for d/c from CM standpoint.    CM discussed d/c plan with wife. Per Wife , brother is transporting pt. When asked if brother has oxygen tank, wife said no.  CM went an discussed d/c plan with pt. Pt will call brother to go by home to get tank.    No further d/c need identified at this time.    CORY LoydN,RN  Care   [facility]  Phone: [phone]

## 2025-02-10 NOTE — DISCHARGE SUMMARY
decreased p.o. intake, encourage p.o. intake and trend BMP     Code Status: Full code  DVT Prophylaxis: Eliquis    History, assessment and plan for 2/10/2025:     Reason for physician visit:    \"I feel a lot better\".  Discussed with RN events overnight.   Feels ready to go home, weaned off o2  Up walking on room air, feels close to baseline  Still with some cough  No CP    No HA, abdominal pain, N/V, diarrhea    Medical Decision Making:   I personally reviewed labs: CBC, CMP  I personally reviewed imaging:  I personally reviewed EKG:  Toxic drug monitoring:   Discussed case with: Patient, nursing staff    Objective:     VITALS:   Last 24hrs VS reviewed since prior progress note. Most recent are:  Patient Vitals for the past 24 hrs:   BP Temp Temp src Pulse Resp SpO2   02/10/25 0804 -- -- -- -- -- 95 %   02/10/25 0729 128/87 98.1 °F (36.7 °C) -- (!) 110 20 94 %   02/10/25 0452 135/85 97.8 °F (36.6 °C) Oral (!) 109 -- 92 %   02/09/25 2036 133/81 -- -- (!) 109 -- --   02/09/25 1931 133/81 97.8 °F (36.6 °C) Oral (!) 109 17 93 %   02/09/25 1520 102/75 97.9 °F (36.6 °C) Oral (!) 111 17 92 %   02/09/25 1424 -- -- -- -- -- 92 %         Intake/Output Summary (Last 24 hours) at 2/10/2025 1406  Last data filed at 2/10/2025 0304  Gross per 24 hour   Intake 600 ml   Output 825 ml   Net -225 ml        I had a face to face encounter and independently examined this patient on 2/10/2025, as outlined below:    PHYSICAL EXAM:  General: Alert, cooperative  EENT:  Anicteric sclerae.  Resp:  Few crackles at bases bilaterally, no wheezing No accessory muscle use  CV:  Regular  rhythm,  No edema  GI:  Soft, Non distended, Non tender.  +Bowel sounds  Neurologic:  Alert and oriented X 3, normal speech,   Psych:   Not anxious nor agitated  Skin:  No rashes.  No jaundice    Reviewed most current lab test results and cultures  YES  Reviewed most current radiology test results   YES  Review and summation of old records today    NO  Reviewed

## 2025-02-13 NOTE — PROGRESS NOTES
Physician Progress Note      PATIENT:               JOHNSON BOUCHER  CSN #:                  [id number]  :                       1950  ADMIT DATE:       2025 3:56 PM  DISCH DATE:        2/10/2025 6:04 PM  RESPONDING  PROVIDER #:        Jr BRYANT Talley MD          QUERY TEXT:    Good Afternoon    This patient admitted for Influenza A.    The patient has known Atrial flutter which is maintained on Eliquis.  Eliquis has been continued this admission.    If possible, please document in progress notes and discharge summary if you   are evaluating and/or treating any of the following:?  ?  The medical record reflects the following:  Risk Factors: DM, HTN Age (> 75)  Clinical Indicators: Known of Atrial flutter and is on Eliquis  Treatment: Eliquis has been continued this admission    Thank you  CORY HaywoodN,RN, CPQ<,CCDS, SMART  Options provided:  -- Secondary hypercoagulable state in a patient with atrial fibrillation  -- Other - I will add my own diagnosis  -- Disagree - Not applicable / Not valid  -- Disagree - Clinically unable to determine / Unknown  -- Refer to Clinical Documentation Reviewer    PROVIDER RESPONSE TEXT:    This patient has secondary hypercoagulable state in a patient with atrial   fibrillation.    Query created by: Leia Modi on 2/10/2025 2:36 PM      Electronically signed by:  Jr BRYANT Talley MD 2025 3:43 PM

## 2025-02-18 ENCOUNTER — ANESTHESIA EVENT (OUTPATIENT)
Facility: HOSPITAL | Age: 75
End: 2025-02-18
Payer: MEDICARE

## 2025-02-18 ENCOUNTER — HOSPITAL ENCOUNTER (OUTPATIENT)
Facility: HOSPITAL | Age: 75
Setting detail: OUTPATIENT SURGERY
Discharge: HOME | End: 2025-02-18
Attending: INTERNAL MEDICINE | Admitting: INTERNAL MEDICINE
Payer: MEDICARE

## 2025-02-18 ENCOUNTER — ANESTHESIA (OUTPATIENT)
Facility: HOSPITAL | Age: 75
End: 2025-02-18
Payer: MEDICARE

## 2025-02-18 ENCOUNTER — HOSPITAL ENCOUNTER (OUTPATIENT)
Facility: HOSPITAL | Age: 75
Discharge: HOME | End: 2025-02-20
Attending: INTERNAL MEDICINE

## 2025-02-18 VITALS
HEART RATE: 102 BPM | OXYGEN SATURATION: 98 % | HEIGHT: 71 IN | TEMPERATURE: 98 F | SYSTOLIC BLOOD PRESSURE: 142 MMHG | BODY MASS INDEX: 33.88 KG/M2 | RESPIRATION RATE: 22 BRPM | DIASTOLIC BLOOD PRESSURE: 68 MMHG | WEIGHT: 242 LBS

## 2025-02-18 DIAGNOSIS — I48.91 A-FIB (HCC): ICD-10-CM

## 2025-02-18 PROCEDURE — 85347 COAGULATION TIME ACTIVATED: CPT

## 2025-02-18 PROCEDURE — 6360000002 HC RX W HCPCS: Performed by: NURSE ANESTHETIST, CERTIFIED REGISTERED

## 2025-02-18 PROCEDURE — C1760 CLOSURE DEV, VASC: HCPCS | Performed by: INTERNAL MEDICINE

## 2025-02-18 PROCEDURE — C1759 CATH, INTRA ECHOCARDIOGRAPHY: HCPCS | Performed by: INTERNAL MEDICINE

## 2025-02-18 PROCEDURE — C1893 INTRO/SHEATH, FIXED,NON-PEEL: HCPCS | Performed by: INTERNAL MEDICINE

## 2025-02-18 PROCEDURE — C1732 CATH, EP, DIAG/ABL, 3D/VECT: HCPCS | Performed by: INTERNAL MEDICINE

## 2025-02-18 PROCEDURE — 94640 AIRWAY INHALATION TREATMENT: CPT

## 2025-02-18 PROCEDURE — C1894 INTRO/SHEATH, NON-LASER: HCPCS | Performed by: INTERNAL MEDICINE

## 2025-02-18 PROCEDURE — 3700000001 HC ADD 15 MINUTES (ANESTHESIA): Performed by: INTERNAL MEDICINE

## 2025-02-18 PROCEDURE — 2500000003 HC RX 250 WO HCPCS: Performed by: NURSE ANESTHETIST, CERTIFIED REGISTERED

## 2025-02-18 PROCEDURE — C1766 INTRO/SHEATH,STRBLE,NON-PEEL: HCPCS | Performed by: INTERNAL MEDICINE

## 2025-02-18 PROCEDURE — C1730 CATH, EP, 19 OR FEW ELECT: HCPCS | Performed by: INTERNAL MEDICINE

## 2025-02-18 PROCEDURE — 2720000010 HC SURG SUPPLY STERILE: Performed by: INTERNAL MEDICINE

## 2025-02-18 PROCEDURE — 3700000000 HC ANESTHESIA ATTENDED CARE: Performed by: INTERNAL MEDICINE

## 2025-02-18 PROCEDURE — 2580000003 HC RX 258: Performed by: NURSE ANESTHETIST, CERTIFIED REGISTERED

## 2025-02-18 PROCEDURE — 6360000002 HC RX W HCPCS: Performed by: ANESTHESIOLOGY

## 2025-02-18 PROCEDURE — 93656 COMPRE EP EVAL ABLTJ ATR FIB: CPT | Performed by: INTERNAL MEDICINE

## 2025-02-18 PROCEDURE — 2709999900 HC NON-CHARGEABLE SUPPLY: Performed by: INTERNAL MEDICINE

## 2025-02-18 RX ORDER — 0.9 % SODIUM CHLORIDE 0.9 %
INTRAVENOUS SOLUTION INTRAVENOUS
Status: DISCONTINUED | OUTPATIENT
Start: 2025-02-18 | End: 2025-02-18 | Stop reason: SDUPTHER

## 2025-02-18 RX ORDER — AMIODARONE HYDROCHLORIDE 200 MG/1
200 TABLET ORAL DAILY
Qty: 30 TABLET | Refills: 2 | Status: SHIPPED
Start: 2025-02-18

## 2025-02-18 RX ORDER — DEXAMETHASONE SODIUM PHOSPHATE 4 MG/ML
INJECTION, SOLUTION INTRA-ARTICULAR; INTRALESIONAL; INTRAMUSCULAR; INTRAVENOUS; SOFT TISSUE
Status: DISCONTINUED | OUTPATIENT
Start: 2025-02-18 | End: 2025-02-18 | Stop reason: SDUPTHER

## 2025-02-18 RX ORDER — VASOPRESSIN 20 [USP'U]/ML
INJECTION, SOLUTION INTRAVENOUS
Status: DISCONTINUED | OUTPATIENT
Start: 2025-02-18 | End: 2025-02-18 | Stop reason: SDUPTHER

## 2025-02-18 RX ORDER — PROTAMINE SULFATE 10 MG/ML
INJECTION, SOLUTION INTRAVENOUS
Status: DISCONTINUED | OUTPATIENT
Start: 2025-02-18 | End: 2025-02-18 | Stop reason: SDUPTHER

## 2025-02-18 RX ORDER — PHENYLEPHRINE HCL IN 0.9% NACL 0.4MG/10ML
SYRINGE (ML) INTRAVENOUS
Status: DISCONTINUED | OUTPATIENT
Start: 2025-02-18 | End: 2025-02-18 | Stop reason: SDUPTHER

## 2025-02-18 RX ORDER — MIDAZOLAM HYDROCHLORIDE 1 MG/ML
INJECTION, SOLUTION INTRAMUSCULAR; INTRAVENOUS
Status: DISCONTINUED | OUTPATIENT
Start: 2025-02-18 | End: 2025-02-18 | Stop reason: SDUPTHER

## 2025-02-18 RX ORDER — FENTANYL CITRATE 50 UG/ML
INJECTION, SOLUTION INTRAMUSCULAR; INTRAVENOUS
Status: DISCONTINUED | OUTPATIENT
Start: 2025-02-18 | End: 2025-02-18 | Stop reason: SDUPTHER

## 2025-02-18 RX ORDER — SUCCINYLCHOLINE CHLORIDE 20 MG/ML
INJECTION INTRAMUSCULAR; INTRAVENOUS
Status: DISCONTINUED | OUTPATIENT
Start: 2025-02-18 | End: 2025-02-18 | Stop reason: SDUPTHER

## 2025-02-18 RX ORDER — ROCURONIUM BROMIDE 10 MG/ML
INJECTION, SOLUTION INTRAVENOUS
Status: DISCONTINUED | OUTPATIENT
Start: 2025-02-18 | End: 2025-02-18 | Stop reason: SDUPTHER

## 2025-02-18 RX ORDER — LOSARTAN POTASSIUM 25 MG/1
25 TABLET ORAL DAILY
COMMUNITY

## 2025-02-18 RX ORDER — LEVALBUTEROL INHALATION SOLUTION 1.25 MG/3ML
1.25 SOLUTION RESPIRATORY (INHALATION) ONCE
Status: DISCONTINUED | OUTPATIENT
Start: 2025-02-18 | End: 2025-02-18

## 2025-02-18 RX ORDER — ONDANSETRON 2 MG/ML
INJECTION INTRAMUSCULAR; INTRAVENOUS
Status: DISCONTINUED | OUTPATIENT
Start: 2025-02-18 | End: 2025-02-18 | Stop reason: SDUPTHER

## 2025-02-18 RX ORDER — HEPARIN SODIUM 1000 [USP'U]/ML
INJECTION, SOLUTION INTRAVENOUS; SUBCUTANEOUS
Status: DISCONTINUED | OUTPATIENT
Start: 2025-02-18 | End: 2025-02-18 | Stop reason: SDUPTHER

## 2025-02-18 RX ADMIN — ROCURONIUM BROMIDE 30 MG: 10 INJECTION INTRAVENOUS at 10:17

## 2025-02-18 RX ADMIN — HEPARIN SODIUM 5000 UNITS: 1000 INJECTION, SOLUTION INTRAVENOUS; SUBCUTANEOUS at 10:55

## 2025-02-18 RX ADMIN — LIDOCAINE HYDROCHLORIDE 100 MG: 20 INJECTION, SOLUTION EPIDURAL; INFILTRATION; INTRACAUDAL; PERINEURAL at 10:03

## 2025-02-18 RX ADMIN — SODIUM CHLORIDE 600 ML: 9 INJECTION, SOLUTION INTRAVENOUS at 11:29

## 2025-02-18 RX ADMIN — Medication 120 MCG: at 10:13

## 2025-02-18 RX ADMIN — Medication 120 MCG: at 10:07

## 2025-02-18 RX ADMIN — IPRATROPIUM BROMIDE 0.5 MG: 0.5 SOLUTION RESPIRATORY (INHALATION) at 08:59

## 2025-02-18 RX ADMIN — MIDAZOLAM HYDROCHLORIDE 1 MG: 1 INJECTION, SOLUTION INTRAMUSCULAR; INTRAVENOUS at 09:50

## 2025-02-18 RX ADMIN — PHENYLEPHRINE HYDROCHLORIDE 35 MCG/MIN: 10 INJECTION INTRAVENOUS at 10:12

## 2025-02-18 RX ADMIN — PROTAMINE SULFATE 70 MG: 10 INJECTION, SOLUTION INTRAVENOUS at 11:25

## 2025-02-18 RX ADMIN — HEPARIN SODIUM 12000 UNITS: 1000 INJECTION, SOLUTION INTRAVENOUS; SUBCUTANEOUS at 10:17

## 2025-02-18 RX ADMIN — VASOPRESSIN 2 UNITS: 20 INJECTION INTRAVENOUS at 10:32

## 2025-02-18 RX ADMIN — DEXAMETHASONE SODIUM PHOSPHATE 8 MG: 4 INJECTION, SOLUTION INTRAMUSCULAR; INTRAVENOUS at 10:24

## 2025-02-18 RX ADMIN — HEPARIN SODIUM 6000 UNITS: 1000 INJECTION, SOLUTION INTRAVENOUS; SUBCUTANEOUS at 10:38

## 2025-02-18 RX ADMIN — Medication 120 MCG: at 10:10

## 2025-02-18 RX ADMIN — ONDANSETRON HYDROCHLORIDE 4 MG: 2 INJECTION, SOLUTION INTRAMUSCULAR; INTRAVENOUS at 10:24

## 2025-02-18 RX ADMIN — FENTANYL CITRATE 50 MCG: 50 INJECTION, SOLUTION INTRAMUSCULAR; INTRAVENOUS at 11:43

## 2025-02-18 RX ADMIN — FENTANYL CITRATE 50 MCG: 50 INJECTION, SOLUTION INTRAMUSCULAR; INTRAVENOUS at 10:03

## 2025-02-18 RX ADMIN — SUGAMMADEX 200 MG: 100 INJECTION, SOLUTION INTRAVENOUS at 11:30

## 2025-02-18 RX ADMIN — PHENYLEPHRINE HYDROCHLORIDE 50 MCG/MIN: 10 INJECTION INTRAVENOUS at 10:20

## 2025-02-18 RX ADMIN — ROCURONIUM BROMIDE 10 MG: 10 INJECTION INTRAVENOUS at 10:03

## 2025-02-18 RX ADMIN — PROPOFOL 120 MG: 10 INJECTION, EMULSION INTRAVENOUS at 10:03

## 2025-02-18 RX ADMIN — VASOPRESSIN 2 UNITS: 20 INJECTION INTRAVENOUS at 10:10

## 2025-02-18 RX ADMIN — SUCCINYLCHOLINE CHLORIDE 160 MG: 20 INJECTION, SOLUTION INTRAMUSCULAR; INTRAVENOUS at 10:03

## 2025-02-18 NOTE — ANESTHESIA PRE PROCEDURE
LVEF is 25 - 30%. Visually measured ejection fraction. Normal cavity size. Mildly increased wall thickness. Moderate-to-severely and globally reduced systolic function. Wall motion: normal. Inconclusive left ventricular diastolic function.  · Image quality for this study was technically difficult.  · Contrast used: DEFINITY.  · MV: Mitral valve non-specific thickening. Mild mitral annular calcification.  · RV: Not well visualized.       Neuro/Psych:   Negative Neuro/Psych ROS              GI/Hepatic/Renal:   (+) GERD:         ROS comment: Obesity.   Endo/Other: Negative Endo/Other ROS                    Abdominal:   (+) obese          Vascular: negative vascular ROS.         Other Findings:       Anesthesia Plan      general     ASA 3     (Atrovent nebulizer prior to procedure)  Induction: intravenous.  BIS  MIPS: Postoperative opioids intended and Prophylactic antiemetics administered.  Anesthetic plan and risks discussed with patient.      Plan discussed with CRNA.                Steven Sanchez MD   2/18/2025

## 2025-02-18 NOTE — ANESTHESIA POSTPROCEDURE EVALUATION
Department of Anesthesiology  Postprocedure Note    Patient: Gary Nuno  MRN: [id number]  YOB: 1950  Date of evaluation: 2/18/2025    Procedure Summary       Date: 02/18/25 Room / Location: [facility] EP LAB / [facility] CARDIAC CATH LAB    Anesthesia Start: 0950 Anesthesia Stop: 1206    Procedure: Ablation A-fib w complete ep study Diagnosis: A-fib (HCC)    Providers: Ben Flood MD Responsible Provider: Steven Sanchez MD    Anesthesia Type: General ASA Status: 3            Anesthesia Type: General    Bharti Phase I: Bharti Score: 8    Bharti Phase II:      Anesthesia Post Evaluation    Patient location during evaluation: PACU  Patient participation: complete - patient participated  Level of consciousness: sleepy but conscious and responsive to verbal stimuli  Airway patency: patent  Nausea & Vomiting: no vomiting and no nausea  Cardiovascular status: blood pressure returned to baseline and hemodynamically stable  Respiratory status: acceptable  Hydration status: stable  Pain management: adequate    No notable events documented.

## 2025-02-18 NOTE — DISCHARGE INSTRUCTIONS
POST-ABLATION DISCHARGE INSTRUCTIONS:    You had a left atrial flutter ablation with Dr. Flood using RF energy.  The area targeted with therapy was on the front of the left atrium.    Continue your usual medications, but decrease the amiodarone to 200 mg once daily.  After 3 months, the goal is to stop amiodarone.    Keep the appointment in the office in the next few weeks, call [phone] with questions.      Do not drive, operate any machinery, or sign any legal documents for 24 hours after your procedure.  You must have someone to drive you home.    You may take a shower 24 hours after your cardiac procedure.  Be sure to get the dressing wet and then remove it; gently wash the area with warm soapy water.  Pat dry and leave open to air.  To help prevent infections, be sure to keep the cath site clean and dry.  No lotions, creams, powders, ointments, etc. in the cath site for approximately 1 week.    Do not take a tub bath, get in a hot tub or swimming pool for approximately 5 days or until the cath site is completely healed.      No strenuous activity or heavy lifting over 20 lbs. for 7 days.     After your procedure, some bruising or discomfort is common during the healing process.  Tylenol, 1-2 tablets every 6 hours as needed, is recommended if you experience any discomfort.  If you experience any signs or symptoms of infection such as fever, chills, or poorly healing incision, persistent tenderness or swelling in the groin, redness and/or warmth to the touch, numbness, significant tingling or pain at the groin site or affected extremity, rash, drainage from the site, or if the leg feels tight or swollen, call your physician right away.    If bleeding at the site occurs, take a clean gauze pad and apply direct pressure to the groin just above the puncture site, and call your physician right away.    If your procedure involved ablation therapy, you may feel some mild or vague chest discomfort due to

## 2025-02-18 NOTE — PROGRESS NOTES
Cardiac Cath Lab Recovery Arrival Note:      Gary Nuno arrived to Cardiac Cath Lab, Recovery Area. Staff introduced to patient. Patient identifiers verified with NAME and DATE OF BIRTH. Procedure verified with patient. Consent forms reviewed and signed by patient or authorized representative and verified. Allergies verified.     Patient and family oriented to department. Patient and family informed of procedure and plan of care.     Questions answered with review. Patient prepped for procedure, per orders from physician, prior to arrival.    Patient on cardiac monitor, non-invasive blood pressure, SPO2 monitor. On RA. Patient is A&Ox 4. Patient reports no pain.     Patient in stretcher, in low position, with side rails up, call bell within reach, patient instructed to call if assistance as needed.    Patient prep in: [facility] Recovery Area, [place] [facility].   Patient family has pager #   Family in: ida.   Prep by: ge

## 2025-02-20 LAB
ACT BLD: 216 SECS (ref 79–138)
ACT BLD: 222 SECS (ref 79–138)
ACT BLD: 250 SECS (ref 79–138)

## 2025-02-23 LAB — ECHO BSA: 2.34 M2

## 2025-03-17 ENCOUNTER — HOSPITAL ENCOUNTER (OUTPATIENT)
Facility: HOSPITAL | Age: 75
Discharge: HOME | End: 2025-03-20
Attending: INTERNAL MEDICINE
Payer: MEDICARE

## 2025-03-17 DIAGNOSIS — F17.211 CIGARETTE NICOTINE DEPENDENCE IN REMISSION: ICD-10-CM

## 2025-03-17 DIAGNOSIS — Z87.891 PERSONAL HISTORY OF TOBACCO USE: ICD-10-CM

## 2025-03-17 PROCEDURE — 71271 CT THORAX LUNG CANCER SCR C-: CPT

## 2025-04-19 SDOH — HEALTH STABILITY: PHYSICAL HEALTH: ON AVERAGE, HOW MANY DAYS PER WEEK DO YOU ENGAGE IN MODERATE TO STRENUOUS EXERCISE (LIKE A BRISK WALK)?: 5 DAYS

## 2025-04-22 ENCOUNTER — OFFICE VISIT (OUTPATIENT)
Facility: CLINIC | Age: 75
End: 2025-04-22
Payer: MEDICARE

## 2025-04-22 VITALS
OXYGEN SATURATION: 95 % | BODY MASS INDEX: 35 KG/M2 | SYSTOLIC BLOOD PRESSURE: 151 MMHG | TEMPERATURE: 97.7 F | WEIGHT: 250 LBS | RESPIRATION RATE: 16 BRPM | HEIGHT: 71 IN | DIASTOLIC BLOOD PRESSURE: 79 MMHG | HEART RATE: 88 BPM

## 2025-04-22 DIAGNOSIS — Z76.89 ENCOUNTER TO ESTABLISH CARE WITH NEW DOCTOR: Primary | ICD-10-CM

## 2025-04-22 DIAGNOSIS — N40.1 BENIGN PROSTATIC HYPERPLASIA WITH LOWER URINARY TRACT SYMPTOMS, SYMPTOM DETAILS UNSPECIFIED: ICD-10-CM

## 2025-04-22 DIAGNOSIS — I48.91 ATRIAL FIBRILLATION, UNSPECIFIED TYPE (HCC): ICD-10-CM

## 2025-04-22 DIAGNOSIS — K21.9 GASTROESOPHAGEAL REFLUX DISEASE WITHOUT ESOPHAGITIS: ICD-10-CM

## 2025-04-22 DIAGNOSIS — I10 PRIMARY HYPERTENSION: ICD-10-CM

## 2025-04-22 DIAGNOSIS — J44.9 CHRONIC OBSTRUCTIVE PULMONARY DISEASE, UNSPECIFIED COPD TYPE (HCC): ICD-10-CM

## 2025-04-22 DIAGNOSIS — E78.2 MIXED HYPERLIPIDEMIA: ICD-10-CM

## 2025-04-22 PROCEDURE — G8417 CALC BMI ABV UP PARAM F/U: HCPCS | Performed by: INTERNAL MEDICINE

## 2025-04-22 PROCEDURE — 99204 OFFICE O/P NEW MOD 45 MIN: CPT | Performed by: INTERNAL MEDICINE

## 2025-04-22 PROCEDURE — G8427 DOCREV CUR MEDS BY ELIG CLIN: HCPCS | Performed by: INTERNAL MEDICINE

## 2025-04-22 PROCEDURE — 3077F SYST BP >= 140 MM HG: CPT | Performed by: INTERNAL MEDICINE

## 2025-04-22 PROCEDURE — 3023F SPIROM DOC REV: CPT | Performed by: INTERNAL MEDICINE

## 2025-04-22 PROCEDURE — 1159F MED LIST DOCD IN RCRD: CPT | Performed by: INTERNAL MEDICINE

## 2025-04-22 PROCEDURE — 1036F TOBACCO NON-USER: CPT | Performed by: INTERNAL MEDICINE

## 2025-04-22 PROCEDURE — 3078F DIAST BP <80 MM HG: CPT | Performed by: INTERNAL MEDICINE

## 2025-04-22 PROCEDURE — 1123F ACP DISCUSS/DSCN MKR DOCD: CPT | Performed by: INTERNAL MEDICINE

## 2025-04-22 PROCEDURE — 1126F AMNT PAIN NOTED NONE PRSNT: CPT | Performed by: INTERNAL MEDICINE

## 2025-04-22 RX ORDER — CHLORAL HYDRATE 500 MG
1 CAPSULE ORAL DAILY
COMMUNITY

## 2025-04-22 ASSESSMENT — PATIENT HEALTH QUESTIONNAIRE - PHQ9
SUM OF ALL RESPONSES TO PHQ QUESTIONS 1-9: 0
2. FEELING DOWN, DEPRESSED OR HOPELESS: NOT AT ALL
1. LITTLE INTEREST OR PLEASURE IN DOING THINGS: NOT AT ALL
SUM OF ALL RESPONSES TO PHQ QUESTIONS 1-9: 0

## 2025-04-22 ASSESSMENT — ENCOUNTER SYMPTOMS
BACK PAIN: 0
DIARRHEA: 0
NAUSEA: 0
COUGH: 0
TROUBLE SWALLOWING: 0
ABDOMINAL PAIN: 0
CONSTIPATION: 0
SHORTNESS OF BREATH: 0
CHEST TIGHTNESS: 0
BLOOD IN STOOL: 0

## 2025-04-22 NOTE — ASSESSMENT & PLAN NOTE
Mildly elevated systolic blood pressure.  Will be monitored. On diltiazem to 40 mg, metoprolol, losartan

## 2025-04-22 NOTE — PROGRESS NOTES
Gary Nuno is a 74 y.o. year old male seen in clinic today for   Chief Complaint   Patient presents with    New Patient     Room 5b- establish care       He  is here today to establish care.  He states he does not think he is diabetic but has been placed on Ozempic and not very keen on it.  He has been prediabetic.  He gives a history of 4 ablations and 4 cardioversions for atrial fibrillation and is under the care of cardiology.  He has been on amiodarone and there is a plan for discontinuing that.  He has an appointment next month.  He also has COPD.  Recently had a CT lung which showed stable mild to moderate chronic lung changes and right upper lobe nodule 12-month follow-up recommended.  Pressure readings in the eyes have been high in the past but no diagnosis of glaucoma.  History of prostate issues but no cancer.  Has been on medications including Flomax and finasteride.  Not seen urology in the last couple of years.     Current Outpatient Medications   Medication Sig Dispense Refill    Omega-3 Fatty Acids (OMEGA-3 FISH OIL) 1000 MG capsule Take 1 capsule by mouth daily      losartan (COZAAR) 25 MG tablet Take 1 tablet by mouth daily      amiodarone (CORDARONE) 200 MG tablet Take 1 tablet by mouth daily 30 tablet 2    Semaglutide, 1 MG/DOSE, (OZEMPIC, 1 MG/DOSE,) 4 MG/3ML SOPN sc injection Inject 1 mg into the skin every 7 days      Budeson-Glycopyrrol-Formoterol (BREZTRI AEROSPHERE) 160-9-4.8 MCG/ACT AERO Inhale into the lungs      ipratropium (ATROVENT) 0.03 % nasal spray 2 sprays by Each Nostril route in the morning and 2 sprays in the evening.      apixaban (ELIQUIS) 5 MG TABS tablet Take 1 tablet by mouth 2 times daily      dilTIAZem (TIAZAC) 240 MG extended release capsule Take 1 capsule by mouth daily      finasteride (PROSCAR) 5 MG tablet Take 1 tablet by mouth daily      gemfibrozil (LOPID) 600 MG tablet Take 1 tablet by mouth 2 times daily      metoprolol succinate (TOPROL XL) 25 MG

## 2025-04-22 NOTE — PROGRESS NOTES
\"Have you been to the ER, urgent care clinic since your last visit?  Hospitalized since your last visit?\"    Yes MMR    “Have you seen or consulted any other health care providers outside our system since your last visit?”    NO

## 2025-05-03 LAB
HBA1C MFR BLD HPLC: 6.1 %
LDL CHOLESTEROL, EXTERNAL: 32

## 2025-05-28 DIAGNOSIS — K21.9 GASTROESOPHAGEAL REFLUX DISEASE WITHOUT ESOPHAGITIS: ICD-10-CM

## 2025-05-28 DIAGNOSIS — Z86.79 H/O CHF: ICD-10-CM

## 2025-05-28 DIAGNOSIS — I50.20 SYSTOLIC CONGESTIVE HEART FAILURE, UNSPECIFIED HF CHRONICITY (HCC): ICD-10-CM

## 2025-05-28 DIAGNOSIS — I10 PRIMARY HYPERTENSION: ICD-10-CM

## 2025-05-28 DIAGNOSIS — R73.03 PREDIABETES: ICD-10-CM

## 2025-05-28 DIAGNOSIS — E78.2 MIXED HYPERLIPIDEMIA: Primary | ICD-10-CM

## 2025-05-28 DIAGNOSIS — I48.91 ATRIAL FIBRILLATION, UNSPECIFIED TYPE (HCC): ICD-10-CM

## 2025-06-20 ENCOUNTER — TELEPHONE (OUTPATIENT)
Facility: CLINIC | Age: 75
End: 2025-06-20

## 2025-06-20 NOTE — TELEPHONE ENCOUNTER
Attempted to contact patient regarding upcoming Medicare wellness appointment and completion of HRA questionnaire. LVM for patient to please return call at  [phone].

## 2025-06-21 SDOH — HEALTH STABILITY: PHYSICAL HEALTH: ON AVERAGE, HOW MANY MINUTES DO YOU ENGAGE IN EXERCISE AT THIS LEVEL?: 20 MIN

## 2025-06-21 SDOH — HEALTH STABILITY: PHYSICAL HEALTH: ON AVERAGE, HOW MANY DAYS PER WEEK DO YOU ENGAGE IN MODERATE TO STRENUOUS EXERCISE (LIKE A BRISK WALK)?: 7 DAYS

## 2025-06-21 ASSESSMENT — PATIENT HEALTH QUESTIONNAIRE - PHQ9
SUM OF ALL RESPONSES TO PHQ QUESTIONS 1-9: 0
SUM OF ALL RESPONSES TO PHQ QUESTIONS 1-9: 0
2. FEELING DOWN, DEPRESSED OR HOPELESS: NOT AT ALL
SUM OF ALL RESPONSES TO PHQ QUESTIONS 1-9: 0
1. LITTLE INTEREST OR PLEASURE IN DOING THINGS: NOT AT ALL
SUM OF ALL RESPONSES TO PHQ QUESTIONS 1-9: 0

## 2025-06-21 ASSESSMENT — LIFESTYLE VARIABLES
HOW OFTEN DURING THE LAST YEAR HAVE YOU BEEN UNABLE TO REMEMBER WHAT HAPPENED THE NIGHT BEFORE BECAUSE YOU HAD BEEN DRINKING: NEVER
HOW OFTEN DURING THE LAST YEAR HAVE YOU HAD A FEELING OF GUILT OR REMORSE AFTER DRINKING: NEVER
HAVE YOU OR SOMEONE ELSE BEEN INJURED AS A RESULT OF YOUR DRINKING: NO
HOW OFTEN DURING THE LAST YEAR HAVE YOU BEEN UNABLE TO REMEMBER WHAT HAPPENED THE NIGHT BEFORE BECAUSE YOU HAD BEEN DRINKING: NEVER
HOW OFTEN DURING THE LAST YEAR HAVE YOU NEEDED AN ALCOHOLIC DRINK FIRST THING IN THE MORNING TO GET YOURSELF GOING AFTER A NIGHT OF HEAVY DRINKING: NEVER
HOW OFTEN DURING THE LAST YEAR HAVE YOU FAILED TO DO WHAT WAS NORMALLY EXPECTED FROM YOU BECAUSE OF DRINKING: NEVER
HOW MANY STANDARD DRINKS CONTAINING ALCOHOL DO YOU HAVE ON A TYPICAL DAY: 1
HOW OFTEN DURING THE LAST YEAR HAVE YOU FAILED TO DO WHAT WAS NORMALLY EXPECTED FROM YOU BECAUSE OF DRINKING: NEVER
HAS A RELATIVE, FRIEND, DOCTOR, OR ANOTHER HEALTH PROFESSIONAL EXPRESSED CONCERN ABOUT YOUR DRINKING OR SUGGESTED YOU CUT DOWN: NO
HOW OFTEN DO YOU HAVE A DRINK CONTAINING ALCOHOL: 4 OR MORE TIMES A WEEK
HOW OFTEN DURING THE LAST YEAR HAVE YOU HAD A FEELING OF GUILT OR REMORSE AFTER DRINKING: NEVER
HAS A RELATIVE, FRIEND, DOCTOR, OR ANOTHER HEALTH PROFESSIONAL EXPRESSED CONCERN ABOUT YOUR DRINKING OR SUGGESTED YOU CUT DOWN: NO
HOW OFTEN DURING THE LAST YEAR HAVE YOU FOUND THAT YOU WERE NOT ABLE TO STOP DRINKING ONCE YOU HAD STARTED: NEVER
HOW OFTEN DO YOU HAVE SIX OR MORE DRINKS ON ONE OCCASION: 1
HOW OFTEN DURING THE LAST YEAR HAVE YOU FOUND THAT YOU WERE NOT ABLE TO STOP DRINKING ONCE YOU HAD STARTED: NEVER
HOW OFTEN DURING THE LAST YEAR HAVE YOU NEEDED AN ALCOHOLIC DRINK FIRST THING IN THE MORNING TO GET YOURSELF GOING AFTER A NIGHT OF HEAVY DRINKING: NEVER
HOW OFTEN DO YOU HAVE A DRINK CONTAINING ALCOHOL: 5
HAVE YOU OR SOMEONE ELSE BEEN INJURED AS A RESULT OF YOUR DRINKING: NO
HOW MANY STANDARD DRINKS CONTAINING ALCOHOL DO YOU HAVE ON A TYPICAL DAY: 1 OR 2

## 2025-06-24 ENCOUNTER — OFFICE VISIT (OUTPATIENT)
Facility: CLINIC | Age: 75
End: 2025-06-24
Payer: MEDICARE

## 2025-06-24 VITALS
HEART RATE: 78 BPM | SYSTOLIC BLOOD PRESSURE: 167 MMHG | RESPIRATION RATE: 12 BRPM | BODY MASS INDEX: 35.84 KG/M2 | HEIGHT: 71 IN | OXYGEN SATURATION: 95 % | WEIGHT: 256 LBS | DIASTOLIC BLOOD PRESSURE: 83 MMHG | TEMPERATURE: 97.6 F

## 2025-06-24 DIAGNOSIS — I48.11 LONGSTANDING PERSISTENT ATRIAL FIBRILLATION (HCC): Primary | ICD-10-CM

## 2025-06-24 DIAGNOSIS — I50.20 SYSTOLIC CONGESTIVE HEART FAILURE, UNSPECIFIED HF CHRONICITY (HCC): ICD-10-CM

## 2025-06-24 DIAGNOSIS — I10 PRIMARY HYPERTENSION: ICD-10-CM

## 2025-06-24 DIAGNOSIS — E78.2 MIXED HYPERLIPIDEMIA: ICD-10-CM

## 2025-06-24 PROCEDURE — G8417 CALC BMI ABV UP PARAM F/U: HCPCS | Performed by: INTERNAL MEDICINE

## 2025-06-24 PROCEDURE — 1159F MED LIST DOCD IN RCRD: CPT | Performed by: INTERNAL MEDICINE

## 2025-06-24 PROCEDURE — 3079F DIAST BP 80-89 MM HG: CPT | Performed by: INTERNAL MEDICINE

## 2025-06-24 PROCEDURE — 1036F TOBACCO NON-USER: CPT | Performed by: INTERNAL MEDICINE

## 2025-06-24 PROCEDURE — G8427 DOCREV CUR MEDS BY ELIG CLIN: HCPCS | Performed by: INTERNAL MEDICINE

## 2025-06-24 PROCEDURE — 3077F SYST BP >= 140 MM HG: CPT | Performed by: INTERNAL MEDICINE

## 2025-06-24 PROCEDURE — 1123F ACP DISCUSS/DSCN MKR DOCD: CPT | Performed by: INTERNAL MEDICINE

## 2025-06-24 PROCEDURE — 99214 OFFICE O/P EST MOD 30 MIN: CPT | Performed by: INTERNAL MEDICINE

## 2025-06-24 RX ORDER — METOPROLOL TARTRATE 50 MG
50 TABLET ORAL 2 TIMES DAILY
COMMUNITY
Start: 2025-03-21

## 2025-06-24 ASSESSMENT — ENCOUNTER SYMPTOMS
COUGH: 0
NAUSEA: 0
DIARRHEA: 0
ABDOMINAL PAIN: 0
SHORTNESS OF BREATH: 1
TROUBLE SWALLOWING: 0
CHEST TIGHTNESS: 0
BLOOD IN STOOL: 0
SINUS PRESSURE: 1
SINUS PAIN: 0
CONSTIPATION: 0
BACK PAIN: 0

## 2025-06-24 NOTE — PROGRESS NOTES
Have you been to the ER, urgent care clinic since your last visit?  Hospitalized since your last visit?   NO    Have you seen or consulted any other health care providers outside our system since your last visit?   YES - When: approximately 1 months ago.  Where and Why: cardiology.

## 2025-06-24 NOTE — PROGRESS NOTES
Gary Nuno is a 74 y.o. year old male seen in clinic today for   Chief Complaint   Patient presents with    Hypertension       He is not reporting any new problems.  He follows up with Dr. Molina for history of pre-diabetes thyroid nodule.  He is following up with Dr. Watt for prostate issues.  He had a prostate lesion.  He states his PSA was 0.6. and Dr. Meyer for history of atrial fibrillation.  He sees Dr. Villareal for COPD.  Current Outpatient Medications   Medication Sig Dispense Refill    metoprolol tartrate (LOPRESSOR) 50 MG tablet Take 1 tablet by mouth 2 times daily      Multiple Vitamins-Minerals (CENTRUM SILVER PO) Take 1 tablet by mouth daily      Multiple Vitamins-Minerals (PRESERVISION AREDS 2 PO) Take by mouth 2 times daily      vitamin D 25 MCG (1000 UT) CAPS Take by mouth      LECITHIN PO Take by mouth      Glucosamine-Chondroitin (GLUCOSAMINE CHONDR COMPLEX PO) Take by mouth      Omega-3 Fatty Acids (OMEGA-3 FISH OIL) 1000 MG capsule Take 1 capsule by mouth daily      Budeson-Glycopyrrol-Formoterol (BREZTRI AEROSPHERE) 160-9-4.8 MCG/ACT AERO Inhale into the lungs      ipratropium (ATROVENT) 0.03 % nasal spray 2 sprays by Each Nostril route in the morning and 2 sprays in the evening.      apixaban (ELIQUIS) 5 MG TABS tablet Take 1 tablet by mouth 2 times daily      dilTIAZem (TIAZAC) 240 MG extended release capsule Take 1 capsule by mouth daily      finasteride (PROSCAR) 5 MG tablet Take 1 tablet by mouth daily      gemfibrozil (LOPID) 600 MG tablet Take 1 tablet by mouth 2 times daily      pantoprazole (PROTONIX) 40 MG tablet Take 1 tablet by mouth daily      rosuvastatin (CRESTOR) 40 MG tablet Take 1 tablet by mouth nightly      tamsulosin (FLOMAX) 0.4 MG capsule Take 1 capsule by mouth daily      losartan (COZAAR) 25 MG tablet Take 1 tablet by mouth daily      amiodarone (CORDARONE) 200 MG tablet Take 1 tablet by mouth daily 30 tablet 2    Semaglutide, 1 MG/DOSE, (OZEMPIC,

## 2025-06-25 ENCOUNTER — RESULTS FOLLOW-UP (OUTPATIENT)
Facility: CLINIC | Age: 75
End: 2025-06-25

## 2025-06-25 LAB
ALBUMIN SERPL-MCNC: 4.2 G/DL (ref 3.5–5)
ALBUMIN/GLOB SERPL: 1.1 (ref 1.1–2.2)
ALP SERPL-CCNC: 82 U/L (ref 45–117)
ALT SERPL-CCNC: 44 U/L (ref 12–78)
ANION GAP SERPL CALC-SCNC: 3 MMOL/L (ref 2–12)
AST SERPL-CCNC: 24 U/L (ref 15–37)
BASOPHILS # BLD: 0.02 K/UL (ref 0–0.1)
BASOPHILS NFR BLD: 0.3 % (ref 0–1)
BILIRUB SERPL-MCNC: 0.4 MG/DL (ref 0.2–1)
BUN SERPL-MCNC: 18 MG/DL (ref 6–20)
BUN/CREAT SERPL: 15 (ref 12–20)
CALCIUM SERPL-MCNC: 9.9 MG/DL (ref 8.5–10.1)
CHLORIDE SERPL-SCNC: 103 MMOL/L (ref 97–108)
CO2 SERPL-SCNC: 31 MMOL/L (ref 21–32)
CREAT SERPL-MCNC: 1.21 MG/DL (ref 0.7–1.3)
DIFFERENTIAL METHOD BLD: ABNORMAL
EOSINOPHIL # BLD: 0.11 K/UL (ref 0–0.4)
EOSINOPHIL NFR BLD: 1.5 % (ref 0–7)
ERYTHROCYTE [DISTWIDTH] IN BLOOD BY AUTOMATED COUNT: 13.7 % (ref 11.5–14.5)
GLOBULIN SER CALC-MCNC: 3.7 G/DL (ref 2–4)
GLUCOSE SERPL-MCNC: 126 MG/DL (ref 65–100)
HCT VFR BLD AUTO: 46.2 % (ref 36.6–50.3)
HGB BLD-MCNC: 14.6 G/DL (ref 12.1–17)
IMM GRANULOCYTES # BLD AUTO: 0.05 K/UL (ref 0–0.04)
IMM GRANULOCYTES NFR BLD AUTO: 0.7 % (ref 0–0.5)
LYMPHOCYTES # BLD: 1.8 K/UL (ref 0.8–3.5)
LYMPHOCYTES NFR BLD: 23.9 % (ref 12–49)
MCH RBC QN AUTO: 28.2 PG (ref 26–34)
MCHC RBC AUTO-ENTMCNC: 31.6 G/DL (ref 30–36.5)
MCV RBC AUTO: 89.2 FL (ref 80–99)
MONOCYTES # BLD: 0.7 K/UL (ref 0–1)
MONOCYTES NFR BLD: 9.3 % (ref 5–13)
NEUTS SEG # BLD: 4.84 K/UL (ref 1.8–8)
NEUTS SEG NFR BLD: 64.3 % (ref 32–75)
NRBC # BLD: 0 K/UL (ref 0–0.01)
NRBC BLD-RTO: 0 PER 100 WBC
NT PRO BNP: 115 PG/ML
PLATELET # BLD AUTO: 161 K/UL (ref 150–400)
PMV BLD AUTO: 10.8 FL (ref 8.9–12.9)
POTASSIUM SERPL-SCNC: 5.9 MMOL/L (ref 3.5–5.1)
PROT SERPL-MCNC: 7.9 G/DL (ref 6.4–8.2)
RBC # BLD AUTO: 5.18 M/UL (ref 4.1–5.7)
SODIUM SERPL-SCNC: 137 MMOL/L (ref 136–145)
WBC # BLD AUTO: 7.5 K/UL (ref 4.1–11.1)

## 2025-06-28 ENCOUNTER — PATIENT MESSAGE (OUTPATIENT)
Facility: CLINIC | Age: 75
End: 2025-06-28

## 2025-06-30 ENCOUNTER — OFFICE VISIT (OUTPATIENT)
Facility: CLINIC | Age: 75
End: 2025-06-30
Payer: MEDICARE

## 2025-06-30 VITALS
HEIGHT: 71 IN | DIASTOLIC BLOOD PRESSURE: 75 MMHG | BODY MASS INDEX: 36.12 KG/M2 | OXYGEN SATURATION: 95 % | WEIGHT: 258 LBS | HEART RATE: 82 BPM | RESPIRATION RATE: 18 BRPM | SYSTOLIC BLOOD PRESSURE: 143 MMHG

## 2025-06-30 DIAGNOSIS — E87.5 HYPERKALEMIA: Primary | ICD-10-CM

## 2025-06-30 PROCEDURE — 1123F ACP DISCUSS/DSCN MKR DOCD: CPT | Performed by: INTERNAL MEDICINE

## 2025-06-30 PROCEDURE — 3077F SYST BP >= 140 MM HG: CPT | Performed by: INTERNAL MEDICINE

## 2025-06-30 PROCEDURE — 93000 ELECTROCARDIOGRAM COMPLETE: CPT | Performed by: INTERNAL MEDICINE

## 2025-06-30 PROCEDURE — 1126F AMNT PAIN NOTED NONE PRSNT: CPT | Performed by: INTERNAL MEDICINE

## 2025-06-30 PROCEDURE — 3078F DIAST BP <80 MM HG: CPT | Performed by: INTERNAL MEDICINE

## 2025-06-30 PROCEDURE — 99214 OFFICE O/P EST MOD 30 MIN: CPT | Performed by: INTERNAL MEDICINE

## 2025-06-30 PROCEDURE — G8427 DOCREV CUR MEDS BY ELIG CLIN: HCPCS | Performed by: INTERNAL MEDICINE

## 2025-06-30 PROCEDURE — 1159F MED LIST DOCD IN RCRD: CPT | Performed by: INTERNAL MEDICINE

## 2025-06-30 PROCEDURE — G8417 CALC BMI ABV UP PARAM F/U: HCPCS | Performed by: INTERNAL MEDICINE

## 2025-06-30 PROCEDURE — 1036F TOBACCO NON-USER: CPT | Performed by: INTERNAL MEDICINE

## 2025-06-30 ASSESSMENT — PATIENT HEALTH QUESTIONNAIRE - PHQ9
SUM OF ALL RESPONSES TO PHQ QUESTIONS 1-9: 0
SUM OF ALL RESPONSES TO PHQ QUESTIONS 1-9: 0
2. FEELING DOWN, DEPRESSED OR HOPELESS: NOT AT ALL
SUM OF ALL RESPONSES TO PHQ QUESTIONS 1-9: 0
SUM OF ALL RESPONSES TO PHQ QUESTIONS 1-9: 0
1. LITTLE INTEREST OR PLEASURE IN DOING THINGS: NOT AT ALL

## 2025-06-30 NOTE — PROGRESS NOTES
Identified pt with two pt identifiers(name and ). Reviewed record in preparation for visit and have obtained necessary documentation.  Chief Complaint   Patient presents with    Abnormal Lab     Potassium        Health Maintenance Due   Topic    Hepatitis C screen     DTaP/Tdap/Td vaccine (1 - Tdap)    Pneumococcal 50+ years Vaccine (1 of 2 - PCV)    Shingles vaccine (1 of 2)    Annual Wellness Visit (Medicare)     Respiratory Syncytial Virus (RSV) Pregnant or age 60 yrs+ (1 - 1-dose 75+ series)    COVID-19 Vaccine ( -  season)     Vitals:    25 0839 25 0847   BP: (!) 152/71 (!) 143/75   BP Site: Left Upper Arm Right Upper Arm   Patient Position: Sitting Sitting   BP Cuff Size: Large Adult Large Adult   Pulse: 82    Resp: 18    SpO2: 95%    Weight: 117 kg (258 lb)    Height: 1.803 m (5' 11\")       Pain Scale: 0 - No pain/10    Coordination of Care Questionnaire:  :   1. Have you been to the ER, urgent care clinic since your last visit?  Hospitalized since your last visit?No    2. Have you seen or consulted any other health care providers outside of the [facility] since your last visit?  Include any pap smears or colon screening. No    
  Neurological:  Negative for dizziness, seizures, syncope and headaches.   Hematological:  Does not bruise/bleed easily.        Physical Exam  Constitutional:       General: He is not in acute distress.     Appearance: Normal appearance.   HENT:      Head: Normocephalic and atraumatic.      Nose: Nose normal.   Eyes:      Extraocular Movements: Extraocular movements intact.      Pupils: Pupils are equal, round, and reactive to light.   Neck:      Vascular: No carotid bruit.   Cardiovascular:      Rate and Rhythm: Normal rate and regular rhythm.      Pulses: Normal pulses.      Heart sounds: Normal heart sounds. No murmur heard.  Pulmonary:      Breath sounds: Normal breath sounds.   Abdominal:      Palpations: Abdomen is soft.      Tenderness: There is no abdominal tenderness.   Lymphadenopathy:      Cervical: No cervical adenopathy.   Skin:     General: Skin is warm.   Neurological:      General: No focal deficit present.      Mental Status: He is alert and oriented to person, place, and time.             ASSESSMENT AND PLAN  Assessment & Plan  Hyperkalemia   EKG does not show any acute changes and no peaked T waves.  Nonspecific QRS widening.  Will check a BMP.  He has stopped his glucosamine supplement    Orders:    EKG 12 Lead    Basic Metabolic Panel; Future           I have discussed the diagnosis with the patient and the intended plan as seen in the above orders. Patient is in agreement.  The patient has received an after-visit summary and questions were answered concerning future plans.  I have discussed medication side effects and warnings with the patient as well.    Triny Pope MD

## 2025-06-30 NOTE — TELEPHONE ENCOUNTER
PCP: Triny Pope MD     Last appt:  6/24/2025      Future Appointments   Date Time Provider Department Center   12/29/2025  8:00 AM Triny Pope MD [facility]          Requested Prescriptions     Pending Prescriptions Disp Refills    pantoprazole (PROTONIX) 40 MG tablet 90 tablet 1     Sig: Take 1 tablet by mouth daily    rosuvastatin (CRESTOR) 40 MG tablet 90 tablet 1     Sig: Take 1 tablet by mouth nightly    gemfibrozil (LOPID) 600 MG tablet 180 tablet 1     Sig: Take 1 tablet by mouth 2 times daily

## 2025-07-01 ENCOUNTER — RESULTS FOLLOW-UP (OUTPATIENT)
Facility: CLINIC | Age: 75
End: 2025-07-01

## 2025-07-01 LAB
ANION GAP SERPL CALC-SCNC: 2 MMOL/L (ref 2–12)
BUN SERPL-MCNC: 22 MG/DL (ref 6–20)
BUN/CREAT SERPL: 17 (ref 12–20)
CALCIUM SERPL-MCNC: 9.2 MG/DL (ref 8.5–10.1)
CHLORIDE SERPL-SCNC: 102 MMOL/L (ref 97–108)
CO2 SERPL-SCNC: 32 MMOL/L (ref 21–32)
CREAT SERPL-MCNC: 1.28 MG/DL (ref 0.7–1.3)
GLUCOSE SERPL-MCNC: 137 MG/DL (ref 65–100)
POTASSIUM SERPL-SCNC: 4.7 MMOL/L (ref 3.5–5.1)
SODIUM SERPL-SCNC: 136 MMOL/L (ref 136–145)

## 2025-07-01 RX ORDER — PANTOPRAZOLE SODIUM 40 MG/1
40 TABLET, DELAYED RELEASE ORAL DAILY
Qty: 90 TABLET | Refills: 3 | Status: SHIPPED | OUTPATIENT
Start: 2025-07-01

## 2025-07-01 RX ORDER — ROSUVASTATIN CALCIUM 40 MG/1
40 TABLET, COATED ORAL NIGHTLY
Qty: 90 TABLET | Refills: 3 | Status: SHIPPED | OUTPATIENT
Start: 2025-07-01

## 2025-07-01 RX ORDER — GEMFIBROZIL 600 MG/1
600 TABLET, FILM COATED ORAL 2 TIMES DAILY
Qty: 180 TABLET | Refills: 3 | Status: SHIPPED | OUTPATIENT
Start: 2025-07-01

## 2025-07-01 ASSESSMENT — ENCOUNTER SYMPTOMS
CONSTIPATION: 0
COUGH: 0
ABDOMINAL PAIN: 0
BACK PAIN: 0
NAUSEA: 0
DIARRHEA: 0
BLOOD IN STOOL: 0
CHEST TIGHTNESS: 0
SHORTNESS OF BREATH: 0

## (undated) DEVICE — 3M™ TEGADERM™ TRANSPARENT FILM DRESSING FRAME STYLE, 1626W, 4 IN X 4-3/4 IN (10 CM X 12 CM), 50/CT 4CT/CASE: Brand: 3M™ TEGADERM™

## (undated) DEVICE — CABLE CATH CONN 150 CM HEXAPOLAR OCTAPOLAR FOR RESPON BLK

## (undated) DEVICE — REM POLYHESIVE ADULT PATIENT RETURN ELECTRODE: Brand: VALLEYLAB

## (undated) DEVICE — MEDI-TRACE CADENCE ADULT, DEFIBRILLATION ELECTRODE -RTS  (10 PR/PK) - PHYSIO-CONTROL: Brand: MEDI-TRACE CADENCE

## (undated) DEVICE — SYSTEM CLOSURE 6-12 FR VEN VASC VASCADE MVP

## (undated) DEVICE — PINNACLE INTRODUCER SHEATH: Brand: PINNACLE

## (undated) DEVICE — CABLE REPROC CATH CONN 150 CM EXTN EP DIAG BLK

## (undated) DEVICE — CABLE EXT EP H/O/D BLK 150CM --

## (undated) DEVICE — NEONATAL-ADULT SPO2 SENSOR: Brand: NELLCOR

## (undated) DEVICE — CABLE EP L150CM RED HEXAPOLAR OCTAPOLAR DECAPOLAR EXTN CONN

## (undated) DEVICE — SET ADMIN 16ML TBNG L100IN 2 Y INJ SITE IV PIGGY BK DISP

## (undated) DEVICE — Device

## (undated) DEVICE — NEEDLE HYPO 18GA L1.5IN PNK S STL HUB POLYPR SHLD REG BVL

## (undated) DEVICE — PROBE ES TEMP HOT AND CLD FAST ACCURATE SFT FLX CIRCA S CATH

## (undated) DEVICE — HEART CATH-MRMC: Brand: MEDLINE INDUSTRIES, INC.

## (undated) DEVICE — CATHETER REPROC SNDSTR ECO 3D DGNSTC ULTRSND USE SMNS IMGNG SSTM 10

## (undated) DEVICE — CABLE RMFG CATH 34PIN ECO 2.7M

## (undated) DEVICE — SHTH GUID 8.5F 22MM MED CRV -- CARTO VIZIGO

## (undated) DEVICE — TUBE SET IRR PUMP THERMALCOOL -- SMARTABLATE

## (undated) DEVICE — CATH EP CRV 7F DUO 2/8 2M LG -- LIVEWIRE STRL

## (undated) DEVICE — TUBING PMP FOR CARTO SYS SMARTABLATE

## (undated) DEVICE — PRESSURE MONITORING SET: Brand: TRUWAVE

## (undated) DEVICE — CATHETER ABLAT 8FR L115CM 1-6-2MM SPC TIP 3.5MM FJ CRV

## (undated) DEVICE — BASIN EMSIS 16OZ GRAPHITE PLAS KID SHP MOLD GRAD FOR ORAL

## (undated) DEVICE — SOLIDIFIER MEDC 1200ML -- CONVERT TO 356117

## (undated) DEVICE — DRESSING HEMOSTATIC INTVENT W/O SLT QUIKCLOT

## (undated) DEVICE — NON-REM POLYHESIVE PATIENT RETURN ELECTRODE: Brand: VALLEYLAB

## (undated) DEVICE — CABLE CATH L10FT RED PIN CONN 34-34 FOR THERMOCOOL

## (undated) DEVICE — CATH EP MAP 2-6-2 7FR F CRV -- PENTARAY

## (undated) DEVICE — PROVE COVER: Brand: UNBRANDED

## (undated) DEVICE — INTRODUCER SHTH 8FR L63CM 8FR DIL GWIRE L145CM DIA0.038IN

## (undated) DEVICE — STRAINER URIN CALC RNL MSH -- CONVERT TO ITEM 357634

## (undated) DEVICE — CONTAINER SPEC 20 ML LID NEUT BUFF FORMALIN 10 % POLYPR STS

## (undated) DEVICE — 1200 GUARD II KIT W/5MM TUBE W/O VAC TUBE: Brand: GUARDIAN

## (undated) DEVICE — CATH REPROC EP DIAG LVWRE DDCA SUPR LG CURL SPACE 28260MM

## (undated) DEVICE — TRAP SUC MUCOUS 70ML -- MEDICHOICE MEDLINE

## (undated) DEVICE — CATHETER ULTRASOUND 10 FRX90 CM FOR CARTO 3 SOUNDSTAR ECO

## (undated) DEVICE — CABLE REPROC INTERFACE CARTO 3

## (undated) DEVICE — TOWEL 4 PLY TISS 19X30 SUE WHT

## (undated) DEVICE — KENDALL RADIOLUCENT FOAM MONITORING ELECTRODE RECTANGULAR SHAPE: Brand: KENDALL

## (undated) DEVICE — SHEATH GUID 11.5X8.5FR L71MM M CRV L22MM BIDIR STEER CARTO

## (undated) DEVICE — TTL1LYR 14FR10ML 100%SILI UMST TR: Brand: MEDLINE

## (undated) DEVICE — SNARE ENDOSCP M L240CM W27MM SHTH DIA2.4MM CHN 2.8MM OVL

## (undated) DEVICE — PATCH CARTO 3 EXT REF --

## (undated) DEVICE — CATH RMFG DECA DUO 7F2/8 95S --

## (undated) DEVICE — 1 X VERSACROSS TRANSSEPTAL SHEATH (INCLUDING  1 X J-TIP GUIDEWIRE); 1 X VERSACROSS RF WIRE (INCLUDING 1 X CONNECTOR CABLE (SINGLE USE)); 1 X DISPERSIVE ELECTRODE: Brand: VERSACROSS ACCESS SOLUTION

## (undated) DEVICE — INTRO SHEATH TRANSSEPTAL 8.5FRX71CM

## (undated) DEVICE — SYR 10ML LUER LOK 1/5ML GRAD --

## (undated) DEVICE — Z DISCONTINUED PER MEDLINE LINE GAS SAMPLING O2/CO2 LNG AD 13 FT NSL W/ TBNG FILTERLINE

## (undated) DEVICE — ELECTRODE PT RET AD L9FT HI MOIST COND ADH HYDRGEL CORDED

## (undated) DEVICE — CATHETER MAP D CRV 3-3-3-3-3 MM SPC GALAXY OCTARAY

## (undated) DEVICE — BAG SPEC BIOHZRD 10 X 10 IN --

## (undated) DEVICE — STERILE (15.2 TAPERED TO 7.6 X 183CM) POLYETHYLENE ACCORDION-FOLDED COVER FOR USE WITH SIEMENS ACUNAV ULTRASOUND CATHETER FAMILY CONNECTOR: Brand: SWIFTLINK TRANSDUCER COVER

## (undated) DEVICE — CABLE CATH L10FT YEL CONN 12-12 PIN ELECTROGRAM CONDUCTION

## (undated) DEVICE — CABLE REPROC EP DIAG EXT RED

## (undated) DEVICE — CATH IV AUTOGRD BC PNK 20GA 25 -- INSYTE

## (undated) DEVICE — FORCEPS BX L240CM JAW DIA2.8MM L CAP W/ NDL MIC MESH TOOTH

## (undated) DEVICE — MEDI-TRACE CADENCE ADULT, DEFIBRILLATION ELECTRODE -RTS  (10 PR/PK) - PHILIPS: Brand: MEDI-TRACE CADENCE

## (undated) DEVICE — SYR 3ML LL TIP 1/10ML GRAD --

## (undated) DEVICE — PATCH REF EXT FOR CARTO 3 SYS (EA = 6 PACKS)